# Patient Record
Sex: MALE | Race: OTHER | NOT HISPANIC OR LATINO | URBAN - METROPOLITAN AREA
[De-identification: names, ages, dates, MRNs, and addresses within clinical notes are randomized per-mention and may not be internally consistent; named-entity substitution may affect disease eponyms.]

---

## 2024-01-21 ENCOUNTER — INPATIENT (INPATIENT)
Facility: HOSPITAL | Age: 64
LOS: 1 days | Discharge: HOME | DRG: 84 | End: 2024-01-23
Attending: HOSPITALIST | Admitting: STUDENT IN AN ORGANIZED HEALTH CARE EDUCATION/TRAINING PROGRAM
Payer: COMMERCIAL

## 2024-01-21 VITALS
HEIGHT: 69 IN | OXYGEN SATURATION: 100 % | SYSTOLIC BLOOD PRESSURE: 152 MMHG | DIASTOLIC BLOOD PRESSURE: 76 MMHG | TEMPERATURE: 97 F | WEIGHT: 175.05 LBS | HEART RATE: 76 BPM | RESPIRATION RATE: 18 BRPM

## 2024-01-21 DIAGNOSIS — R93.89 ABNORMAL FINDINGS ON DIAGNOSTIC IMAGING OF OTHER SPECIFIED BODY STRUCTURES: ICD-10-CM

## 2024-01-21 DIAGNOSIS — Z29.9 ENCOUNTER FOR PROPHYLACTIC MEASURES, UNSPECIFIED: ICD-10-CM

## 2024-01-21 DIAGNOSIS — S20.219A CONTUSION OF UNSPECIFIED FRONT WALL OF THORAX, INITIAL ENCOUNTER: ICD-10-CM

## 2024-01-21 DIAGNOSIS — R55 SYNCOPE AND COLLAPSE: ICD-10-CM

## 2024-01-21 DIAGNOSIS — S70.02XA CONTUSION OF LEFT HIP, INITIAL ENCOUNTER: ICD-10-CM

## 2024-01-21 DIAGNOSIS — S09.90XA UNSPECIFIED INJURY OF HEAD, INITIAL ENCOUNTER: ICD-10-CM

## 2024-01-21 LAB
ALBUMIN SERPL ELPH-MCNC: 3.7 G/DL — SIGNIFICANT CHANGE UP (ref 3.3–5)
ALP SERPL-CCNC: 83 U/L — SIGNIFICANT CHANGE UP (ref 40–120)
ALT FLD-CCNC: 28 U/L — SIGNIFICANT CHANGE UP (ref 12–78)
ANION GAP SERPL CALC-SCNC: 4 MMOL/L — LOW (ref 5–17)
APPEARANCE UR: CLEAR — SIGNIFICANT CHANGE UP
AST SERPL-CCNC: 18 U/L — SIGNIFICANT CHANGE UP (ref 15–37)
BILIRUB SERPL-MCNC: 0.4 MG/DL — SIGNIFICANT CHANGE UP (ref 0.2–1.2)
BILIRUB UR-MCNC: NEGATIVE — SIGNIFICANT CHANGE UP
BUN SERPL-MCNC: 15 MG/DL — SIGNIFICANT CHANGE UP (ref 7–23)
CALCIUM SERPL-MCNC: 9.6 MG/DL — SIGNIFICANT CHANGE UP (ref 8.5–10.1)
CHLORIDE SERPL-SCNC: 107 MMOL/L — SIGNIFICANT CHANGE UP (ref 96–108)
CO2 SERPL-SCNC: 28 MMOL/L — SIGNIFICANT CHANGE UP (ref 22–31)
COLOR SPEC: YELLOW — SIGNIFICANT CHANGE UP
CREAT SERPL-MCNC: 1 MG/DL — SIGNIFICANT CHANGE UP (ref 0.5–1.3)
DIFF PNL FLD: NEGATIVE — SIGNIFICANT CHANGE UP
EGFR: 84 ML/MIN/1.73M2 — SIGNIFICANT CHANGE UP
GLUCOSE SERPL-MCNC: 159 MG/DL — HIGH (ref 70–99)
GLUCOSE UR QL: NEGATIVE MG/DL — SIGNIFICANT CHANGE UP
HCT VFR BLD CALC: 42.9 % — SIGNIFICANT CHANGE UP (ref 39–50)
HGB BLD-MCNC: 13.7 G/DL — SIGNIFICANT CHANGE UP (ref 13–17)
KETONES UR-MCNC: NEGATIVE MG/DL — SIGNIFICANT CHANGE UP
LEUKOCYTE ESTERASE UR-ACNC: NEGATIVE — SIGNIFICANT CHANGE UP
MCHC RBC-ENTMCNC: 26.9 PG — LOW (ref 27–34)
MCHC RBC-ENTMCNC: 31.9 GM/DL — LOW (ref 32–36)
MCV RBC AUTO: 84.1 FL — SIGNIFICANT CHANGE UP (ref 80–100)
NITRITE UR-MCNC: NEGATIVE — SIGNIFICANT CHANGE UP
NRBC # BLD: 0 /100 WBCS — SIGNIFICANT CHANGE UP (ref 0–0)
PH UR: 7.5 — SIGNIFICANT CHANGE UP (ref 5–8)
PLATELET # BLD AUTO: 254 K/UL — SIGNIFICANT CHANGE UP (ref 150–400)
POTASSIUM SERPL-MCNC: 4.6 MMOL/L — SIGNIFICANT CHANGE UP (ref 3.5–5.3)
POTASSIUM SERPL-SCNC: 4.6 MMOL/L — SIGNIFICANT CHANGE UP (ref 3.5–5.3)
PROT SERPL-MCNC: 7.7 G/DL — SIGNIFICANT CHANGE UP (ref 6–8.3)
PROT UR-MCNC: NEGATIVE MG/DL — SIGNIFICANT CHANGE UP
RBC # BLD: 5.1 M/UL — SIGNIFICANT CHANGE UP (ref 4.2–5.8)
RBC # FLD: 14.2 % — SIGNIFICANT CHANGE UP (ref 10.3–14.5)
SODIUM SERPL-SCNC: 139 MMOL/L — SIGNIFICANT CHANGE UP (ref 135–145)
SP GR SPEC: 1.04 — HIGH (ref 1–1.03)
UROBILINOGEN FLD QL: 0.2 MG/DL — SIGNIFICANT CHANGE UP (ref 0.2–1)
WBC # BLD: 10.29 K/UL — SIGNIFICANT CHANGE UP (ref 3.8–10.5)
WBC # FLD AUTO: 10.29 K/UL — SIGNIFICANT CHANGE UP (ref 3.8–10.5)

## 2024-01-21 PROCEDURE — 99223 1ST HOSP IP/OBS HIGH 75: CPT

## 2024-01-21 PROCEDURE — 70450 CT HEAD/BRAIN W/O DYE: CPT | Mod: 26,MA

## 2024-01-21 PROCEDURE — 93010 ELECTROCARDIOGRAM REPORT: CPT

## 2024-01-21 PROCEDURE — 71260 CT THORAX DX C+: CPT | Mod: 26,MA

## 2024-01-21 PROCEDURE — 99285 EMERGENCY DEPT VISIT HI MDM: CPT

## 2024-01-21 PROCEDURE — 72125 CT NECK SPINE W/O DYE: CPT | Mod: 26,MA

## 2024-01-21 PROCEDURE — 74177 CT ABD & PELVIS W/CONTRAST: CPT | Mod: 26,MA

## 2024-01-21 PROCEDURE — 73502 X-RAY EXAM HIP UNI 2-3 VIEWS: CPT | Mod: 26,LT

## 2024-01-21 RX ORDER — ACETAMINOPHEN 500 MG
1000 TABLET ORAL ONCE
Refills: 0 | Status: COMPLETED | OUTPATIENT
Start: 2024-01-21 | End: 2024-01-21

## 2024-01-21 RX ORDER — SODIUM CHLORIDE 9 MG/ML
1000 INJECTION INTRAMUSCULAR; INTRAVENOUS; SUBCUTANEOUS ONCE
Refills: 0 | Status: COMPLETED | OUTPATIENT
Start: 2024-01-21 | End: 2024-01-21

## 2024-01-21 RX ORDER — ACETAMINOPHEN 500 MG
650 TABLET ORAL EVERY 6 HOURS
Refills: 0 | Status: DISCONTINUED | OUTPATIENT
Start: 2024-01-21 | End: 2024-01-23

## 2024-01-21 RX ADMIN — Medication 1000 MILLIGRAM(S): at 20:00

## 2024-01-21 RX ADMIN — Medication 400 MILLIGRAM(S): at 19:39

## 2024-01-21 RX ADMIN — Medication 1000 MILLIGRAM(S): at 20:15

## 2024-01-21 RX ADMIN — SODIUM CHLORIDE 1000 MILLILITER(S): 9 INJECTION INTRAMUSCULAR; INTRAVENOUS; SUBCUTANEOUS at 19:38

## 2024-01-21 RX ADMIN — SODIUM CHLORIDE 1000 MILLILITER(S): 9 INJECTION INTRAMUSCULAR; INTRAVENOUS; SUBCUTANEOUS at 22:23

## 2024-01-21 NOTE — ED PROVIDER NOTE - OBJECTIVE STATEMENT
Patient is a 63-year-old healthy   male with no significant past medical history on no medications no allergies non-smoker nondrinker who fell down basement stairs at home.  Maximum number of stairs was 10.  Patient amnestic to event so what led up to him falling down the stairs i.e. events/symptoms that may have preceded the fall is not known.  Patient was found at the bottom of the stairs by his wife who was immediately present after she heard the sounds.  Patient was initially unconscious (per daughter) and then was confused post event with repetitive questioning and amnesia to event.  Patient was brought to the emergency department shortly thereafter at which time he was awake and alert with above complaints.  Of note he has never had a seizure and he has never had an event like this before.

## 2024-01-21 NOTE — H&P ADULT - PROBLEM SELECTOR PLAN 3
CT chest/CT A/P w/ IV cont: No CT evidence of acute traumatic sequela in the chest, abdomen or pelvis. 3 mm right middle lobe nodule, recommend follow-up chest CT in 6-12 months  - Patient informed about findings  - Patient to follow up outpatient for further management

## 2024-01-21 NOTE — H&P ADULT - NSHPREVIEWOFSYSTEMS_GEN_ALL_CORE
REVIEW OF SYSTEMS:  CONSTITUTIONAL: No fever/chills or appetite changes.  HENMT: No HA, lightheadedness/dizziness  RESPIRATORY: No cough, wheezing, hemoptysis; No shortness of breath.  CARDIOVASCULAR:+ Pleuritic chest pain No palpitations.  GASTROINTESTINAL: No abdominal or epigastric pain. No nausea or vomiting; No diarrhea or constipation.  GENITOURINARY: No dysuria, changes in frequency, hematuria, or incontinence  NEUROLOGICAL: Baseline strength. Sensation intact bilaterally.  SKIN: No itching, rashes  MUSCULOSKELETAL: + L.hip pain, No swelling

## 2024-01-21 NOTE — ED PROVIDER NOTE - CROS ED [facility] ALL NEG
Problem: Safety - Adult  Goal: Free from fall injury  Outcome: Adequate for Discharge  Flowsheets (Taken 11/2/2023 1110)  Free From Fall Injury: Instruct family/caregiver on patient safety  Note: Patient verbalizes understanding of fall precautions. Patient free from falls this visit. Problem: Discharge Planning  Goal: Discharge to home or other facility with appropriate resources  Outcome: Adequate for Discharge  Flowsheets (Taken 11/2/2023 1110)  Discharge to home or other facility with appropriate resources: Identify barriers to discharge with patient and caregiver  Note: Verbalized understanding of discharge instructions, follow-up appointments, and when to call the physician. Problem: Infection - Adult  Goal: Absence of infection at discharge  Outcome: Adequate for Discharge  Flowsheets (Taken 11/2/2023 1110)  Absence of infection at discharge:   Assess and monitor for signs and symptoms of infection   Monitor all insertion sites i.e., indwelling lines, tubes and drains  Note: Mediport site with no redness or warmth. Skin over port intact with no signs of breakdown noted. Patient verbalizes signs/symptoms of port infection and when to notify the physician. Chemotherapy Teaching     What is Chemotherapy   Drug action [x]   Method of Administration [x]   Handouts given []     Side Effects  Nausea/vomiting [x]   Diarrhea [x]   Fatigue [x]   Signs / Symptoms of infection [x]   Neutropenia [x]   Thrombocytopenia [x]   Alopecia [x]   neuropathy [x]   [place] diet &  the importance of fluids [x]       Micellaneous  Importance of nutrition [x]   Importance of oral hygiene [x]   When to call the MD [x]   Monitoring labs [x]   Use of supportive services []     Explanation of Drug Regimen / Frequency  5FU     Comments  Verbalized understanding to drug,action,side effects and when to call MD     Care plan reviewed with patient.   Patient verbalizes understanding of the plan of care and contribute to goal - - -

## 2024-01-21 NOTE — H&P ADULT - NSHPSOCIALHISTORY_GEN_ALL_CORE
Denies alcohol, tobacco, illicit drug use. Lives w/ wife and daughter. Able to ambulate and perform all ADL's indepdently.

## 2024-01-21 NOTE — ED PROVIDER NOTE - CARE PLAN
Principal Discharge DX:	Contusion, chest wall  Secondary Diagnosis:	Head trauma   1 Principal Discharge DX:	Syncope  Secondary Diagnosis:	Head trauma

## 2024-01-21 NOTE — H&P ADULT - NSHPPHYSICALEXAM_GEN_ALL_CORE
CONSTITUTIONAL: Well groomed, no apparent distress  EYES: No conjunctival or scleral injection, non-icteric  ENMT: Oral mucosa with moist membranes.   NECK: Supple, symmetric and without tracheal deviation   RESP: No respiratory distress, no use of accessory muscles; CTA b/l, no WRR  CV: RRR, +S1S2, no peripheral edema  GI: Soft, NT, ND  MSK: + L. hip pain TTP, Midsternum chest wall TTP, soft tissue swelling to the upper left portion of the forehead  SKIN: No rashes/ abrasions/ ecchymosis noted   NEURO: Sensation intact in upper and lower extremities b/l to light touch   PSYCH: Appropriate insight/judgment; A+O x 3, mood and affect appropriate

## 2024-01-21 NOTE — ED PROVIDER NOTE - PHYSICAL EXAMINATION
Examination reveals a well-developed well-nourished [place] male in no acute distress with the following.  HEENT normocephalic soft tissue swelling to the upper left portion of the forehead.  Pupils are equal round reactive to light and accommodation extraocular movements intact neck is supple with no midline tenderness.  Chest wall no ecchymosis but there is reproducible tenderness to palpation in the inferior lateral aspect of his chest wall without any crepitance.  Lungs are clear and symmetrical bilaterally heart regular rate and rhythm without any murmurs or gallops.  Abdomen is soft with minimal tenderness to deep palpation left upper quadrant.  Upper extremities completely normal no bruising no abrasions free range of motion of all joints.  Pelvis left hip reveals mild tenderness to palpation left proximal femur or hip area without any bruising.  Neurologically alert oriented x 4 without any focal neurologic deficits skin is warm and dry.

## 2024-01-21 NOTE — ED ADULT NURSE NOTE - NSFALLRISKINTERV_ED_ALL_ED

## 2024-01-21 NOTE — ED ADULT NURSE NOTE - OBJECTIVE STATEMENT
Pt. received alert and oriented x3 with chief complaint of trip and fall down 10 stairs landing on head w/ LOC. Pt. found on floor by daughter. Pt. presents w/ head/chest/left leg pain. Pt. placed on continuous cardiac monitor with continuous pulse ox. Pt. received alert and oriented x3 with chief complaint of trip and fall down 10 stairs landing on head w/ LOC. Pt. found on floor by daughter. Pt. presents w/ head/chest/left leg pain. Pt. placed on continuous cardiac monitor with continuous pulse ox. Hard c-collar placed on pt. upon arrival into room 18a.

## 2024-01-21 NOTE — ED PROVIDER NOTE - PROGRESS NOTE DETAILS
Radiology contacted me at approximately 7:45 PM to inform me of questionable "Speck" of blood immediately adjacent to the dura the right frontal region and recommended delayed imaging in 4 hours to see if density decreases stays the same or increases. Radiology contacted me at approximately 7:45 PM to inform me of questionable small focus of blood immediately adjacent to the dura the right frontal region and recommended delayed imaging in 4 hours to see if density decreases stays the same or increases.

## 2024-01-21 NOTE — H&P ADULT - PROBLEM SELECTOR PLAN 2
- CT Head no cont: Left frontal extracalvarial hematoma. Subtle focus of increased attenuation in a right frontal location, which may represent a small focus of subarachnoid hemorrhage,  - Will obtain repeat CT Head, f/u results   - No neurological symptoms

## 2024-01-21 NOTE — ED ADULT TRIAGE NOTE - CHIEF COMPLAINT QUOTE
patient to ED via wheelchair with c/o fall down stairs +LOC. denies use of blood thinners. unsure if fell first or syncopized first. also with left side rib pain

## 2024-01-21 NOTE — H&P ADULT - PROBLEM SELECTOR PLAN 1
Patient presents w/ fall with unclear etiology - DDX: mechanical fall vs arrythmia vs orthostatic hypotension   - CT Head no cont: Left frontal extracalvarial hematoma. Subtle focus of increased attenuation in a right frontal location, which may represent a small focus of subarachnoid hemorrhage  -  CT cervical spine no cont:  No acute fracture or traumatic subluxation. Multi-level degenerative changes, most prominent at C5-C6   -Check orthostatic vitals  - Check TSH, A1c, lipid profile, monitor electrolytes  - Telemetry monitoring to r/o arrhythmia  - F/u X ray hip pelvis   - Fall and aspiration precautions  - F/u TTE  - PT/ OT evaluation  - Tylenol 650mg PRN for mild pain   - Will hold off neuro consult at this time Patient presents w/ fall with unclear etiology - DDX: mechanical fall vs arrythmia vs orthostatic hypotension   - CT Head no cont: Left frontal extracalvarial hematoma. Subtle focus of increased attenuation in a right frontal location, which may represent a small focus of subarachnoid hemorrhage  -  CT cervical spine no cont:  No acute fracture or traumatic subluxation. Multi-level degenerative changes, most prominent at C5-C6   -Check orthostatic vitals  - Check TSH, A1c, lipid profile, monitor electrolytes  - Telemetry monitoring to r/o arrhythmia  - F/u X ray hip pelvis   - Fall and aspiration precautions  - F/u TTE  - PT/ OT evaluation  - Tylenol 650mg PRN for mild pain   - Can consider neuro consult if needed.

## 2024-01-21 NOTE — H&P ADULT - ATTENDING COMMENTS
Negative
64yo M w/ no significant PMH (on no meds) presents w/ fall. Patient states he was going downstairs when he suddenly fell (perhaps 10 stairs) and lost consciousness. Admitted for syncope, head trauma.     Syncope and collapse - Denies prodromal symptoms. CTH noted as above, will get MRI brain w/wo con to r/o acute/chronic pathology. f/u orthostatics, TTE in am. Fall precautions, neuro checks q4h. PT when able.   Traumatic fall 2/2 syncope - Trauma scan with CT head, cervical spine, chest/abdomen/pelvis negative for fractures. monitor on tele, PT when able.  3 mm lung nodule - f/u for screening as outpatient.  DVT ppx: SCDs for now

## 2024-01-21 NOTE — H&P ADULT - HISTORY OF PRESENT ILLNESS
64yo M w/ no significant PMH (on no meds) presents w/ fall. Patient states he was going downstairs when he suddenly fell (perhaps 10 stairs) and lost consciousness. Unable to contact family for history. Patient states that he felt normal and did not have any inciting event. Patient however is unsure of how he fell and states "he must have slipped". Patient states he lost consciousness for perhaps 10 minutes and his wife found him at the bottom of the stairs. As per ED note, patient was confused post event and was amnesic to event. Patient states he has never had a fall like this before and has no cardiac hx/ no seizure hx. Patient has seen a neurologist a few years ago but states it was because his legs "did not work as well" after having chikungunya infection. However patient is able to ambulate independently. Patient currently admits to achy mid sternum pain after the fall, rates it 8/10, non radiating, achy in nature, and pleuritic. Patient also has L. hip pain, rated 8/10, achy in nature, worse with movement. Patient states that he has been well hydrated and has been eating well. Drinks about 6-7 glasses of water per day. Patient currently denies fevers, chills, headache, congestion, sore throat, palpitations, sob, degroot, abdominal pain, n/v.     IN THE ED:  Temp  96.8 F , HR 76  , BP  152/76  ,RR 18 , SpO2 100% RA   S/P 1L Bolus NS IV x1, Ofirmev IV x1  EKG: Ordered   Labs significant for: WBC 10.29, H/H 13.2/42.9, Na 139, K 4.6, BUN/Cr 15/1,00,  Imaging: CT Head no cont: Left frontal extracalvarial hematoma. Subtle focus of increased attenuation in a right frontal location, which may represent a small focus of subarachnoid hemorrhage, attention on follow-up.; CT cervical spine no cont:  No acute fracture or traumatic subluxation. Multi-level degenerative changes, most prominent at C5-C6; CT chest/CT A/P w/ IV cont: No CT evidence of acute traumatic sequela in the chest, abdomen or pelvis. 3 mm right middle lobe nodule, recommend follow-up chest CT in 6-12 months.

## 2024-01-21 NOTE — ED PROVIDER NOTE - CLINICAL SUMMARY MEDICAL DECISION MAKING FREE TEXT BOX
63-year-old male fell downstairs short while ago at home sustaining injury to head and left chest as well as hip with questionable loss of consciousness and amnesia to event requiring thorough evaluation labs EKG and trauma imaging of the head neck chest abdomen and pelvis.

## 2024-01-21 NOTE — H&P ADULT - ASSESSMENT
64yo M w/ no significant PMH (on no meds) presents w/ fall. Patient states he was going downstairs when he suddenly fell (perhaps 10 stairs) and lost consciousness. Admitted for syncope, head trauma.

## 2024-01-22 LAB
A1C WITH ESTIMATED AVERAGE GLUCOSE RESULT: 5.5 % — SIGNIFICANT CHANGE UP (ref 4–5.6)
ALBUMIN SERPL ELPH-MCNC: 3.4 G/DL — SIGNIFICANT CHANGE UP (ref 3.3–5)
ALP SERPL-CCNC: 77 U/L — SIGNIFICANT CHANGE UP (ref 40–120)
ALT FLD-CCNC: 24 U/L — SIGNIFICANT CHANGE UP (ref 12–78)
ANION GAP SERPL CALC-SCNC: 1 MMOL/L — LOW (ref 5–17)
AST SERPL-CCNC: 11 U/L — LOW (ref 15–37)
BASOPHILS # BLD AUTO: 0.02 K/UL — SIGNIFICANT CHANGE UP (ref 0–0.2)
BASOPHILS NFR BLD AUTO: 0.3 % — SIGNIFICANT CHANGE UP (ref 0–2)
BILIRUB SERPL-MCNC: 0.6 MG/DL — SIGNIFICANT CHANGE UP (ref 0.2–1.2)
BUN SERPL-MCNC: 11 MG/DL — SIGNIFICANT CHANGE UP (ref 7–23)
CALCIUM SERPL-MCNC: 9.1 MG/DL — SIGNIFICANT CHANGE UP (ref 8.5–10.1)
CHLORIDE SERPL-SCNC: 108 MMOL/L — SIGNIFICANT CHANGE UP (ref 96–108)
CHOLEST SERPL-MCNC: 169 MG/DL — SIGNIFICANT CHANGE UP
CO2 SERPL-SCNC: 29 MMOL/L — SIGNIFICANT CHANGE UP (ref 22–31)
CREAT SERPL-MCNC: 0.9 MG/DL — SIGNIFICANT CHANGE UP (ref 0.5–1.3)
EGFR: 95 ML/MIN/1.73M2 — SIGNIFICANT CHANGE UP
EOSINOPHIL # BLD AUTO: 0.1 K/UL — SIGNIFICANT CHANGE UP (ref 0–0.5)
EOSINOPHIL NFR BLD AUTO: 1.5 % — SIGNIFICANT CHANGE UP (ref 0–6)
ESTIMATED AVERAGE GLUCOSE: 111 MG/DL — SIGNIFICANT CHANGE UP (ref 68–114)
GLUCOSE SERPL-MCNC: 108 MG/DL — HIGH (ref 70–99)
HCT VFR BLD CALC: 41.7 % — SIGNIFICANT CHANGE UP (ref 39–50)
HCV AB S/CO SERPL IA: 0.15 S/CO — SIGNIFICANT CHANGE UP (ref 0–0.99)
HCV AB SERPL-IMP: SIGNIFICANT CHANGE UP
HDLC SERPL-MCNC: 38 MG/DL — LOW
HGB BLD-MCNC: 13.1 G/DL — SIGNIFICANT CHANGE UP (ref 13–17)
IMM GRANULOCYTES NFR BLD AUTO: 0.2 % — SIGNIFICANT CHANGE UP (ref 0–0.9)
LIPID PNL WITH DIRECT LDL SERPL: 109 MG/DL — HIGH
LYMPHOCYTES # BLD AUTO: 2.1 K/UL — SIGNIFICANT CHANGE UP (ref 1–3.3)
LYMPHOCYTES # BLD AUTO: 32.4 % — SIGNIFICANT CHANGE UP (ref 13–44)
MCHC RBC-ENTMCNC: 26.6 PG — LOW (ref 27–34)
MCHC RBC-ENTMCNC: 31.4 GM/DL — LOW (ref 32–36)
MCV RBC AUTO: 84.6 FL — SIGNIFICANT CHANGE UP (ref 80–100)
MONOCYTES # BLD AUTO: 0.74 K/UL — SIGNIFICANT CHANGE UP (ref 0–0.9)
MONOCYTES NFR BLD AUTO: 11.4 % — SIGNIFICANT CHANGE UP (ref 2–14)
NEUTROPHILS # BLD AUTO: 3.51 K/UL — SIGNIFICANT CHANGE UP (ref 1.8–7.4)
NEUTROPHILS NFR BLD AUTO: 54.2 % — SIGNIFICANT CHANGE UP (ref 43–77)
NON HDL CHOLESTEROL: 132 MG/DL — HIGH
NRBC # BLD: 0 /100 WBCS — SIGNIFICANT CHANGE UP (ref 0–0)
PLATELET # BLD AUTO: 245 K/UL — SIGNIFICANT CHANGE UP (ref 150–400)
POTASSIUM SERPL-MCNC: 4.2 MMOL/L — SIGNIFICANT CHANGE UP (ref 3.5–5.3)
POTASSIUM SERPL-SCNC: 4.2 MMOL/L — SIGNIFICANT CHANGE UP (ref 3.5–5.3)
PROT SERPL-MCNC: 7 G/DL — SIGNIFICANT CHANGE UP (ref 6–8.3)
RAPID RVP RESULT: SIGNIFICANT CHANGE UP
RBC # BLD: 4.93 M/UL — SIGNIFICANT CHANGE UP (ref 4.2–5.8)
RBC # FLD: 14.2 % — SIGNIFICANT CHANGE UP (ref 10.3–14.5)
SARS-COV-2 RNA SPEC QL NAA+PROBE: SIGNIFICANT CHANGE UP
SODIUM SERPL-SCNC: 138 MMOL/L — SIGNIFICANT CHANGE UP (ref 135–145)
TRIGL SERPL-MCNC: 126 MG/DL — SIGNIFICANT CHANGE UP
TROPONIN I, HIGH SENSITIVITY RESULT: 9.3 NG/L — SIGNIFICANT CHANGE UP
TSH SERPL-MCNC: 0.35 UIU/ML — LOW (ref 0.36–3.74)
WBC # BLD: 6.48 K/UL — SIGNIFICANT CHANGE UP (ref 3.8–10.5)
WBC # FLD AUTO: 6.48 K/UL — SIGNIFICANT CHANGE UP (ref 3.8–10.5)

## 2024-01-22 PROCEDURE — 93306 TTE W/DOPPLER COMPLETE: CPT | Mod: 26

## 2024-01-22 PROCEDURE — 99223 1ST HOSP IP/OBS HIGH 75: CPT

## 2024-01-22 PROCEDURE — 99232 SBSQ HOSP IP/OBS MODERATE 35: CPT

## 2024-01-22 PROCEDURE — 93880 EXTRACRANIAL BILAT STUDY: CPT | Mod: 26

## 2024-01-22 PROCEDURE — 70450 CT HEAD/BRAIN W/O DYE: CPT | Mod: 26

## 2024-01-22 PROCEDURE — 70553 MRI BRAIN STEM W/O & W/DYE: CPT | Mod: 26

## 2024-01-22 RX ORDER — INFLUENZA VIRUS VACCINE 15; 15; 15; 15 UG/.5ML; UG/.5ML; UG/.5ML; UG/.5ML
0.7 SUSPENSION INTRAMUSCULAR ONCE
Refills: 0 | Status: DISCONTINUED | OUTPATIENT
Start: 2024-01-22 | End: 2024-01-23

## 2024-01-22 RX ADMIN — Medication 650 MILLIGRAM(S): at 05:14

## 2024-01-22 RX ADMIN — Medication 650 MILLIGRAM(S): at 13:53

## 2024-01-22 RX ADMIN — Medication 650 MILLIGRAM(S): at 07:00

## 2024-01-22 RX ADMIN — Medication 650 MILLIGRAM(S): at 23:49

## 2024-01-22 NOTE — CONSULT NOTE ADULT - ASSESSMENT
CHARTING IN PROGRESS  64yo M w/ no significant PMH (on no meds) presents w/ fall. Admitted for syncope work-up. CTH noted to have small right frontal subarachnoid hemorrhage.    #Syncope  - Patient is not complaining of any cardiac symptoms at this time.  - No clear evidence of acute ischemia, trops negative x 1.  - EKG sinus rhythm with 1st deg AV block  - No meaningful evidence of volume overload.  - TTE with LVEF 64%, grossly normal  - F/u orthostatic VS  - BP well controlled, monitor routine hemodynamics.  - No telemetry events to date. Continue to monitor on tele  - TSH borderline low, f/u T3 and T4  - CTH with small right frontal subarachnoid hemorrhage, repeat CTH stable. Pending MRI  - Monitor and replete lytes, keep K>4, Mg>2.  - Other cardiovascular workup will depend on clinical course.  - All other workup per primary team.  - Will continue to follow.  - Will need outpatient follow up for Zio/Holter monitor   62yo M w/ no significant PMH (on no meds) presents w/ fall. Admitted for syncope work-up. CTH noted to have small right frontal subarachnoid hemorrhage.    #Syncope  - Likely arrhythmogenic  - No clear evidence of acute ischemia, trops negative x 1.  - EKG sinus rhythm with 1st deg AV block  - No telemetry events to date. Continue to monitor on tele  - Will need outpatient follow up for 2wk Zio monitor and stress test  - No meaningful evidence of volume overload.  - TTE with LVEF 64%, grossly normal  - F/u orthostatic VS  - BP well controlled, monitor routine hemodynamics.  - TSH borderline low, f/u T3 and T4  - CTH with small right frontal subarachnoid hemorrhage, repeat CTH stable. Pending MRI  - Monitor and replete lytes, keep K>4, Mg>2.  - Other cardiovascular workup will depend on clinical course.  - All other workup per primary team.  - Will continue to follow.   62yo M w/ no significant PMH (on no meds) presents w/ fall. Admitted for syncope work-up. CTH noted to have small right frontal subarachnoid hemorrhage.    #Syncope  - Possibly arrhythmogenic  - No clear evidence of acute ischemia, trops negative x 1.  - EKG sinus rhythm with 1st deg AV block  - No telemetry events to date. Continue to monitor on tele  - Will need outpatient follow up for 2wk Zio monitor and stress test  - No meaningful evidence of volume overload.  - TTE with LVEF 64%, grossly normal  - F/u orthostatic VS  - BP well controlled, monitor routine hemodynamics.  - TSH borderline low, f/u T3 and T4  - CTH with small right frontal subarachnoid hemorrhage, repeat CTH stable. Pending MRI  - Monitor and replete lytes, keep K>4, Mg>2.  - Other cardiovascular workup will depend on clinical course.  - All other workup per primary team.  - Will continue to follow.

## 2024-01-22 NOTE — PROGRESS NOTE ADULT - PROBLEM SELECTOR PLAN 2
- CT Head no cont: Left frontal extracalvarial hematoma. Subtle focus of increased attenuation in a right frontal location, which may represent a small focus of subarachnoid hemorrhage,  - No neurological symptoms  plan  repeat ct head now  MRI brain

## 2024-01-22 NOTE — PATIENT PROFILE ADULT - FALL HARM RISK - RISK INTERVENTIONS

## 2024-01-22 NOTE — CONSULT NOTE ADULT - ATTENDING COMMENTS
62yo M w/ no significant PMH (on no meds) presents w/ fall. Admitted for syncope work-up. CTH noted to have small right frontal subarachnoid hemorrhage.    Very concerning syncopal event  No prodrome noted and he has no memory of the event  Now with small subarachnoid hemorrhage   EKG with NSR with first degree, no delta wave noted  TTE with normal LV, RV function, no significant valvular disease, no pHTN  No events on tele thus far but has been off most of the day  Would obtain orthostatics  Would obtain carotid dopplers in this scenario   MRI done, read is pending  Continue tele monitoring  Will need very close Cardiology follow up with me at least EKG stress + 2 week Steven

## 2024-01-22 NOTE — PROGRESS NOTE ADULT - PROBLEM SELECTOR PLAN 1
Patient presents w/ fall with unclear etiology - DDX: mechanical fall vs arrythmia vs orthostatic hypotension   - CT Head no cont: Left frontal extracalvarial hematoma. Subtle focus of increased attenuation in a right frontal location, which may represent a small focus of subarachnoid hemorrhage  -  CT cervical spine no cont:  No acute fracture or traumatic subluxation. Multi-level degenerative changes, most prominent at C5-C6  -tele thus far benign  -echo benign  -PT consulted  plan:  -cards consult  - Check TSH, A1c, lipid profile, monitor electrolytes  - Fall and aspiration precautions  - F/u TTE  - PT/ OT evaluation  - Tylenol 650mg PRN for mild pain   - Can consider neuro consult if needed.

## 2024-01-22 NOTE — CHART NOTE - NSCHARTNOTEFT_GEN_A_CORE
[facility]     62yo M w/ no significant PMH (on no meds) presented w/ fall. Patient stated he was going downstairs when he suddenly fell (perhaps 10 stairs) and lost consciousness. Admitted for syncope, head trauma.   - CT Head no cont: Left frontal extracalvarial hematoma. Subtle focus of increased attenuation in a right frontal location, which may represent a small focus of subarachnoid hemorrhage    Neuro intact per report    Stable repeat CTH  MRI brain pending    Neurochecks q4h  hold AC/AP   SBP goal <160    No indication for Neurosurgical or Neurocritical care intervention at this time. Initial injury>24h ago and stable exam/imaging.  Please recall if any neurologic decompensation or acute findings on imaging.      Case discussed with Neurosurgery Dr. Polanco and Hospitalist Dr. Farrell

## 2024-01-22 NOTE — PHYSICAL THERAPY INITIAL EVALUATION ADULT - TRANSFER TRAINING, PT EVAL
Patient will improve sit <-> stand with independence 3-5 sessions in order for patient to safely get in and out of chair

## 2024-01-22 NOTE — PATIENT PROFILE ADULT - NSPROMEDSBROUGHTTOHOSP_GEN_A_NUR
Nutrition Assessment   Assessment Type: Follow up    Demographic/Anthropometrics Information  Gender: male   Patient Age: 52 year old  Height:    Ht Readings from Last 1 Encounters:   03/06/20 6' 5.01\" (1.956 m)      Weight:   Wt Readings from Last 1 Encounters:   03/06/20 103 kg      BMI:   BMI Readings from Last 1 Encounters:   03/06/20 26.92 kg/m²     Weight Classification: Overweight (BMI 25-29.9)    Estimated Nutritional Needs  Needs previously estimated to be 1430-4499 kcals per day, 13-14 servings carb per day    Nutrition Diagnosis (PES)  Food and nutrition knowledge related deficit related to Lack of prior exposure to information as evidenced by Need for Education    Nutrition Plan  Recommended Nutrition Intervention: nutrition education  Monitor: Biochemical data, medical tests, procedures and Weight    New Diet recall  Breakfast- 1-2 slices bread with butter, eggs OR ramen noodles with boiled egg, unsweetened tea    Snack- 12 crackers and unsweetened tea    Dinner- 3 cups rice, greens, fried or baked meat    Dietitian Notes/Impressions/Recommendations:  Follow up for pt with type 2 DM, HTN and HL.  Pt's wife stated that she is trying to limit portion sizes for pt and encourages him to eat 3 meals per day.  Pt does not seem to be taking a leading role in his healthy eating.  Pt is not carb counting; at this point the plate method seems more appropriate for pt, which was discussed at initial visit.    Per pt, fasting glucose is running >= 200mg/dL;  He used to take lantus but stopped d/t cost.  Pt is planning on restarting lantus soon.    Reviewed plate method and discussed need for 3 meals daily.  Asked pt if he can increase walking from 30 minutes per day to 45 minutes but he said he has pain if he walks more.    New Goals:  1. Eat 3 meals daily  2. Eat a good source of protein at each meal  3. Limit serving of rice to 1 cup  4. Continue to walk 30 minutes, 4-5 days per week    Pt to call if he would like  follow up appointment.    Marycruz Sung, MS, RDN, LDN, CDE  Clinical Dietitian       no

## 2024-01-22 NOTE — PHYSICAL THERAPY INITIAL EVALUATION ADULT - PERTINENT HX OF CURRENT PROBLEM, REHAB EVAL
64yo M w/ no significant PMH (on no meds) presents w/ fall. Patient states he was going downstairs when he suddenly fell (perhaps 10 stairs) and lost consciousness. Unable to contact family for history. Patient states that he felt normal and did not have any inciting event. Patient however is unsure of how he fell and states "he must have slipped". Patient states he lost consciousness for perhaps 10 minutes and his wife found him at the bottom of the stairs. As per ED note, patient was confused post event and was amnesic to event. Patient states he has never had a fall like this before and has no cardiac hx/ no seizure hx. Patient has seen a neurologist a few years ago but states it was because his legs "did not work as well" after having chikungunya infection. However patient is able to ambulate independently. Patient currently admits to achy mid sternum pain after the fall, rates it 8/10, non radiating, achy in nature, and pleuritic. Patient also has L. hip pain, rated 8/10, achy in nature, worse with movement. Patient states that he has been well hydrated and has been eating well. Drinks about 6-7 glasses of water per day. Patient currently denies fevers, chills, headache, congestion, sore throat, palpitations, sob, degroot, abdominal pain, n/v.

## 2024-01-22 NOTE — PHYSICAL THERAPY INITIAL EVALUATION ADULT - ADDITIONAL COMMENTS
History obtained from patient and patient's dtr bedside. Patient lives with dtr in private home with 4 steps to enter and 10+14 steps inside house. Patient reports prior to admission being independent with no assistive device with all functional mobility.

## 2024-01-22 NOTE — CONSULT NOTE ADULT - SUBJECTIVE AND OBJECTIVE BOX
[facility]    Ronnie Mejia Patel, Jose, Nishant        [phone] (office)    Reason for Consult: syncope    Interval HPI: Patient seen and examined at bedside. No acute events overnight.     HPI:  62yo M w/ no significant PMH (on no meds) presents w/ fall. Patient states he was going downstairs when he suddenly fell (perhaps 10 stairs) and lost consciousness. Unable to contact family for history. Patient states that he felt normal and did not have any inciting event. Patient however is unsure of how he fell and states "he must have slipped". Patient states he lost consciousness for perhaps 10 minutes and his wife found him at the bottom of the stairs. As per ED note, patient was confused post event and was amnesic to event. Patient states he has never had a fall like this before and has no cardiac hx/ no seizure hx. Patient has seen a neurologist a few years ago but states it was because his legs "did not work as well" after having chikungunya infection. However patient is able to ambulate independently. Patient currently admits to achy mid sternum pain after the fall, rates it 8/10, non radiating, achy in nature, and pleuritic. Patient also has L. hip pain, rated 8/10, achy in nature, worse with movement. Patient states that he has been well hydrated and has been eating well. Drinks about 6-7 glasses of water per day. Patient currently denies fevers, chills, headache, congestion, sore throat, palpitations, sob, degroot, abdominal pain, n/v.     IN THE ED:  Temp  96.8 F , HR 76  , BP  152/76  ,RR 18 , SpO2 100% RA   S/P 1L Bolus NS IV x1, Ofirmev IV x1  EKG: Ordered   Labs significant for: WBC 10.29, H/H 13.2/42.9, Na 139, K 4.6, BUN/Cr 15/1,00,  Imaging: CT Head no cont: Left frontal extracalvarial hematoma. Subtle focus of increased attenuation in a right frontal location, which may represent a small focus of subarachnoid hemorrhage, attention on follow-up.; CT cervical spine no cont:  No acute fracture or traumatic subluxation. Multi-level degenerative changes, most prominent at C5-C6; CT chest/CT A/P w/ IV cont: No CT evidence of acute traumatic sequela in the chest, abdomen or pelvis. 3 mm right middle lobe nodule, recommend follow-up chest CT in 6-12 months.         (21 Jan 2024 21:42)      PAST MEDICAL & SURGICAL HISTORY:  No pertinent past medical history      No significant past surgical history          SOCIAL HISTORY: No active tobacco, alcohol or illicit drug use    FAMILY HISTORY:  No pertinent family history in first degree relatives        Home Medications:      MEDICATIONS  (STANDING):    MEDICATIONS  (PRN):  acetaminophen     Tablet .. 650 milliGRAM(s) Oral every 6 hours PRN Temp greater or equal to 38C (100.4F), Mild Pain (1 - 3)      Allergies    No Known Allergies    Intolerances        REVIEW OF SYSTEMS: Negative except as per HPI.    VITAL SIGNS:   Vital Signs Last 24 Hrs  T(C): 37.1 (22 Jan 2024 12:55), Max: 37.1 (22 Jan 2024 12:55)  T(F): 98.8 (22 Jan 2024 12:55), Max: 98.8 (22 Jan 2024 12:55)  HR: 77 (22 Jan 2024 12:55) (76 - 83)  BP: 135/81 (22 Jan 2024 12:55) (117/60 - 152/76)  BP(mean): --  RR: 18 (22 Jan 2024 12:55) (18 - 19)  SpO2: 99% (22 Jan 2024 12:55) (97% - 100%)    Parameters below as of 22 Jan 2024 12:00  Patient On (Oxygen Delivery Method): room air        I&O's Summary      PHYSICAL EXAM:  Constitutional: NAD, well-developed  HEENT NC/AT, moist mucous membranes  Pulmonary: Non-labored, breath sounds are clear bilaterally, no wheezing, rales or rhonchi  Cardiovascular: +S1, S2, RRR, no murmur  Gastrointestinal: Soft, nontender, nondistended, normoactive bowel sounds  Extremities: No peripheral edema   Neurological: Alert, strength and sensitivity are grossly intact  Skin: No obvious lesions/rashes  Psych: Mood & affect appropriate    LABS: All Labs Reviewed:                        13.1   6.48  )-----------( 245      ( 22 Jan 2024 14:28 )             41.7                         13.7   10.29 )-----------( 254      ( 21 Jan 2024 19:02 )             42.9     22 Jan 2024 14:28    138    |  108    |  11     ----------------------------<  108    4.2     |  29     |  0.90   21 Jan 2024 19:02    139    |  107    |  15     ----------------------------<  159    4.6     |  28     |  1.00     Ca    9.1        22 Jan 2024 14:28  Ca    9.6        21 Jan 2024 19:02    TPro  7.0    /  Alb  3.4    /  TBili  0.6    /  DBili  x      /  AST  11     /  ALT  24     /  AlkPhos  77     22 Jan 2024 14:28  TPro  7.7    /  Alb  3.7    /  TBili  0.4    /  DBili  x      /  AST  18     /  ALT  28     /  AlkPhos  83     21 Jan 2024 19:02          Blood Culture:     01-22 @ 14:28  TSH: 0.35      EKG: sinus rhythm with 1st deg AV block    RADIOLOGY:    CTH with Left frontal extracalvarial hematoma. Subtle focus of increased attenuation in a right frontal location, which may represent a small focus of subarachnoid hemorrhage   [facility]    Ronnie Mejia Patel, Jose, Nishant        [phone] (office)    Reason for Consult: syncope    Interval HPI: Patient seen and examined at bedside. Denies medical hx, not any meds, has never seen a cardiologist. Reports this is the first time he has lost consciousness and fallen, unsure of down time, was found by his wife and daughter at the bottom of the stairs, fell down about 10 steps. Complaining of reproducible right sided chest pain after the fall. No other acute cardiac complaints. Denies personal or familial cardiac hx or hx of sudden death in the family.    HPI:  62yo M w/ no significant PMH (on no meds) presents w/ fall. Patient states he was going downstairs when he suddenly fell (perhaps 10 stairs) and lost consciousness. Unable to contact family for history. Patient states that he felt normal and did not have any inciting event. Patient however is unsure of how he fell and states "he must have slipped". Patient states he lost consciousness for perhaps 10 minutes and his wife found him at the bottom of the stairs. As per ED note, patient was confused post event and was amnesic to event. Patient states he has never had a fall like this before and has no cardiac hx/ no seizure hx. Patient has seen a neurologist a few years ago but states it was because his legs "did not work as well" after having chikungunya infection. However patient is able to ambulate independently. Patient currently admits to achy mid sternum pain after the fall, rates it 8/10, non radiating, achy in nature, and pleuritic. Patient also has L. hip pain, rated 8/10, achy in nature, worse with movement. Patient states that he has been well hydrated and has been eating well. Drinks about 6-7 glasses of water per day. Patient currently denies fevers, chills, headache, congestion, sore throat, palpitations, sob, degroot, abdominal pain, n/v.     IN THE ED:  Temp  96.8 F , HR 76  , BP  152/76  ,RR 18 , SpO2 100% RA   S/P 1L Bolus NS IV x1, Ofirmev IV x1  EKG: Ordered   Labs significant for: WBC 10.29, H/H 13.2/42.9, Na 139, K 4.6, BUN/Cr 15/1,00,  Imaging: CT Head no cont: Left frontal extracalvarial hematoma. Subtle focus of increased attenuation in a right frontal location, which may represent a small focus of subarachnoid hemorrhage, attention on follow-up.; CT cervical spine no cont:  No acute fracture or traumatic subluxation. Multi-level degenerative changes, most prominent at C5-C6; CT chest/CT A/P w/ IV cont: No CT evidence of acute traumatic sequela in the chest, abdomen or pelvis. 3 mm right middle lobe nodule, recommend follow-up chest CT in 6-12 months.         (21 Jan 2024 21:42)      PAST MEDICAL & SURGICAL HISTORY:  No pertinent past medical history      No significant past surgical history          SOCIAL HISTORY: No active tobacco, alcohol or illicit drug use    FAMILY HISTORY:  No pertinent family history in first degree relatives        Home Medications:      MEDICATIONS  (STANDING):    MEDICATIONS  (PRN):  acetaminophen     Tablet .. 650 milliGRAM(s) Oral every 6 hours PRN Temp greater or equal to 38C (100.4F), Mild Pain (1 - 3)      Allergies    No Known Allergies    Intolerances        REVIEW OF SYSTEMS: Negative except as per HPI.    VITAL SIGNS:   Vital Signs Last 24 Hrs  T(C): 37.1 (22 Jan 2024 12:55), Max: 37.1 (22 Jan 2024 12:55)  T(F): 98.8 (22 Jan 2024 12:55), Max: 98.8 (22 Jan 2024 12:55)  HR: 77 (22 Jan 2024 12:55) (76 - 83)  BP: 135/81 (22 Jan 2024 12:55) (117/60 - 152/76)  BP(mean): --  RR: 18 (22 Jan 2024 12:55) (18 - 19)  SpO2: 99% (22 Jan 2024 12:55) (97% - 100%)    Parameters below as of 22 Jan 2024 12:00  Patient On (Oxygen Delivery Method): room air        I&O's Summary      PHYSICAL EXAM:  Constitutional: NAD, well-developed  HEENT NC/AT, moist mucous membranes  Pulmonary: Non-labored, breath sounds are clear bilaterally, no wheezing, rales or rhonchi  Cardiovascular: +S1, S2, RRR, no murmur  Gastrointestinal: Soft, nontender, nondistended, normoactive bowel sounds  Extremities: No peripheral edema   Neurological: Alert, strength and sensitivity are grossly intact  Skin: No obvious lesions/rashes  Psych: Mood & affect appropriate    LABS: All Labs Reviewed:                        13.1   6.48  )-----------( 245      ( 22 Jan 2024 14:28 )             41.7                         13.7   10.29 )-----------( 254      ( 21 Jan 2024 19:02 )             42.9     22 Jan 2024 14:28    138    |  108    |  11     ----------------------------<  108    4.2     |  29     |  0.90   21 Jan 2024 19:02    139    |  107    |  15     ----------------------------<  159    4.6     |  28     |  1.00     Ca    9.1        22 Jan 2024 14:28  Ca    9.6        21 Jan 2024 19:02    TPro  7.0    /  Alb  3.4    /  TBili  0.6    /  DBili  x      /  AST  11     /  ALT  24     /  AlkPhos  77     22 Jan 2024 14:28  TPro  7.7    /  Alb  3.7    /  TBili  0.4    /  DBili  x      /  AST  18     /  ALT  28     /  AlkPhos  83     21 Jan 2024 19:02          Blood Culture:     01-22 @ 14:28  TSH: 0.35      EKG: sinus rhythm with 1st deg AV block    RADIOLOGY:    CTH with Left frontal extracalvarial hematoma. Subtle focus of increased attenuation in a right frontal location, which may represent a small focus of subarachnoid hemorrhage

## 2024-01-22 NOTE — PHYSICAL THERAPY INITIAL EVALUATION ADULT - GAIT TRAINING, PT EVAL
Patient will ambulate 150 feet within 3-5 sessions to allow patient to ambulate household distances.

## 2024-01-22 NOTE — PROGRESS NOTE ADULT - SUBJECTIVE AND OBJECTIVE BOX
patient seen and examined  comfortable  reports headache is less  in speaking to patient  patient reports not remembering the event of fall. the last thing he remembers was the event before the fall, attempting to go to basement [place] travel [place]      VItals stable  echo stable  tele thus far no events  for MRI head  Review of Systems:  General:denies fever chills, headache, weakness  HEENT: denies blurry vision,diffculty swallowing, difficulty hearing, tinnitus  Cardiovascular: denies chest pain  ,palpitations  Pulmonary:denies shortness of breath, cough, wheezing, hemoptysis  Gastrointestinal: denies abdominal pain, constipation, diarrhea,nausea , vomiting, hematochezia  : denies hematuria, dysuria, or incontinence  Neurological: denies weakness, numbness , tingling, dizziness, tremors  MSK: denies muscle pain, difficulty ambulating, swelling, back pain  skin: denies skin rash, itching, burning, or  skin lesions  Psychiatrical: denies mood disturbances, anxierty, feeling depressed, depression , or difficulty sleeping    Objective:  Vitals  T(C): 37.1 (01-22-24 @ 12:55), Max: 37.1 (01-22-24 @ 12:55)  HR: 77 (01-22-24 @ 12:55) (76 - 83)  BP: 135/81 (01-22-24 @ 12:55) (117/60 - 152/76)  RR: 18 (01-22-24 @ 12:55) (18 - 19)  SpO2: 99% (01-22-24 @ 12:55) (97% - 100%)    Physical Exam:  General: comfortable, no acute distress  HEENT: Atraumatic, no LAD, trachea midline, PERRLA  Cardiovascular: normal s1s2, no murmurs, gallops or fricition rubs  Pulmonary: clear to ausculation Bilaterally, no wheezing , rhonchi  Gastrointestinal: soft non tender non distended, no masses felt, no organomegally  Muscloskeletal: no lower extremity edema, intact bilateral lower extremity pulses  Neurological: CN II-12 intact. No focal weakness  Psychiatrical: normal mood, cooperative  SKIN: no rash, lesions or ulcers    Labs:                          13.1   6.48  )-----------( 245      ( 22 Jan 2024 14:28 )             41.7     01-21    139  |  107  |  15  ----------------------------<  159<H>  4.6   |  28  |  1.00    Ca    9.6      21 Jan 2024 19:02    TPro  7.7  /  Alb  3.7  /  TBili  0.4  /  DBili  x   /  AST  18  /  ALT  28  /  AlkPhos  83  01-21    LIVER FUNCTIONS - ( 21 Jan 2024 19:02 )  Alb: 3.7 g/dL / Pro: 7.7 g/dL / ALK PHOS: 83 U/L / ALT: 28 U/L / AST: 18 U/L / GGT: x                 Active Medications  MEDICATIONS  (STANDING):    MEDICATIONS  (PRN):  acetaminophen     Tablet .. 650 milliGRAM(s) Oral every 6 hours PRN Temp greater or equal to 38C (100.4F), Mild Pain (1 - 3)

## 2024-01-23 ENCOUNTER — TRANSCRIPTION ENCOUNTER (OUTPATIENT)
Age: 64
End: 2024-01-23

## 2024-01-23 VITALS
DIASTOLIC BLOOD PRESSURE: 67 MMHG | RESPIRATION RATE: 19 BRPM | SYSTOLIC BLOOD PRESSURE: 128 MMHG | HEART RATE: 72 BPM | TEMPERATURE: 98 F | OXYGEN SATURATION: 98 %

## 2024-01-23 PROCEDURE — 73502 X-RAY EXAM HIP UNI 2-3 VIEWS: CPT

## 2024-01-23 PROCEDURE — 83036 HEMOGLOBIN GLYCOSYLATED A1C: CPT

## 2024-01-23 PROCEDURE — 93005 ELECTROCARDIOGRAM TRACING: CPT

## 2024-01-23 PROCEDURE — 36415 COLL VENOUS BLD VENIPUNCTURE: CPT

## 2024-01-23 PROCEDURE — 0225U NFCT DS DNA&RNA 21 SARSCOV2: CPT

## 2024-01-23 PROCEDURE — 93880 EXTRACRANIAL BILAT STUDY: CPT

## 2024-01-23 PROCEDURE — 80061 LIPID PANEL: CPT

## 2024-01-23 PROCEDURE — 99232 SBSQ HOSP IP/OBS MODERATE 35: CPT

## 2024-01-23 PROCEDURE — 84443 ASSAY THYROID STIM HORMONE: CPT

## 2024-01-23 PROCEDURE — 96365 THER/PROPH/DIAG IV INF INIT: CPT

## 2024-01-23 PROCEDURE — 99239 HOSP IP/OBS DSCHRG MGMT >30: CPT

## 2024-01-23 PROCEDURE — 74177 CT ABD & PELVIS W/CONTRAST: CPT | Mod: MA

## 2024-01-23 PROCEDURE — 99285 EMERGENCY DEPT VISIT HI MDM: CPT

## 2024-01-23 PROCEDURE — 93306 TTE W/DOPPLER COMPLETE: CPT

## 2024-01-23 PROCEDURE — 85027 COMPLETE CBC AUTOMATED: CPT

## 2024-01-23 PROCEDURE — 84484 ASSAY OF TROPONIN QUANT: CPT

## 2024-01-23 PROCEDURE — A9579: CPT

## 2024-01-23 PROCEDURE — 71260 CT THORAX DX C+: CPT | Mod: MA

## 2024-01-23 PROCEDURE — 86803 HEPATITIS C AB TEST: CPT

## 2024-01-23 PROCEDURE — 70450 CT HEAD/BRAIN W/O DYE: CPT | Mod: MA

## 2024-01-23 PROCEDURE — 80053 COMPREHEN METABOLIC PANEL: CPT

## 2024-01-23 PROCEDURE — 97162 PT EVAL MOD COMPLEX 30 MIN: CPT

## 2024-01-23 PROCEDURE — 81003 URINALYSIS AUTO W/O SCOPE: CPT

## 2024-01-23 PROCEDURE — 72125 CT NECK SPINE W/O DYE: CPT | Mod: MA

## 2024-01-23 PROCEDURE — 70553 MRI BRAIN STEM W/O & W/DYE: CPT

## 2024-01-23 PROCEDURE — 85025 COMPLETE CBC W/AUTO DIFF WBC: CPT

## 2024-01-23 RX ADMIN — Medication 650 MILLIGRAM(S): at 05:13

## 2024-01-23 NOTE — PROGRESS NOTE ADULT - NS ATTEND AMEND GEN_ALL_CORE FT
62yo M w/ no significant PMH (on no meds) presents w/ fall. Admitted for syncope work-up. CTH noted to have small right frontal subarachnoid hemorrhage.    syncope of uncertain etiology with associ sah  has not memory of the event though this may be from head trauma and retrograde amnesia  will need followup for monitoring and ischemic eval  echo normal ef  dc planning as per primary team

## 2024-01-23 NOTE — DISCHARGE NOTE PROVIDER - CARE PROVIDER_API CALL
Elliott Mejia  Interventional Cardiology  [address] [place] [address]  Phone: [phone]  Fax: [phone]  Follow Up Time: 2 weeks    Suman Fuentes  Neurosurgery  [address] [place] [address]  Phone: [phone]  Fax: [phone]  Follow Up Time: 2 weeks    Emmy Castillo [facility]  Neurology  [address] [place] [address]  Phone: [phone]  Fax: [phone]  Follow Up Time: 2 weeks

## 2024-01-23 NOTE — DISCHARGE NOTE PROVIDER - NSDCCPCAREPLAN_GEN_ALL_CORE_FT
PRINCIPAL DISCHARGE DIAGNOSIS  Diagnosis: Syncope  Assessment and Plan of Treatment: You were evaluated for the following hospital problems:  1. Neurosurgery (dr. Fuentes consulted for traumatic hemorhage) stable does not require transfer. MR head and dopplers are reassuring  Please followup with Dr. Fuentes in two week followup in office for repeat imaging  2. As for syncope event , please followup with cardiology for an outpatient stress test  3. Wife concerns on tremors. patient has mild tremors of outstreteched hands. Neurology consulted (Dr leal). Please followup outpatient        SECONDARY DISCHARGE DIAGNOSES  Diagnosis: Head trauma  Assessment and Plan of Treatment:

## 2024-01-23 NOTE — CARE COORDINATION ASSESSMENT. - OTHER PERTINENT DISCHARGE PLANNING INFORMATION:
CM met with the patient at the bedside and explained role of CM and transition planning. Patient verbalized understanding. Patient lives with his family in a private home in [facility]. Unsure of address as he just moved. 4 stairs outside/ flight of stairs to basement. No DME or prior services PTA. Does not take medication at home. CM provided direct contact/resource folder and remains available.

## 2024-01-23 NOTE — PROGRESS NOTE ADULT - SUBJECTIVE AND OBJECTIVE BOX
[facility] -- Roberto Graff, Fam Trujillo Savella, Goodger, Cohen: Office # [phone]    Follow Up:  Syncope, head trauma    Subjective/Observations: Patient awake, alert, resting in bed. Denies chest pain, palpitations and dizziness. Denies any difficulty breathing. No orthopnea and PND. Tolerating room air.     REVIEW OF SYSTEMS: All other review of systems are negative unless indicated above    PAST MEDICAL & SURGICAL HISTORY:  No pertinent past medical history    No significant past surgical history    MEDICATIONS  (STANDING):  influenza  Vaccine (HIGH DOSE) 0.7 milliLiter(s) IntraMuscular once    MEDICATIONS  (PRN):  acetaminophen     Tablet .. 650 milliGRAM(s) Oral every 6 hours PRN Temp greater or equal to 38C (100.4F), Mild Pain (1 - 3)    Allergies  No Known Allergies    Vital Signs Last 24 Hrs  T(C): 36.6 (23 Jan 2024 04:58), Max: 37.1 (22 Jan 2024 12:55)  T(F): 97.9 (23 Jan 2024 04:58), Max: 98.8 (22 Jan 2024 12:55)  HR: 71 (23 Jan 2024 04:58) (71 - 82)  BP: 125/73 (23 Jan 2024 04:58) (117/60 - 162/79)  BP(mean): --  RR: 18 (23 Jan 2024 04:58) (17 - 18)  SpO2: 96% (23 Jan 2024 04:58) (96% - 99%)    Parameters below as of 23 Jan 2024 04:58  Patient On (Oxygen Delivery Method): room air      I&O's Summary    22 Jan 2024 07:01  -  23 Jan 2024 07:00  --------------------------------------------------------  IN: 0 mL / OUT: 350 mL / NET: -350 mL          TELE:   PHYSICAL EXAM:  Constitutional: NAD, awake and alert  HEENT: Moist Mucous Membranes, Anicteric  Pulmonary: Non-labored, breath sounds are clear bilaterally, No wheezing, rales or rhonchi  Cardiovascular: Regular, S1 and S2, No murmurs, No rubs, gallops or clicks  Gastrointestinal:  soft, nontender, nondistended   Lymph: No peripheral edema. No lymphadenopathy.   Skin: No visible rashes or ulcers.  Psych:  Mood & affect appropriate      LABS: All Labs Reviewed:                        13.1   6.48  )-----------( 245      ( 22 Jan 2024 14:28 )             41.7                         13.7   10.29 )-----------( 254      ( 21 Jan 2024 19:02 )             42.9     22 Jan 2024 14:28    138    |  108    |  11     ----------------------------<  108    4.2     |  29     |  0.90   21 Jan 2024 19:02    139    |  107    |  15     ----------------------------<  159    4.6     |  28     |  1.00     Ca    9.1        22 Jan 2024 14:28  Ca    9.6        21 Jan 2024 19:02    TPro  7.0    /  Alb  3.4    /  TBili  0.6    /  DBili  x      /  AST  11     /  ALT  24     /  AlkPhos  77     22 Jan 2024 14:28  TPro  7.7    /  Alb  3.7    /  TBili  0.4    /  DBili  x      /  AST  18     /  ALT  28     /  AlkPhos  83     21 Jan 2024 19:02   LIVER FUNCTIONS - ( 22 Jan 2024 14:28 )  Alb: 3.4 g/dL / Pro: 7.0 g/dL / ALK PHOS: 77 U/L / ALT: 24 U/L / AST: 11 U/L / GGT: x           Troponin I, High Sensitivity Result: 9.3 ng/L (01-21-24 @ 23:36)  Cholesterol: 169 mg/dL (01-22-24 @ 14:28)  HDL Cholesterol: 38 mg/dL (01-22-24 @ 14:28)  Triglycerides, Serum: 126 mg/dL (01-22-24 @ 14:28)    12 Lead ECG:   Ventricular Rate 79 BPM    Atrial Rate 79 BPM    P-R Interval 224 ms    QRS Duration 88 ms    Q-T Interval 380 ms    QTC Calculation(Bazett) 435 ms    P Axis 44 degrees    R Axis 0 degrees    T Axis 11 degrees    Diagnosis Line Sinus rhythm with 1st degree AV block  No previous ECGs available  Confirmed by Erica Dillard ([id number]) on 1/22/2024 2:29:21 PM (01-21-24 @ 18:07)      TRANSTHORACIC ECHOCARDIOGRAM REPORT  ________________________________________________________________________________                                      _______       Pt. Name:       CHAU AMES Study Date:    1/22/2024  MRN:            [id number]     YOB: 1960  Accession #:    [id number]     Age:           63 years  Account#:       [id number]    Gender:        M  Heart Rate:                   Height:        68.90 in (175.00 cm)  Rhythm:                       Weight:        174.16 lb (79.00 kg)  Blood Pressure: 125/80 mmHg   BSA/BMI:       1.95 m² / 25.80 kg/m²  ________________________________________________________________________________________  Referring Physician:    [id number] Luis Miguel Escobar  Interpreting Physician: Sara Otto  Primary Sonographer:    Otis Cook    CPT:               ECHO TTE WO CON COMP W DOPP - 18389.m  Indication(s):     Syncope and collapse - R55  Procedure:         Transthoracic echocardiogram with 2-D, M-mode and complete        spectral and color flow Doppler.  Ordering Location: [facility]  Admission Status:  ED  Study Information: Image quality for this study is technically difficult.    _______________________________________________________________________________________     CONCLUSIONS:      1. Technically difficult image quality.   2. Left ventricular systolic function is normal with an ejection fraction of 64 % by Fine's method of disks.   3. There is normal LV mass and concentric remodeling.   4. Normal right ventricular cavity size and probably normal systolic function.   5. The left atrium is normal.   6. Structurally normal mitral valve with normal leaflet excursion.   7. Trace mitral regurgitation.   8. Tricuspid aortic valve with normal leaflet excursion.   9. Estimated pulmonary artery systolic pressure is 20 mmHg, consistent with normal pulmonary artery pressure.    ________________________________________________________________________________________  FINDINGS:     Left Ventricle:  Left ventricular systolic function is normal with a calculated ejection fraction of 64 % by the Fine's biplane method of disks. There is normal left ventricular diastolic function. There is normal LV mass and concentric remodeling.     Right Ventricle:  The right ventricular cavity is normal in size and probably normal systolic function. Tricuspid annular plane systolic excursion (TAPSE) is 2.1 cm (normal >=1.7 cm).     Left Atrium:  The left atrium is normal with an indexed volume of 20.87 ml/m².     Right Atrium:  The right atrium is normal in size.     Aortic Valve:  The aortic valve is tricuspid with normal leaflet excursion.     Mitral Valve:  Structurally normal mitral valve with normal leaflet excursion. There is trace mitral regurgitation.     Tricuspid Valve:  Structurally normal tricuspid valve with normal leaflet excursion. There is mild tricuspid regurgitation. Estimated pulmonary artery systolic pressure is 20 mmHg, consistent with normal pulmonary artery pressure.     Pulmonic Valve:  The pulmonic valve was not well visualized.     Aorta:  The aortic root at the sinuses of Valsalva is normal in size, measuring 3.10 cm (indexed 1.59 cm/m²). The ascending aorta diameter is normal in size, measuring 2.30 cm (indexed 1.18 cm/m²). The aortic arch diameter is normal in size, measuring 2.3 cm (indexed 1.18 cm/m²).     Systemic Veins:  The inferior vena cava is normal in size measuring 1.50 cm in diameter, (normal <2.1cm) with normal inspiratory collapse (normal >50%) consistent with normal right atrial pressure (~3, range 0-5mmHg).  ____________________________________________________________________  QUANTITATIVE DATA:  Left Ventricle Measurements: (Indexed to BSA)     IVSd (2D):   0.9 cm  LVPWd (2D):  1.1 cm  LVIDd (2D):  4.4 cm  LVIDs (2D):  2.7 cm  LV Mass:     150 g  76.9 g/m²  LV Vol d, MOD A2C: 51.6 ml 26.52 ml/m²  LV Vol d, MOD A4C: 68.8 ml 35.36 ml/m²  LV Vol d, MOD BP:  60.7 ml 31.17 ml/m²  LV Vol s, MOD A2C: 17.7 ml 9.10 ml/m²  LV Vol s, MOD A4C: 27.1 ml 13.93 ml/m²  LV Vol s, MOD BP:  21.8 ml 11.21 ml/m²  LVOT SV MOD BP:    38.8 ml  LV EF% MOD BP:     64 %     MV E Vmax:    0.74 m/s  MV A Vmax:    0.79 m/s  MV E/A:       0.94  e' lateral:   13.30 cm/s  e' medial:    10.70 cm/s  E/e' lateral: 5.56  E/e' medial:  6.91  E/e' Average: 6.16  MV DT:        186 msec    Aorta Measurements: (normal range) (Indexed to BSA)     Sinuses of Valsalva: 3.10 cm (3.1 - 3.7 cm)  Ao Asc prox:         2.30 cm  Ao Arch:             2.3 cm       Left Atrium Measurements: (Indexed to BSA)  LA Diam 2D: 3.10 cm    Right Ventricle Measurements:     TAPSE:            2.1 cm  RV Base (RVID1):  3.7 cm  RV Mid (RVID2):   3.3 cm  RV Major (RVID3): 6.0 cm       LVOT / RVOT/ Qp/Qs Data: (Indexed to BSA)  LVOT Diameter: 2.10 cm    Mitral Valve Measurements:     MV E Vmax: 0.7 m/s  MV A Vmax: 0.8 m/s  MV E/A:    0.9       Tricuspid Valve Measurements:     TR Vmax:          2.1 m/s  TR Peak Gradient: 17.1 mmHg  RA Pressure:      3 mmHg  PASP:             20 mmHg    ________________________________________________________________________________________  Electronically signed on 1/22/2024 at 11:28:23 AM by Sara Otto         *** Final ***   [facility] -- Roberto Graff, Fam Trujillo Savella, Goodger, Cohen: Office # [phone]    Follow Up:  Syncope, head trauma    Subjective/Observations: Patient awake, alert, resting in bed. Denies chest pain, palpitations and dizziness. Denies any difficulty breathing. No orthopnea and PND. Tolerating room air. C/o Headache  and left hip.     REVIEW OF SYSTEMS: All other review of systems are negative unless indicated above    PAST MEDICAL & SURGICAL HISTORY:  No pertinent past medical history    No significant past surgical history    MEDICATIONS  (STANDING):  influenza  Vaccine (HIGH DOSE) 0.7 milliLiter(s) IntraMuscular once    MEDICATIONS  (PRN):  acetaminophen     Tablet .. 650 milliGRAM(s) Oral every 6 hours PRN Temp greater or equal to 38C (100.4F), Mild Pain (1 - 3)    Allergies  No Known Allergies    Vital Signs Last 24 Hrs  T(C): 36.6 (23 Jan 2024 04:58), Max: 37.1 (22 Jan 2024 12:55)  T(F): 97.9 (23 Jan 2024 04:58), Max: 98.8 (22 Jan 2024 12:55)  HR: 71 (23 Jan 2024 04:58) (71 - 82)  BP: 125/73 (23 Jan 2024 04:58) (117/60 - 162/79)  BP(mean): --  RR: 18 (23 Jan 2024 04:58) (17 - 18)  SpO2: 96% (23 Jan 2024 04:58) (96% - 99%)    Parameters below as of 23 Jan 2024 04:58  Patient On (Oxygen Delivery Method): room air      I&O's Summary    22 Jan 2024 07:01  -  23 Jan 2024 07:00  --------------------------------------------------------  IN: 0 mL / OUT: 350 mL / NET: -350 mL          TELE: Sr 50-90s   PHYSICAL EXAM:  Constitutional: NAD, awake and alert  HEENT: Moist Mucous Membranes, Anicteric  Pulmonary: Non-labored, breath sounds are clear bilaterally, No wheezing, rales or rhonchi  Cardiovascular: Regular, S1 and S2, No murmurs, No rubs, gallops or clicks  Gastrointestinal:  soft, nontender, nondistended   Lymph: No peripheral edema. No lymphadenopathy.   Skin: No visible rashes or ulcers.  Psych:  Mood & affect appropriate      LABS: All Labs Reviewed:                        13.1   6.48  )-----------( 245      ( 22 Jan 2024 14:28 )             41.7                         13.7   10.29 )-----------( 254      ( 21 Jan 2024 19:02 )             42.9     22 Jan 2024 14:28    138    |  108    |  11     ----------------------------<  108    4.2     |  29     |  0.90   21 Jan 2024 19:02    139    |  107    |  15     ----------------------------<  159    4.6     |  28     |  1.00     Ca    9.1        22 Jan 2024 14:28  Ca    9.6        21 Jan 2024 19:02    TPro  7.0    /  Alb  3.4    /  TBili  0.6    /  DBili  x      /  AST  11     /  ALT  24     /  AlkPhos  77     22 Jan 2024 14:28  TPro  7.7    /  Alb  3.7    /  TBili  0.4    /  DBili  x      /  AST  18     /  ALT  28     /  AlkPhos  83     21 Jan 2024 19:02   LIVER FUNCTIONS - ( 22 Jan 2024 14:28 )  Alb: 3.4 g/dL / Pro: 7.0 g/dL / ALK PHOS: 77 U/L / ALT: 24 U/L / AST: 11 U/L / GGT: x           Troponin I, High Sensitivity Result: 9.3 ng/L (01-21-24 @ 23:36)  Cholesterol: 169 mg/dL (01-22-24 @ 14:28)  HDL Cholesterol: 38 mg/dL (01-22-24 @ 14:28)  Triglycerides, Serum: 126 mg/dL (01-22-24 @ 14:28)    12 Lead ECG:   Ventricular Rate 79 BPM    Atrial Rate 79 BPM    P-R Interval 224 ms    QRS Duration 88 ms    Q-T Interval 380 ms    QTC Calculation(Bazett) 435 ms    P Axis 44 degrees    R Axis 0 degrees    T Axis 11 degrees    Diagnosis Line Sinus rhythm with 1st degree AV block  No previous ECGs available  Confirmed by Erica Dillard ([id number]) on 1/22/2024 2:29:21 PM (01-21-24 @ 18:07)      TRANSTHORACIC ECHOCARDIOGRAM REPORT  ________________________________________________________________________________                                      _______       Pt. Name:       CHAU AMES Study Date:    1/22/2024  MRN:            [id number]     YOB: 1960  Accession #:    [id number]     Age:           63 years  Account#:       [id number]    Gender:        M  Heart Rate:                   Height:        68.90 in (175.00 cm)  Rhythm:                       Weight:        174.16 lb (79.00 kg)  Blood Pressure: 125/80 mmHg   BSA/BMI:       1.95 m² / 25.80 kg/m²  ________________________________________________________________________________________  Referring Physician:    [id number] Luis Miguel Escobar  Interpreting Physician: Sara Otto  Primary Sonographer:    Otis Cook    CPT:               ECHO TTE WO CON COMP W DOPP - 34422.m  Indication(s):     Syncope and collapse - R55  Procedure:         Transthoracic echocardiogram with 2-D, M-mode and complete        spectral and color flow Doppler.  Ordering Location: [facility]  Admission Status:  ED  Study Information: Image quality for this study is technically difficult.    _______________________________________________________________________________________     CONCLUSIONS:      1. Technically difficult image quality.   2. Left ventricular systolic function is normal with an ejection fraction of 64 % by Fine's method of disks.   3. There is normal LV mass and concentric remodeling.   4. Normal right ventricular cavity size and probably normal systolic function.   5. The left atrium is normal.   6. Structurally normal mitral valve with normal leaflet excursion.   7. Trace mitral regurgitation.   8. Tricuspid aortic valve with normal leaflet excursion.   9. Estimated pulmonary artery systolic pressure is 20 mmHg, consistent with normal pulmonary artery pressure.    ________________________________________________________________________________________  FINDINGS:     Left Ventricle:  Left ventricular systolic function is normal with a calculated ejection fraction of 64 % by the Fine's biplane method of disks. There is normal left ventricular diastolic function. There is normal LV mass and concentric remodeling.     Right Ventricle:  The right ventricular cavity is normal in size and probably normal systolic function. Tricuspid annular plane systolic excursion (TAPSE) is 2.1 cm (normal >=1.7 cm).     Left Atrium:  The left atrium is normal with an indexed volume of 20.87 ml/m².     Right Atrium:  The right atrium is normal in size.     Aortic Valve:  The aortic valve is tricuspid with normal leaflet excursion.     Mitral Valve:  Structurally normal mitral valve with normal leaflet excursion. There is trace mitral regurgitation.     Tricuspid Valve:  Structurally normal tricuspid valve with normal leaflet excursion. There is mild tricuspid regurgitation. Estimated pulmonary artery systolic pressure is 20 mmHg, consistent with normal pulmonary artery pressure.     Pulmonic Valve:  The pulmonic valve was not well visualized.     Aorta:  The aortic root at the sinuses of Valsalva is normal in size, measuring 3.10 cm (indexed 1.59 cm/m²). The ascending aorta diameter is normal in size, measuring 2.30 cm (indexed 1.18 cm/m²). The aortic arch diameter is normal in size, measuring 2.3 cm (indexed 1.18 cm/m²).     Systemic Veins:  The inferior vena cava is normal in size measuring 1.50 cm in diameter, (normal <2.1cm) with normal inspiratory collapse (normal >50%) consistent with normal right atrial pressure (~3, range 0-5mmHg).  ____________________________________________________________________  QUANTITATIVE DATA:  Left Ventricle Measurements: (Indexed to BSA)     IVSd (2D):   0.9 cm  LVPWd (2D):  1.1 cm  LVIDd (2D):  4.4 cm  LVIDs (2D):  2.7 cm  LV Mass:     150 g  76.9 g/m²  LV Vol d, MOD A2C: 51.6 ml 26.52 ml/m²  LV Vol d, MOD A4C: 68.8 ml 35.36 ml/m²  LV Vol d, MOD BP:  60.7 ml 31.17 ml/m²  LV Vol s, MOD A2C: 17.7 ml 9.10 ml/m²  LV Vol s, MOD A4C: 27.1 ml 13.93 ml/m²  LV Vol s, MOD BP:  21.8 ml 11.21 ml/m²  LVOT SV MOD BP:    38.8 ml  LV EF% MOD BP:     64 %     MV E Vmax:    0.74 m/s  MV A Vmax:    0.79 m/s  MV E/A:       0.94  e' lateral:   13.30 cm/s  e' medial:    10.70 cm/s  E/e' lateral: 5.56  E/e' medial:  6.91  E/e' Average: 6.16  MV DT:        186 msec    Aorta Measurements: (normal range) (Indexed to BSA)     Sinuses of Valsalva: 3.10 cm (3.1 - 3.7 cm)  Ao Asc prox:         2.30 cm  Ao Arch:             2.3 cm       Left Atrium Measurements: (Indexed to BSA)  LA Diam 2D: 3.10 cm    Right Ventricle Measurements:     TAPSE:            2.1 cm  RV Base (RVID1):  3.7 cm  RV Mid (RVID2):   3.3 cm  RV Major (RVID3): 6.0 cm       LVOT / RVOT/ Qp/Qs Data: (Indexed to BSA)  LVOT Diameter: 2.10 cm    Mitral Valve Measurements:     MV E Vmax: 0.7 m/s  MV A Vmax: 0.8 m/s  MV E/A:    0.9       Tricuspid Valve Measurements:     TR Vmax:          2.1 m/s  TR Peak Gradient: 17.1 mmHg  RA Pressure:      3 mmHg  PASP:             20 mmHg    ________________________________________________________________________________________  Electronically signed on 1/22/2024 at 11:28:23 AM by Sara Otto         *** Final ***

## 2024-01-23 NOTE — DISCHARGE NOTE NURSING/CASE MANAGEMENT/SOCIAL WORK - PATIENT PORTAL LINK FT
You can access the FollowMyHealth Patient Portal offered by [facility] by registering at the following website: [email or web address]. By joining [facility]’s FollowMyHealth portal, you will also be able to view your health information using other applications (apps) compatible with our system.

## 2024-01-23 NOTE — CAREGIVER ENGAGEMENT NOTE - CAREGIVER OUTREACH NOTES - FREE TEXT
Declined for CM to contact his spouse at this time. He stated she will transport him home at discharge.

## 2024-01-23 NOTE — DISCHARGE NOTE PROVIDER - PROVIDER TOKENS
PROVIDER:[TOKEN:[[id number]],FOLLOWUP:[2 weeks]],PROVIDER:[TOKEN:[[id number]],FOLLOWUP:[2 weeks]],PROVIDER:[TOKEN:[[id number]],FOLLOWUP:[2 weeks]]

## 2024-01-23 NOTE — CARE COORDINATION ASSESSMENT. - NSCAREPROVIDERS_GEN_ALL_CORE_FT
CARE PROVIDERS:  Accepting Physician: Luis Miguel Escobar  Administration: Gosia Pruett  Administration: Napoleon Velásquez  Administration: Hakan Maldonado  Admitting: Luis Miguel Escobar  Attending: Mino Farrell  Case Management: Breanna Barrientos  Consultant: Suman Fuentes  Consultant: Sanjuana Pickering  Consultant: Bertha Polanco  Consultant: Weil, Patricia  Consultant: Elliott Mejia  Consultant: Dyana Del Real  Consultant: Charo Garcia  Consultant: Erica Dillard  Covering Team: Traci Mcmahon  Covering Team: Sanjuana Pickering  ED Attending: Juan Ralph  ED Attending2: Zac Hall  ED Nurse: Santiago Le  Nurse: Amaury Castillo  Nurse: Santhosh Grady  Nurse: Teresita Bird  Ordered: ADM, User  Override: Abhi Beverly  PCA/Nursing Assistant: Marcos Hernandes  Primary Team: Ava Ruby  Primary Team: Cathy Choudhury  Primary Team: Sandy Gallagher  Quality Review: Soniya Power  Team: [facility], Team

## 2024-01-23 NOTE — DISCHARGE NOTE NURSING/CASE MANAGEMENT/SOCIAL WORK - NSDCFUADDAPPT_GEN_ALL_CORE_FT
Prescription for home PT has been provided to you  Prescription for home physical therapy has been provided to you. You can contact the following agencies to arrange home physical therapy.   [facility] [phone]  [facility] [phone]  [place] [phone]  [facility] [place] [phone]  [place] [phone]  [facility] [phone]  [place] [facility] [phone]

## 2024-01-23 NOTE — PROGRESS NOTE ADULT - ASSESSMENT
64yo M w/ no significant PMH (on no meds) presents w/ fall. Admitted for syncope work-up. CTH noted to have small right frontal subarachnoid hemorrhage.    Syncope  - Possibly arrhythmogenic  - No prodrome noted and he has no memory of the event  - Tele w/   -  Continue to monitor on tele  - Will need outpatient follow up for 2wk Zio monitor and stress test  - F/u orthostatic VS  - Would obtain carotid dopplers    - EKG sinus rhythm with 1st deg AV block  - No clear evidence of acute ischemia, trop negative x 1.    - No meaningful evidence of volume overload.  - TTE with LVEF 64%, grossly normal    - BP well controlled  - TSH borderline low, f/u T3 and T4  - CTH with small right frontal subarachnoid hemorrhage, repeat CTH stable. Pending MRI    - Monitor and replete lytes, keep K>4, Mg>2.  - Will continue to follow.    Bertha Polanco MS FNP, AGACNP  Nurse Practitioner- Cardiology   Please call on TEAMS     62yo M w/ no significant PMH (on no meds) presents w/ fall. Admitted for syncope work-up. CTH noted to have small right frontal subarachnoid hemorrhage.    Syncope  - Possibly arrhythmogenic  - No prodrome noted and he has no memory of the event  - Tele w/ SR 50-90s, no events, continue to monitor on tele  - Will need outpatient follow up for 2wk Zio monitor and stress test  - F/u orthostatic VS, ordered   - Would obtain carotid dopplers    - EKG sinus rhythm with 1st deg AV block  - No clear evidence of acute ischemia, trop negative x 1.    - No meaningful evidence of volume overload.  - TTE with LVEF 64%, grossly normal    - BP well controlled  - TSH borderline low, f/u T3 and T4  - CTH with small right frontal subarachnoid hemorrhage, repeat CTH stable.   - MRI w/ No evidence of a mass, abnormal enhancement, or current evidence of acute ischemia. Mild chronic white matter ischemic changes    - Monitor and replete lytes, keep K>4, Mg>2.  - Will continue to follow.    Bertha Polanco, MS FNP, AGACNP  Nurse Practitioner- Cardiology   Please call on TEAMS

## 2024-01-23 NOTE — DISCHARGE NOTE PROVIDER - CARE PROVIDERS DIRECT ADDRESSES
[email or web address]

## 2024-01-23 NOTE — DISCHARGE NOTE PROVIDER - HOSPITAL COURSE
62yo M w/ no significant PMH (on no meds) presents w/ fall. Patient states he was going downstairs when he suddenly fell (perhaps 10 stairs) and lost consciousness. Admitted for syncope, head trauma.              Problem/Plan - 1:  ·  Problem: Syncope.   ·  Plan: Patient presents w/ fall with unclear etiology - DDX: mechanical fall vs arrythmia vs orthostatic hypotension   - CT Head no cont: Left frontal extracalvarial hematoma. Subtle focus of increased attenuation in a right frontal location, which may represent a small focus of subarachnoid hemorrhage  -  CT cervical spine no cont:  No acute fracture or traumatic subluxation. Multi-level degenerative changes, most prominent at C5-C6  -tele thus far benign  -echo benign  -PT consulted  plan:  -cards consult  - Check TSH, A1c, lipid profile, monitor electrolytes  - Fall and aspiration precautions  - F/u TTE  - PT/ OT evaluation  - Tylenol 650mg PRN for mild pain   - Can consider neuro consult if needed.     Problem/Plan - 2:  ·  Problem: Head trauma.   ·  Plan: - CT Head no cont: Left frontal extracalvarial hematoma. Subtle focus of increased attenuation in a right frontal location, which may represent a small focus of subarachnoid hemorrhage,  - No neurological symptoms  plan  repeat ct head now  MRI brain.     Problem/Plan - 3:  ·  Problem: Abnormal finding on imaging.   ·  Plan: CT chest/CT A/P w/ IV cont: No CT evidence of acute traumatic sequela in the chest, abdomen or pelvis. 3 mm right middle lobe nodule, recommend follow-up chest CT in 6-12 months  - Patient informed about findings  - Patient to follow up outpatient for further management.     Problem/Plan - 4:  ·  Problem: Need for prophylactic measure.   ·  Plan: SCD's in setting of hematoma / abnormal CT head.    Critical points on discharge  Neurosurgery (dr. Fuentes consulted for traumatic hemorhage) stable does not require transfer. GCS 15 , MR head and dopplers are reassuring  Nsx advised for a two week followup in office for repeat imaging  As for syncope event ,discussed with patient following up with cardiology for an outpatient stress test  Wife concerns on tremors. patient has mild tremors of outstreteched hands. Neurology consulted (Dr leal). will set patient up with outpatient appt  Advised patient to delay his trip to [facility] until seen by the outpatient physicians

## 2024-01-23 NOTE — CARE COORDINATION ASSESSMENT. - ASSESSMENT CONCERNS TO BE ADDRESSED
Anticipate transition home today with outpatient follow up. CM discussed PT recommendations for home PT and RW. Patient is in agreement.   Rx provided for home PT and referrals have been sent to [facility] and [facility]-pending acceptance.   Referral sent to [facility] for RW-upon approval to be delivered to the patient at the bedside.

## 2024-02-21 ENCOUNTER — OUTPATIENT (OUTPATIENT)
Dept: OUTPATIENT SERVICES | Facility: HOSPITAL | Age: 64
LOS: 1 days | Discharge: HOME | End: 2024-02-21
Payer: MEDICARE

## 2024-02-21 DIAGNOSIS — R94.39 ABNORMAL RESULT OF OTHER CARDIOVASCULAR FUNCTION STUDY: ICD-10-CM

## 2024-02-21 LAB
ANION GAP SERPL CALC-SCNC: 12 MMOL/L — SIGNIFICANT CHANGE UP (ref 7–14)
BUN SERPL-MCNC: 10 MG/DL — SIGNIFICANT CHANGE UP (ref 7–23)
CALCIUM SERPL-MCNC: 9.3 MG/DL — SIGNIFICANT CHANGE UP (ref 8.4–10.5)
CHLORIDE SERPL-SCNC: 101 MMOL/L — SIGNIFICANT CHANGE UP (ref 98–107)
CO2 SERPL-SCNC: 26 MMOL/L — SIGNIFICANT CHANGE UP (ref 22–31)
CREAT SERPL-MCNC: 0.81 MG/DL — SIGNIFICANT CHANGE UP (ref 0.5–1.3)
EGFR: 97 ML/MIN/1.73M2 — SIGNIFICANT CHANGE UP
GLUCOSE SERPL-MCNC: 97 MG/DL — SIGNIFICANT CHANGE UP (ref 70–99)
HCT VFR BLD CALC: 43.5 % — SIGNIFICANT CHANGE UP (ref 39–50)
HGB BLD-MCNC: 14.1 G/DL — SIGNIFICANT CHANGE UP (ref 13–17)
MAGNESIUM SERPL-MCNC: 2.1 MG/DL — SIGNIFICANT CHANGE UP (ref 1.6–2.6)
MCHC RBC-ENTMCNC: 27 PG — SIGNIFICANT CHANGE UP (ref 27–34)
MCHC RBC-ENTMCNC: 32.4 GM/DL — SIGNIFICANT CHANGE UP (ref 32–36)
MCV RBC AUTO: 83.2 FL — SIGNIFICANT CHANGE UP (ref 80–100)
NRBC # BLD: 0 /100 WBCS — SIGNIFICANT CHANGE UP (ref 0–0)
NRBC # FLD: 0 K/UL — SIGNIFICANT CHANGE UP (ref 0–0)
PLATELET # BLD AUTO: 211 K/UL — SIGNIFICANT CHANGE UP (ref 150–400)
POTASSIUM SERPL-MCNC: 4.4 MMOL/L — SIGNIFICANT CHANGE UP (ref 3.5–5.3)
POTASSIUM SERPL-SCNC: 4.4 MMOL/L — SIGNIFICANT CHANGE UP (ref 3.5–5.3)
RBC # BLD: 5.23 M/UL — SIGNIFICANT CHANGE UP (ref 4.2–5.8)
RBC # FLD: 13.3 % — SIGNIFICANT CHANGE UP (ref 10.3–14.5)
SODIUM SERPL-SCNC: 139 MMOL/L — SIGNIFICANT CHANGE UP (ref 135–145)
WBC # BLD: 5.63 K/UL — SIGNIFICANT CHANGE UP (ref 3.8–10.5)
WBC # FLD AUTO: 5.63 K/UL — SIGNIFICANT CHANGE UP (ref 3.8–10.5)

## 2024-02-21 PROCEDURE — 99152 MOD SED SAME PHYS/QHP 5/>YRS: CPT | Mod: 59

## 2024-02-21 PROCEDURE — 93010 ELECTROCARDIOGRAM REPORT: CPT

## 2024-02-21 PROCEDURE — 93458 L HRT ARTERY/VENTRICLE ANGIO: CPT | Mod: 26

## 2024-02-21 RX ORDER — SODIUM CHLORIDE 9 MG/ML
3 INJECTION INTRAMUSCULAR; INTRAVENOUS; SUBCUTANEOUS EVERY 8 HOURS
Refills: 0 | Status: DISCONTINUED | OUTPATIENT
Start: 2024-02-21 | End: 2024-03-06

## 2024-02-21 RX ORDER — SODIUM CHLORIDE 9 MG/ML
1000 INJECTION INTRAMUSCULAR; INTRAVENOUS; SUBCUTANEOUS
Refills: 0 | Status: DISCONTINUED | OUTPATIENT
Start: 2024-02-21 | End: 2024-03-06

## 2024-02-21 RX ORDER — SODIUM CHLORIDE 9 MG/ML
250 INJECTION INTRAMUSCULAR; INTRAVENOUS; SUBCUTANEOUS ONCE
Refills: 0 | Status: COMPLETED | OUTPATIENT
Start: 2024-02-21 | End: 2024-02-21

## 2024-02-21 RX ADMIN — SODIUM CHLORIDE 100 MILLILITER(S): 9 INJECTION INTRAMUSCULAR; INTRAVENOUS; SUBCUTANEOUS at 11:50

## 2024-02-21 RX ADMIN — SODIUM CHLORIDE 500 MILLILITER(S): 9 INJECTION INTRAMUSCULAR; INTRAVENOUS; SUBCUTANEOUS at 11:52

## 2024-02-21 NOTE — H&P CARDIOLOGY - NEGATIVE NEUROLOGICAL SYMPTOMS
no transient paralysis/no generalized seizures/no focal seizures/no tremors/no vertigo/no loss of sensation/no headache/no hemiparesis/no confusion/no facial palsy

## 2024-02-21 NOTE — H&P CARDIOLOGY - HISTORY OF PRESENT ILLNESS
63 y/o M with no PMH(not on any medications) but recent admission to [facility] on 01/24 for fall with head trauma with CT head showing Left frontal extra calvarial hematoma. Subtle focus of increased attenuation in a right frontal location, which may represent a small focus of subarachnoid hemorrhage and was discharged with outpatient Neurologist and Neurosurgeon follow up presented to [facility] [place] for abnormal stress test. As per wife at bedside her  has been having multiple unwitnessed falls with head trauma and last episode was yesterday and day before. Patient bend over to pick something and then fell on the floor. Wife denied any urinary or fecal incontinence or any seizure like activity. Patient stated that he has been feeling very weak in both his legs and have been using a walker for assistance. Patient also endorsed of bilateral numbness in both his legs and when he stands up from a seated position he would feel dizziness and would have to hold onto the wall or the walker nearby. Patient has an appointment with the neurologist this Friday. Patient otherwise denied any CP, PAULINO, SOB, fevers, chills, N/V/D/C, abdominal pain, dysuria, melena, hematochezia, sick contact, cough, body aches, pleuritic or positional chest pain.     Referring: Dr. Shaik Denney

## 2024-03-08 ENCOUNTER — INPATIENT (INPATIENT)
Facility: HOSPITAL | Age: 64
LOS: 12 days | Discharge: OTHER INSTITUTION | DRG: 25 | End: 2024-03-21
Attending: NEUROLOGICAL SURGERY | Admitting: STUDENT IN AN ORGANIZED HEALTH CARE EDUCATION/TRAINING PROGRAM
Payer: COMMERCIAL

## 2024-03-08 ENCOUNTER — EMERGENCY (EMERGENCY)
Facility: HOSPITAL | Age: 64
LOS: 1 days | Discharge: ANOTHER HOSPITAL | End: 2024-03-08
Attending: EMERGENCY MEDICINE | Admitting: EMERGENCY MEDICINE
Payer: COMMERCIAL

## 2024-03-08 ENCOUNTER — TRANSCRIPTION ENCOUNTER (OUTPATIENT)
Age: 64
End: 2024-03-08

## 2024-03-08 VITALS
HEART RATE: 79 BPM | OXYGEN SATURATION: 98 % | RESPIRATION RATE: 18 BRPM | DIASTOLIC BLOOD PRESSURE: 81 MMHG | SYSTOLIC BLOOD PRESSURE: 131 MMHG | TEMPERATURE: 98 F

## 2024-03-08 VITALS
HEIGHT: 69 IN | DIASTOLIC BLOOD PRESSURE: 85 MMHG | HEART RATE: 82 BPM | SYSTOLIC BLOOD PRESSURE: 143 MMHG | OXYGEN SATURATION: 99 % | WEIGHT: 220.02 LBS | RESPIRATION RATE: 18 BRPM | TEMPERATURE: 98 F

## 2024-03-08 VITALS
OXYGEN SATURATION: 100 % | DIASTOLIC BLOOD PRESSURE: 61 MMHG | HEART RATE: 79 BPM | SYSTOLIC BLOOD PRESSURE: 127 MMHG | RESPIRATION RATE: 24 BRPM

## 2024-03-08 DIAGNOSIS — S06.5XAA TRAUMATIC SUBDURAL HEMORRHAGE WITH LOSS OF CONSCIOUSNESS STATUS UNKNOWN, INITIAL ENCOUNTER: ICD-10-CM

## 2024-03-08 LAB
ALBUMIN SERPL ELPH-MCNC: 3.6 G/DL — SIGNIFICANT CHANGE UP (ref 3.3–5)
ALBUMIN SERPL ELPH-MCNC: 4 G/DL — SIGNIFICANT CHANGE UP (ref 3.3–5.2)
ALP SERPL-CCNC: 90 U/L — SIGNIFICANT CHANGE UP (ref 40–120)
ALP SERPL-CCNC: 94 U/L — SIGNIFICANT CHANGE UP (ref 40–120)
ALT FLD-CCNC: 19 U/L — SIGNIFICANT CHANGE UP
ALT FLD-CCNC: 32 U/L — SIGNIFICANT CHANGE UP (ref 12–78)
ANION GAP SERPL CALC-SCNC: 8 MMOL/L — SIGNIFICANT CHANGE UP (ref 5–17)
ANION GAP SERPL CALC-SCNC: 9 MMOL/L — SIGNIFICANT CHANGE UP (ref 5–17)
APTT BLD: 25.1 SEC — SIGNIFICANT CHANGE UP (ref 24.5–35.6)
AST SERPL-CCNC: 16 U/L — SIGNIFICANT CHANGE UP
AST SERPL-CCNC: 43 U/L — HIGH (ref 15–37)
BASOPHILS # BLD AUTO: 0.02 K/UL — SIGNIFICANT CHANGE UP (ref 0–0.2)
BASOPHILS # BLD AUTO: 0.03 K/UL — SIGNIFICANT CHANGE UP (ref 0–0.2)
BASOPHILS NFR BLD AUTO: 0.3 % — SIGNIFICANT CHANGE UP (ref 0–2)
BASOPHILS NFR BLD AUTO: 0.4 % — SIGNIFICANT CHANGE UP (ref 0–2)
BILIRUB SERPL-MCNC: 0.4 MG/DL — SIGNIFICANT CHANGE UP (ref 0.4–2)
BILIRUB SERPL-MCNC: 0.5 MG/DL — SIGNIFICANT CHANGE UP (ref 0.2–1.2)
BLD GP AB SCN SERPL QL: SIGNIFICANT CHANGE UP
BUN SERPL-MCNC: 10.3 MG/DL — SIGNIFICANT CHANGE UP (ref 8–20)
BUN SERPL-MCNC: 9 MG/DL — SIGNIFICANT CHANGE UP (ref 7–23)
CALCIUM SERPL-MCNC: 9 MG/DL — SIGNIFICANT CHANGE UP (ref 8.5–10.1)
CALCIUM SERPL-MCNC: 9.4 MG/DL — SIGNIFICANT CHANGE UP (ref 8.4–10.5)
CHLORIDE SERPL-SCNC: 103 MMOL/L — SIGNIFICANT CHANGE UP (ref 96–108)
CHLORIDE SERPL-SCNC: 106 MMOL/L — SIGNIFICANT CHANGE UP (ref 96–108)
CO2 SERPL-SCNC: 26 MMOL/L — SIGNIFICANT CHANGE UP (ref 22–31)
CO2 SERPL-SCNC: 28 MMOL/L — SIGNIFICANT CHANGE UP (ref 22–29)
CREAT SERPL-MCNC: 0.8 MG/DL — SIGNIFICANT CHANGE UP (ref 0.5–1.3)
CREAT SERPL-MCNC: 0.84 MG/DL — SIGNIFICANT CHANGE UP (ref 0.5–1.3)
D DIMER BLD IA.RAPID-MCNC: 182 NG/ML DDU — SIGNIFICANT CHANGE UP
EGFR: 96 ML/MIN/1.73M2 — SIGNIFICANT CHANGE UP
EGFR: 98 ML/MIN/1.73M2 — SIGNIFICANT CHANGE UP
EOSINOPHIL # BLD AUTO: 0.09 K/UL — SIGNIFICANT CHANGE UP (ref 0–0.5)
EOSINOPHIL # BLD AUTO: 0.09 K/UL — SIGNIFICANT CHANGE UP (ref 0–0.5)
EOSINOPHIL NFR BLD AUTO: 1.2 % — SIGNIFICANT CHANGE UP (ref 0–6)
EOSINOPHIL NFR BLD AUTO: 1.3 % — SIGNIFICANT CHANGE UP (ref 0–6)
ETHANOL SERPL-MCNC: <10 MG/DL — SIGNIFICANT CHANGE UP (ref 0–9)
GLUCOSE SERPL-MCNC: 100 MG/DL — HIGH (ref 70–99)
GLUCOSE SERPL-MCNC: 130 MG/DL — HIGH (ref 70–99)
HCT VFR BLD CALC: 43.5 % — SIGNIFICANT CHANGE UP (ref 39–50)
HCT VFR BLD CALC: 45.7 % — SIGNIFICANT CHANGE UP (ref 39–50)
HGB BLD-MCNC: 14.4 G/DL — SIGNIFICANT CHANGE UP (ref 13–17)
HGB BLD-MCNC: 14.5 G/DL — SIGNIFICANT CHANGE UP (ref 13–17)
IMM GRANULOCYTES NFR BLD AUTO: 0.3 % — SIGNIFICANT CHANGE UP (ref 0–0.9)
IMM GRANULOCYTES NFR BLD AUTO: 0.4 % — SIGNIFICANT CHANGE UP (ref 0–0.9)
INR BLD: 1.08 RATIO — SIGNIFICANT CHANGE UP (ref 0.85–1.18)
LIDOCAIN IGE QN: 40 U/L — SIGNIFICANT CHANGE UP (ref 22–51)
LYMPHOCYTES # BLD AUTO: 2.91 K/UL — SIGNIFICANT CHANGE UP (ref 1–3.3)
LYMPHOCYTES # BLD AUTO: 2.92 K/UL — SIGNIFICANT CHANGE UP (ref 1–3.3)
LYMPHOCYTES # BLD AUTO: 40.3 % — SIGNIFICANT CHANGE UP (ref 13–44)
LYMPHOCYTES # BLD AUTO: 41.7 % — SIGNIFICANT CHANGE UP (ref 13–44)
MCHC RBC-ENTMCNC: 26.8 PG — LOW (ref 27–34)
MCHC RBC-ENTMCNC: 27.3 PG — SIGNIFICANT CHANGE UP (ref 27–34)
MCHC RBC-ENTMCNC: 31.7 GM/DL — LOW (ref 32–36)
MCHC RBC-ENTMCNC: 33.1 GM/DL — SIGNIFICANT CHANGE UP (ref 32–36)
MCV RBC AUTO: 82.4 FL — SIGNIFICANT CHANGE UP (ref 80–100)
MCV RBC AUTO: 84.3 FL — SIGNIFICANT CHANGE UP (ref 80–100)
MONOCYTES # BLD AUTO: 0.72 K/UL — SIGNIFICANT CHANGE UP (ref 0–0.9)
MONOCYTES # BLD AUTO: 0.74 K/UL — SIGNIFICANT CHANGE UP (ref 0–0.9)
MONOCYTES NFR BLD AUTO: 10.2 % — SIGNIFICANT CHANGE UP (ref 2–14)
MONOCYTES NFR BLD AUTO: 10.3 % — SIGNIFICANT CHANGE UP (ref 2–14)
NEUTROPHILS # BLD AUTO: 3.22 K/UL — SIGNIFICANT CHANGE UP (ref 1.8–7.4)
NEUTROPHILS # BLD AUTO: 3.44 K/UL — SIGNIFICANT CHANGE UP (ref 1.8–7.4)
NEUTROPHILS NFR BLD AUTO: 46.1 % — SIGNIFICANT CHANGE UP (ref 43–77)
NEUTROPHILS NFR BLD AUTO: 47.5 % — SIGNIFICANT CHANGE UP (ref 43–77)
NRBC # BLD: 0 /100 WBCS — SIGNIFICANT CHANGE UP (ref 0–0)
PLATELET # BLD AUTO: 258 K/UL — SIGNIFICANT CHANGE UP (ref 150–400)
PLATELET # BLD AUTO: 306 K/UL — SIGNIFICANT CHANGE UP (ref 150–400)
POTASSIUM SERPL-MCNC: 3.7 MMOL/L — SIGNIFICANT CHANGE UP (ref 3.5–5.3)
POTASSIUM SERPL-MCNC: 5 MMOL/L — SIGNIFICANT CHANGE UP (ref 3.5–5.3)
POTASSIUM SERPL-SCNC: 3.7 MMOL/L — SIGNIFICANT CHANGE UP (ref 3.5–5.3)
POTASSIUM SERPL-SCNC: 5 MMOL/L — SIGNIFICANT CHANGE UP (ref 3.5–5.3)
PROT SERPL-MCNC: 7.1 G/DL — SIGNIFICANT CHANGE UP (ref 6.6–8.7)
PROT SERPL-MCNC: 7.6 G/DL — SIGNIFICANT CHANGE UP (ref 6–8.3)
PROTHROM AB SERPL-ACNC: 12 SEC — SIGNIFICANT CHANGE UP (ref 9.5–13)
RBC # BLD: 5.28 M/UL — SIGNIFICANT CHANGE UP (ref 4.2–5.8)
RBC # BLD: 5.42 M/UL — SIGNIFICANT CHANGE UP (ref 4.2–5.8)
RBC # FLD: 13.3 % — SIGNIFICANT CHANGE UP (ref 10.3–14.5)
RBC # FLD: 13.6 % — SIGNIFICANT CHANGE UP (ref 10.3–14.5)
SODIUM SERPL-SCNC: 139 MMOL/L — SIGNIFICANT CHANGE UP (ref 135–145)
SODIUM SERPL-SCNC: 141 MMOL/L — SIGNIFICANT CHANGE UP (ref 135–145)
TROPONIN I, HIGH SENSITIVITY RESULT: 7.5 NG/L — SIGNIFICANT CHANGE UP
WBC # BLD: 6.98 K/UL — SIGNIFICANT CHANGE UP (ref 3.8–10.5)
WBC # BLD: 7.25 K/UL — SIGNIFICANT CHANGE UP (ref 3.8–10.5)
WBC # FLD AUTO: 6.98 K/UL — SIGNIFICANT CHANGE UP (ref 3.8–10.5)
WBC # FLD AUTO: 7.25 K/UL — SIGNIFICANT CHANGE UP (ref 3.8–10.5)

## 2024-03-08 PROCEDURE — 70450 CT HEAD/BRAIN W/O DYE: CPT | Mod: 26,MC,59

## 2024-03-08 PROCEDURE — 70496 CT ANGIOGRAPHY HEAD: CPT | Mod: 26,MC

## 2024-03-08 PROCEDURE — 71045 X-RAY EXAM CHEST 1 VIEW: CPT

## 2024-03-08 PROCEDURE — 96374 THER/PROPH/DIAG INJ IV PUSH: CPT

## 2024-03-08 PROCEDURE — 71045 X-RAY EXAM CHEST 1 VIEW: CPT | Mod: 26

## 2024-03-08 PROCEDURE — 99285 EMERGENCY DEPT VISIT HI MDM: CPT | Mod: 25

## 2024-03-08 PROCEDURE — 99285 EMERGENCY DEPT VISIT HI MDM: CPT

## 2024-03-08 PROCEDURE — 85025 COMPLETE CBC W/AUTO DIFF WBC: CPT

## 2024-03-08 PROCEDURE — 70450 CT HEAD/BRAIN W/O DYE: CPT | Mod: MC

## 2024-03-08 PROCEDURE — 93010 ELECTROCARDIOGRAM REPORT: CPT

## 2024-03-08 PROCEDURE — 70450 CT HEAD/BRAIN W/O DYE: CPT | Mod: 26,MC

## 2024-03-08 PROCEDURE — 93005 ELECTROCARDIOGRAM TRACING: CPT

## 2024-03-08 PROCEDURE — 80053 COMPREHEN METABOLIC PANEL: CPT

## 2024-03-08 PROCEDURE — 85379 FIBRIN DEGRADATION QUANT: CPT

## 2024-03-08 PROCEDURE — 72131 CT LUMBAR SPINE W/O DYE: CPT | Mod: MC

## 2024-03-08 PROCEDURE — 70498 CT ANGIOGRAPHY NECK: CPT | Mod: 26,MC

## 2024-03-08 PROCEDURE — 72125 CT NECK SPINE W/O DYE: CPT | Mod: 26

## 2024-03-08 PROCEDURE — 84484 ASSAY OF TROPONIN QUANT: CPT

## 2024-03-08 PROCEDURE — 72131 CT LUMBAR SPINE W/O DYE: CPT | Mod: 26,MC

## 2024-03-08 PROCEDURE — 36415 COLL VENOUS BLD VENIPUNCTURE: CPT

## 2024-03-08 RX ORDER — ACETAMINOPHEN 500 MG
1000 TABLET ORAL ONCE
Refills: 0 | Status: COMPLETED | OUTPATIENT
Start: 2024-03-08 | End: 2024-03-08

## 2024-03-08 RX ORDER — LEVETIRACETAM 250 MG/1
1000 TABLET, FILM COATED ORAL
Refills: 0 | Status: DISCONTINUED | OUTPATIENT
Start: 2024-03-08 | End: 2024-03-09

## 2024-03-08 RX ORDER — SODIUM CHLORIDE 9 MG/ML
250 INJECTION INTRAMUSCULAR; INTRAVENOUS; SUBCUTANEOUS ONCE
Refills: 0 | Status: DISCONTINUED | OUTPATIENT
Start: 2024-03-08 | End: 2024-03-09

## 2024-03-08 RX ADMIN — Medication 400 MILLIGRAM(S): at 21:09

## 2024-03-08 NOTE — H&P ADULT - HISTORY OF PRESENT ILLNESS
63 yo male with no PMHx transferred from [place] s/p fall in the afternoon found to have B/L SDH in the frontal-parietal area.     A: intact  B: B/L breathe sounds, clear to auscultation   C: femoral pulses palpable B/L, BP: 142/70  D: GCS: 15

## 2024-03-08 NOTE — ED PROVIDER NOTE - OBJECTIVE STATEMENT
64-year-old male presents to the ED with some at bedside with complaints of multiple frequent falls associated with "blacking out".  Son at bedside states that patient is unable to be left alone secondary to frequent blackouts and falls.  Patient was seen and admitted at [facility] 1 month ago with neuro and cardio workup.  Son at bedside states that primary care physician told him to come to the ER for further evaluation and treatment and admission for inpatient rehab secondary to unsteady gait.

## 2024-03-08 NOTE — ED ADULT TRIAGE NOTE - CADM TRG TX PRIOR TO ARRIVAL
**THIS IS A VIRTUAL VISIT VIA AUDIO- VIDEO SYNCHRONOUS DISCUSSION. PATIENT AGREED TO HAVE THEIR CARE DELIVERED OVER A StratioT/DOXY. ME VIDEO VISIT IN PLACE OF THEIR REGULARLY SCHEDULED OFFICE VISIT**   Pt  is aware that this is a billable encounter and is responsible for copays/deductibles   Patient gave a verbal consent to proceed with virtual video visit   Patient is at home and I, the provider,  am at the office care diabetes and endocrinology      Patient Information  Date:10/20/2022  Name : Celsa Garcia 26 y.o.     YOB: 1989           History of present illness    Celsa Garcia is a 26 y.o. female  is here for follow-up for  thyroid cancer      Patient has walked in requesting help for scheduling GRANADOS asap   She had seen  Dr. Rosa Brooke     I am doing this visit as I am covering for Dr. Jared Gagnon and patient accepted to proceed with my help for GRANADOS therapy    Reviewed Dr. India Grijalva notes,  and Dr. Sarai Yuen notes           Papillary thyroid carcinoma T3N1 2.1 cm, status post total thyroidectomy at [facility] by Dr. Betsy Carrizales, in October 2011. She had metastatic carcinoma in 6 of 7 lymph nodes level VI nodes with vascular and lymphatic invasion. 2 parathyroid glands were removed right inferior and right superior. Thyrogen stimulated ablation February 2012 with 167 mCi with a TG of 3.6. Neck ultrasound in October was negative, initial TG was 0.8, increased to 1.1. Thyrogen stimulated TG was 40 with a negative whole-body scan. She had neck ultrasound at [facility] in December which was negative for recurrent disease. PET scan showed uptake in one axillary node in the mediastinum.   She saw Dr. Alcides Vargas at [facility],     November 2011: TSH 1.3, TG 6.6 (postop, PreI-131)  May 31, 2012, TSH less than 0.01, TG 0.8, TG antibody less than 20  October 16, 2012: TG 1.1, TG antibody less than 20, TSH 0.042  November 30, 2012: Thyrogen stimulated TG 40, TG antibody less than 20  January 18, 2013: TG 1.5, TG antibody less than 20, TSH 0.019 (on Synthroid 125 mcg)  April 2013: TG 1.5, TSH 0.45  February 2015: TG 2.2, TSH 0.3  September 2015: TSH 0.08, TG 4.6  2016: TG 6.8, TSH 0.495    Thyrogen whole-body scan: January 31, 2012  Anterior and lateral pinhole images of the neck reveal 3 small round foci of tracer uptake within the neck. One in the right neck, adjacent to the midline and 2 in the left neck located to the cephalad and lateral to the midline    Thyrogen whole-body scan iodine-131 treatment 167 mCi February 2012  Post iodine-131 treatment whole-body scan radioiodine avid thyroid tissue confined to the neck. No imaging evidence of distant metastatic cancer. TG was 3.6  PET/CT 12/14/2012  0.7 cm right axillary lymph node exhibits increased radiotracer activity. Small lymph nodes in the axillary are normal.  Soft tissue in the anterior mediastinum is more prominent than expected for a patient of this age. There is a focus of increased tracer activity in the anterior mediastinal soft tissue on the right with a max SUV of 4.8. Soft tissue in the left aspect of the anterior mediastinum exhibits a max SUV of 3.8. No mediastinal lymphadenopathy. Impression soft tissue in the anterior mediastinum with increased metabolic activity. Right axillary lymph node with increased metabolic activity. Metastatic disease cannot be excluded. No abnormal activity in the expected location of the thyroid gland    CT chest January 2013  Anterior mediastinal opacities as described on the left rather than right. Left-sided lesion is slightly smaller than the CT images of the PET scan December 2012. The etiology of the posterior in the right anterior mediastinum is indeterminate. It could be thymic tissue. There are 2 soft tissue densities extending inferiorly along the great vessel to form a band of opacity which corresponds to the thickened pericardial surface adjacent to the great vessels.   Should be noted that neither the right-sided focus in the anterior mediastinum not apparent pericardial thickening was identified on the recent PET scan. Scattered tiny nodular opacities majority of which are peripheral distribution and probably reflect small lymph nodes. She is on 150 mcg the longest, celiac was negative in the past      Review of Systems: Per HPI    Physical Examination:  General: pleasant, no distress, good eye contact      Data Reviewed:     [x] Reviewed labs      Assessment/Plan:       Metastatic papillary thyroid cancer status post total thyroidectomy as well as central neck lymph node dissection in 2011 at [facility] [place]. GRANADOS  2012 with 167 mCi, with Tg of 3.6  She had TG positive, scan negative disease without any radiological evidence. TG November 2019 less than 5, stable  Ultrasound neck May 2020 - neg      2021: Thyroglobulin 10, ultrasound neck negative,  CT chest November 2021 - LUNGS: Multiple pulmonary nodules, measuring up to 3 mm in the right upper lobe up to 4 mm in the right middle lobe , up to 4 mm in the right lower lobe  Up to 1 to 2 mm in the lingula  Up to 3 mm in the left lower lobe. INCIDENTALLY IMAGED UPPER ABDOMEN: Nonobstructing 4 mm left renal calculus. Segments VI 6 x 3.6 cm enhancing mass (series 2, image 45). Segment VI 11 x 11  mm enhancing mass (image 51). More central right hepatic 19 mm and 10 mm  enhancing masses (series 2, image 54). BONES: No destructive bone lesion. PET/CT November 2021  -Diffuse FDG uptake in the breasts, possibly related to lactation. Clinical correlation recommended. 2.  Pulmonary nodules, indeterminate. 2.  No FDG uptake associated with liver lesions. The last CT chest was in 2013 at [facility] [place], there was some nodular opacities then. Small nodular densities in the lung, indeterminate, too small to biopsy.  Radiologist reviewed all scans,, lung lesions are new per radiology      Enhancing lesion on the liver on CT has no FDG uptake on PET, differential hemangioma, focal nodular hyperplasia, ultrasound liver      Thyrogen stimulated radioactive iodine uptake and scan ordered which can both be diagnostic as well as therapeutic if there is uptake-not completed,  patient postponed it because scans are expensive  She finally saw Dr. Isaac Gardner and is here to follow up on GRANADOS therapy and WBS       Discussed that the lung nodules are of concern, until otherwise proven - we have to consider it as malignancy given persistent thyroid disease, although thyroglobulin is not impressive. I discussed with her proceeding to  GRANADOS  after withdrawing synthroid   Discussed that she cannot get pregnant for almost 1 years after GRANADOS (  vasectomized )   Discussed that she has to be AWAY from her kids for a week following the treatment       She already discontinued taking synthroid on oct 18 2022 . Will do TSH  on nov 2nd and if elevated, will proceed with GRANADOS therapy  with 200 mci  to address lung mets .   Pregnancy test a day prior to treatment dose   Will do WBS  a week later       She resumed her synthroid dose after the WBS and will do labs in 6 and 3 months       Spent > 40 minutes on the day of the visit reviewing chart,  ordering/reviewing labs, counseling, discussing therapeutics and documentation in the medical record none

## 2024-03-08 NOTE — ED PROVIDER NOTE - CLINICAL SUMMARY MEDICAL DECISION MAKING FREE TEXT BOX
64y M presents as transfer from [place] for b/l SDH diagnosed after fall with head injury. Pt hemodynamically stable with GCS 15. Code Trauma B activated on arrival. Seen by neurosurgery team -- advising q1h neuro checks. Admitted to SICU.

## 2024-03-08 NOTE — ED ADULT NURSE REASSESSMENT NOTE - NS ED NURSE REASSESS COMMENT FT1
Assumed care for patient at 1900. patient alert and oriented. patient being transferred to [facility] secondary to bilat hematomas. Report given to transferring hospital. Transport here now.

## 2024-03-08 NOTE — ED ADULT NURSE NOTE - ED STAT RN HANDOFF DETAILS
Patient transferred to Neuro ICU for further care, A&Ox4, in no acute distress with no apparent deficits on assessment.

## 2024-03-08 NOTE — ED PROVIDER NOTE - CLINICAL SUMMARY MEDICAL DECISION MAKING FREE TEXT BOX
64-year-old male with frequent falls, unsteady gait, questionable syncope.  Cardiac workup rule out ACS, arrhythmia, PE.  Labs to rule out electrolyte abnormalities.  CT head rule out any intracranial pathology secondary to trauma.  PT, social work, admit.

## 2024-03-08 NOTE — ED ADULT TRIAGE NOTE - CHIEF COMPLAINT QUOTE
AOX4, s/p fall at home. c/o weakness in legs. no blood thinners. AOX4, s/p fall at home. c/o weakness in legs and lower back pain. no blood thinners. denies hitting head . denies LOC. pt has hx falls, dizziness, weakness in legs

## 2024-03-08 NOTE — ED PROVIDER NOTE - OBJECTIVE STATEMENT
64y M w/ no known PMH, presents as transfer from [place] for subdural hemorrhage. Pt has reportedly had freq 64y M w/ no known PMH, presents as transfer from [place] for subdural hemorrhage. Pt has reportedly had frequent falls recently and had negative workup at [place] 1 month ago. Had a fall this afternoon; was seen at [place] and found to have b/l frontoparietal SDH. Given Ofirmev prior to arrival. Denies other complaints. No blood thinners.

## 2024-03-08 NOTE — ED PROVIDER NOTE - NS ED ROS FT
Constitutional: no fever  CV: no chest pain  Resp: no cough, no shortness of breath  GI: no abdominal pain, no vomiting, no diarrhea  : no dysuria  MSK: no joint pain  Neuro: +headache

## 2024-03-08 NOTE — H&P ADULT - ASSESSMENT
63 yo male with no significant PMHx transferred from [facility] s/p fall, found to have B/L SDH in the frontalpariteal region. Patient is hemodynamically stable, GCS remains 15. No other deficits or obvious injuries noted.  Awaiting neuro surgery recommendations. Keppra given in the trauma room.     Plan:   - NPO  - pain control   - follow up neuro surgery recs  - f/u CT head, C spine  - f/u CTA head and neck.   - neuro checks   - monitor vitals    65 yo male with no significant PMHx transferred from [facility] s/p fall, found to have B/L SDH in the frontalpariteal region. Patient is hemodynamically stable, GCS remains 15. No other deficits or obvious injuries noted.  Awaiting neuro surgery recommendations. Keppra given in the trauma room.     Plan:   - NPO  - pain control   - follow up neuro surgery recs  - f/u CT head, C spine  - f/u CTA head and neck.   - neuro checks   - monitor vitals     Addendum  Admit to SICU  q1 neurochecks

## 2024-03-08 NOTE — ED ADULT NURSE NOTE - CHIEF COMPLAINT QUOTE
AOX4, s/p fall at home. c/o weakness in legs and lower back pain. no blood thinners. denies hitting head . denies LOC. pt has hx falls, dizziness, weakness in legs

## 2024-03-08 NOTE — ED ADULT NURSE NOTE - NSFALLHARMRISKINTERV_ED_ALL_ED

## 2024-03-08 NOTE — ED ADULT NURSE NOTE - OBJECTIVE STATEMENT
Received the patient in the Er. Patient is alert and oriented. S/P Fall. Denies LOC. C/O Back pain. Able to move all extremities. Patient has H/O Falls.

## 2024-03-08 NOTE — ED ADULT NURSE NOTE - OBJECTIVE STATEMENT
Patient presents to trauma bay, as trauma B transfer from [facility] Patient presents to trauma bay, as trauma B transfer from [facility].  Pt alert and responsive, normotensive on arrival without apparent neuro deficits, no uncontrolled bleeding noted.  Refer to trauma flow sheet.

## 2024-03-08 NOTE — ED PROVIDER NOTE - NSICDXNOPASTSURGICALHX_GEN_ALL_ED
Caller: Eva Borrego    Relationship: Self    Best call back number: [phone]     Medication needed:   Requested Prescriptions     Pending Prescriptions Disp Refills   • HYDROcodone-acetaminophen (Norco) 7.5-325 MG per tablet 120 tablet 0     Sig: Take 1 tablet by mouth Every 6 (Six) Hours As Needed for Severe Pain .       When do you need the refill by: 3-29-21    What additional details did the patient provide when requesting the medication: PATIENT IS OUT OF MEDICATION    Does the patient have less than a 3 day supply:  [x] Yes  [] No    What is the patient's preferred pharmacy: [facility] [address] [place] [address] [address] [place] [address] [address] [facility] [address] - [phone] [facility] [phone] FX        <-- Click to add NO significant Past Surgical History

## 2024-03-08 NOTE — ED PROVIDER NOTE - PHYSICAL EXAMINATION
Constitutional: Awake, alert, in no acute distress  Eyes: PERRL  HENT: no scalp tenderness or deformity, no facial tenderness, airway patent  Neck: no cervical spine tenderness, no palpable stepoff, no tracheal deviation  CV: no chest wall tenderness, no crepitus or subcutaneous emphysema.  RRR, no murmur, 2+ distal pulses in all extremities  Pulm: non-labored respirations, CTAB  Abdomen: soft, non-tender, non-distended, no ecchymosis  Back: +lumbar spinal tenderness, no palpable stepoff  Extremities: stable pelvis, no extremity tenderness or deformity  Skin: no rash  Neuro: AAOx3, GCS 15, +mild weakness LLE, no other focal deficits

## 2024-03-09 ENCOUNTER — RESULT REVIEW (OUTPATIENT)
Age: 64
End: 2024-03-09

## 2024-03-09 LAB
ANION GAP SERPL CALC-SCNC: 11 MMOL/L — SIGNIFICANT CHANGE UP (ref 5–17)
BASOPHILS # BLD AUTO: 0.02 K/UL — SIGNIFICANT CHANGE UP (ref 0–0.2)
BASOPHILS NFR BLD AUTO: 0.3 % — SIGNIFICANT CHANGE UP (ref 0–2)
BUN SERPL-MCNC: 11.5 MG/DL — SIGNIFICANT CHANGE UP (ref 8–20)
CALCIUM SERPL-MCNC: 8.8 MG/DL — SIGNIFICANT CHANGE UP (ref 8.4–10.5)
CHLORIDE SERPL-SCNC: 105 MMOL/L — SIGNIFICANT CHANGE UP (ref 96–108)
CO2 SERPL-SCNC: 23 MMOL/L — SIGNIFICANT CHANGE UP (ref 22–29)
CREAT SERPL-MCNC: 0.72 MG/DL — SIGNIFICANT CHANGE UP (ref 0.5–1.3)
EGFR: 101 ML/MIN/1.73M2 — SIGNIFICANT CHANGE UP
EOSINOPHIL # BLD AUTO: 0.1 K/UL — SIGNIFICANT CHANGE UP (ref 0–0.5)
EOSINOPHIL NFR BLD AUTO: 1.6 % — SIGNIFICANT CHANGE UP (ref 0–6)
GLUCOSE SERPL-MCNC: 99 MG/DL — SIGNIFICANT CHANGE UP (ref 70–99)
HCT VFR BLD CALC: 41.3 % — SIGNIFICANT CHANGE UP (ref 39–50)
HCV AB S/CO SERPL IA: 0.15 S/CO — SIGNIFICANT CHANGE UP (ref 0–0.99)
HCV AB SERPL-IMP: SIGNIFICANT CHANGE UP
HGB BLD-MCNC: 13.4 G/DL — SIGNIFICANT CHANGE UP (ref 13–17)
IMM GRANULOCYTES NFR BLD AUTO: 0.2 % — SIGNIFICANT CHANGE UP (ref 0–0.9)
LYMPHOCYTES # BLD AUTO: 2.4 K/UL — SIGNIFICANT CHANGE UP (ref 1–3.3)
LYMPHOCYTES # BLD AUTO: 39.3 % — SIGNIFICANT CHANGE UP (ref 13–44)
MAGNESIUM SERPL-MCNC: 1.9 MG/DL — SIGNIFICANT CHANGE UP (ref 1.6–2.6)
MCHC RBC-ENTMCNC: 26.7 PG — LOW (ref 27–34)
MCHC RBC-ENTMCNC: 32.4 GM/DL — SIGNIFICANT CHANGE UP (ref 32–36)
MCV RBC AUTO: 82.4 FL — SIGNIFICANT CHANGE UP (ref 80–100)
MONOCYTES # BLD AUTO: 0.67 K/UL — SIGNIFICANT CHANGE UP (ref 0–0.9)
MONOCYTES NFR BLD AUTO: 11 % — SIGNIFICANT CHANGE UP (ref 2–14)
NEUTROPHILS # BLD AUTO: 2.91 K/UL — SIGNIFICANT CHANGE UP (ref 1.8–7.4)
NEUTROPHILS NFR BLD AUTO: 47.6 % — SIGNIFICANT CHANGE UP (ref 43–77)
NT-PROBNP SERPL-SCNC: <36 PG/ML — SIGNIFICANT CHANGE UP (ref 0–300)
PHOSPHATE SERPL-MCNC: 4.1 MG/DL — SIGNIFICANT CHANGE UP (ref 2.4–4.7)
PLATELET # BLD AUTO: 248 K/UL — SIGNIFICANT CHANGE UP (ref 150–400)
POTASSIUM SERPL-MCNC: 4 MMOL/L — SIGNIFICANT CHANGE UP (ref 3.5–5.3)
POTASSIUM SERPL-SCNC: 4 MMOL/L — SIGNIFICANT CHANGE UP (ref 3.5–5.3)
RBC # BLD: 5.01 M/UL — SIGNIFICANT CHANGE UP (ref 4.2–5.8)
RBC # FLD: 13.3 % — SIGNIFICANT CHANGE UP (ref 10.3–14.5)
SODIUM SERPL-SCNC: 139 MMOL/L — SIGNIFICANT CHANGE UP (ref 135–145)
TROPONIN T, HIGH SENSITIVITY RESULT: 8 NG/L — SIGNIFICANT CHANGE UP (ref 0–51)
WBC # BLD: 6.11 K/UL — SIGNIFICANT CHANGE UP (ref 3.8–10.5)
WBC # FLD AUTO: 6.11 K/UL — SIGNIFICANT CHANGE UP (ref 3.8–10.5)

## 2024-03-09 PROCEDURE — 72158 MRI LUMBAR SPINE W/O & W/DYE: CPT | Mod: 26

## 2024-03-09 PROCEDURE — 93306 TTE W/DOPPLER COMPLETE: CPT | Mod: 26

## 2024-03-09 PROCEDURE — 72170 X-RAY EXAM OF PELVIS: CPT | Mod: 26

## 2024-03-09 PROCEDURE — 99232 SBSQ HOSP IP/OBS MODERATE 35: CPT

## 2024-03-09 PROCEDURE — 99223 1ST HOSP IP/OBS HIGH 75: CPT | Mod: 25

## 2024-03-09 PROCEDURE — 93010 ELECTROCARDIOGRAM REPORT: CPT

## 2024-03-09 PROCEDURE — 72156 MRI NECK SPINE W/O & W/DYE: CPT | Mod: 26

## 2024-03-09 PROCEDURE — 99233 SBSQ HOSP IP/OBS HIGH 50: CPT | Mod: 25

## 2024-03-09 PROCEDURE — 99497 ADVNCD CARE PLAN 30 MIN: CPT

## 2024-03-09 PROCEDURE — 99223 1ST HOSP IP/OBS HIGH 75: CPT

## 2024-03-09 PROCEDURE — 72131 CT LUMBAR SPINE W/O DYE: CPT | Mod: 26

## 2024-03-09 PROCEDURE — 70450 CT HEAD/BRAIN W/O DYE: CPT | Mod: 26

## 2024-03-09 PROCEDURE — 70552 MRI BRAIN STEM W/DYE: CPT | Mod: 26

## 2024-03-09 PROCEDURE — 72157 MRI CHEST SPINE W/O & W/DYE: CPT | Mod: 26

## 2024-03-09 RX ORDER — CHLORHEXIDINE GLUCONATE 213 G/1000ML
1 SOLUTION TOPICAL DAILY
Refills: 0 | Status: DISCONTINUED | OUTPATIENT
Start: 2024-03-09 | End: 2024-03-17

## 2024-03-09 RX ORDER — LEVETIRACETAM 250 MG/1
500 TABLET, FILM COATED ORAL
Refills: 0 | Status: DISCONTINUED | OUTPATIENT
Start: 2024-03-09 | End: 2024-03-13

## 2024-03-09 RX ORDER — ACETAMINOPHEN 500 MG
1000 TABLET ORAL EVERY 6 HOURS
Refills: 0 | Status: COMPLETED | OUTPATIENT
Start: 2024-03-09 | End: 2024-03-13

## 2024-03-09 RX ORDER — CEFAZOLIN SODIUM 1 G
2000 VIAL (EA) INJECTION ONCE
Refills: 0 | Status: COMPLETED | OUTPATIENT
Start: 2024-03-10 | End: 2024-03-10

## 2024-03-09 RX ORDER — SODIUM CHLORIDE 9 MG/ML
1000 INJECTION INTRAMUSCULAR; INTRAVENOUS; SUBCUTANEOUS
Refills: 0 | Status: DISCONTINUED | OUTPATIENT
Start: 2024-03-09 | End: 2024-03-09

## 2024-03-09 RX ORDER — CHLORHEXIDINE GLUCONATE 213 G/1000ML
1 SOLUTION TOPICAL EVERY 12 HOURS
Refills: 0 | Status: DISCONTINUED | OUTPATIENT
Start: 2024-03-09 | End: 2024-03-09

## 2024-03-09 RX ORDER — SODIUM CHLORIDE 9 MG/ML
3 INJECTION INTRAMUSCULAR; INTRAVENOUS; SUBCUTANEOUS EVERY 8 HOURS
Refills: 0 | Status: DISCONTINUED | OUTPATIENT
Start: 2024-03-09 | End: 2024-03-10

## 2024-03-09 RX ORDER — MAGNESIUM SULFATE 500 MG/ML
2 VIAL (ML) INJECTION ONCE
Refills: 0 | Status: COMPLETED | OUTPATIENT
Start: 2024-03-09 | End: 2024-03-09

## 2024-03-09 RX ADMIN — SODIUM CHLORIDE 3 MILLILITER(S): 9 INJECTION INTRAMUSCULAR; INTRAVENOUS; SUBCUTANEOUS at 19:28

## 2024-03-09 RX ADMIN — Medication 1000 MILLIGRAM(S): at 15:30

## 2024-03-09 RX ADMIN — Medication 400 MILLIGRAM(S): at 14:58

## 2024-03-09 RX ADMIN — Medication 25 GRAM(S): at 05:51

## 2024-03-09 RX ADMIN — LEVETIRACETAM 500 MILLIGRAM(S): 250 TABLET, FILM COATED ORAL at 17:14

## 2024-03-09 RX ADMIN — LEVETIRACETAM 500 MILLIGRAM(S): 250 TABLET, FILM COATED ORAL at 05:51

## 2024-03-09 NOTE — PATIENT PROFILE ADULT - FALL HARM RISK - HARM RISK INTERVENTIONS

## 2024-03-09 NOTE — PROGRESS NOTE ADULT - SUBJECTIVE AND OBJECTIVE BOX
NEUROSURGERY PROGRESS NOTE:    HPI: 63 yo male with no PMHx transferred from [place] s/p fall in the afternoon. Patient has been having multiple falls recently, and had a negative cardio and neuro w/u at [place] 1 month ago. Fall today was unwitnessed, +HS, +LOC. On CTH in ED at [place], found to have B/L SDH in the frontal-parietal area. Repeat CTH in ED here re-demonstrates the bleed. Neurosurgery consulted for SDHs. Pt evaluated in ED trauma bay, in NAD, already in C-collar.   pt seen and states is at baseline, denied any new or worsening sensorimotor changes    pt seen in ICU earlier today w Dr Polanco, pt is native [facility] speaker and communicates well in English/ makes needs known.  at baseline w recent h/o ataxia and frequent falls.  mild headache well controlled w PRN analgesics as ordered   pt denied any daily meds use at home PTA, occasional tylenol for aches   - Nausea / - Vomiting  denies weakness  denies numbness/ tingling  denies visual changes  denies C/T/LS  Spine pain  + void  + OOB w assist   - diet/ +NPO   + venodynes b/l when in bed         MEDICATIONS  (STANDING):  sodium chloride 0.9% lock flush 3 milliLiter(s) IV Push every 8 hours      MEDICATIONS  (PRN):    Allergies    Allergy Status Unknown    Intolerances      Vital Signs Last 24 Hrs  T(C): 36.6 (09 Mar 2024 12:01), Max: 37.1 (09 Mar 2024 01:10)  T(F): 97.9 (09 Mar 2024 12:01), Max: 98.7 (09 Mar 2024 01:10)  HR: 81 (09 Mar 2024 13:00) (55 - 86)  BP: 155/130 (09 Mar 2024 13:00) (101/66 - 163/70)  BP(mean): 140 (09 Mar 2024 13:00) (74 - 140)  RR: 18 (09 Mar 2024 13:00) (11 - 24)  SpO2: 100% (09 Mar 2024 13:00) (93% - 100%)    Parameters below as of 09 Mar 2024 12:01  Patient On (Oxygen Delivery Method): room air        GCS: 15  Eye response (E)4  Verbal response (V)5  Motor response (M)6        PHYSICAL EXAM:  GENERAL: NAD, well-groomed, cooperative   HEAD:  Atraumatic, normocephalic  VIOLETA COMA SCORE: E- V- M- = 15  MENTAL STATUS: AAO x3; Awake and upright in bed. Appropriately conversant without aphasia; following commands  CRANIAL NERVES: PERRL. EOMI without nystagmus. Face symmetric w/ normal eye closure and smile, tongue midline. Hearing grossly intact. Speech clear. Head turning and shoulder shrug intact. FS/ TML.  MOTOR: strength 5/5 b/l upper and lower extremities  SENSORY: intact to FT to face & UEs and LEs  SKIN: Warm, dry; no rashes or lesions appreciated         LABS:                        13.4   6.11  )-----------( 248      ( 09 Mar 2024 03:15 )             41.3     03-09    139  |  105  |  11.5  ----------------------------<  99  4.0   |  23.0  |  0.72    Ca    8.8      09 Mar 2024 03:15  Phos  4.1     03-09  Mg     1.9     03-09    TPro  7.1  /  Alb  4.0  /  TBili  0.4  /  DBili  x   /  AST  16  /  ALT  19  /  AlkPhos  90  03-08    PT/INR - ( 08 Mar 2024 22:30 )   PT: 12.0 sec;   INR: 1.08 ratio         PTT - ( 08 Mar 2024 22:30 )  PTT:25.1 sec  Urinalysis Basic - ( 09 Mar 2024 03:15 )    Color: x / Appearance: x / SG: x / pH: x  Gluc: 99 mg/dL / Ketone: x  / Bili: x / Urobili: x   Blood: x / Protein: x / Nitrite: x   Leuk Esterase: x / RBC: x / WBC x   Sq Epi: x / Non Sq Epi: x / Bacteria: x        RADIOLOGY & ADDITIONAL TESTS:  < from: CT Head No Cont (03.09.24 @ 05:44) >  ACC: [id number] EXAM:  CT BRAIN   ORDERED BY: DANA OVERTON     PROCEDURE DATE:  03/09/2024    INTERPRETATION:  CT head without IV contrast  CLINICAL INFORMATION: Follow-up subdural hematomas    TECHNIQUE: Contiguous axial 5 mm sections were obtained through the head.   Sagittal and coronal 2-D reformatted images were also obtained.   This scan was performed using automatic exposure control (radiation dose reduction software) to obtain a diagnostic image quality scan with patient dose as low as reasonably achievable.    FINDINGS:   CT dated 03/08/2024 available for review.    The brain demonstrates unchanged acute BILATERAL holohemispheric subdural hematomas measuring 2.3 cm on the LEFT and 2.2 cm on the RIGHT with mass effect on the underlying brain but no midline shift.   No acute cerebral cortical infarct is seen.    No mass effect is found in the brain.    The ventricles, sulci and basal cisterns show mild cerebellar atrophy.    The orbits are unremarkable.  The paranasal sinuses are clear.  The nasal cavity appears intact.  The nasopharynx is symmetric.  The central skull base, petrous temporal bones and calvarium remain intact.      IMPRESSION:   unchanged acute BILATERAL holohemispheric subdural hematomas measuring 2.3 cm on the LEFT and 2.2 cm on the RIGHT with mass effect on the underlying brain but no midline shift.    --- End of Report ---  BASILIA PARK MD; Attending Radiologist  This document has been electronically signed. Mar 9 2024  7:59AM  < end of copied text >      I spent a total time of 25 mins with the patient at bedside of which more than 50% of time was spent on counseling/coordination of care

## 2024-03-09 NOTE — CHART NOTE - NSCHARTNOTEFT_GEN_A_CORE
Spoke with Dr. Cobb. Will get EEG to r/o seizure. Will consult neurology. Will get MR brain, C/T/L-spine to assess for CSF leak. OR clearance pending further medical w/u. Plan for OR sometime this week pending clearance and pt status. Discussed w/ SICU team.

## 2024-03-09 NOTE — CONSULT NOTE ADULT - SUBJECTIVE AND OBJECTIVE BOX
HPI: 65 yo male with no PMHx transferred from [place] s/p fall in the afternoon. Patient has been having multiple falls recently, and had a negative cardio and neuro w/u at [place] 1 month ago. Fall today was unwitnessed, +HS, +LOC. On CTH in ED at [place], found to have B/L SDH in the frontal-parietal area. Repeat CTH in ED here re-demonstrates the bleed. Neurosurgery consulted for SDHs. Pt evaluated in ED trauma bay, in [facility], already in [facility].     Allergies  Allergy Status Unknown  Intolerances    MEDICATIONS:  Neuro:  levETIRAcetam 500 milliGRAM(s) Oral two times a day    IVF:  sodium chloride 0.9%. 1000 milliLiter(s) IV Continuous <Continuous>    PHYSICAL EXAM:  GENERAL: NAD, well-groomed  HEAD:  Atraumatic, normocephalic  VIOLETA COMA SCORE: E- V- M- = 15  MENTAL STATUS: AAO x3; Awake; Opens eyes spontaneously; Appropriately conversant without aphasia; following simple commands  CRANIAL NERVES: PERRL. EOMI without nystagmus. Face symmetric w/ normal eye closure and smile, tongue midline. Hearing grossly intact. Speech clear. Head turning and shoulder shrug intact.   MOTOR: strength 5/5 b/l upper and lower extremities  SKIN: Warm, dry; no rashes or lesions    LABS:                        14.4   7.25  )-----------( 258      ( 08 Mar 2024 22:30 )             43.5     03-08    139  |  103  |  10.3  ----------------------------<  130<H>  3.7   |  28.0  |  0.80    Ca    9.4      08 Mar 2024 22:30    TPro  7.1  /  Alb  4.0  /  TBili  0.4  /  DBili  x   /  AST  16  /  ALT  19  /  AlkPhos  90  03-08    PT/INR - ( 08 Mar 2024 22:30 )   PT: 12.0 sec;   INR: 1.08 ratio         PTT - ( 08 Mar 2024 22:30 )  PTT:25.1 sec  Urinalysis Basic - ( 08 Mar 2024 22:30 )    Color: x / Appearance: x / SG: x / pH: x  Gluc: 130 mg/dL / Ketone: x  / Bili: x / Urobili: x   Blood: x / Protein: x / Nitrite: x   Leuk Esterase: x / RBC: x / WBC x   Sq Epi: x / Non Sq Epi: x / Bacteria: x    RADIOLOGY & ADDITIONAL STUDIES:    < from: CT Angio Head w/ IV Cont (03.08.24 @ 22:58) >  IMPRESSION:    NONCONTRAST CT HEAD:  Bilateral mixed attenuation subdural collections, measuring up to 1.5 cm   in thickness on the right and up to 1.7 cm on the left. No   intraventricular extension of hemorrhage or midline shift. Findings are   consistent with acute on chronic subdural hematoma.    Calvarium intact.    CTA HEAD/NECK:  No proximal large vessel occlusion, aneurysm, dissection, or   hemodynamically flow-limiting stenosis identified in the head and neck.    CT CERVICAL SPINE:  No evidence of acute cervical spine fracture or spondylolisthesis.

## 2024-03-09 NOTE — DISCHARGE NOTE NURSING/CASE MANAGEMENT/SOCIAL WORK - NSDCPETBCESMAN_GEN_ALL_CORE
If you are a smoker, it is important for your health to stop smoking. Please be aware that second hand smoke is also harmful.
31.5

## 2024-03-09 NOTE — PROGRESS NOTE ADULT - NUTRITIONAL ASSESSMENT
Diet, NPO after Midnight:      NPO Start Date: 09-Mar-2024,   NPO Start Time: 23:59  Except Medications  With Ice Chips/Sips of Water (03-09-24 @ 14:21) [Active]  Diet, NPO (03-08-24 @ 22:36) [Active]    Prealbumin pending

## 2024-03-09 NOTE — CONSULT NOTE ADULT - ASSESSMENT
65 yo male with no PMHx transferred from [place] s/p fall in the afternoon. On CTH in ED at [place], found to have B/L SDH in the frontal-parietal area. Repeat CTH in ED here re-demonstrates the bleed. Neurosurgery consulted for SDHs.    PLAN    - Q1 neuro checks  - Trauma eval  - CTH 6 hrs (~05:00)  - Medicine consult for clearance  - Hold AC/AP  - Keppra 500 BID  - Care management per primary team  - To d/w Dr. Polanco in AM

## 2024-03-09 NOTE — PROGRESS NOTE ADULT - SUBJECTIVE AND OBJECTIVE BOX
HPI:  65 yo male with no PMHx transferred from [place] s/p unwitnessed fall in the afternoon found to have B/L SDH in the frontal-parietal area. Patient admits to prior episode of mechanical fall on 02/21/24 in which he was diagnosed with R frontal SDH. At that time, Neurosurgery (Dr. Fuentes, [facility]) was consulted and deemed no acute neurosurgical intervention and followup outpatient for repeat imaging.     Patient admitted to trauma service, Neurosurgery consulted and following.      A: intact  B: B/L breathe sounds, clear to auscultation   C: femoral pulses palpable B/L, BP: 142/70  D: GCS: 15  (08 Mar 2024 22:36)        INTERVAL HPI/OVERNIGHT EVENTS:  64y Male seen and examined at bedside. Patient neurologically intact (GCS 15) and able to provide full history. Noted to have slight headache that is 4/10 in intensity. pending repeat CT head.       Vital Signs Last 24 Hrs  T(C): 37.1 (09 Mar 2024 01:10), Max: 37.1 (09 Mar 2024 01:10)  T(F): 98.7 (09 Mar 2024 01:10), Max: 98.7 (09 Mar 2024 01:10)  HR: 63 (09 Mar 2024 03:00) (63 - 72)  BP: 101/66 (09 Mar 2024 03:00) (101/66 - 149/105)  BP(mean): 79 (09 Mar 2024 03:00) (79 - 114)  RR: 13 (09 Mar 2024 03:00) (13 - 16)  SpO2: 95% (09 Mar 2024 03:00) (95% - 100%)        PHYSICAL EXAM:  GENERAL: NAD, well-groomed  HEAD:  normocephalic  VIOLETA COMA SCORE: E- V- M- = 15  MENTAL STATUS: AAO x3; Awake; Opens eyes spontaneously; Appropriately  conversant without aphasia; following simple and complex commands  CRANIAL NERVES: PERRL. EOMI without nystagmus. Face symmetric w/ normal eye  closure and smile, tongue midline. Hearing grossly intact. Speech clear.  MOTOR: strength 5/5 b/l upper and lower extremities  SKIN: Warm, dry; no rashes or lesions       LABS:                        13.4   6.11  )-----------( 248      ( 09 Mar 2024 03:15 )             41.3     03-08    139  |  103  |  10.3  ----------------------------<  130<H>  3.7   |  28.0  |  0.80    Ca    9.4      08 Mar 2024 22:30    TPro  7.1  /  Alb  4.0  /  TBili  0.4  /  DBili  x   /  AST  16  /  ALT  19  /  AlkPhos  90  03-08    PT/INR - ( 08 Mar 2024 22:30 )   PT: 12.0 sec;   INR: 1.08 ratio         PTT - ( 08 Mar 2024 22:30 )  PTT:25.1 sec  Urinalysis Basic - ( 08 Mar 2024 22:30 )    Color: x / Appearance: x / SG: x / pH: x  Gluc: 130 mg/dL / Ketone: x  / Bili: x / Urobili: x   Blood: x / Protein: x / Nitrite: x   Leuk Esterase: x / RBC: x / WBC x   Sq Epi: x / Non Sq Epi: x / Bacteria: x        03-08 @ 07:01  -  03-09 @ 03:58  --------------------------------------------------------  IN: 200 mL / OUT: 0 mL / NET: 200 mL        RADIOLOGY & ADDITIONAL TESTS:  < from: CT Cervical Spine No Cont (03.08.24 @ 23:21) >          IMPRESSION:    NONCONTRAST CT HEAD:  Bilateral mixed attenuation subdural collections, measuring up to 1.5 cm   in thickness on the right and up to 1.7 cm on the left. No   intraventricular extension of hemorrhage or midline shift. Findings are   consistent with acute on chronic subdural hematoma.    Calvarium intact.    CTA HEAD/NECK:  No proximal large vessel occlusion, aneurysm, dissection, or   hemodynamically flow-limiting stenosis identified in the head and neck.    CT CERVICAL SPINE:  No evidence of acute cervical spine fracture or spondylolisthesis.    Critical findings above related to intracranial hemorrhage were discussed   directly by telephone by the ED radiologist on call, Lupillo Tim M.D.,   with the emergency Department ordering physician, Shine Whaley M.D., at   11:40 PM on 3/8/2024.    < end of copied text >  < from: CT Angio Neck w/ IV Cont (03.08.24 @ 22:59) >

## 2024-03-09 NOTE — DISCHARGE NOTE NURSING/CASE MANAGEMENT/SOCIAL WORK - PATIENT PORTAL LINK FT
You can access the FollowMyHealth Patient Portal offered by [facility] by registering at the following website: [email or web address]. By joining [facility]’s FollowMyHealth portal, you will also be able to view your health information using other applications (apps) compatible with our system.

## 2024-03-09 NOTE — DISCHARGE NOTE NURSING/CASE MANAGEMENT/SOCIAL WORK - NSDCPELOVENOX_GEN_ALL_CORE
Consent Type: Consent 1 (Standard) Enoxaparin/Lovenox - Compliance/Enoxaparin/Lovenox - Dietary Advice/Enoxaparin/Lovenox - Follow up monitoring/Enoxaparin/Lovenox - Potential for adverse drug reactions and interactions

## 2024-03-09 NOTE — CHART NOTE - NSCHARTNOTEFT_GEN_A_CORE
Tertiary Trauma Survey (TTS)    Date of TTS: 03-09-24 @ 14:45                             Admit Date: 03-08-24 @ 23:10      Trauma Activation: B    List Injuries Identified to Date:        List Operative and Interventional Radiological Procedures:   N/A    Tertiary [x] 3/9/2024  Geriatric Consult [x] 3/9/2024  PTSD [N/A]   GOC [x] 3/9/2024   CODE STATUS: FULL       Physical Exam:    Neuro: A and Ox3, NAD, Nonfocal. Upper and Lower Extremities Sensory/Motor intact B/L.    HEENT: Normal cephalic, atraumatic. Trachea midline.     Pulm/Chest: CTA B/L, equal rise and fall of the chest.    Cardiac: NSR, S1/S2.    GI / Abdomen: Nontender, nondistended.     Musculoskeletal / Extremities: Atraumatic. AROM of the extremities. Cervical/Thoracic/Lumbar spine NTTP with no obvious stepoffs. TTP in sacrum/coccyx.     Integumentary: Intact. No obvious ecchymosis, lacerations, abrasions.      RADIOLOGICAL FINDINGS REVIEW:  CXR  IMPRESSION:  HEART:difficult to access in this projection.  LUNGS: free of consolidation,effusion, or pneumothorax..  BONES: within normal limits    INCIDENTAL FINDINGS:    [ ] No    [ ] Yes, Findings are:        [ ] Tertiary exam complete, there are no new injuries identified    [ ] Tertiary exam done, new injuries identified are:                [ ] Imaging ordered: Tertiary Trauma Survey (TTS)    Date of TTS: 03-09-24 @ 14:45                             Admit Date: 03-08-24 @ 23:10      Trauma Activation: B    List Injuries Identified to Date:  SDH B/L      List Operative and Interventional Radiological Procedures:   N/A    Tertiary [x] 3/9/2024  Geriatric Consult [x] 3/9/2024  PTSD [N/A]   GOC [x] 3/9/2024   CODE STATUS: FULL       Physical Exam:    Neuro: A and Ox3, NAD, Nonfocal. Upper and Lower Extremities Sensory/Motor intact B/L.    HEENT: Normal cephalic, atraumatic. Trachea midline.     Pulm/Chest: CTA B/L, equal rise and fall of the chest.    Cardiac: NSR, S1/S2.    GI / Abdomen: Nontender, nondistended.     Musculoskeletal / Extremities: Atraumatic. AROM of the extremities. Cervical/Thoracic/Lumbar spine NTTP with no obvious stepoffs. TTP in sacrum/coccyx.     Integumentary: Intact. No obvious ecchymosis, lacerations, abrasions.      RADIOLOGICAL FINDINGS REVIEW:  CXR  IMPRESSION:  HEART:difficult to access in this projection.  LUNGS: free of consolidation,effusion, or pneumothorax..  BONES: within normal limits    NONCONTRAST CT HEAD:  Bilateral mixed attenuation subdural collections, measuring up to 1.5 cm in thickness on the right and up to 1.7 cm on the left. No intraventricular extension of hemorrhage or midline shift. Findings are consistent with acute on chronic subdural hematoma.    Calvarium intact.    CTA HEAD/NECK:  No proximal large vessel occlusion, aneurysm, dissection, or hemodynamically flow-limiting stenosis identified in the head and neck.    CT CERVICAL SPINE:  No evidence of acute cervical spine fracture or spondylolisthesis.    CTH:   IMPRESSION: unchanged acute BILATERAL holohemispheric subdural hematomas measuring 2.3 cm on the LEFT and 2.2 cm on the RIGHT with mass effect on the underlying brain but no midline shift.    INCIDENTAL FINDINGS:    [x] No    [ ] Yes, Findings are:    [x] Tertiary exam done, new injuries identified are: sacral/coccyx TTP              [x] Imaging ordered: X Tertiary Trauma Survey (TTS)    Date of TTS: 03-09-24 @ 14:45                             Admit Date: 03-08-24 @ 23:10      Trauma Activation: B    List Injuries Identified to Date:  SDH B/L      List Operative and Interventional Radiological Procedures:   N/A    Tertiary [x] 3/9/2024  Geriatric Consult [x] 3/9/2024  PTSD [N/A]   GOC [x] 3/9/2024   CODE STATUS: FULL       Physical Exam:    Neuro: A and Ox3, NAD, Nonfocal. Upper and Lower Extremities Sensory/Motor intact B/L.    HEENT: Normal cephalic, atraumatic. Trachea midline.     Pulm/Chest: CTA B/L, equal rise and fall of the chest.    Cardiac: NSR, S1/S2.    GI / Abdomen: Nontender, nondistended.     Musculoskeletal / Extremities: Atraumatic. AROM of the extremities. Cervical/Thoracic/Lumbar spine NTTP with no obvious stepoffs. TTP in sacrum/coccyx.     Integumentary: Intact. No obvious ecchymosis, lacerations, abrasions.      RADIOLOGICAL FINDINGS REVIEW:  CXR  IMPRESSION:  HEART:difficult to access in this projection.  LUNGS: free of consolidation,effusion, or pneumothorax..  BONES: within normal limits    NONCONTRAST CT HEAD:  Bilateral mixed attenuation subdural collections, measuring up to 1.5 cm in thickness on the right and up to 1.7 cm on the left. No intraventricular extension of hemorrhage or midline shift. Findings are consistent with acute on chronic subdural hematoma.    Calvarium intact.    CTA HEAD/NECK:  No proximal large vessel occlusion, aneurysm, dissection, or hemodynamically flow-limiting stenosis identified in the head and neck.    CT CERVICAL SPINE:  No evidence of acute cervical spine fracture or spondylolisthesis.    CTH:   IMPRESSION: unchanged acute BILATERAL holohemispheric subdural hematomas measuring 2.3 cm on the LEFT and 2.2 cm on the RIGHT with mass effect on the underlying brain but no midline shift.    INCIDENTAL FINDINGS:    [x] No    [ ] Yes, Findings are:    [x] Tertiary exam done, new injuries identified are: sacral/coccyx TTP              [x] Imaging ordered: XR Pelvis - Pending read Tertiary Trauma Survey (TTS)    Date of TTS: 03-09-24 @ 14:45                             Admit Date: 03-08-24 @ 23:10      Trauma Activation: B    List Injuries Identified to Date:  SDH B/L      List Operative and Interventional Radiological Procedures:   N/A    Tertiary [x] 3/9/2024  Geriatric Consult [x] 3/9/2024  PTSD [N/A]   GOC [x] 3/9/2024   CODE STATUS: FULL       Physical Exam:    Neuro: A and Ox3, NAD, Nonfocal. Upper and Lower Extremities Sensory/Motor intact B/L.    HEENT: Normal cephalic, atraumatic. Trachea midline.     Pulm/Chest: CTA B/L, equal rise and fall of the chest.    Cardiac: NSR, S1/S2.    GI / Abdomen: Nontender, nondistended.     Musculoskeletal / Extremities: Atraumatic. AROM of the extremities. Cervical/Thoracic/Lumbar spine NTTP with no obvious stepoffs. TTP in sacrum/coccyx.     Integumentary: Intact. No obvious ecchymosis, lacerations, abrasions.      RADIOLOGICAL FINDINGS REVIEW:  CXR  IMPRESSION:  HEART:difficult to access in this projection.  LUNGS: free of consolidation,effusion, or pneumothorax..  BONES: within normal limits    NONCONTRAST CT HEAD:  Bilateral mixed attenuation subdural collections, measuring up to 1.5 cm in thickness on the right and up to 1.7 cm on the left. No intraventricular extension of hemorrhage or midline shift. Findings are consistent with acute on chronic subdural hematoma.    Calvarium intact.    CTA HEAD/NECK:  No proximal large vessel occlusion, aneurysm, dissection, or hemodynamically flow-limiting stenosis identified in the head and neck.    CT CERVICAL SPINE:  No evidence of acute cervical spine fracture or spondylolisthesis.    CTH:   IMPRESSION: unchanged acute BILATERAL holohemispheric subdural hematomas measuring 2.3 cm on the LEFT and 2.2 cm on the RIGHT with mass effect on the underlying brain but no midline shift.    INCIDENTAL FINDINGS:    [x] No    [ ] Yes, Findings are:    [x] Tertiary exam done, new injuries identified are: sacral/coccyx TTP              [x] Imaging ordered: XR Pelvis - Pending read, will obtain lumbar sacral CT

## 2024-03-09 NOTE — CONSULT NOTE ADULT - ASSESSMENT
63 yo male with Hx of Multiple unwitnessed falls, out patient Cardiac cath done in feb shows minor irregularities, he was brought to the [place] s/p unwitnessed fall yesterday afternoon found to have B/L SDH in the frontal-parietal area, Patient admits to prior episode of mechanical fall on 02/21/24 in which he was diagnosed with R frontal SDH. At that time, Neurosurgery (Dr. Fuentes, [facility]) was consulted and deemed no acute neurosurgical intervention and followup outpatient for repeat imaging, he has been transferred here under Trauma service for further eval, medicine consulted for [facility] Trauma Eval.       Plan:     Identification of Seniors at Risk:                                                                                                                                       Before the event that brought you to the hospital, did you need someone to help you on a regular basis?     In the 24 hours before your injury, have you needed more help than usual?                                                          Have you been hospitalized for one or more nights during the past six months?                                                   In general, do you have serious problems with your vision that cannot be corrected with glasses?                   In general, do you have serious problems with your memory?                                                                                   Do you take six or more different medications every day?                                                                                             A positive screen is an aswer of yes to 2 or more - Total:                                   Recurrent Fall / B/L SDH    - Neurochecks  - neurosurgery following  - pain control, avoid oversedation  - serial imaging   - Keppra 500 BID for AED prophylaxis  - SBP:    - no other deficits/obvious injury noted  - DVT prophylaxis as per Primary team   -Fall risk, fall precaution  -PT and OT  - recent cath reviewed with minor irregularities  - would check Orthostatic vitals   -would get TSH, cortisole in am, vitamin B12 level, magnesium level   - consult for home safety         65 yo male with Hx of Multiple unwitnessed falls, out patient Cardiac cath done in feb shows minor irregularities, he was brought to the [place] [employer] s/p unwitnessed fall yesterday afternoon found to have B/L SDH in the frontal-parietal area, Patient admits to prior episode of mechanical fall on 02/21/24 in which he was diagnosed with R frontal SDH. At that time, Neurosurgery (Dr. Fuentes, [facility]) was consulted and deemed no acute neurosurgical intervention and followup outpatient for repeat imaging, he has been transferred here under Trauma service for further eval, medicine consulted for [facility] Trauma Eval.       Plan:     Identification of Seniors at Risk:                                                                                                                                       Before the event that brought you to the hospital, did you need someone to help you on a regular basis?   Yes   In the 24 hours before your injury, have you needed more help than usual?                                                no          Have you been hospitalized for one or more nights during the past six months?                                         Yes           In general, do you have serious problems with your vision that cannot be corrected with glasses?               No    In general, do you have serious problems with your memory?                                                                  No                  Do you take six or more different medications every day?                                                                        No                      A positive screen is an aswer of yes to 2 or more - Total:   Score is positive                Recurrent Fall / B/L SDH    - Neuro checks   - neurosurgery following  - pain control, avoid oversedation  - serial imaging   - Keppra 500 BID for AED prophylaxis  - SBP:    - no other deficits/obvious injury noted  - DVT prophylaxis as per Primary team   -Fall risk, fall precaution  -PT and OT  - recent cath reviewed with minor irregularities  - would check Orthostatic vitals   -would get TSH, cortisole in am, vitamin B12 level, magnesium level   - consult for home safety  -orthostatic hypotension check   -Neurology consult for seizure r/o and cVEEG  -seizure precautions  -fall precaution.   -DVT prophylaxis as per Primary team              63 yo male with Hx of Multiple unwitnessed falls, out patient Cardiac cath done in feb shows minor irregularities, he was brought to the [place] s/p unwitnessed fall yesterday afternoon found to have B/L SDH in the frontal-parietal area, Patient admits to prior episode of mechanical fall on 02/21/24 in which he was diagnosed with R frontal SDH. At that time, Neurosurgery (Dr. Fuentes, [facility]) was consulted and deemed no acute neurosurgical intervention and followup outpatient for repeat imaging, he has been transferred here under Trauma service for further eval, medicine consulted for [facility] Trauma Eval.       Plan:     Identification of Seniors at Risk:                                                                                                                                       Before the event that brought you to the hospital, did you need someone to help you on a regular basis?   Yes   In the 24 hours before your injury, have you needed more help than usual?                                                no          Have you been hospitalized for one or more nights during the past six months?                                         Yes           In general, do you have serious problems with your vision that cannot be corrected with glasses?               No    In general, do you have serious problems with your memory?                                                                  No                  Do you take six or more different medications every day?                                                                        No                      A positive screen is an aswer of yes to 2 or more - Total:   Score is positive                Recurrent Fall / B/L SDH    - Neuro checks   - neurosurgery following  - pain control, avoid oversedation  - serial imaging   - Keppra 500 BID for AED prophylaxis  - SBP:    - no other deficits/obvious injury noted  - DVT prophylaxis as per Primary team   -Fall risk, fall precaution  -PT and OT  - recent cath reviewed with minor irregularities  - would check Orthostatic vitals   -would get TSH, cortisole in am, vitamin B12 level, magnesium level, RPR   - consult for home safety  -orthostatic hypotension check   -Neurology consult for seizure r/o and cVEEG  -seizure precautions  -fall precaution.   -DVT prophylaxis as per Primary team     Plan of care discussed with SICU attending and Neurosurgery attending

## 2024-03-09 NOTE — CONSULT NOTE ADULT - NS ATTEND AMEND GEN_ALL_CORE FT
NSGY Attg:    see above    patient seen and examined    agree with above    A and O x 3  PERRL  EOMI  speech clear  no drift    imaging reviewed    I explained the risks, benefits, and alternatives of potential operative intervention to the patient's son per the request of the patient as below:    benefit: hopeful decompression, hopeful improvement in symptoms, hopeful prevention of progression of deficit   alternative: no surgical intervention; continued surveillance  risks: bleeding, infection, CSF leak, failure of procedure, need for re-operation, seizure, stroke, coma, death, DVT, PE, MI, PNA, UTI, difficulty/failure to intubate or extubate, new or worsening numbness, tingling, weakness, paralysis, sensory changes, difficulty/inability to ambulate, difficulty with expressive or receptive speech, altered personality or judgment, sexual dysfunction, incontinence    Patient and son are agree able to operative intervention    d/w medical team -- pending EEG and additional labs for pre-operative optimization    plan of care determined for bilateral subacute SDH with balance difficulty  supportive care per ICU  will follow  anticipate drainage this week  MRI brain and CTLS spine pending  EEG pending [Arthralgia] : arthralgia [Joint Pain] : joint pain [Joint Stiffness] : joint stiffness [Joint Swelling] : joint swelling NSGY Attg:    see above    patient seen and examined    agree with above    A and O x 3  PERRL  EOMI  speech clear  no drift    imaging reviewed    I explained the risks, benefits, and alternatives of potential operative intervention to the patient's son per the request of the patient as below:    benefit: hopeful decompression, hopeful improvement in symptoms, hopeful prevention of progression of deficit   alternative: no surgical intervention; continued surveillance; MMA embolization with or without drainage  risks: bleeding, infection, CSF leak, failure of procedure, need for re-operation, seizure, stroke, coma, death, DVT, PE, MI, PNA, UTI, difficulty/failure to intubate or extubate, new or worsening numbness, tingling, weakness, paralysis, sensory changes, difficulty/inability to ambulate, difficulty with expressive or receptive speech, altered personality or judgment, sexual dysfunction, incontinence    Patient and son are agree able to operative intervention.  I answered all of the son's additional questions.    d/w medical team -- pending EEG and additional labs for pre-operative optimization    plan of care determined for bilateral subacute SDH with balance difficulty  supportive care per ICU  will follow  anticipate drainage this week  MRI brain and CTLS spine pending  EEG pending [Negative] : Heme/Lymph

## 2024-03-09 NOTE — PROGRESS NOTE ADULT - ASSESSMENT
65 yo male with no PMHx transferred from [place] s/p fall in the afternoon.  On CTH in ED at [place], found to have B/L  acute on chronic SDH in the frontal-parietal area. GCS 15     Plan:   - c/w q1 neurochecks  - neurosurgery following, reccs greatly appreciated.   - pain control, avoid oversedation  - pending repeat 6 hour CT head (0500)  - Keppra 500 BID for AED prophylaxis  - C spine negative for fracture/ligamentous injury. Cleared for C-Collar removal.   - SBP:    - no other deficits/obvious injury noted  - tertiary exam to follow.   - Hold DVT prophylaxis due to acute cerebral hemorrhage

## 2024-03-09 NOTE — PROGRESS NOTE ADULT - CRITICAL CARE ATTENDING COMMENT
x Repeat CT head from 5am stable        Unwitnessed falls  Bilateral (frontal parietal) SDH    -keppra  -HD stable, TTE ordered, troponin, BNP  -Breathing comfortably on RA  -NPO for possible operative intervention w/NSGY  -IVF w/NS at 100/hr  -Cr stable, adequate urine output  -SCDs, no chemical dvt prophylaxis  -F/U Xray of pelvis Repeat CT head from 5am stable  Patient reports pain in his lower back    GEN: Awake, alert, oriented to person, place, month and situation        Unwitnessed falls  Bilateral (frontal parietal) SDH    -keppra  -HD stable, TTE ordered, troponin, BNP  -Breathing comfortably on RA  -NPO for possible operative intervention w/NSGY  -IVF w/NS at 100/hr  -Cr stable, adequate urine output  -SCDs, no chemical dvt prophylaxis  -F/U Xray of pelvis Repeat CT head from 5am stable  Patient reports pain in his lower back    GEN: Awake, alert, oriented to person, place, month and situation, moves all extremities w/o deficit  NECK:  Non tender, no stepoffs  CVS:  RRR  PUL:  Clear b/l  ABD:  Soft, non tender, non distended  EXT:  Warm, no edema, no abrasions, 2+DP pulses b/l  BACK:  No abrasions, ecchymosis, no step offs, noted to have some midline tenderness right at superior gluteal cleft at bony prominence w/o associated ecchymosis or abrasion    Unwitnessed falls  Bilateral traumatic (frontal parietal) SDH    -Repeat CT stable, pending NSGY plan - possibly surgery vs IR procedure vs conservative management, keppra, serial q1hr neuro exams  -HD stable, TTE ordered, as well as troponin & BNP, would check EKG as well -as part of pre-operative work up   -Breathing comfortably on RA  -NPO for possible operative intervention w/NSGY, OK for diet if plan is for surgery tomorrow  -IVF w/NS at 100/hr  -Cr stable, adequate urine output  -SCDs, no chemical dvt prophylaxis  -F/U Xray of pelvis and review CT of pelvis from admission to eval sacrum given tenderness on exam   -Tertiary exam  -See Goals of Care note -patient is a full code  -Geriatric evaluation Repeat CT head from 5am stable  Patient reports pain in his lower back    GEN: Awake, alert, oriented to person, place, month and situation, moves all extremities w/o deficit  NECK:  Non tender, no stepoffs  CVS:  RRR  PUL:  Clear b/l  ABD:  Soft, non tender, non distended  EXT:  Warm, no edema, no abrasions, 2+DP pulses b/l  BACK:  No abrasions, ecchymosis, no step offs, noted to have some midline tenderness right at superior gluteal cleft at bony prominence w/o associated ecchymosis or abrasion    Unwitnessed falls  Bilateral traumatic (frontal parietal) SDH    -Repeat CT stable, pending NSGY plan - possibly surgery vs IR procedure vs conservative management, keppra, serial q1hr neuro exams  -HD stable, TTE ordered, as well as troponin & BNP, would check EKG as well -as part of pre-operative work up   -Breathing comfortably on RA  -NPO for possible operative intervention w/NSGY, OK for diet if plan is for surgery tomorrow  -IVF w/NS at 100/hr  -Cr stable, adequate urine output  -SCDs, no chemical dvt prophylaxis  -F/U Xray of pelvis and review CT of pelvis from admission to eval sacrum given tenderness on exam   -Tertiary exam  -See Goals of Care note -patient is a full code  -Geriatric evaluation  -Patient without any trauma contraindications to planned operative procedure.

## 2024-03-09 NOTE — PROGRESS NOTE ADULT - ASSESSMENT
65 yo male with no PMHx transferred from [place] s/p fall in the afternoon. On CTH in ED at [place], found to have B/L SDH in the frontal-parietal area. Repeat CTH in ED here re-demonstrates the bleed. Neurosurgery consulted for SDHs.  f/u CTB reported as stable and pt remains clinically intact w mild HAs     PLAN:  Dr Polanco discussed options w pt and family regarding surgical vs nonsurgical vs IR options  pls for OR at 7:30 AM 3/10/24 fro b/l angelina hole craniotomies for Subdural fluids/ hematomas evacuation   ok for diet today   cont clinical observation and CTB if decline in neuro status/ exam noted   medical/ SICU clearance to OR pending/ cardio echo in progress   pre-op ordered:  AM labs   2u pRBC on hold to OR in AM   chlorhexidine 4% Liquid 1 Application(s) Topical every 12 hours  levETIRAcetam 500 milliGRAM(s) Oral two times a day  sodium chloride 0.9% lock flush 3 milliLiter(s) IV Push every 8 hours  sodium chloride 0.9%. 1000 milliLiter(s) (100 mL/Hr) IV Continuous <Continuous>  Hibiclens wash/ hair wash ordered x3   Q1 neuro checks  Hold AC/AP or chemical DVT PPx   cont w Keppra 500 BID  will defer further care & management per primary team 65 yo male with no PMHx transferred from [place] s/p fall in the afternoon. On CTH in ED at [place], found to have B/L SDH in the frontal-parietal area. Repeat CTH in ED here re-demonstrates the bleed. Neurosurgery consulted for SDHs.  f/u CTB reported as stable and pt remains clinically intact w mild HAs     PLAN:  Dr Polanco discussed options w pt and family regarding surgical vs nonsurgical vs IR options  pls for OR at 7:30 AM 3/10/24 fro b/l angelina hole craniotomies for Subdural fluids/ hematomas evacuation   ok for diet today   cont clinical observation and CTB if decline in neuro status/ exam noted   medical/ SICU clearance to OR pending/ cardio echo in progress   pre-op ordered:  AM labs   2u pRBC on hold to OR in AM   chlorhexidine 4% Liquid 1 Application(s) Topical every 12 hours  levETIRAcetam 500 milliGRAM(s) Oral two times a day  sodium chloride 0.9% lock flush 3 milliLiter(s) IV Push every 8 hours  sodium chloride 0.9%. 1000 milliLiter(s) (100 mL/Hr) IV Continuous <Continuous>  Hibiclens wash/ hair wash ordered x3   Q1 neuro checks  Hold AC/AP or chemical DVT PPx   cont w Keppra 500 BID  will defer further care & management per primary team  plan and care d/w SICU team

## 2024-03-09 NOTE — GOALS OF CARE CONVERSATION - ADVANCED CARE PLANNING - CONVERSATION DETAILS
Spoke w/patient regarding proxy decision maker.  He is clear that he would want his ex wife, Jarrod Hernandes ([phone]) to make decisions for himself should he be unable to do so.  Discussed/defined DNR/DNI.  He is clear that he would want to have chest compressions, shocks or medications should he require them and in addition he would want to be intubated should he require it.  Patient to remain a full code.

## 2024-03-09 NOTE — CONSULT NOTE ADULT - SUBJECTIVE AND OBJECTIVE BOX
65 yo male with Hx of Multiple unwitnessed falls, out patient Cardiac cath done in feb shows minor irregularities, he was brought to the [place] s/p unwitnessed fall yesterday afternoon found to have B/L SDH in the frontal-parietal area, Patient admits to prior episode of mechanical fall on 02/21/24 in which he was diagnosed with R frontal SDH. At that time, Neurosurgery (Dr. Fuentes, [facility]) was consulted and deemed no acute neurosurgical intervention and followup outpatient for repeat imaging, he has been transferred here under Trauma service for further eval, medicine consulted for [facility] Trauma Eval.         Vital Signs Last 24 Hrs  T(C): 36.6 (09 Mar 2024 12:01), Max: 37.1 (09 Mar 2024 01:10)  T(F): 97.9 (09 Mar 2024 12:01), Max: 98.7 (09 Mar 2024 01:10)  HR: 65 (09 Mar 2024 14:00) (55 - 86)  BP: 129/81 (09 Mar 2024 14:00) (101/66 - 163/70)  BP(mean): 94 (09 Mar 2024 14:00) (74 - 140)  RR: 12 (09 Mar 2024 14:00) (11 - 24)  SpO2: 99% (09 Mar 2024 14:00) (93% - 100%)    Parameters below as of 09 Mar 2024 12:01  Patient On (Oxygen Delivery Method): room air          LABS:                        13.4   6.11  )-----------( 248      ( 09 Mar 2024 03:15 )             41.3     03-09    139  |  105  |  11.5  ----------------------------<  99  4.0   |  23.0  |  0.72    Ca    8.8      09 Mar 2024 03:15  Phos  4.1     03-09  Mg     1.9     03-09    TPro  7.1  /  Alb  4.0  /  TBili  0.4  /  DBili  x   /  AST  16  /  ALT  19  /  AlkPhos  90  03-08    PT/INR - ( 08 Mar 2024 22:30 )   PT: 12.0 sec;   INR: 1.08 ratio         PTT - ( 08 Mar 2024 22:30 )  PTT:25.1 sec          I&O's Summary    08 Mar 2024 07:01  -  09 Mar 2024 07:00  --------------------------------------------------------  IN: 750 mL / OUT: 400 mL / NET: 350 mL    09 Mar 2024 06:01  -  09 Mar 2024 15:23  --------------------------------------------------------  IN: 800 mL / OUT: 700 mL / NET: 100 mL        MEDICATIONS  (STANDING):  chlorhexidine 4% Liquid 1 Application(s) Topical every 12 hours  levETIRAcetam 500 milliGRAM(s) Oral two times a day  sodium chloride 0.9% lock flush 3 milliLiter(s) IV Push every 8 hours  sodium chloride 0.9%. 1000 milliLiter(s) (100 mL/Hr) IV Continuous <Continuous>    MEDICATIONS  (PRN):  acetaminophen   IVPB .. 1000 milliGRAM(s) IV Intermittent every 6 hours PRN Temp greater or equal to 38C (100.4F), Mild Pain (1 - 3), Moderate Pain (4 - 6), Severe Pain (7 - 10)             63 yo male with Hx of Multiple unwitnessed falls, out patient Cardiac cath done in feb shows minor irregularities, he was brought to the [place] s/p unwitnessed fall yesterday afternoon found to have B/L SDH in the frontal-parietal area, Patient admits to prior episode of mechanical fall on 02/21/24 in which he was diagnosed with R frontal SDH. At that time, Neurosurgery (Dr. Fuentes, [facility]) was consulted and deemed no acute neurosurgical intervention and followup outpatient for repeat imaging, he has been transferred here under Trauma service for further eval, medicine consulted for [facility] Trauma Eval.   as per patient he had fall multiple times, he does not remember how he fall down, her never bite his tongue but lost urine with his knowledge, never been diagnosed with seizure, never had EEG as well, he is strict vegetarian.        Vital Signs Last 24 Hrs  T(C): 36.4 (09 Mar 2024 15:45), Max: 37.1 (09 Mar 2024 01:10)  T(F): 97.6 (09 Mar 2024 15:45), Max: 98.7 (09 Mar 2024 01:10)  HR: 78 (09 Mar 2024 17:17) (55 - 86)  BP: 126/72 (09 Mar 2024 17:17) (101/66 - 163/70)  BP(mean): 86 (09 Mar 2024 17:17) (74 - 140)  RR: 18 (09 Mar 2024 17:17) (11 - 24)  SpO2: 100% (09 Mar 2024 17:17) (93% - 100%)    Parameters below as of 09 Mar 2024 16:00  Patient On (Oxygen Delivery Method): room air        PHYSICAL EXAM:    GENERAL: Elderly male looking comfortable   HEENT: PERRL, +EOMI  NECK: soft, Supple, No JVD   CHEST/LUNG: Clear to auscultate bilaterally; No wheezing  HEART: S1S2+, Regular rate and rhythm; No murmurs  ABDOMEN: Soft, Nontender, Nondistended; Bowel sounds present  EXTREMITIES:  1+ Peripheral Pulses, No edema  SKIN: No rashes or lesions  NEURO: AAOX3  PSYCH: normal mood            LABS:                        13.4   6.11  )-----------( 248      ( 09 Mar 2024 03:15 )             41.3     03-09    139  |  105  |  11.5  ----------------------------<  99  4.0   |  23.0  |  0.72    Ca    8.8      09 Mar 2024 03:15  Phos  4.1     03-09  Mg     1.9     03-09    TPro  7.1  /  Alb  4.0  /  TBili  0.4  /  DBili  x   /  AST  16  /  ALT  19  /  AlkPhos  90  03-08    PT/INR - ( 08 Mar 2024 22:30 )   PT: 12.0 sec;   INR: 1.08 ratio         PTT - ( 08 Mar 2024 22:30 )  PTT:25.1 sec          I&O's Summary    08 Mar 2024 07:01  -  09 Mar 2024 07:00  --------------------------------------------------------  IN: 750 mL / OUT: 400 mL / NET: 350 mL    09 Mar 2024 06:01  -  09 Mar 2024 15:23  --------------------------------------------------------  IN: 800 mL / OUT: 700 mL / NET: 100 mL        MEDICATIONS  (STANDING):  chlorhexidine 4% Liquid 1 Application(s) Topical every 12 hours  levETIRAcetam 500 milliGRAM(s) Oral two times a day  sodium chloride 0.9% lock flush 3 milliLiter(s) IV Push every 8 hours  sodium chloride 0.9%. 1000 milliLiter(s) (100 mL/Hr) IV Continuous <Continuous>    MEDICATIONS  (PRN):  acetaminophen   IVPB .. 1000 milliGRAM(s) IV Intermittent every 6 hours PRN Temp greater or equal to 38C (100.4F), Mild Pain (1 - 3), Moderate Pain (4 - 6), Severe Pain (7 - 10)

## 2024-03-10 LAB
ANION GAP SERPL CALC-SCNC: 8 MMOL/L — SIGNIFICANT CHANGE UP (ref 5–17)
BASOPHILS # BLD AUTO: 0.02 K/UL — SIGNIFICANT CHANGE UP (ref 0–0.2)
BASOPHILS NFR BLD AUTO: 0.3 % — SIGNIFICANT CHANGE UP (ref 0–2)
BLD GP AB SCN SERPL QL: SIGNIFICANT CHANGE UP
BUN SERPL-MCNC: 8.6 MG/DL — SIGNIFICANT CHANGE UP (ref 8–20)
CALCIUM SERPL-MCNC: 8.8 MG/DL — SIGNIFICANT CHANGE UP (ref 8.4–10.5)
CHLORIDE SERPL-SCNC: 102 MMOL/L — SIGNIFICANT CHANGE UP (ref 96–108)
CO2 SERPL-SCNC: 28 MMOL/L — SIGNIFICANT CHANGE UP (ref 22–29)
CORTIS AM PEAK SERPL-MCNC: 12.6 UG/DL — SIGNIFICANT CHANGE UP (ref 6–18.4)
CREAT SERPL-MCNC: 0.73 MG/DL — SIGNIFICANT CHANGE UP (ref 0.5–1.3)
EGFR: 100 ML/MIN/1.73M2 — SIGNIFICANT CHANGE UP
EOSINOPHIL # BLD AUTO: 0.12 K/UL — SIGNIFICANT CHANGE UP (ref 0–0.5)
EOSINOPHIL NFR BLD AUTO: 2 % — SIGNIFICANT CHANGE UP (ref 0–6)
GLUCOSE SERPL-MCNC: 97 MG/DL — SIGNIFICANT CHANGE UP (ref 70–99)
HCT VFR BLD CALC: 42.3 % — SIGNIFICANT CHANGE UP (ref 39–50)
HGB BLD-MCNC: 13.7 G/DL — SIGNIFICANT CHANGE UP (ref 13–17)
IMM GRANULOCYTES NFR BLD AUTO: 0.2 % — SIGNIFICANT CHANGE UP (ref 0–0.9)
LYMPHOCYTES # BLD AUTO: 1.93 K/UL — SIGNIFICANT CHANGE UP (ref 1–3.3)
LYMPHOCYTES # BLD AUTO: 32.6 % — SIGNIFICANT CHANGE UP (ref 13–44)
MAGNESIUM SERPL-MCNC: 2.1 MG/DL — SIGNIFICANT CHANGE UP (ref 1.6–2.6)
MCHC RBC-ENTMCNC: 27 PG — SIGNIFICANT CHANGE UP (ref 27–34)
MCHC RBC-ENTMCNC: 32.4 GM/DL — SIGNIFICANT CHANGE UP (ref 32–36)
MCV RBC AUTO: 83.3 FL — SIGNIFICANT CHANGE UP (ref 80–100)
MONOCYTES # BLD AUTO: 0.72 K/UL — SIGNIFICANT CHANGE UP (ref 0–0.9)
MONOCYTES NFR BLD AUTO: 12.2 % — SIGNIFICANT CHANGE UP (ref 2–14)
MRSA PCR RESULT.: SIGNIFICANT CHANGE UP
NEUTROPHILS # BLD AUTO: 3.12 K/UL — SIGNIFICANT CHANGE UP (ref 1.8–7.4)
NEUTROPHILS NFR BLD AUTO: 52.7 % — SIGNIFICANT CHANGE UP (ref 43–77)
PHOSPHATE SERPL-MCNC: 3.5 MG/DL — SIGNIFICANT CHANGE UP (ref 2.4–4.7)
PLATELET # BLD AUTO: 251 K/UL — SIGNIFICANT CHANGE UP (ref 150–400)
POTASSIUM SERPL-MCNC: 4.2 MMOL/L — SIGNIFICANT CHANGE UP (ref 3.5–5.3)
POTASSIUM SERPL-SCNC: 4.2 MMOL/L — SIGNIFICANT CHANGE UP (ref 3.5–5.3)
PREALB SERPL-MCNC: 19 MG/DL — SIGNIFICANT CHANGE UP (ref 18–38)
RBC # BLD: 5.08 M/UL — SIGNIFICANT CHANGE UP (ref 4.2–5.8)
RBC # FLD: 13.2 % — SIGNIFICANT CHANGE UP (ref 10.3–14.5)
S AUREUS DNA NOSE QL NAA+PROBE: SIGNIFICANT CHANGE UP
SODIUM SERPL-SCNC: 138 MMOL/L — SIGNIFICANT CHANGE UP (ref 135–145)
TSH SERPL-MCNC: 0.12 UIU/ML — LOW (ref 0.27–4.2)
VIT B12 SERPL-MCNC: 649 PG/ML — SIGNIFICANT CHANGE UP (ref 232–1245)
WBC # BLD: 5.92 K/UL — SIGNIFICANT CHANGE UP (ref 3.8–10.5)
WBC # FLD AUTO: 5.92 K/UL — SIGNIFICANT CHANGE UP (ref 3.8–10.5)

## 2024-03-10 PROCEDURE — 99233 SBSQ HOSP IP/OBS HIGH 50: CPT

## 2024-03-10 PROCEDURE — 99232 SBSQ HOSP IP/OBS MODERATE 35: CPT

## 2024-03-10 PROCEDURE — 99291 CRITICAL CARE FIRST HOUR: CPT

## 2024-03-10 PROCEDURE — 95720 EEG PHY/QHP EA INCR W/VEEG: CPT

## 2024-03-10 RX ORDER — DEXTROSE MONOHYDRATE, SODIUM CHLORIDE, AND POTASSIUM CHLORIDE 50; .745; 4.5 G/1000ML; G/1000ML; G/1000ML
1000 INJECTION, SOLUTION INTRAVENOUS
Refills: 0 | Status: DISCONTINUED | OUTPATIENT
Start: 2024-03-10 | End: 2024-03-10

## 2024-03-10 RX ORDER — SENNA PLUS 8.6 MG/1
2 TABLET ORAL AT BEDTIME
Refills: 0 | Status: DISCONTINUED | OUTPATIENT
Start: 2024-03-10 | End: 2024-03-14

## 2024-03-10 RX ORDER — SODIUM CHLORIDE 9 MG/ML
1000 INJECTION INTRAMUSCULAR; INTRAVENOUS; SUBCUTANEOUS
Refills: 0 | Status: DISCONTINUED | OUTPATIENT
Start: 2024-03-11 | End: 2024-03-11

## 2024-03-10 RX ORDER — CYCLOBENZAPRINE HYDROCHLORIDE 10 MG/1
5 TABLET, FILM COATED ORAL ONCE
Refills: 0 | Status: COMPLETED | OUTPATIENT
Start: 2024-03-10 | End: 2024-03-10

## 2024-03-10 RX ORDER — POLYETHYLENE GLYCOL 3350 17 G/17G
17 POWDER, FOR SOLUTION ORAL EVERY 12 HOURS
Refills: 0 | Status: DISCONTINUED | OUTPATIENT
Start: 2024-03-10 | End: 2024-03-14

## 2024-03-10 RX ADMIN — Medication 400 MILLIGRAM(S): at 17:05

## 2024-03-10 RX ADMIN — SODIUM CHLORIDE 3 MILLILITER(S): 9 INJECTION INTRAMUSCULAR; INTRAVENOUS; SUBCUTANEOUS at 19:44

## 2024-03-10 RX ADMIN — SODIUM CHLORIDE 3 MILLILITER(S): 9 INJECTION INTRAMUSCULAR; INTRAVENOUS; SUBCUTANEOUS at 13:54

## 2024-03-10 RX ADMIN — LEVETIRACETAM 500 MILLIGRAM(S): 250 TABLET, FILM COATED ORAL at 05:30

## 2024-03-10 RX ADMIN — Medication 1000 MILLIGRAM(S): at 17:30

## 2024-03-10 RX ADMIN — SODIUM CHLORIDE 3 MILLILITER(S): 9 INJECTION INTRAMUSCULAR; INTRAVENOUS; SUBCUTANEOUS at 07:16

## 2024-03-10 RX ADMIN — CHLORHEXIDINE GLUCONATE 1 APPLICATION(S): 213 SOLUTION TOPICAL at 11:55

## 2024-03-10 RX ADMIN — CYCLOBENZAPRINE HYDROCHLORIDE 5 MILLIGRAM(S): 10 TABLET, FILM COATED ORAL at 20:20

## 2024-03-10 RX ADMIN — LEVETIRACETAM 500 MILLIGRAM(S): 250 TABLET, FILM COATED ORAL at 17:05

## 2024-03-10 NOTE — PROGRESS NOTE ADULT - SUBJECTIVE AND OBJECTIVE BOX
24h Events: Patient remains neurologically intact and very pleasant. CT L-spine and MRI’s all performed yesterday and pending official reads. Medicine consulted for geriatric trauma consult yesterday. Syncope w/u performed. EEG started yesterday.    ICU Vital Signs Last 24 Hrs  T(C): 36.8 (10 Mar 2024 04:07), Max: 36.8 (10 Mar 2024 04:07)  T(F): 98.3 (10 Mar 2024 04:07), Max: 98.3 (10 Mar 2024 04:07)  HR: 50 (10 Mar 2024 05:00) (50 - 82)  BP: 108/80 (10 Mar 2024 05:00) (103/81 - 155/130)  BP(mean): 79 (10 Mar 2024 05:00) (73 - 140)  ABP: --  ABP(mean): --  RR: 16 (10 Mar 2024 05:00) (12 - 20)  SpO2: 99% (10 Mar 2024 05:00) (94% - 100%)    O2 Parameters below as of 10 Mar 2024 04:00  Patient On (Oxygen Delivery Method): room air      I&O's Detail    08 Mar 2024 07:01  -  09 Mar 2024 07:00  --------------------------------------------------------  IN:    IV PiggyBack: 50 mL    sodium chloride 0.9%: 700 mL  Total IN: 750 mL    OUT:    Voided (mL): 400 mL  Total OUT: 400 mL    Total NET: 350 mL      09 Mar 2024 06:01  -  10 Mar 2024 05:32  --------------------------------------------------------  IN:    IV PiggyBack: 100 mL    Oral Fluid: 800 mL    sodium chloride 0.9%: 800 mL  Total IN: 1700 mL    OUT:    Voided (mL): 1550 mL  Total OUT: 1550 mL    Total NET: 150 mL      MEDICATIONS  (STANDING):  ceFAZolin  Injectable. 2000 milliGRAM(s) IV Push once  chlorhexidine 2% Cloths 1 Application(s) Topical daily  levETIRAcetam 500 milliGRAM(s) Oral two times a day  sodium chloride 0.9% lock flush 3 milliLiter(s) IV Push every 8 hours    MEDICATIONS  (PRN):  acetaminophen   IVPB .. 1000 milliGRAM(s) IV Intermittent every 6 hours PRN Temp greater or equal to 38C (100.4F), Mild Pain (1 - 3), Moderate Pain (4 - 6), Severe Pain (7 - 10)      Physical Exam:    GENERAL: NAD, well-groomed   HEAD:  normocephalic   VIOLETA COMA SCORE: E- V- M- = 15   MENTAL STATUS: AAO x3; Awake; Opens eyes spontaneously; Appropriately   conversant without aphasia; following simple and complex commands   CRANIAL NERVES: PERRL. EOMI without nystagmus. Face symmetric w/ normal eye   closure and smile, tongue midline. Hearing grossly intact. Speech clear.   MOTOR: strength 5/5 b/l upper and lower extremities   SKIN: Warm, dry; no rashes or lesions     LABS:  CBC Full  -  ( 10 Mar 2024 04:21 )  WBC Count : 5.92 K/uL  RBC Count : 5.08 M/uL  Hemoglobin : 13.7 g/dL  Hematocrit : 42.3 %  Platelet Count - Automated : 251 K/uL  Mean Cell Volume : 83.3 fl  Mean Cell Hemoglobin : 27.0 pg  Mean Cell Hemoglobin Concentration : 32.4 gm/dL  Auto Neutrophil # : 3.12 K/uL  Auto Lymphocyte # : 1.93 K/uL  Auto Monocyte # : 0.72 K/uL  Auto Eosinophil # : 0.12 K/uL  Auto Basophil # : 0.02 K/uL  Auto Neutrophil % : 52.7 %  Auto Lymphocyte % : 32.6 %  Auto Monocyte % : 12.2 %  Auto Eosinophil % : 2.0 %  Auto Basophil % : 0.3 %    03-10    138  |  102  |  8.6  ----------------------------<  97  4.2   |  28.0  |  0.73    Ca    8.8      10 Mar 2024 04:21  Phos  3.5     03-10  Mg     2.1     03-10    TPro  7.1  /  Alb  4.0  /  TBili  0.4  /  DBili  x   /  AST  16  /  ALT  19  /  AlkPhos  90  03-08    PT/INR - ( 08 Mar 2024 22:30 )   PT: 12.0 sec;   INR: 1.08 ratio         PTT - ( 08 Mar 2024 22:30 )  PTT:25.1 sec  Urinalysis Basic - ( 10 Mar 2024 04:21 )    Color: x / Appearance: x / SG: x / pH: x  Gluc: 97 mg/dL / Ketone: x  / Bili: x / Urobili: x   Blood: x / Protein: x / Nitrite: x   Leuk Esterase: x / RBC: x / WBC x   Sq Epi: x / Non Sq Epi: x / Bacteria: x      RECENT CULTURES:      LIVER FUNCTIONS - ( 08 Mar 2024 22:30 )  Alb: 4.0 g/dL / Pro: 7.1 g/dL / ALK PHOS: 90 U/L / ALT: 19 U/L / AST: 16 U/L / GGT: x

## 2024-03-10 NOTE — PROGRESS NOTE ADULT - ASSESSMENT
65 yo M with mixed BL SDH with mass effect.  H/o recent falls (?syncopal events), unclear etiology.  1st degree AV block.    Plan:  neurochecks  plan for SDH evac in am  Keppra for sz ppx  -160; syncope w/up negative so far  maintain Osats>92%, incentive spirotmetry  NPO MN for procedure BM regimen  monitor e-lytes, IVf MN  SCDs for VTE ppx, hold chemoppx for OR

## 2024-03-10 NOTE — PROGRESS NOTE ADULT - SUBJECTIVE AND OBJECTIVE BOX
HPI:  63 yo male with no PMHx transferred from [place] s/p fall in the afternoon. Patient has been having multiple falls recently, and had a negative cardio and neuro w/u at [place] 1 month ago. Fall today was unwitnessed, +HS, +LOC. On CTH in ED at [place], found to have B/L SDH in the frontal-parietal area. Repeat CTH in ED here re-demonstrates the bleed. Neurosurgery consulted for SDHs. Pt evaluated in ED trauma bay, in [facility], already in C-collar.     (08 Mar 2024 22:36)    O/n events: none reported.    ICU Vital Signs Last 24 Hrs  T(C): 36.8 (10 Mar 2024 16:06), Max: 36.8 (10 Mar 2024 04:07)  T(F): 98.3 (10 Mar 2024 16:06), Max: 98.3 (10 Mar 2024 04:07)  HR: 75 (10 Mar 2024 17:00) (50 - 79)  BP: 115/74 (10 Mar 2024 17:00) (95/58 - 135/81)  BP(mean): 87 (10 Mar 2024 17:00) (64 - 96)  ABP: --  ABP(mean): --  RR: 13 (10 Mar 2024 17:00) (11 - 18)  SpO2: 98% (10 Mar 2024 17:00) (94% - 100%)    O2 Parameters below as of 10 Mar 2024 16:00  Patient On (Oxygen Delivery Method): room air    Labs, imaging reviewed.      Exam:  NAD, cooperative.    MENTAL STATUS: AAO x 3, conversant, following simple commands, comprehension seems intact.  CRANIAL NERVES: PERRL, EOMI without nystagmus. Face symmetric.  MOTOR: strength 5/5 b/l upper and lower extremities, trace upward drift LUE.  SENSATION: grossly intact to light touch all extremities   S1S2 present  CTAB  Abd soft, NT, ND  No peripheral swelling.

## 2024-03-10 NOTE — CHART NOTE - NSCHARTNOTEFT_GEN_A_CORE
SICU TRANSFER NOTE  -----------------------------  ICU Admission Date: 03/08/24  Transfer Date: 03-10-24 @ 11:41    Admission Diagnosis: B/L SDH     Active Problems/injuries: B/L SDH, Mild narrowing of the RIGHT C5-6 neural foramen due to uncovertebral spurring, Tiny LEFT foraminal disc herniations at L3-4 and L4-5 narrow the LEFT neural foramina at these levels. Mild disc bulges at L4-5 and L5-S1 flatten the ventral thecal sac and narrow the BILATERAL neural foramina.    Procedures: N/A    Consultants:  [ ] Cardiology  [ ] Endocrine  [ ] Infectious Disease  [x] Medicine  [x]Neurosurgery  [ ] Ortho       [ ] Weight Bearing Status:  [ ] Palliative       [ ] Advanced Directives:    [ ] Physical Medicine and Rehab       [ ] Disposition :   [ ] Plastics  [ ] Pulmonary    Medications  acetaminophen   IVPB .. 1000 milliGRAM(s) IV Intermittent every 6 hours PRN  chlorhexidine 2% Cloths 1 Application(s) Topical daily  levETIRAcetam 500 milliGRAM(s) Oral two times a day  polyethylene glycol 3350 17 Gram(s) Oral every 12 hours  senna 2 Tablet(s) Oral at bedtime  sodium chloride 0.9% lock flush 3 milliLiter(s) IV Push every 8 hours  sodium chloride 0.9% with potassium chloride 20 mEq/L 1000 milliLiter(s) IV Continuous <Continuous>      [x] I attest I have reviewed and reconciled all medications prior to transfer    Assessment: 65 yo male with no PMHx transferred from [place] s/p fall in the afternoon.   On CTH in ED at [place], found to have B/L  acute on chronic SDH in the frontal-parietal area. GCS 15.    Plan:    - c/w q1 neurochecks   - neurosurgery following   - pain control, avoid oversedation   - 6h Rpt CTH stable, obtain rpt if change in mental status  - Keppra 500 BID for AED prophylaxis   - C spine negative for fracture/ligamentous injury. Cleared for C-Collar removal.    - SBP:     - no other deficits/obvious injury noted   - tertiary exam completed.    - Hold DVT prophylaxis due to acute cerebral hemorrhage   - EEG pending  - Lumbar MRI w/ disk bulging/herniaitions  - MR Head/C-Spine/T-spine/L-spine w/o CSF leak    Tertiary [x]  Geriatric Consult [x]  PTSD [N/A]   GOC [x]   CODE STATUS: FULL     I have discussed this case with Neuro Sx ICU upon transfer and all questions regarding ICU course were answered.  The following items are to be followed up:  1) Medicine clearance for OR  2) F/U plan for Lumbar Herniations   3) F/U plan for OR  4) Start DVT prophylaxis when clinically appropriate  5) Plan per Neuro Sx/Neuro ICU

## 2024-03-10 NOTE — PROGRESS NOTE ADULT - ASSESSMENT
ASSESSMENT:   65 yo male with no PMHx transferred from [place] s/p fall in the afternoon. Pt has had multiple falls over the last several months. CTH showed b/l mixed SDH.   -Repeat CTH stable  -MRI brain/spinal axis-negative for CSF leak    PLAN:    -Reviewed imaging  -Q1h neuro checks  -STAT CTH for any changes in neuro exam  -OR likely later this week, pending medical clearance  -Will discuss possible MMA embo w/ NeuroIR  -EEG pending  -Medicine following, recommended EEG, neuro eval, and additional labs  -Pain control prn, avoid oversedation  --160  -Keppra 500mg BID  -VTE prophylaxis: SCDs, hold chemoprophylaxis due to: acute component of b/l SDH  -Will likely be transferred from SICU to NSICU today  -Further medical management per SICU  -Discussed case with Dr. Polanco

## 2024-03-10 NOTE — PROGRESS NOTE ADULT - ASSESSMENT
Assessment: 63 yo male with no PMHx transferred from [place] s/p fall in the afternoon.   On CTH in ED at [place], found to have B/L  acute on chronic SDH in the frontal-parietal area. GCS 15      Plan:    - c/w q1 neurochecks   - neurosurgery following   - pain control, avoid oversedation   - 6h Rpt CTH stable   - Keppra 500 BID for AED prophylaxis   - C spine negative for fracture/ligamentous injury. Cleared for C-Collar removal.    - SBP:     - no other deficits/obvious injury noted   - tertiary exam to follow.    - Hold DVT prophylaxis due to acute cerebral hemorrhage   - f/u 24h EEG   - Lumbar CT performed and pending read   - MR Head/C-Spine/T-spine/L-spine performed and pending reads   - Transfer to Neuro ICU for continued management today.

## 2024-03-10 NOTE — PROGRESS NOTE ADULT - SUBJECTIVE AND OBJECTIVE BOX
SUBJECTIVE:   63 yo male with no PMHx transferred from [place] s/p fall in the afternoon. Patient has been having multiple falls recently, and had a negative cardio and neuro w/u at [place] 1 month ago. Fall today was unwitnessed, +HS, +LOC. On CTH in ED at [place], found to have B/L SDH in the frontal-parietal area. Repeat CTH in ED here re-demonstrates the bleed. Neurosurgery consulted for SDHs. Pt evaluated in ED trauma bay, in [facility], already in C-collar.     INTERVAL HX/OVERNIGHT EVENTS:  Patient seen and examined at bedside. Reports mild HA but otherwise reports he is feeling better. Had MRI of brain and spinal axis last night which was negative for CSF leak. Plan for OR likely later this week.     Vital Signs Last 24 Hrs  T(C): 36.8 (03-10-24 @ 08:01), Max: 36.8 (03-10-24 @ 04:07)  T(F): 98.2 (03-10-24 @ 08:01), Max: 98.3 (03-10-24 @ 04:07)  HR: 68 (03-10-24 @ 09:00) (50 - 82)  BP: 135/81 (03-10-24 @ 09:00) (95/58 - 155/130)  BP(mean): 83 (03-10-24 @ 09:00) (70 - 140)  RR: 14 (03-10-24 @ 09:00) (11 - 20)  SpO2: 99% (03-10-24 @ 09:00) (94% - 100%)    PHYSICAL EXAM:    GENERAL: NAD    HEAD: normocephalic   MENTAL STATUS: AAO x 3, conversant, following simple commands    CRANIAL NERVES: PERRL, EOMI without nystagmus. Face symmetric, Tongue is midline. Hearing grossly intact. Head turning and shoulder shrug intact.    REFLEXES: No pronator drift   MOTOR: strength 5/5 b/l upper and lower extremities   SENSATION: grossly intact to light touch all extremities   SKIN: Warm, dry    LABS:                          13.7   5.92  )-----------( 251      ( 10 Mar 2024 04:21 )             42.3    03-10    138  |  102  |  8.6  ----------------------------<  97  4.2   |  28.0  |  0.73    Ca    8.8      10 Mar 2024 04:21  Phos  3.5     03-10  Mg     2.1     03-10    TPro  7.1  /  Alb  4.0  /  TBili  0.4  /  DBili  x   /  AST  16  /  ALT  19  /  AlkPhos  90  03-08  PT/INR - ( 08 Mar 2024 22:30 )   PT: 12.0 sec;   INR: 1.08 ratio         PTT - ( 08 Mar 2024 22:30 )  PTT:25.1 sec    03-09 @ 06:01  -  03-10 @ 07:00  --------------------------------------------------------  IN: 1700 mL / OUT: 1550 mL / NET: 150 mL    03-10 @ 07:01  -  03-10 @ 09:47  --------------------------------------------------------  IN: 0 mL / OUT: 250 mL / NET: -250 mL        IMAGING:   MR Head w/ IV Cont (03.09.24 @ 22:05)   IMPRESSION:    MR brain: unchanged BILATERAL acute subdural hematomas, measuring 1.6 on   the RIGHT and 1.7 on the LEFT with mass effect on the brain but no   midline shift.    MR spine:    1.  Cervical spine:   Mild narrowing of the RIGHT C5-6 neural foramen due  to uncovertebral spurring.    2.  Thoracic spine:   Unremarkable MR of the thoracic spine.    3.  Lumbar spine:   Tiny LEFT foraminal disc herniations at L3-4 and L4-5   narrow the LEFT neural foramina at these levels. Mild disc bulges at L4-5   and L5-S1 flatten the ventral thecal sac and narrow the BILATERAL neural   foramina.    CT Head No Cont (03.09.24 @ 05:44)   IMPRESSION:   unchanged acute BILATERAL holohemispheric subdural   hematomas measuring 2.3 cm on the LEFT and 2.2 cm on the RIGHT with mass   effect on the underlying brain but no midline shift.      CT Lumbar Spine No Cont (03.09.24 @ 20:13)   IMPRESSION:  Tiny LEFT foraminal disc herniations at L3-4 and L4-5 narrow   the LEFT neural foramina at these levels. Mild disc bulges at L4-5 and   L5-S1 flatten the ventral thecal sac and narrow the BILATERAL neural   foramina.     No vertebral fracture is recognized.    MR Head w/ IV Cont (03.09.24 @ 22:05)   IMPRESSION:    MR brain: unchanged BILATERAL acute subdural hematomas, measuring 1.6 on   the RIGHT and 1.7 on the LEFT with mass effect on the brain but no   midline shift.    MR spine:    1.  Cervical spine:   Mild narrowing of the RIGHT C5-6 neural foramen due  to uncovertebral spurring.    2.  Thoracic spine:   Unremarkable MR of the thoracic spine.    3.  Lumbar spine:   Tiny LEFT foraminal disc herniations at L3-4 and L4-5   narrow the LEFT neural foramina at these levels. Mild disc bulges at L4-5   and L5-S1 flatten the ventral thecal sac and narrow the BILATERAL neural   foramina.    CT Head No Cont (03.08.24 @ 22:53)   IMPRESSION:    NONCONTRAST CT HEAD:  Bilateral mixed attenuation subdural collections, measuring up to 1.5 cm   in thickness on the right and up to 1.7 cm on the left. No   intraventricular extension of hemorrhage or midline shift. Findings are   consistent with acute on chronic subdural hematoma.    Calvarium intact.    CTA HEAD/NECK:  No proximal large vessel occlusion, aneurysm, dissection, or   hemodynamically flow-limiting stenosis identified in the head and neck.    CT CERVICAL SPINE:  No evidence of acute cervical spine fracture or spondylolisthesis.

## 2024-03-10 NOTE — PROGRESS NOTE ADULT - SUBJECTIVE AND OBJECTIVE BOX
CHAU HUI    [id number]    64y      Male    Patient is a 64y old  Male who presents with a chief complaint of b/l SDH (10 Mar 2024 09:46)      INTERVAL HPI/OVERNIGHT EVENTS:    Patient is doing ok, he has no complains, denies fever, chills, chest pain       Vital Signs Last 24 Hrs  T(C): 36.8 (10 Mar 2024 08:01), Max: 36.8 (10 Mar 2024 04:07)  T(F): 98.2 (10 Mar 2024 08:01), Max: 98.3 (10 Mar 2024 04:07)  HR: 74 (10 Mar 2024 11:00) (50 - 82)  BP: 128/71 (10 Mar 2024 11:00) (95/58 - 155/130)  BP(mean): 86 (10 Mar 2024 11:00) (70 - 140)  RR: 13 (10 Mar 2024 11:00) (11 - 20)  SpO2: 98% (10 Mar 2024 11:00) (94% - 100%)    Parameters below as of 10 Mar 2024 08:00  Patient On (Oxygen Delivery Method): room air        PHYSICAL EXAM:  GENERAL: Elderly male looking comfortable   HEENT: PERRL, +EOMI  NECK: soft, Supple, No JVD   CHEST/LUNG: Clear to auscultate bilaterally; No wheezing  HEART: S1S2+, Regular rate and rhythm; No murmurs  ABDOMEN: Soft, Nontender, Nondistended; Bowel sounds present  EXTREMITIES:  1+ Peripheral Pulses, No edema  SKIN: No rashes or lesions  NEURO: AAOX3  PSYCH: normal mood      LABS:                        13.7   5.92  )-----------( 251      ( 10 Mar 2024 04:21 )             42.3     03-10    138  |  102  |  8.6  ----------------------------<  97  4.2   |  28.0  |  0.73    Ca    8.8      10 Mar 2024 04:21  Phos  3.5     03-10  Mg     2.1     03-10    TPro  7.1  /  Alb  4.0  /  TBili  0.4  /  DBili  x   /  AST  16  /  ALT  19  /  AlkPhos  90  03-08    PT/INR - ( 08 Mar 2024 22:30 )   PT: 12.0 sec;   INR: 1.08 ratio         PTT - ( 08 Mar 2024 22:30 )  PTT:25.1 sec          I&O's Summary    09 Mar 2024 06:01  -  10 Mar 2024 07:00  --------------------------------------------------------  IN: 1700 mL / OUT: 1550 mL / NET: 150 mL    10 Mar 2024 07:01  -  10 Mar 2024 12:25  --------------------------------------------------------  IN: 0 mL / OUT: 400 mL / NET: -400 mL        MEDICATIONS  (STANDING):  chlorhexidine 2% Cloths 1 Application(s) Topical daily  levETIRAcetam 500 milliGRAM(s) Oral two times a day  polyethylene glycol 3350 17 Gram(s) Oral every 12 hours  senna 2 Tablet(s) Oral at bedtime  sodium chloride 0.9% lock flush 3 milliLiter(s) IV Push every 8 hours  sodium chloride 0.9% with potassium chloride 20 mEq/L 1000 milliLiter(s) (75 mL/Hr) IV Continuous <Continuous>    MEDICATIONS  (PRN):  acetaminophen   IVPB .. 1000 milliGRAM(s) IV Intermittent every 6 hours PRN Temp greater or equal to 38C (100.4F), Mild Pain (1 - 3), Moderate Pain (4 - 6), Severe Pain (7 - 10)

## 2024-03-10 NOTE — PROGRESS NOTE ADULT - ASSESSMENT
63 yo male with Hx of Multiple unwitnessed falls, out patient Cardiac cath done in feb shows minor irregularities, he was brought to the [place] s/p unwitnessed fall yesterday afternoon found to have B/L SDH in the frontal-parietal area, Patient admits to prior episode of mechanical fall on 02/21/24 in which he was diagnosed with R frontal SDH. At that time, Neurosurgery (Dr. Fuentes, [facility]) was consulted and deemed no acute neurosurgical intervention and followup outpatient for repeat imaging, he has been transferred here under Trauma service for further eval, medicine consulted for [facility] Trauma Eval.       Plan:     Identification of Seniors at Risk:                                                                                                                                       Before the event that brought you to the hospital, did you need someone to help you on a regular basis?   Yes   In the 24 hours before your injury, have you needed more help than usual?                                                no          Have you been hospitalized for one or more nights during the past six months?                                         Yes           In general, do you have serious problems with your vision that cannot be corrected with glasses?               No    In general, do you have serious problems with your memory?                                                                  No                  Do you take six or more different medications every day?                                                                        No                      A positive screen is an aswer of yes to 2 or more - Total:   Score is positive                Recurrent Fall / B/L SDH    - Neuro checks   - neurosurgery following  - pain control, avoid oversedation  - serial imaging   - Keppra 500 BID for AED prophylaxis  - SBP:    - no other deficits/obvious injury noted  - DVT prophylaxis as per Primary team   -Fall risk, fall precaution  -PT and OT  - recent cath reviewed with minor irregularities  - would check Orthostatic vitals   -TSH, vitamin B12 level, magnesium and cortisole level is ok   -RPR pending   - consult for home safety  -orthostatic hypotension check   -Neurology consult for seizure r/o and EEG  -seizure precautions  -fall precaution.   -DVT prophylaxis as per Primary team   -Getting MRI of the brain and spike  -likely require MCOT and tilt table test as out patient.     Patient denies Hx of exertional dyspnea or chest pain, no personal or family hx of easy bleeding, Patient's METS Score is limited, his RCRI is ~1, patient labs, EKG, Echo and recent cath reviewed, patient is at intermediate risk for perioperative cardiovascular complication and is optimized from the medicine point of view for planned procedure.     Medicine team is signing off, please call as needed              65 yo male with Hx of Multiple unwitnessed falls, out patient Cardiac cath done in feb shows minor irregularities, he was brought to the [place] s/p unwitnessed fall yesterday afternoon found to have B/L SDH in the frontal-parietal area, Patient admits to prior episode of mechanical fall on 02/21/24 in which he was diagnosed with R frontal SDH. At that time, Neurosurgery (Dr. Fuentes, [facility]) was consulted and deemed no acute neurosurgical intervention and followup outpatient for repeat imaging, he has been transferred here under Trauma service for further eval, medicine consulted for [facility] Trauma Eval.       Plan:     Identification of Seniors at Risk:                                                                                                                                       Before the event that brought you to the hospital, did you need someone to help you on a regular basis?   Yes   In the 24 hours before your injury, have you needed more help than usual?                                                no          Have you been hospitalized for one or more nights during the past six months?                                         Yes           In general, do you have serious problems with your vision that cannot be corrected with glasses?               No    In general, do you have serious problems with your memory?                                                                  No                  Do you take six or more different medications every day?                                                                        No                      A positive screen is an aswer of yes to 2 or more - Total:   Score is positive                Recurrent Fall / B/L SDH    - Neuro checks   - neurosurgery following  - pain control, avoid oversedation  - serial imaging   - Keppra 500 BID for AED prophylaxis  - SBP:    - no other deficits/obvious injury noted  - DVT prophylaxis as per Primary team   -Fall risk, fall precaution  -PT and OT  - recent cath reviewed with minor irregularities  - would check Orthostatic vitals   -TSH, vitamin B12 level, magnesium and cortisole level is ok   -RPR pending   - consult for home safety  -orthostatic hypotension check   -Neurology consult for seizure r/o and EEG  -seizure precautions  -fall precaution.   -DVT prophylaxis as per Primary team   -Getting MRI of the brain and spike  -likely require MCOT and tilt table test as out patient.     TSH is slightly lower then normal, might need to repeat in few weeks as out patient     Patient denies Hx of exertional dyspnea or chest pain, no personal or family hx of easy bleeding, Patient's METS Score is limited, his RCRI is ~1, patient labs, EKG, Echo and recent cath reviewed, patient is at intermediate risk for perioperative cardiovascular complication and is optimized from the medicine point of view for planned procedure.     Medicine team is signing off, please call as needed

## 2024-03-10 NOTE — PROGRESS NOTE ADULT - CRITICAL CARE ATTENDING COMMENT
x MRIs performed overnight to eval for CSF leak.    GEN: Awake, alert, oriented and comfortable, conversant and appropriate, moves all extremities  ABD:  Soft, non tender, non distended  EXT:  Warm, no edema, no abrasions, 2+DP pulses b/l    Unwitnessed falls  Bilateral traumatic (frontal parietal) SDH (acute on chronic)    -Mild headache this am, prn tylenol, continue w/q1hr neuro checks and plan for repeat scan w/any significant pain or exam findings.  Plan for likely neurosurgical intervention in day or so  -MRIs completed overnight did not suggest CSF leak but did note L3/4 and L4/5 disk bulging and herniation.  Have discussed w/NSGY who will review imaging (they are on for spine consults).  No evidence of sacral fracture on CT pelvis.  -Appreciate medicine input and ongoing work up for syncope including neurology evaluation  -HD stable  -Breathing comfortably on RA  -H/H stable, scds, NSGY to determine time of initiation of chemical prophylaxis  -Afebrile, wbc normal, no antibiotics  -Voiding, Cr stable, making adequate urine  -PIV    No acute critical care issues at this time.  Will transfer from SICU to NSGY-ICU.  Both teams are in agreement.

## 2024-03-11 ENCOUNTER — TRANSCRIPTION ENCOUNTER (OUTPATIENT)
Age: 64
End: 2024-03-11

## 2024-03-11 LAB
A1C WITH ESTIMATED AVERAGE GLUCOSE RESULT: 5.6 % — SIGNIFICANT CHANGE UP (ref 4–5.6)
ALBUMIN SERPL ELPH-MCNC: 3.7 G/DL — SIGNIFICANT CHANGE UP (ref 3.3–5.2)
ALP SERPL-CCNC: 91 U/L — SIGNIFICANT CHANGE UP (ref 40–120)
ALT FLD-CCNC: 15 U/L — SIGNIFICANT CHANGE UP
ANION GAP SERPL CALC-SCNC: 11 MMOL/L — SIGNIFICANT CHANGE UP (ref 5–17)
AST SERPL-CCNC: 14 U/L — SIGNIFICANT CHANGE UP
BASOPHILS # BLD AUTO: 0.03 K/UL — SIGNIFICANT CHANGE UP (ref 0–0.2)
BASOPHILS NFR BLD AUTO: 0.4 % — SIGNIFICANT CHANGE UP (ref 0–2)
BILIRUB SERPL-MCNC: 0.4 MG/DL — SIGNIFICANT CHANGE UP (ref 0.4–2)
BUN SERPL-MCNC: 12.7 MG/DL — SIGNIFICANT CHANGE UP (ref 8–20)
CALCIUM SERPL-MCNC: 8.9 MG/DL — SIGNIFICANT CHANGE UP (ref 8.4–10.5)
CHLORIDE SERPL-SCNC: 105 MMOL/L — SIGNIFICANT CHANGE UP (ref 96–108)
CHOLEST SERPL-MCNC: 140 MG/DL — SIGNIFICANT CHANGE UP
CHOLEST SERPL-MCNC: 142 MG/DL — SIGNIFICANT CHANGE UP
CO2 SERPL-SCNC: 24 MMOL/L — SIGNIFICANT CHANGE UP (ref 22–29)
CREAT SERPL-MCNC: 0.68 MG/DL — SIGNIFICANT CHANGE UP (ref 0.5–1.3)
EGFR: 103 ML/MIN/1.73M2 — SIGNIFICANT CHANGE UP
EOSINOPHIL # BLD AUTO: 0.15 K/UL — SIGNIFICANT CHANGE UP (ref 0–0.5)
EOSINOPHIL NFR BLD AUTO: 2.2 % — SIGNIFICANT CHANGE UP (ref 0–6)
ESTIMATED AVERAGE GLUCOSE: 114 MG/DL — SIGNIFICANT CHANGE UP (ref 68–114)
GLUCOSE SERPL-MCNC: 93 MG/DL — SIGNIFICANT CHANGE UP (ref 70–99)
HCT VFR BLD CALC: 43.1 % — SIGNIFICANT CHANGE UP (ref 39–50)
HDLC SERPL-MCNC: 37 MG/DL — LOW
HDLC SERPL-MCNC: 38 MG/DL — LOW
HGB BLD-MCNC: 14.1 G/DL — SIGNIFICANT CHANGE UP (ref 13–17)
IMM GRANULOCYTES NFR BLD AUTO: 0.1 % — SIGNIFICANT CHANGE UP (ref 0–0.9)
INR BLD: 1.06 RATIO — SIGNIFICANT CHANGE UP (ref 0.85–1.18)
LIPID PNL WITH DIRECT LDL SERPL: 83 MG/DL — SIGNIFICANT CHANGE UP
LIPID PNL WITH DIRECT LDL SERPL: 84 MG/DL — SIGNIFICANT CHANGE UP
LYMPHOCYTES # BLD AUTO: 2.45 K/UL — SIGNIFICANT CHANGE UP (ref 1–3.3)
LYMPHOCYTES # BLD AUTO: 35.7 % — SIGNIFICANT CHANGE UP (ref 13–44)
MAGNESIUM SERPL-MCNC: 1.8 MG/DL — SIGNIFICANT CHANGE UP (ref 1.6–2.6)
MCHC RBC-ENTMCNC: 26.9 PG — LOW (ref 27–34)
MCHC RBC-ENTMCNC: 32.7 GM/DL — SIGNIFICANT CHANGE UP (ref 32–36)
MCV RBC AUTO: 82.3 FL — SIGNIFICANT CHANGE UP (ref 80–100)
MONOCYTES # BLD AUTO: 0.67 K/UL — SIGNIFICANT CHANGE UP (ref 0–0.9)
MONOCYTES NFR BLD AUTO: 9.8 % — SIGNIFICANT CHANGE UP (ref 2–14)
NEUTROPHILS # BLD AUTO: 3.56 K/UL — SIGNIFICANT CHANGE UP (ref 1.8–7.4)
NEUTROPHILS NFR BLD AUTO: 51.8 % — SIGNIFICANT CHANGE UP (ref 43–77)
NON HDL CHOLESTEROL: 103 MG/DL — SIGNIFICANT CHANGE UP
NON HDL CHOLESTEROL: 104 MG/DL — SIGNIFICANT CHANGE UP
PHOSPHATE SERPL-MCNC: 3.5 MG/DL — SIGNIFICANT CHANGE UP (ref 2.4–4.7)
PLATELET # BLD AUTO: 247 K/UL — SIGNIFICANT CHANGE UP (ref 150–400)
POTASSIUM SERPL-MCNC: 4.1 MMOL/L — SIGNIFICANT CHANGE UP (ref 3.5–5.3)
POTASSIUM SERPL-SCNC: 4.1 MMOL/L — SIGNIFICANT CHANGE UP (ref 3.5–5.3)
PROT SERPL-MCNC: 6.5 G/DL — LOW (ref 6.6–8.7)
PROTHROM AB SERPL-ACNC: 11.7 SEC — SIGNIFICANT CHANGE UP (ref 9.5–13)
RBC # BLD: 5.24 M/UL — SIGNIFICANT CHANGE UP (ref 4.2–5.8)
RBC # FLD: 13.4 % — SIGNIFICANT CHANGE UP (ref 10.3–14.5)
SODIUM SERPL-SCNC: 140 MMOL/L — SIGNIFICANT CHANGE UP (ref 135–145)
T PALLIDUM AB TITR SER: NEGATIVE — SIGNIFICANT CHANGE UP
T4 FREE SERPL-MCNC: 1.2 NG/DL — SIGNIFICANT CHANGE UP (ref 0.9–1.8)
TRIGL SERPL-MCNC: 101 MG/DL — SIGNIFICANT CHANGE UP
TRIGL SERPL-MCNC: 101 MG/DL — SIGNIFICANT CHANGE UP
TSH SERPL-MCNC: 0.18 UIU/ML — LOW (ref 0.27–4.2)
WBC # BLD: 6.87 K/UL — SIGNIFICANT CHANGE UP (ref 3.8–10.5)
WBC # FLD AUTO: 6.87 K/UL — SIGNIFICANT CHANGE UP (ref 3.8–10.5)

## 2024-03-11 PROCEDURE — 99232 SBSQ HOSP IP/OBS MODERATE 35: CPT

## 2024-03-11 PROCEDURE — 99291 CRITICAL CARE FIRST HOUR: CPT

## 2024-03-11 RX ORDER — CEFAZOLIN SODIUM 1 G
2000 VIAL (EA) INJECTION ONCE
Refills: 0 | Status: DISCONTINUED | OUTPATIENT
Start: 2024-03-12 | End: 2024-03-13

## 2024-03-11 RX ORDER — SODIUM CHLORIDE 9 MG/ML
1000 INJECTION INTRAMUSCULAR; INTRAVENOUS; SUBCUTANEOUS
Refills: 0 | Status: DISCONTINUED | OUTPATIENT
Start: 2024-03-12 | End: 2024-03-12

## 2024-03-11 RX ORDER — CHLORHEXIDINE GLUCONATE 213 G/1000ML
1 SOLUTION TOPICAL DAILY
Refills: 0 | Status: COMPLETED | OUTPATIENT
Start: 2024-03-11 | End: 2024-03-13

## 2024-03-11 RX ORDER — ENOXAPARIN SODIUM 100 MG/ML
40 INJECTION SUBCUTANEOUS ONCE
Refills: 0 | Status: COMPLETED | OUTPATIENT
Start: 2024-03-11 | End: 2024-03-11

## 2024-03-11 RX ORDER — CYCLOBENZAPRINE HYDROCHLORIDE 10 MG/1
5 TABLET, FILM COATED ORAL THREE TIMES A DAY
Refills: 0 | Status: DISCONTINUED | OUTPATIENT
Start: 2024-03-11 | End: 2024-03-20

## 2024-03-11 RX ORDER — MAGNESIUM SULFATE 500 MG/ML
2 VIAL (ML) INJECTION ONCE
Refills: 0 | Status: COMPLETED | OUTPATIENT
Start: 2024-03-11 | End: 2024-03-11

## 2024-03-11 RX ADMIN — Medication 25 GRAM(S): at 07:42

## 2024-03-11 RX ADMIN — LEVETIRACETAM 500 MILLIGRAM(S): 250 TABLET, FILM COATED ORAL at 05:46

## 2024-03-11 RX ADMIN — CHLORHEXIDINE GLUCONATE 1 APPLICATION(S): 213 SOLUTION TOPICAL at 12:06

## 2024-03-11 RX ADMIN — CYCLOBENZAPRINE HYDROCHLORIDE 5 MILLIGRAM(S): 10 TABLET, FILM COATED ORAL at 00:04

## 2024-03-11 RX ADMIN — LEVETIRACETAM 500 MILLIGRAM(S): 250 TABLET, FILM COATED ORAL at 17:17

## 2024-03-11 RX ADMIN — SODIUM CHLORIDE 75 MILLILITER(S): 9 INJECTION INTRAMUSCULAR; INTRAVENOUS; SUBCUTANEOUS at 00:01

## 2024-03-11 RX ADMIN — ENOXAPARIN SODIUM 40 MILLIGRAM(S): 100 INJECTION SUBCUTANEOUS at 08:52

## 2024-03-11 NOTE — EEG REPORT - NS EEG TEXT BOX
CHAU AMES [facility]-[id number]     Study Date: 03-10-24 13:29 - 08:00 03-11-24  Duration in hours:  18.5h    --------------------------------------------------------------------------------------------------  History:  CC/ HPI Patient is a 64y old  Male who presents with a chief complaint of SDH (10 Mar 2024 18:28)    MEDICATIONS  (STANDING):  levETIRAcetam 500 milliGRAM(s) Oral two times a day  magnesium sulfate  IVPB 2 Gram(s) IV Intermittent once  polyethylene glycol 3350 17 Gram(s) Oral every 12 hours  senna 2 Tablet(s) Oral at bedtime    --------------------------------------------------------------------------------------------------  Study Interpretation:    [Abbreviation Key:  PDR=alpha rhythm/posterior dominant rhythm. A-P=anterior posterior.  Amplitude: ‘very low’:<20; ‘low’:20-49; ‘medium’:; ‘high’:>150uV.  Persistence for periodic/rhythmic patterns (% of epoch) ‘rare’:<1%; ‘occasional’:1-10%; ‘frequent’:10-50%; ‘abundant’:50-90%; ‘continuous’:>90%.  Persistence for sporadic discharges: ‘rare’:<1/hr; ‘occasional’:1/min-1/hr; ‘frequent’:>1/min; ‘abundant’:>1/10 sec.  RPP=rhythmic and periodic patterns; GRDA=generalized rhythmic delta activity; FIRDA=frontal intermittent GRDA; LRDA=lateralized rhythmic delta activity; TIRDA=temporal intermittent rhythmic delta activity;  LPD=PLED=lateralized periodic discharges; GPD=generalized periodic discharges; BIPDs =bilateral independent periodic discharges; Mf=multifocal; SIRPDs=stimulus induced rhythmic, periodic, or ictal appearing discharges; BIRDs=brief potentially ictal rhythmic discharges >4 Hz, lasting .5-10s; PFA (paroxysmal bursts >13 Hz or =8 Hz <10s).  Modifiers: +F=with fast component; +S=with spike component; +R=with rhythmic component.  S-B=burst suppression pattern.  Max=maximal. N1-drowsy; N2-stage II sleep; N3-slow wave sleep. SSS/BETS=small sharp spikes/benign epileptiform transients of sleep. HV=hyperventilation; PS=photic stimulation]    FINDINGS:      Background:  Continuity: continuous  Symmetry: symmetric  PDR: 7.5-8 Hz activity, with amplitude to 40 uV, that attenuated to eye opening.    Reactivity: present  Voltage: normal (between 20-150uV)  Anterior Posterior Gradient: present  Other background findings: none  Breach: absent    Background Slowing:  Generalized slowing: intermittent frontally predominant irregular delta and theta activity.  Focal slowing: none was present.    State Changes:   -Present with N2 sleep transients with symmetric spindles and K-complexes.    Sporadic Epileptiform Discharges:    None    Rhythmic and Periodic Patterns (RPPs):  None     Electrographic and Electroclinical seizures:  None    Other Clinical Events:  None    Activation Procedures:   -Hyperventilation was performed and did not elicit any abnormalities.    -Photic stimulation was performed and did not elicit any abnormalities.       Artifacts:  Intermittent myogenic and movement artifacts were noted.    ECG:  The heart rate on single channel ECG was predominantly between 60-75 BPM.    EEG Classification / Summary:  Abnormal EEG study  Mild to moderate generalized background slowing    -----------------------------------------------------------------------------------------------------    Clinical Impression: ***THIS IS A PRELIMINARY FELLOW REPORT PENDING REVIEW WITH ATTENDING EPILEPTOLOGIST***  Mild to moderate diffuse/multi-focal cerebral dysfunction, not specific as to etiology.  There were no epileptiform abnormalities recorded.    Mohinder Romero MD  PGY-6, Pediatric Epilepsy Fellow   -------------------------------------------------------------------------------------------------------  [facility] EEG Reading Room Ph#: [phone]  Epilepsy Answering Service after 5PM and before 8:30AM: Ph#: [phone]   CHAU AMES [facility]-[id number]     Study Date: 03-10-24 13:29 - 09:00 03-11-24  Duration in hours:  19.5h    --------------------------------------------------------------------------------------------------  History:  CC/ HPI Patient is a 64y old  Male who presents with a chief complaint of SDH (10 Mar 2024 18:28)    MEDICATIONS  (STANDING):  levETIRAcetam 500 milliGRAM(s) Oral two times a day  magnesium sulfate  IVPB 2 Gram(s) IV Intermittent once  polyethylene glycol 3350 17 Gram(s) Oral every 12 hours  senna 2 Tablet(s) Oral at bedtime    --------------------------------------------------------------------------------------------------  Study Interpretation:    [Abbreviation Key:  PDR=alpha rhythm/posterior dominant rhythm. A-P=anterior posterior.  Amplitude: ‘very low’:<20; ‘low’:20-49; ‘medium’:; ‘high’:>150uV.  Persistence for periodic/rhythmic patterns (% of epoch) ‘rare’:<1%; ‘occasional’:1-10%; ‘frequent’:10-50%; ‘abundant’:50-90%; ‘continuous’:>90%.  Persistence for sporadic discharges: ‘rare’:<1/hr; ‘occasional’:1/min-1/hr; ‘frequent’:>1/min; ‘abundant’:>1/10 sec.  RPP=rhythmic and periodic patterns; GRDA=generalized rhythmic delta activity; FIRDA=frontal intermittent GRDA; LRDA=lateralized rhythmic delta activity; TIRDA=temporal intermittent rhythmic delta activity;  LPD=PLED=lateralized periodic discharges; GPD=generalized periodic discharges; BIPDs =bilateral independent periodic discharges; Mf=multifocal; SIRPDs=stimulus induced rhythmic, periodic, or ictal appearing discharges; BIRDs=brief potentially ictal rhythmic discharges >4 Hz, lasting .5-10s; PFA (paroxysmal bursts >13 Hz or =8 Hz <10s).  Modifiers: +F=with fast component; +S=with spike component; +R=with rhythmic component.  S-B=burst suppression pattern.  Max=maximal. N1-drowsy; N2-stage II sleep; N3-slow wave sleep. SSS/BETS=small sharp spikes/benign epileptiform transients of sleep. HV=hyperventilation; PS=photic stimulation]    FINDINGS:      Background:  Continuity: continuous  Symmetry: symmetric  PDR: 7.5-8 Hz activity, with amplitude to 40 uV, that attenuated to eye opening.    Reactivity: present  Voltage: normal (between 20-150uV)  Anterior Posterior Gradient: present  Other background findings: none  Breach: absent    Background Slowing:  Generalized slowing: intermittent frontally predominant irregular delta and theta activity.  Focal slowing: none was present.    State Changes:   -Present with N2 sleep transients with symmetric spindles and K-complexes.    Sporadic Epileptiform Discharges:    None    Rhythmic and Periodic Patterns (RPPs):  Infrequent GRDA near 1hz    Electrographic and Electroclinical seizures:  None    Other Clinical Events:  None    Activation Procedures:   -Hyperventilation was performed and did not elicit any abnormalities.    -Photic stimulation was performed and did not elicit any abnormalities.       Artifacts:  Intermittent myogenic and movement artifacts were noted.    ECG:  The heart rate on single channel ECG was predominantly between 60-75 BPM.    EEG Classification / Summary:  Abnormal EEG study  Mild to moderate generalized background slowing. Infrequent GRDA near 1hz    -----------------------------------------------------------------------------------------------------    Clinical Impression:    Mild  diffuse/multi-focal cerebral dysfunction, not specific as to etiology.   There were no epileptiform abnormalities recorded.      Mohinder Romero MD  PGY-6, Pediatric Epilepsy Fellow   -------------------------------------------------------------------------------------------------------  [facility] EEG Reading Room Ph#: [phone]  Epilepsy Answering Service after 5PM and before 8:30AM: Ph#: [phone]

## 2024-03-11 NOTE — PROGRESS NOTE ADULT - ASSESSMENT
65 yo M with mixed BL SDH with mass effect.  H/o recent falls (?syncopal events), unclear etiology.  1st degree AV block.    Plan:  neurochecks  plan for SDH evac in am 3/11 or 3/12  Keppra for sz ppx, cont  cEEG negative, no suspicious clinical events - d/c  -160; syncope w/up negative so far  maintain Osats>92%, incentive spirometry  diet as tolerated; NPO MN for procedure; BM regimen  monitor e-lytes, IVf MN  SCDs for VTE ppx, SQL this am; hold further chemoppx for OR

## 2024-03-11 NOTE — PROGRESS NOTE ADULT - ASSESSMENT
ASSESSMENT/PLAN:    NEURO:  Neuro/Vital checks q 1  Pre-op for OR this am  EEG pending    Keppra 500 BID for AED prophylaxsis  Pain control- flexeril for chronic back pain, Tylenol prn    PULM: RA  Incentive Spirometry as tolerated    CV:  SBP goal:     RENAL:  Fluids:  NS @ 75cc/hr     GI:  Diet: NPO @ midnight  GI prophylaxis [X] not indicated   Bowel regimen  [X] senna [] miralax    ENDO:   -no active issues      HEME/ONC:  VTE prophylaxis: [X] SCDs     ID: Afebrile  -no active issues      CODE STATUS:  [X] Full Code    DISPOSITION:  [X] NSICU

## 2024-03-11 NOTE — PROGRESS NOTE ADULT - SUBJECTIVE AND OBJECTIVE BOX
HPI:  63 yo male with no PMHx transferred from [place] s/p fall in the afternoon. Patient has been having multiple falls recently, and had a negative cardio and neuro w/u at [place] 1 month ago. Fall today was unwitnessed, +HS, +LOC. On CTH in ED at [place], found to have B/L SDH in the frontal-parietal area. Repeat CTH in ED here re-demonstrates the bleed. Neurosurgery consulted for SDHs. Pt evaluated in ED trauma bay, in [place], already in C-collar.   (08 Mar 2024 22:36)    O/n events: none reported.      ICU Vital Signs Last 24 Hrs  T(C): 36.6 (11 Mar 2024 12:01), Max: 36.9 (10 Mar 2024 20:00)  T(F): 97.9 (11 Mar 2024 12:01), Max: 98.5 (10 Mar 2024 20:00)  HR: 78 (11 Mar 2024 13:00) (50 - 80)  BP: 129/77 (11 Mar 2024 12:00) (91/80 - 137/69)  BP(mean): 90 (11 Mar 2024 12:00) (72 - 96)  ABP: --  ABP(mean): --  RR: 17 (11 Mar 2024 13:00) (10 - 23)  SpO2: 94% (11 Mar 2024 13:00) (94% - 100%)    O2 Parameters below as of 11 Mar 2024 12:00  Patient On (Oxygen Delivery Method): room air          Labs, imaging reviewed.  Telemetry with no events.    Exam: no changes.  NAD, cooperative.    MENTAL STATUS: AAO x 3, conversant, following simple commands, comprehension seems intact.  CRANIAL NERVES: PERRL, EOMI without nystagmus. Face symmetric.  MOTOR: strength 5/5 b/l upper and lower extremities, trace upward drift LUE.  SENSATION: grossly intact to light touch all extremities   S1S2 present  CTAB  Abd soft, NT, ND  No peripheral swelling.

## 2024-03-11 NOTE — PROGRESS NOTE ADULT - SUBJECTIVE AND OBJECTIVE BOX
HPI:  65 yo male with no PMHx transferred from [place] s/p fall in the afternoon (3/8) found to have B/L SDH in the frontal-parietal area.     A: intact  B: B/L breathe sounds, clear to auscultation   C: femoral pulses palpable B/L, BP: 142/70  D: GCS: 15  (08 Mar 2024 22:36)      INTERVAL HPI/OVERNIGHT EVENTS:  flexeril 5mg q8 added for chronic back pain    Vital Signs Last 24 Hrs  T(C): 36.9 (10 Mar 2024 23:39), Max: 36.9 (10 Mar 2024 20:00)  T(F): 98.4 (10 Mar 2024 23:39), Max: 98.5 (10 Mar 2024 20:00)  HR: 71 (11 Mar 2024 01:00) (50 - 80)  BP: 137/69 (11 Mar 2024 01:00) (95/58 - 137/69)  BP(mean): 79 (11 Mar 2024 01:00) (64 - 96)  RR: 15 (11 Mar 2024 01:00) (11 - 23)  SpO2: 100% (11 Mar 2024 01:00) (95% - 100%)    Parameters below as of 11 Mar 2024 00:00  Patient On (Oxygen Delivery Method): room air        PHYSICAL EXAM:  GENERAL: no acute distress, well-groomed  HEAD:  Normocephalic  NEURO:  VIOLETA COMA SCORE: E- V- M- = 15   MENTAL STATUS: AAO x3; Awake; Opens eyes spontaneously; Appropriately   conversant without aphasia; following simple and complex commands   CRANIAL NERVES: PERRL. EOMI without nystagmus. Face symmetric w/ normal eye   closure and smile, tongue midline. Hearing grossly intact. Speech clear.   MOTOR: strength 5/5 b/l upper and lower extremities   SKIN: Warm, dry; no rashes or lesions       CHEST/LUNG: Clear to auscultation bilaterally; no rales, rhonchi, wheezing, or rubs  ABDOMEN: Soft, nontender, nondistended;   EXTREMITIES:  2+ peripheral pulses, no clubbing, cyanosis, or edema  SKIN: Warm, dry; no rashes or lesions      LABS:                        13.7   5.92  )-----------( 251      ( 10 Mar 2024 04:21 )             42.3     03-10    138  |  102  |  8.6  ----------------------------<  97  4.2   |  28.0  |  0.73    Ca    8.8      10 Mar 2024 04:21  Phos  3.5     03-10  Mg     2.1     03-10        Urinalysis Basic - ( 10 Mar 2024 04:21 )    Color: x / Appearance: x / SG: x / pH: x  Gluc: 97 mg/dL / Ketone: x  / Bili: x / Urobili: x   Blood: x / Protein: x / Nitrite: x   Leuk Esterase: x / RBC: x / WBC x   Sq Epi: x / Non Sq Epi: x / Bacteria: x        03-09 @ 06:01  - 03-10 @ 07:00  --------------------------------------------------------  IN: 1700 mL / OUT: 1550 mL / NET: 150 mL    03-10 @ 07:01  -  03-11 @ 01:37  --------------------------------------------------------  IN: 705 mL / OUT: 900 mL / NET: -195 mL          RADIOLOGY & ADDITIONAL TESTS:    ACC: [id number] EXAM:MR SPINE LUMBAR WAW IC       PROCEDURE DATE:  03/09/2024        INTERPRETATION:  MR brain with and without gadolinium  CLINICAL INFORMATION:   CSF leak, BILATERAL subdural hematomas    IMPRESSION:    MR brain: unchanged BILATERAL acute subdural hematomas, measuring 1.6 on   the RIGHT and 1.7 on the LEFT with mass effect on the brain but no   midline shift.    MR spine:  1.  Cervical spine:   Mild narrowing of the RIGHT C5-6 neural foramen due  to uncovertebral spurring.  2.  Thoracic spine:   Unremarkable MR of the thoracic spine.  3.  Lumbar spine:   Tiny LEFT foraminal disc herniations at L3-4 and L4-5   narrow the LEFT neural foramina at these levels. Mild disc bulges at L4-5   and L5-S1 flatten the ventral thecal sac and narrow the BILATERAL neural   foramina.    --- End of Report ---

## 2024-03-12 ENCOUNTER — APPOINTMENT (OUTPATIENT)
Dept: NEUROSURGERY | Facility: HOSPITAL | Age: 64
End: 2024-03-12

## 2024-03-12 LAB
ANION GAP SERPL CALC-SCNC: 12 MMOL/L — SIGNIFICANT CHANGE UP (ref 5–17)
APTT BLD: 29.6 SEC — SIGNIFICANT CHANGE UP (ref 24.5–35.6)
BLD GP AB SCN SERPL QL: SIGNIFICANT CHANGE UP
BUN SERPL-MCNC: 11 MG/DL — SIGNIFICANT CHANGE UP (ref 8–20)
CALCIUM SERPL-MCNC: 9 MG/DL — SIGNIFICANT CHANGE UP (ref 8.4–10.5)
CHLORIDE SERPL-SCNC: 105 MMOL/L — SIGNIFICANT CHANGE UP (ref 96–108)
CO2 SERPL-SCNC: 24 MMOL/L — SIGNIFICANT CHANGE UP (ref 22–29)
CREAT SERPL-MCNC: 0.74 MG/DL — SIGNIFICANT CHANGE UP (ref 0.5–1.3)
EGFR: 100 ML/MIN/1.73M2 — SIGNIFICANT CHANGE UP
GLUCOSE BLDC GLUCOMTR-MCNC: 145 MG/DL — HIGH (ref 70–99)
GLUCOSE SERPL-MCNC: 105 MG/DL — HIGH (ref 70–99)
HCT VFR BLD CALC: 44.2 % — SIGNIFICANT CHANGE UP (ref 39–50)
HGB BLD-MCNC: 14.6 G/DL — SIGNIFICANT CHANGE UP (ref 13–17)
INR BLD: 1.01 RATIO — SIGNIFICANT CHANGE UP (ref 0.85–1.18)
MAGNESIUM SERPL-MCNC: 1.9 MG/DL — SIGNIFICANT CHANGE UP (ref 1.6–2.6)
MCHC RBC-ENTMCNC: 27.1 PG — SIGNIFICANT CHANGE UP (ref 27–34)
MCHC RBC-ENTMCNC: 33 GM/DL — SIGNIFICANT CHANGE UP (ref 32–36)
MCV RBC AUTO: 82.2 FL — SIGNIFICANT CHANGE UP (ref 80–100)
PHOSPHATE SERPL-MCNC: 3.6 MG/DL — SIGNIFICANT CHANGE UP (ref 2.4–4.7)
PLATELET # BLD AUTO: 263 K/UL — SIGNIFICANT CHANGE UP (ref 150–400)
POTASSIUM SERPL-MCNC: 4.3 MMOL/L — SIGNIFICANT CHANGE UP (ref 3.5–5.3)
POTASSIUM SERPL-SCNC: 4.3 MMOL/L — SIGNIFICANT CHANGE UP (ref 3.5–5.3)
PROTHROM AB SERPL-ACNC: 11.2 SEC — SIGNIFICANT CHANGE UP (ref 9.5–13)
RBC # BLD: 5.38 M/UL — SIGNIFICANT CHANGE UP (ref 4.2–5.8)
RBC # FLD: 13.4 % — SIGNIFICANT CHANGE UP (ref 10.3–14.5)
SODIUM SERPL-SCNC: 141 MMOL/L — SIGNIFICANT CHANGE UP (ref 135–145)
WBC # BLD: 6.76 K/UL — SIGNIFICANT CHANGE UP (ref 3.8–10.5)
WBC # FLD AUTO: 6.76 K/UL — SIGNIFICANT CHANGE UP (ref 3.8–10.5)

## 2024-03-12 PROCEDURE — 99291 CRITICAL CARE FIRST HOUR: CPT

## 2024-03-12 PROCEDURE — 99232 SBSQ HOSP IP/OBS MODERATE 35: CPT

## 2024-03-12 PROCEDURE — 61312 CRNEC/CRNOT STTL XDRL/SDRL: CPT | Mod: AS

## 2024-03-12 PROCEDURE — 61312 CRNEC/CRNOT STTL XDRL/SDRL: CPT | Mod: 59

## 2024-03-12 DEVICE — VENTRICULAR CATH STANDARD 23CM: Type: IMPLANTABLE DEVICE | Site: BILATERAL | Status: FUNCTIONAL

## 2024-03-12 DEVICE — SURGIFOAM PAD 8CM X 12.5CM X 10MM (100): Type: IMPLANTABLE DEVICE | Site: BILATERAL | Status: FUNCTIONAL

## 2024-03-12 DEVICE — SURGCEL 4 X 8": Type: IMPLANTABLE DEVICE | Site: BILATERAL | Status: FUNCTIONAL

## 2024-03-12 DEVICE — PLATE COVER BURRHOLE UN3 W/TAB 10MM: Type: IMPLANTABLE DEVICE | Site: BILATERAL | Status: FUNCTIONAL

## 2024-03-12 DEVICE — SURGIFLO MATRIX WITH THROMBIN KIT: Type: IMPLANTABLE DEVICE | Site: BILATERAL | Status: FUNCTIONAL

## 2024-03-12 DEVICE — GRAFT DURAL MATRX 1 X 3IN DURGN: Type: IMPLANTABLE DEVICE | Site: BILATERAL | Status: FUNCTIONAL

## 2024-03-12 DEVICE — GRAFT DURAGEN PLUS 3X3IN: Type: IMPLANTABLE DEVICE | Site: BILATERAL | Status: FUNCTIONAL

## 2024-03-12 DEVICE — COLLAGEN DURAMATRIX ONLAY PLUS 3X3IN: Type: IMPLANTABLE DEVICE | Site: BILATERAL | Status: FUNCTIONAL

## 2024-03-12 DEVICE — FLOSEAL FAST PREP 10ML: Type: IMPLANTABLE DEVICE | Site: BILATERAL | Status: FUNCTIONAL

## 2024-03-12 DEVICE — PLATE COVER BURRHOLE UN3 W/TAB 14MM: Type: IMPLANTABLE DEVICE | Site: BILATERAL | Status: FUNCTIONAL

## 2024-03-12 DEVICE — KIT A-LINE 1LUM 20G X 12CM SAFE KIT: Type: IMPLANTABLE DEVICE | Site: BILATERAL | Status: FUNCTIONAL

## 2024-03-12 DEVICE — SURGICEL FIBRILLAR 4 X 4": Type: IMPLANTABLE DEVICE | Site: BILATERAL | Status: FUNCTIONAL

## 2024-03-12 DEVICE — MAYFIELD SKULL PIN ADULT STEEL: Type: IMPLANTABLE DEVICE | Site: BILATERAL | Status: FUNCTIONAL

## 2024-03-12 DEVICE — PLATE UN3 RECTANGLE: Type: IMPLANTABLE DEVICE | Site: BILATERAL | Status: FUNCTIONAL

## 2024-03-12 DEVICE — SCREW UN3 ST 1.5X4MM: Type: IMPLANTABLE DEVICE | Site: BILATERAL | Status: FUNCTIONAL

## 2024-03-12 RX ORDER — MAGNESIUM SULFATE 500 MG/ML
1 VIAL (ML) INJECTION ONCE
Refills: 0 | Status: COMPLETED | OUTPATIENT
Start: 2024-03-12 | End: 2024-03-12

## 2024-03-12 RX ORDER — SODIUM CHLORIDE 9 MG/ML
1000 INJECTION INTRAMUSCULAR; INTRAVENOUS; SUBCUTANEOUS
Refills: 0 | Status: DISCONTINUED | OUTPATIENT
Start: 2024-03-12 | End: 2024-03-13

## 2024-03-12 RX ORDER — CEFAZOLIN SODIUM 1 G
1000 VIAL (EA) INJECTION EVERY 8 HOURS
Refills: 0 | Status: DISCONTINUED | OUTPATIENT
Start: 2024-03-13 | End: 2024-03-15

## 2024-03-12 RX ADMIN — LEVETIRACETAM 500 MILLIGRAM(S): 250 TABLET, FILM COATED ORAL at 17:15

## 2024-03-12 RX ADMIN — CHLORHEXIDINE GLUCONATE 1 APPLICATION(S): 213 SOLUTION TOPICAL at 08:17

## 2024-03-12 RX ADMIN — SODIUM CHLORIDE 75 MILLILITER(S): 9 INJECTION INTRAMUSCULAR; INTRAVENOUS; SUBCUTANEOUS at 02:13

## 2024-03-12 RX ADMIN — SODIUM CHLORIDE 75 MILLILITER(S): 9 INJECTION INTRAMUSCULAR; INTRAVENOUS; SUBCUTANEOUS at 21:41

## 2024-03-12 RX ADMIN — SODIUM CHLORIDE 75 MILLILITER(S): 9 INJECTION INTRAMUSCULAR; INTRAVENOUS; SUBCUTANEOUS at 17:17

## 2024-03-12 RX ADMIN — Medication 400 MILLIGRAM(S): at 21:40

## 2024-03-12 RX ADMIN — LEVETIRACETAM 500 MILLIGRAM(S): 250 TABLET, FILM COATED ORAL at 05:47

## 2024-03-12 RX ADMIN — Medication 100 GRAM(S): at 05:47

## 2024-03-12 RX ADMIN — Medication 1000 MILLIGRAM(S): at 22:00

## 2024-03-12 RX ADMIN — CHLORHEXIDINE GLUCONATE 1 APPLICATION(S): 213 SOLUTION TOPICAL at 12:16

## 2024-03-12 NOTE — PROGRESS NOTE ADULT - SUBJECTIVE AND OBJECTIVE BOX
HPI:  63 yo male with no PMHx transferred from [place] s/p fall in the afternoon (3/8) found to have B/L SDH in the frontal-parietal area.     INTERVAL HPI/OVERNIGHT EVENTS:  No overnight events    ICU Vital Signs Last 24 Hrs  T(C): 36.6 (11 Mar 2024 16:55), Max: 36.8 (11 Mar 2024 04:33)  T(F): 97.9 (11 Mar 2024 16:55), Max: 98.3 (11 Mar 2024 04:33)  HR: 83 (11 Mar 2024 19:00) (50 - 86)  BP: 124/78 (11 Mar 2024 19:00) (91/80 - 137/85)  BP(mean): 92 (11 Mar 2024 19:00) (72 - 99)  ABP: --  ABP(mean): --  RR: 17 (11 Mar 2024 19:00) (10 - 18)  SpO2: 99% (11 Mar 2024 19:00) (94% - 100%)    O2 Parameters below as of 11 Mar 2024 16:00  Patient On (Oxygen Delivery Method): room air            PHYSICAL EXAM:  GENERAL: no acute distress, well-groomed  HEAD:  Normocephalic  NEURO:  VIOLETA COMA SCORE: E- V- M- = 15   MENTAL STATUS: AAO x3; Awake; Opens eyes spontaneously; Appropriately   conversant without aphasia; following simple and complex commands   CRANIAL NERVES: PERRL. EOMI without nystagmus. Face symmetric w/ normal eye   closure and smile, tongue midline. Hearing grossly intact. Speech clear.   MOTOR: strength 5/5 b/l upper and lower extremities   SKIN: Warm, dry; no rashes or lesions       CHEST/LUNG: Clear to auscultation bilaterally; no rales, rhonchi, wheezing, or rubs  ABDOMEN: Soft, nontender, nondistended;   EXTREMITIES:  2+ peripheral pulses, no clubbing, cyanosis, or edema  SKIN: Warm, dry; no rashes or lesions      LABS:                                   14.1   6.87  )-----------( 247      ( 11 Mar 2024 05:00 )             43.1   03-11    140  |  105  |  12.7  ----------------------------<  93  4.1   |  24.0  |  0.68    Ca    8.9      11 Mar 2024 05:00  Phos  3.5     03-11  Mg     1.8     03-11    TPro  6.5<L>  /  Alb  3.7  /  TBili  0.4  /  DBili  x   /  AST  14  /  ALT  15  /  AlkPhos  91  03-11    Urinalysis Basic - ( 10 Mar 2024 04:21 )    Color: x / Appearance: x / SG: x / pH: x  Gluc: 97 mg/dL / Ketone: x  / Bili: x / Urobili: x   Blood: x / Protein: x / Nitrite: x   Leuk Esterase: x / RBC: x / WBC x   Sq Epi: x / Non Sq Epi: x / Bacteria: x      I&O's Summary    10 Mar 2024 07:01  -  11 Mar 2024 07:00  --------------------------------------------------------  IN: 1080 mL / OUT: 1050 mL / NET: 30 mL    11 Mar 2024 07:01  -  12 Mar 2024 00:24  --------------------------------------------------------  IN: 240 mL / OUT: 400 mL / NET: -160 mL        RADIOLOGY & ADDITIONAL TESTS:    ACC: [id number] EXAM:MR SPINE LUMBAR WAW IC       PROCEDURE DATE:  03/09/2024        INTERPRETATION:  MR brain with and without gadolinium  CLINICAL INFORMATION:   CSF leak, BILATERAL subdural hematomas    IMPRESSION:    MR brain: unchanged BILATERAL acute subdural hematomas, measuring 1.6 on   the RIGHT and 1.7 on the LEFT with mass effect on the brain but no   midline shift.    MR spine:  1.  Cervical spine:   Mild narrowing of the RIGHT C5-6 neural foramen due  to uncovertebral spurring.  2.  Thoracic spine:   Unremarkable MR of the thoracic spine.  3.  Lumbar spine:   Tiny LEFT foraminal disc herniations at L3-4 and L4-5   narrow the LEFT neural foramina at these levels. Mild disc bulges at L4-5   and L5-S1 flatten the ventral thecal sac and narrow the BILATERAL neural   foramina.    --- End of Report ---

## 2024-03-12 NOTE — DIETITIAN INITIAL EVALUATION ADULT - OTHER INFO
Pt is a 64 year old male with no PMHx transferred from [place] s/p fall in the afternoon. Patient has been having multiple falls recently, and had a negative cardio and neuro w/u at [place] 1 month ago. Fall today was unwitnessed, +HS, +LOC. On CTH in ED at [place], found to have B/L SDH in the frontal-parietal area. Repeat CTH in ED here re-demonstrates the bleed. Neurosurgery consulted for SDHs. Pt evaluated in ED trauma bay, in [facility], already in C-collar.

## 2024-03-12 NOTE — DIETITIAN INITIAL EVALUATION ADULT - NS FNS DIET ORDER
Diet, NPO after Midnight:      NPO Start Date: 12-Mar-2024,   NPO Start Time: 23:59  Except Medications (03-12-24 @ 07:35)  Diet, Regular:   Vegan {Accepts Vegetable Products Only}  No Beef (03-11-24 @ 10:43)

## 2024-03-12 NOTE — PROGRESS NOTE ADULT - CRITICAL CARE ATTENDING COMMENT
I have personally provided the above noted minutes of critical care time including review of laboratory values, imaging, interdisciplinary care coordination, and frequent monitoring for decompensation post-operatively.    Based on my personal evaluation, this patient has a high probability of imminent or life-threatening deterioration due to the presence of: SDH s/p angelina hole, cerebral compression, subdural drainage device in situ, 1st degree AV block -  which required my direct attention, intervention, and personal management. Other billable procedures, if performed, are documented separately.

## 2024-03-12 NOTE — PROGRESS NOTE ADULT - SUBJECTIVE AND OBJECTIVE BOX
HPI:  63 yo male with no PMHx transferred from [place] s/p fall in the afternoon. Patient has been having multiple falls recently, and had a negative cardio and neuro w/u at [place] 1 month ago. Fall today was unwitnessed, +HS, +LOC. On CTH in ED at [place], found to have B/L SDH in the frontal-parietal area. Repeat CTH in ED here re-demonstrates the bleed. Neurosurgery consulted for SDHs. Pt evaluated in ED trauma bay, in [facility], already in C-collar.   (08 Mar 2024 22:36)    O/n events: none reported.    ICU Vital Signs Last 24 Hrs  T(C): 36.7 (12 Mar 2024 12:03), Max: 36.7 (12 Mar 2024 12:03)  T(F): 98.1 (12 Mar 2024 12:03), Max: 98.1 (12 Mar 2024 12:03)  HR: 62 (12 Mar 2024 13:00) (62 - 90)  BP: 127/72 (12 Mar 2024 13:00) (95/76 - 138/78)  BP(mean): 85 (12 Mar 2024 13:00) (70 - 105)  ABP: --  ABP(mean): --  RR: 14 (12 Mar 2024 13:00) (12 - 20)  SpO2: 100% (12 Mar 2024 13:00) (95% - 100%)    O2 Parameters below as of 12 Mar 2024 12:00  Patient On (Oxygen Delivery Method): room air        Labs, imaging reviewed.  Telemetry with no events.    Exam: stable.  NAD, cooperative. Sitting in a chair.  MENTAL STATUS: AAO x 3, conversant, following simple commands, comprehension seems intact.  CRANIAL NERVES: PERRL, EOMI without nystagmus. Face symmetric.  MOTOR: strength 5/5 b/l upper and lower extremities, trace upward drift LUE.  SENSATION: grossly intact to light touch all extremities   S1S2 present  CTAB  Abd soft, NT, ND  No peripheral swelling.

## 2024-03-12 NOTE — PROGRESS NOTE ADULT - ASSESSMENT
Assessment: 64M presented 3/8 after a fall with b/l SDH, initial concern for CSF leak -> improved, no leak on imaging.   Now s/p OR for b/l crani for SDH evacuation 3/12.    Plan:  neurochecks q1h  CTH 6h post-op (3AM)  maintain drains to gravity  keppra 500mg BID  Mobility: PT/OT in AM  SBP  post-op  Incentive spirometer as tolerated, SpO2>92%  Swallow eval -> advance diet as tolerated, bowel regimen  glucose goal 120-180  donte-op ancef  SCDs, holding chemoppx post-op    See day attending note for add'l details

## 2024-03-12 NOTE — PROGRESS NOTE ADULT - ASSESSMENT
ASSESSMENT/PLAN:    NEURO:  Neuro/Vital checks q 2  Pre-op for OR 3/12 am  EEG negative 3/10    Keppra 500 BID for AED prophylaxsis  Pain control- flexeril for chronic back pain, Tylenol prn    PULM: RA  Incentive Spirometry as tolerated    CV:  SBP goal:     RENAL:  Fluids:  NS @ 75cc/hr     GI:  Diet: NPO @ midnight  GI prophylaxis [X] not indicated   Bowel regimen  [X] senna [] miralax    ENDO:   -no active issues      HEME/ONC:  VTE prophylaxis: [X] SCDs     ID: Afebrile  -no active issues      CODE STATUS:  [X] Full Code    DISPOSITION:  [X] NSICU

## 2024-03-12 NOTE — PROGRESS NOTE ADULT - ASSESSMENT
65 yo M with mixed BL SDH with mass effect.  H/o recent falls (?syncopal events), unclear etiology, w/up negative so  far.  1st degree AV block.    Plan:  neurochecks  plan for SDH evac later today, medically optimized for intervention   Keppra for sz ppx, cont  -160; PRN meds  maintain Osats>92%, incentive spirometry  NPO for procedure, IV fluids for now  monitor e-lytes, UOP  SCDs for VTE ppx, chemoppx held for procedure

## 2024-03-12 NOTE — DIETITIAN INITIAL EVALUATION ADULT - PERTINENT MEDS FT
MEDICATIONS  (STANDING):  ceFAZolin  Injectable. 2000 milliGRAM(s) IV Push once  chlorhexidine 2% Cloths 1 Application(s) Topical daily  levETIRAcetam 500 milliGRAM(s) Oral two times a day  polyethylene glycol 3350 17 Gram(s) Oral every 12 hours  senna 2 Tablet(s) Oral at bedtime  sodium chloride 0.9%. 1000 milliLiter(s) (75 mL/Hr) IV Continuous <Continuous>    MEDICATIONS  (PRN):  acetaminophen   IVPB .. 1000 milliGRAM(s) IV Intermittent every 6 hours PRN Temp greater or equal to 38C (100.4F), Mild Pain (1 - 3), Moderate Pain (4 - 6), Severe Pain (7 - 10)  cyclobenzaprine 5 milliGRAM(s) Oral three times a day PRN Muscle Spasm

## 2024-03-12 NOTE — DIETITIAN INITIAL EVALUATION ADULT - ORAL INTAKE PTA/DIET HISTORY
PT reports eating well PTA; denies any recent weight changes. Pt currently denies difficulty chewing or swallowing. Pt reports following a Vegetarian diet (confirmed in [facility]). Pt is currently NPO for procedure.

## 2024-03-12 NOTE — DIETITIAN INITIAL EVALUATION ADULT - PERTINENT LABORATORY DATA
03-12    141  |  105  |  11.0  ----------------------------<  105<H>  4.3   |  24.0  |  0.74    Ca    9.0      12 Mar 2024 02:20  Phos  3.6     03-12  Mg     1.9     03-12    TPro  6.5<L>  /  Alb  3.7  /  TBili  0.4  /  DBili  x   /  AST  14  /  ALT  15  /  AlkPhos  91  03-11  A1C with Estimated Average Glucose Result: 5.6 % (03-11-24 @ 05:00)

## 2024-03-12 NOTE — PROGRESS NOTE ADULT - SUBJECTIVE AND OBJECTIVE BOX
NSICU Night Intensivist Note    Historical Review:   64M no PMHx presented 3/8 after a fall, transferred from [place], found to have B/L SDH in the frontal-parietal area.     12hr EVENTS:  s/p b/l crani for subdural evacuation with ERICKSON drains in situ  I: 1L, O: 600cc, EBL:100    ----------------------------------------------------------------------------------------------------  PHYSICAL EXAM:  General:   HEENT: MMM  Neuro:  -Mental status- Ox_, [] EO , [] FC  -CN- PERRL 3mm, EOMI, tongue midline, face symmetric  -SensoriMotor- RUE __, RLE __, LUE __, LLE__    CV: regular rate  Pulm: no accessory muscle use  Abd: soft, nontender, nondistended, []TF  Skin: warm, dry, [] incontinence associated skin breakdown           NSICU Night Intensivist Note    Historical Review:   64M no PMHx presented 3/8 after a fall, transferred from [place], found to have B/L SDH in the frontal-parietal area. Initial concern for CSF leak not found on imaging.    12hr EVENTS:  s/p b/l crani for subdural evacuation with ERICKSON drains in situ  I: 1L, O: 600cc, EBL:100    ROS: unable to fully participate 2/2 mental status    ----------------------------------------------------------------------------------------------------  PHYSICAL EXAM:  General: lying comfortably in bed  HEENT: MMM  Neuro: head wrap c/d/i, subdural drains in place connect to EVD apparatus  -Mental status- opens eyes to voice, oriented to self and hospital, not year  -CN- PERRL 3mm, EOMI, tongue midline, face symmetric  -SensoriMotor- lifting all extremities spontaneously antigravity, not cooperative with full confrontation exam    CV: regular rate  Pulm: no accessory muscle use  Abd: soft, nontender, nondistended  Skin: warm, dry    -----------------------------------------------------------------------------------------------------  ICU Vital Signs Last 24 Hrs  T(C): 36.6 (12 Mar 2024 20:45), Max: 36.7 (12 Mar 2024 12:03)  T(F): 97.8 (12 Mar 2024 20:45), Max: 98.1 (12 Mar 2024 12:03)  HR: 83 (12 Mar 2024 23:00) (62 - 83)  BP: 135/71 (12 Mar 2024 23:00) (95/76 - 147/91)  BP(mean): 87 (12 Mar 2024 23:00) (70 - 111)  ABP: 129/50 (12 Mar 2024 23:00) (119/49 - 140/64)  ABP(mean): 77 (12 Mar 2024 23:00) (74 - 93)  RR: 18 (12 Mar 2024 23:00) (12 - 21)  SpO2: 100% (12 Mar 2024 23:00) (96% - 100%)    O2 Parameters below as of 12 Mar 2024 22:00  Patient On (Oxygen Delivery Method): room air            I&O's Summary    11 Mar 2024 07:01  -  12 Mar 2024 07:00  --------------------------------------------------------  IN: 1040 mL / OUT: 400 mL / NET: 640 mL    12 Mar 2024 07:01  -  12 Mar 2024 23:35  --------------------------------------------------------  IN: 925 mL / OUT: 919 mL / NET: 6 mL        MEDICATIONS  (STANDING):  ceFAZolin  Injectable. 2000 milliGRAM(s) IV Push once  chlorhexidine 2% Cloths 1 Application(s) Topical daily  chlorhexidine 4% Liquid 1 Application(s) Topical daily  levETIRAcetam 500 milliGRAM(s) Oral two times a day  polyethylene glycol 3350 17 Gram(s) Oral every 12 hours  senna 2 Tablet(s) Oral at bedtime  sodium chloride 0.9%. 1000 milliLiter(s) (75 mL/Hr) IV Continuous <Continuous>    IMAGING:   Recent imaging studies were reviewed.    LAB RESULTS:                          14.6   6.76  )-----------( 263      ( 12 Mar 2024 02:20 )             44.2       PT/INR - ( 12 Mar 2024 02:20 )   PT: 11.2 sec;   INR: 1.01 ratio         PTT - ( 12 Mar 2024 02:20 )  PTT:29.6 sec    03-12    141  |  105  |  11.0  ----------------------------<  105<H>  4.3   |  24.0  |  0.74    Ca    9.0      12 Mar 2024 02:20  Phos  3.6     03-12  Mg     1.9     03-12    TPro  6.5<L>  /  Alb  3.7  /  TBili  0.4  /  DBili  x   /  AST  14  /  ALT  15  /  AlkPhos  91  03-11        -----------------------------------------------------------------------------------------------------------------------------------------------------------------------------------

## 2024-03-12 NOTE — BRIEF OPERATIVE NOTE - NSICDXBRIEFPROCEDURE_GEN_ALL_CORE_FT
PROCEDURES:  Evacuation of bilateral subdural hematomas by craniotomy 12-Mar-2024 20:22:55  Patel Polanco

## 2024-03-13 LAB
ANION GAP SERPL CALC-SCNC: 13 MMOL/L — SIGNIFICANT CHANGE UP (ref 5–17)
BUN SERPL-MCNC: 9.6 MG/DL — SIGNIFICANT CHANGE UP (ref 8–20)
CALCIUM SERPL-MCNC: 8.7 MG/DL — SIGNIFICANT CHANGE UP (ref 8.4–10.5)
CHLORIDE SERPL-SCNC: 103 MMOL/L — SIGNIFICANT CHANGE UP (ref 96–108)
CO2 SERPL-SCNC: 20 MMOL/L — LOW (ref 22–29)
CREAT SERPL-MCNC: 0.67 MG/DL — SIGNIFICANT CHANGE UP (ref 0.5–1.3)
EGFR: 103 ML/MIN/1.73M2 — SIGNIFICANT CHANGE UP
GLUCOSE SERPL-MCNC: 170 MG/DL — HIGH (ref 70–99)
HCT VFR BLD CALC: 41.9 % — SIGNIFICANT CHANGE UP (ref 39–50)
HGB BLD-MCNC: 13.8 G/DL — SIGNIFICANT CHANGE UP (ref 13–17)
MAGNESIUM SERPL-MCNC: 1.8 MG/DL — SIGNIFICANT CHANGE UP (ref 1.6–2.6)
MCHC RBC-ENTMCNC: 26.8 PG — LOW (ref 27–34)
MCHC RBC-ENTMCNC: 32.9 GM/DL — SIGNIFICANT CHANGE UP (ref 32–36)
MCV RBC AUTO: 81.4 FL — SIGNIFICANT CHANGE UP (ref 80–100)
PHOSPHATE SERPL-MCNC: 2.4 MG/DL — SIGNIFICANT CHANGE UP (ref 2.4–4.7)
PLATELET # BLD AUTO: 246 K/UL — SIGNIFICANT CHANGE UP (ref 150–400)
POTASSIUM SERPL-MCNC: 3.7 MMOL/L — SIGNIFICANT CHANGE UP (ref 3.5–5.3)
POTASSIUM SERPL-SCNC: 3.7 MMOL/L — SIGNIFICANT CHANGE UP (ref 3.5–5.3)
RBC # BLD: 5.15 M/UL — SIGNIFICANT CHANGE UP (ref 4.2–5.8)
RBC # FLD: 13.1 % — SIGNIFICANT CHANGE UP (ref 10.3–14.5)
SODIUM SERPL-SCNC: 136 MMOL/L — SIGNIFICANT CHANGE UP (ref 135–145)
WBC # BLD: 8.62 K/UL — SIGNIFICANT CHANGE UP (ref 3.8–10.5)
WBC # FLD AUTO: 8.62 K/UL — SIGNIFICANT CHANGE UP (ref 3.8–10.5)

## 2024-03-13 PROCEDURE — 70450 CT HEAD/BRAIN W/O DYE: CPT | Mod: 26

## 2024-03-13 PROCEDURE — 70450 CT HEAD/BRAIN W/O DYE: CPT | Mod: 26,77

## 2024-03-13 PROCEDURE — 99291 CRITICAL CARE FIRST HOUR: CPT

## 2024-03-13 RX ORDER — SODIUM CHLORIDE 9 MG/ML
500 INJECTION INTRAMUSCULAR; INTRAVENOUS; SUBCUTANEOUS ONCE
Refills: 0 | Status: COMPLETED | OUTPATIENT
Start: 2024-03-13 | End: 2024-03-13

## 2024-03-13 RX ORDER — ENOXAPARIN SODIUM 100 MG/ML
40 INJECTION SUBCUTANEOUS EVERY 24 HOURS
Refills: 0 | Status: DISCONTINUED | OUTPATIENT
Start: 2024-03-13 | End: 2024-03-14

## 2024-03-13 RX ORDER — POTASSIUM PHOSPHATE, MONOBASIC POTASSIUM PHOSPHATE, DIBASIC 236; 224 MG/ML; MG/ML
30 INJECTION, SOLUTION INTRAVENOUS ONCE
Refills: 0 | Status: COMPLETED | OUTPATIENT
Start: 2024-03-13 | End: 2024-03-13

## 2024-03-13 RX ORDER — ACETAMINOPHEN 500 MG
1000 TABLET ORAL ONCE
Refills: 0 | Status: COMPLETED | OUTPATIENT
Start: 2024-03-13 | End: 2024-03-13

## 2024-03-13 RX ORDER — MAGNESIUM SULFATE 500 MG/ML
2 VIAL (ML) INJECTION ONCE
Refills: 0 | Status: COMPLETED | OUTPATIENT
Start: 2024-03-13 | End: 2024-03-13

## 2024-03-13 RX ORDER — LEVETIRACETAM 250 MG/1
500 TABLET, FILM COATED ORAL EVERY 12 HOURS
Refills: 0 | Status: DISCONTINUED | OUTPATIENT
Start: 2024-03-13 | End: 2024-03-15

## 2024-03-13 RX ADMIN — SODIUM CHLORIDE 500 MILLILITER(S): 9 INJECTION INTRAMUSCULAR; INTRAVENOUS; SUBCUTANEOUS at 06:28

## 2024-03-13 RX ADMIN — CHLORHEXIDINE GLUCONATE 1 APPLICATION(S): 213 SOLUTION TOPICAL at 13:34

## 2024-03-13 RX ADMIN — LEVETIRACETAM 500 MILLIGRAM(S): 250 TABLET, FILM COATED ORAL at 17:33

## 2024-03-13 RX ADMIN — Medication 25 GRAM(S): at 04:37

## 2024-03-13 RX ADMIN — Medication 1000 MILLIGRAM(S): at 02:54

## 2024-03-13 RX ADMIN — Medication 1000 MILLIGRAM(S): at 10:21

## 2024-03-13 RX ADMIN — Medication 10 MILLIGRAM(S): at 12:18

## 2024-03-13 RX ADMIN — Medication 1000 MILLIGRAM(S): at 14:00

## 2024-03-13 RX ADMIN — SODIUM CHLORIDE 500 MILLILITER(S): 9 INJECTION INTRAMUSCULAR; INTRAVENOUS; SUBCUTANEOUS at 12:18

## 2024-03-13 RX ADMIN — SODIUM CHLORIDE 1000 MILLILITER(S): 9 INJECTION INTRAMUSCULAR; INTRAVENOUS; SUBCUTANEOUS at 03:43

## 2024-03-13 RX ADMIN — POTASSIUM PHOSPHATE, MONOBASIC POTASSIUM PHOSPHATE, DIBASIC 83.33 MILLIMOLE(S): 236; 224 INJECTION, SOLUTION INTRAVENOUS at 03:44

## 2024-03-13 RX ADMIN — SENNA PLUS 2 TABLET(S): 8.6 TABLET ORAL at 22:02

## 2024-03-13 RX ADMIN — ENOXAPARIN SODIUM 40 MILLIGRAM(S): 100 INJECTION SUBCUTANEOUS at 22:02

## 2024-03-13 RX ADMIN — Medication 1000 MILLIGRAM(S): at 06:45

## 2024-03-13 RX ADMIN — Medication 1000 MILLIGRAM(S): at 17:33

## 2024-03-13 RX ADMIN — Medication 400 MILLIGRAM(S): at 06:28

## 2024-03-13 RX ADMIN — LEVETIRACETAM 500 MILLIGRAM(S): 250 TABLET, FILM COATED ORAL at 06:28

## 2024-03-13 RX ADMIN — Medication 400 MILLIGRAM(S): at 13:33

## 2024-03-13 RX ADMIN — POLYETHYLENE GLYCOL 3350 17 GRAM(S): 17 POWDER, FOR SOLUTION ORAL at 17:47

## 2024-03-13 NOTE — PROGRESS NOTE ADULT - ASSESSMENT
Impression 64M s/p bilateral frontal angelina hole e/o SDH POD#0    Plan CTH 3am, 1hr neuro checks, ancef, keppra, maintain subdural drains to gravity - clamp in transit

## 2024-03-13 NOTE — PROGRESS NOTE ADULT - ASSESSMENT
65 yo M with mixed BL SDH with mass effect. S/p BL angelina hole for SDH evac 3/12, SD drains in place; pneumacephalus.  H/o recent falls (?syncopal events), unclear etiology, w/up negative so  far.  1st degree AV block.    Plan:  neurochecks  keeping flat for 24 hrs, suppl O2  plan for CTh in am  Keppra for sz ppx, cont  -160; PRN meds  maintain Osats>92%, incentive spirometry  NPO while flat, IV fluids for now; dysphagia screen in am  monitor e-lytes, UOP  SCDs for VTE ppx, chemoppx held as fresh postop

## 2024-03-13 NOTE — PROGRESS NOTE ADULT - SUBJECTIVE AND OBJECTIVE BOX
HPI:  63 yo male with no PMHx transferred from [place] s/p fall in the afternoon found to have B/L SDH in the frontal-parietal area.     A: intact  B: B/L breathe sounds, clear to auscultation   C: femoral pulses palpable B/L, BP: 142/70  D: GCS: 15  (08 Mar 2024 22:36)    as above - 64M s/p bilateral frontal angelina hole e/o SDH POD#0, in Neuro ICU doing well.  awake - no complaints      MEDICATIONS  (STANDING):  ceFAZolin  Injectable. 1000 milliGRAM(s) IV Push every 8 hours  ceFAZolin  Injectable. 2000 milliGRAM(s) IV Push once  chlorhexidine 2% Cloths 1 Application(s) Topical daily  chlorhexidine 4% Liquid 1 Application(s) Topical daily  levETIRAcetam 500 milliGRAM(s) Oral two times a day  polyethylene glycol 3350 17 Gram(s) Oral every 12 hours  senna 2 Tablet(s) Oral at bedtime  sodium chloride 0.9%. 1000 milliLiter(s) (75 mL/Hr) IV Continuous <Continuous>    MEDICATIONS  (PRN):  acetaminophen   IVPB .. 1000 milliGRAM(s) IV Intermittent every 6 hours PRN Temp greater or equal to 38C (100.4F), Mild Pain (1 - 3), Moderate Pain (4 - 6), Severe Pain (7 - 10)  cyclobenzaprine 5 milliGRAM(s) Oral three times a day PRN Muscle Spasm                              14.6   6.76  )-----------( 263      ( 12 Mar 2024 02:20 )             44.2     03-12    141  |  105  |  11.0  ----------------------------<  105<H>  4.3   |  24.0  |  0.74    Ca    9.0      12 Mar 2024 02:20  Phos  3.6     03-12  Mg     1.9     03-12    TPro  6.5<L>  /  Alb  3.7  /  TBili  0.4  /  DBili  x   /  AST  14  /  ALT  15  /  AlkPhos  91  03-11    PT/INR - ( 12 Mar 2024 02:20 )   PT: 11.2 sec;   INR: 1.01 ratio         PTT - ( 12 Mar 2024 02:20 )  PTT:29.6 sec      IMAGING    Neurosurgery Imaging:        Vital Signs Last 24 Hrs  T(C): 36.6 (12 Mar 2024 20:45), Max: 36.7 (12 Mar 2024 12:03)  T(F): 97.8 (12 Mar 2024 20:45), Max: 98.1 (12 Mar 2024 12:03)  HR: 83 (12 Mar 2024 23:00) (62 - 83)  BP: 135/71 (12 Mar 2024 23:00) (106/84 - 147/91)  BP(mean): 87 (12 Mar 2024 23:00) (70 - 111)  RR: 18 (12 Mar 2024 23:00) (12 - 21)  SpO2: 100% (12 Mar 2024 23:00) (96% - 100%)    Parameters below as of 12 Mar 2024 22:00  Patient On (Oxygen Delivery Method): room air      I&O's Detail    11 Mar 2024 07:01  -  12 Mar 2024 07:00  --------------------------------------------------------  IN:    IV PiggyBack: 50 mL    Oral Fluid: 390 mL    sodium chloride 0.9%: 600 mL  Total IN: 1040 mL    OUT:    Voided (mL): 400 mL  Total OUT: 400 mL    Total NET: 640 mL      12 Mar 2024 07:01  -  13 Mar 2024 01:13  --------------------------------------------------------  IN:    IV PiggyBack: 100 mL    sodium chloride 0.9%: 525 mL    sodium chloride 0.9%: 300 mL  Total IN: 925 mL    OUT:    Drain (mL): 6 mL    Drain (mL): 53 mL    Indwelling Catheter - Urethral (mL): 460 mL    Voided (mL): 400 mL  Total OUT: 919 mL    Total NET: 6 mL          PHYSICAL EXAM:  Alert, eyes open spontaneously, follows commands  left drain 50cc   Rt drain 6 cc  Head wrap in place c/d/i

## 2024-03-13 NOTE — PROGRESS NOTE ADULT - SUBJECTIVE AND OBJECTIVE BOX
HPI:  65 yo male with no PMHx transferred from [place] s/p fall in the afternoon. Patient has been having multiple falls recently, and had a negative cardio and neuro w/u at [facility] 1 month ago. Fall today was unwitnessed, +HS, +LOC. On CTH in ED at [facility], found to have B/L SDH in the frontal-parietal area. Repeat CTH in ED here re-demonstrates the bleed. Neurosurgery consulted for SDHs. Pt evaluated in ED trauma bay, in [facility], already in C-collar.   (08 Mar 2024 22:36)    O/n events: none reported.    ICU Vital Signs Last 24 Hrs  T(C): 36.7 (13 Mar 2024 16:22), Max: 36.7 (13 Mar 2024 16:22)  T(F): 98.1 (13 Mar 2024 16:22), Max: 98.1 (13 Mar 2024 16:22)  HR: 81 (13 Mar 2024 15:00) (69 - 105)  BP: 147/94 (13 Mar 2024 15:00) (98/86 - 152/91)  BP(mean): 108 (13 Mar 2024 15:00) (72 - 111)  ABP: 94/76 (13 Mar 2024 02:00) (94/76 - 140/64)  ABP(mean): 84 (13 Mar 2024 02:00) (74 - 94)  RR: 12 (13 Mar 2024 15:00) (12 - 27)  SpO2: 100% (13 Mar 2024 15:00) (96% - 100%)    O2 Parameters below as of 13 Mar 2024 12:00  Patient On (Oxygen Delivery Method): mask, nonrebreather  O2 Flow (L/min): 15      Labs, imaging reviewed.    Exam: stable.  NAD, cooperative.  MENTAL STATUS: AAO x 3, conversant, following simple commands, comprehension seems intact.  CRANIAL NERVES: PERRL, EOMI without nystagmus. Face symmetric.  MOTOR: strength 5/5 b/l upper and lower extremities, trace upward drift LUE.  SENSATION: grossly intact to light touch all extremities   S1S2 present  CTAB  Abd soft, NT, ND  No peripheral swelling.

## 2024-03-13 NOTE — PROVIDER CONTACT NOTE (CHANGE IN STATUS NOTIFICATION) - ASSESSMENT
pt A&Ox2, confused to situation & time but notably more alert & cooperative since laying bed flat & administering NRB mask. Pt with no drifts on BL UE & LLE but unable to maintain RLE antigravity for a full 5 seconds.

## 2024-03-14 LAB
ANION GAP SERPL CALC-SCNC: 10 MMOL/L — SIGNIFICANT CHANGE UP (ref 5–17)
BUN SERPL-MCNC: 10.4 MG/DL — SIGNIFICANT CHANGE UP (ref 8–20)
CALCIUM SERPL-MCNC: 9.1 MG/DL — SIGNIFICANT CHANGE UP (ref 8.4–10.5)
CHLORIDE SERPL-SCNC: 103 MMOL/L — SIGNIFICANT CHANGE UP (ref 96–108)
CO2 SERPL-SCNC: 23 MMOL/L — SIGNIFICANT CHANGE UP (ref 22–29)
CREAT SERPL-MCNC: 0.72 MG/DL — SIGNIFICANT CHANGE UP (ref 0.5–1.3)
EGFR: 101 ML/MIN/1.73M2 — SIGNIFICANT CHANGE UP
GLUCOSE SERPL-MCNC: 122 MG/DL — HIGH (ref 70–99)
HCT VFR BLD CALC: 44.4 % — SIGNIFICANT CHANGE UP (ref 39–50)
HGB BLD-MCNC: 14.8 G/DL — SIGNIFICANT CHANGE UP (ref 13–17)
MAGNESIUM SERPL-MCNC: 2.1 MG/DL — SIGNIFICANT CHANGE UP (ref 1.6–2.6)
MCHC RBC-ENTMCNC: 27.1 PG — SIGNIFICANT CHANGE UP (ref 27–34)
MCHC RBC-ENTMCNC: 33.3 GM/DL — SIGNIFICANT CHANGE UP (ref 32–36)
MCV RBC AUTO: 81.3 FL — SIGNIFICANT CHANGE UP (ref 80–100)
PHOSPHATE SERPL-MCNC: 2.7 MG/DL — SIGNIFICANT CHANGE UP (ref 2.4–4.7)
PLATELET # BLD AUTO: 258 K/UL — SIGNIFICANT CHANGE UP (ref 150–400)
POTASSIUM SERPL-MCNC: 4.1 MMOL/L — SIGNIFICANT CHANGE UP (ref 3.5–5.3)
POTASSIUM SERPL-SCNC: 4.1 MMOL/L — SIGNIFICANT CHANGE UP (ref 3.5–5.3)
RBC # BLD: 5.46 M/UL — SIGNIFICANT CHANGE UP (ref 4.2–5.8)
RBC # FLD: 13.5 % — SIGNIFICANT CHANGE UP (ref 10.3–14.5)
SODIUM SERPL-SCNC: 136 MMOL/L — SIGNIFICANT CHANGE UP (ref 135–145)
WBC # BLD: 11.75 K/UL — HIGH (ref 3.8–10.5)
WBC # FLD AUTO: 11.75 K/UL — HIGH (ref 3.8–10.5)

## 2024-03-14 PROCEDURE — 70450 CT HEAD/BRAIN W/O DYE: CPT | Mod: 26,59

## 2024-03-14 PROCEDURE — 99291 CRITICAL CARE FIRST HOUR: CPT

## 2024-03-14 RX ORDER — SODIUM CHLORIDE 9 MG/ML
1000 INJECTION INTRAMUSCULAR; INTRAVENOUS; SUBCUTANEOUS
Refills: 0 | Status: DISCONTINUED | OUTPATIENT
Start: 2024-03-14 | End: 2024-03-14

## 2024-03-14 RX ADMIN — CHLORHEXIDINE GLUCONATE 1 APPLICATION(S): 213 SOLUTION TOPICAL at 11:25

## 2024-03-14 RX ADMIN — Medication 1000 MILLIGRAM(S): at 01:59

## 2024-03-14 RX ADMIN — Medication 1000 MILLIGRAM(S): at 18:16

## 2024-03-14 RX ADMIN — Medication 63.75 MILLIMOLE(S): at 06:47

## 2024-03-14 RX ADMIN — LEVETIRACETAM 500 MILLIGRAM(S): 250 TABLET, FILM COATED ORAL at 18:15

## 2024-03-14 RX ADMIN — LEVETIRACETAM 500 MILLIGRAM(S): 250 TABLET, FILM COATED ORAL at 05:08

## 2024-03-14 RX ADMIN — POLYETHYLENE GLYCOL 3350 17 GRAM(S): 17 POWDER, FOR SOLUTION ORAL at 05:08

## 2024-03-14 RX ADMIN — Medication 1000 MILLIGRAM(S): at 10:31

## 2024-03-14 RX ADMIN — SODIUM CHLORIDE 75 MILLILITER(S): 9 INJECTION INTRAMUSCULAR; INTRAVENOUS; SUBCUTANEOUS at 11:25

## 2024-03-14 NOTE — PROGRESS NOTE ADULT - CRITICAL CARE ATTENDING COMMENT
I spent 55 minutes of critical care time examining patient, reviewing vitals, labs, medications, imaging and discussing with the team goals of care to prevent life-threatening in this patient who is at high risk for deterioration or death due to:  SDH

## 2024-03-14 NOTE — PROGRESS NOTE ADULT - ASSESSMENT
ASSESSMENT/PLAN: 63 yo M with mixed BL SDH with mass effect. S/p BL angelina hole for SDH evac 3/12, SD drains in place; pneumacephalus.  H/o recent falls (?syncopal events), unclear etiology, w/up negative so  far.  1st degree AV block.      NEURO:   neurochecks  keeping flat for 24 hrs, suppl O2  plan for CTh in am  Keppra for sz ppx, cont  pain mgt: tylenol and oxy 5 prn  Activity: [x] mobilize as tolerated [] Bedrest [x] PT [x] OT [] PMNR    PULM: room air   SpO2>92%  incentive spirometry   OOB    CV:  -160; PRN meds    RENAL: monitor I/O  replete lytes prn  voiding  Fluids: IVF while NPO    GI:  NPO while flat, IV fluids for now; dysphagia screen in am  GI prophylaxis [x] not indicated   zofran prn for nausea  Bowel regimen [x] senna [] other: miralax    ENDO:   Goal euglycemia (-180)    HEME/ONC:  VTE prophylaxis: [] SCDs [x] chemoprophylaxis held 24hrs post-op    ID: afebrile   no leukocytosis    MISC:    I affirm that this patient is critically ill and at high risk for sudden, fatal deterioration due to one or more of the above stated active issues.     This time does not include bedside procedures that are documented separately.               ASSESSMENT/PLAN: 63 yo M with mixed BL SDH with mass effect. S/p BL angelina hole for SDH evac 3/12, SD drains in place; pneumacephalus.  H/o recent falls (?syncopal events), unclear etiology, w/up negative so  far.  1st degree AV block.      NEURO: POD 2  neurochecks q2h  keeping flat for 24 hrs, suppl O2  plan for CTh in am  Keppra for sz ppx, cont  drains per neurosurgery  pain mgt: tylenol and oxy 5 prn  Activity: [x] mobilize as tolerated [] Bedrest [x] PT [x] OT [] PMNR    PULM: NRB for pneumocephalus  SpO2>92%  incentive spirometry   OOB    CV:  -160; PRN meds    RENAL: monitor I/O  replete lytes prn  voiding  Fluids: IVF while NPO    GI:  IV fluids for now; dysphagia screen in am  GI prophylaxis [x] not indicated   zofran prn for nausea  Bowel regimen [x] senna [] other: miralax  LBM 3/14    ENDO:   Goal euglycemia (-180)    HEME/ONC:  VTE prophylaxis: [] SCDs [x] chemoprophylaxis held 24hrs post-op    ID: afebrile   no leukocytosis  abx while drains in place    MISC:    I affirm that this patient is critically ill and at high risk for sudden, fatal deterioration due to one or more of the above stated active issues.     This time does not include bedside procedures that are documented separately.

## 2024-03-14 NOTE — PROGRESS NOTE ADULT - SUBJECTIVE AND OBJECTIVE BOX
Chief complaint:   Patient is a 64y old  Male who presents with a chief complaint of b/l SDH (14 Mar 2024 05:32)    HPI:  63 yo male with no PMHx transferred from [place] s/p fall in the afternoon found to have B/L SDH in the frontal-parietal area.     A: intact  B: B/L breathe sounds, clear to auscultation   C: femoral pulses palpable B/L, BP: 142/70  D: GCS: 15  (08 Mar 2024 22:36)        24hr EVENTS:      ROS: [ ]  Unable to assess due to mental status   All other systems negative    -----------------------------------------------------------------------------------------------------------------------------------------------------------------------------------  ICU Vital Signs Last 24 Hrs  T(C): 36.9 (14 Mar 2024 07:54), Max: 36.9 (14 Mar 2024 07:54)  T(F): 98.4 (14 Mar 2024 07:54), Max: 98.4 (14 Mar 2024 07:54)  HR: 64 (14 Mar 2024 08:00) (64 - 101)  BP: 130/70 (14 Mar 2024 08:00) (128/72 - 152/91)  BP(mean): 89 (14 Mar 2024 08:00) (86 - 110)  ABP: --  ABP(mean): --  RR: 12 (14 Mar 2024 08:00) (12 - 27)  SpO2: 100% (14 Mar 2024 08:00) (100% - 100%)    O2 Parameters below as of 14 Mar 2024 08:00  Patient On (Oxygen Delivery Method): mask, nonrebreather            I&O's Summary    13 Mar 2024 07:01  -  14 Mar 2024 07:00  --------------------------------------------------------  IN: 1373.3 mL / OUT: 3724 mL / NET: -2350.7 mL    14 Mar 2024 07:01  -  14 Mar 2024 09:11  --------------------------------------------------------  IN: 125 mL / OUT: 103 mL / NET: 22 mL        MEDICATIONS  (STANDING):  ceFAZolin  Injectable. 1000 milliGRAM(s) IV Push every 8 hours  chlorhexidine 2% Cloths 1 Application(s) Topical daily  enoxaparin Injectable 40 milliGRAM(s) SubCutaneous every 24 hours  levETIRAcetam   Injectable 500 milliGRAM(s) IV Push every 12 hours  polyethylene glycol 3350 17 Gram(s) Oral every 12 hours  senna 2 Tablet(s) Oral at bedtime      RESPIRATORY:        IMAGING:   Recent imaging studies were reviewed.    LAB RESULTS:                          14.8   11.75 )-----------( 258      ( 14 Mar 2024 03:30 )             44.4           03-14    136  |  103  |  10.4  ----------------------------<  122<H>  4.1   |  23.0  |  0.72    Ca    9.1      14 Mar 2024 03:30  Phos  2.7     03-14  Mg     2.1     03-14                  -----------------------------------------------------------------------------------------------------------------------------------------------------------------------------------    PHYSICAL EXAM:  General: Calm  HEENT: MMM  Neuro:  -Mental status- No acute distress  -CN- PERRL 3mm, EOMI, tongue midline, face symmetric    CV: RRR  Pulm: clear to auscultation  Abd: Soft, nontender, nondistended  Ext: no noted edema in lower ext  Skin: warm, dry       Chief complaint:   Patient is a 64y old  Male who presents with a chief complaint of b/l SDH (14 Mar 2024 05:32)    HPI:  63 yo male with no PMHx transferred from [place] s/p fall in the afternoon found to have B/L SDH in the frontal-parietal area.     A: intact  B: B/L breathe sounds, clear to auscultation   C: femoral pulses palpable B/L, BP: 142/70  D: GCS: 15  (08 Mar 2024 22:36)    24hr EVENTS: no acute issues  POD 2    ROS: no acute issues   All other systems negative    -----------------------------------------------------------------------------------------------------------------------------------------------------------------------------------  ICU Vital Signs Last 24 Hrs  T(C): 36.9 (14 Mar 2024 07:54), Max: 36.9 (14 Mar 2024 07:54)  T(F): 98.4 (14 Mar 2024 07:54), Max: 98.4 (14 Mar 2024 07:54)  HR: 64 (14 Mar 2024 08:00) (64 - 101)  BP: 130/70 (14 Mar 2024 08:00) (128/72 - 152/91)  BP(mean): 89 (14 Mar 2024 08:00) (86 - 110)  ABP: --  ABP(mean): --  RR: 12 (14 Mar 2024 08:00) (12 - 27)  SpO2: 100% (14 Mar 2024 08:00) (100% - 100%)    O2 Parameters below as of 14 Mar 2024 08:00  Patient On (Oxygen Delivery Method): mask, nonrebreather      I&O's Summary    13 Mar 2024 07:01  -  14 Mar 2024 07:00  --------------------------------------------------------  IN: 1373.3 mL / OUT: 3724 mL / NET: -2350.7 mL    14 Mar 2024 07:01  -  14 Mar 2024 09:11  --------------------------------------------------------  IN: 125 mL / OUT: 103 mL / NET: 22 mL        MEDICATIONS  (STANDING):  ceFAZolin  Injectable. 1000 milliGRAM(s) IV Push every 8 hours  chlorhexidine 2% Cloths 1 Application(s) Topical daily  enoxaparin Injectable 40 milliGRAM(s) SubCutaneous every 24 hours  levETIRAcetam   Injectable 500 milliGRAM(s) IV Push every 12 hours  polyethylene glycol 3350 17 Gram(s) Oral every 12 hours  senna 2 Tablet(s) Oral at bedtime      IMAGING:   Recent imaging studies were reviewed.    LAB RESULTS:                          14.8   11.75 )-----------( 258      ( 14 Mar 2024 03:30 )             44.4       03-14    136  |  103  |  10.4  ----------------------------<  122<H>  4.1   |  23.0  |  0.72    Ca    9.1      14 Mar 2024 03:30  Phos  2.7     03-14  Mg     2.1     03-14      -----------------------------------------------------------------------------------------------------------------------------------------------------------------------------------    PHYSICAL EXAM:  General: Calm  HEENT: MMM  Neuro:  -Mental status- No acute distress, AOx1-2  -CN- PERRL 3mm, EOMI, tongue midline, face symmetric  RUE drift  LUE 5/5  RLE 5/5  LLE 5/5    CV: RRR  Pulm: clear to auscultation  Abd: Soft, nontender, nondistended  Ext: no noted edema in lower ext  Skin: warm, dry

## 2024-03-14 NOTE — PROGRESS NOTE ADULT - SUBJECTIVE AND OBJECTIVE BOX
HPI:  65 yo male with no PMHx transferred from [place] s/p [facility] in the afternoon found to have B/L SDH in the frontal-parietal area.     A: intact  B: B/L breathe sounds, clear to auscultation   C: femoral pulses palpable B/L, BP: 142/70  D: GCS: 15  (08 Mar 2024 22:36)      INTERVAL HPI/OVERNIGHT EVENTS:  QUE     Vital Signs Last 24 Hrs  T(C): 36.5 (14 Mar 2024 04:00), Max: 36.8 (13 Mar 2024 23:59)  T(F): 97.7 (14 Mar 2024 04:00), Max: 98.3 (13 Mar 2024 23:59)  HR: 74 (14 Mar 2024 05:00) (65 - 105)  BP: 143/83 (14 Mar 2024 05:00) (98/86 - 152/91)  BP(mean): 100 (14 Mar 2024 05:00) (72 - 110)  RR: 14 (14 Mar 2024 05:00) (12 - 27)  SpO2: 100% (14 Mar 2024 05:00) (100% - 100%)    Parameters below as of 14 Mar 2024 04:00  Patient On (Oxygen Delivery Method): mask, nonrebreather  O2 Flow (L/min): 15      PHYSICAL EXAM:  Exam: NAD, cooperative.  MENTAL STATUS: AAO x 3, lethargic tonight, conversant, following simple commands, comprehension seems intact.  CRANIAL NERVES: PERRL, EOMI without nystagmus. Face symmetric.  MOTOR: strength 5/5 b/l upper and lower extremities; effort dependent due to fatigue   SENSATION: grossly intact to light touch all extremities   CV: S1S2 present  Respiratory: chest rise and fall equally and bilaterally   Abd soft, NT, ND  No peripheral swelling.      LABS:                        14.8   11.75 )-----------( 258      ( 14 Mar 2024 03:30 )             44.4     03-14    136  |  103  |  10.4  ----------------------------<  122<H>  4.1   |  23.0  |  0.72    Ca    9.1      14 Mar 2024 03:30  Phos  2.7     03-14  Mg     2.1     03-14        Urinalysis Basic - ( 14 Mar 2024 03:30 )    Color: x / Appearance: x / SG: x / pH: x  Gluc: 122 mg/dL / Ketone: x  / Bili: x / Urobili: x   Blood: x / Protein: x / Nitrite: x   Leuk Esterase: x / RBC: x / WBC x   Sq Epi: x / Non Sq Epi: x / Bacteria: x        03-12 @ 07:01 - 03-13 @ 07:00  --------------------------------------------------------  IN: 3016.5 mL / OUT: 2538 mL / NET: 478.5 mL    03-13 @ 07:01 - 03-14 @ 05:33  --------------------------------------------------------  IN: 1248.3 mL / OUT: 3629 mL / NET: -2380.7 mL        RADIOLOGY & ADDITIONAL TESTS:  3/14 CTH pending    ASSESSMENT AND PLAN:   ASSESSMENT: 65 yo M with mixed BL SDH with mass effect. S/p BL angelina hole for SDH evac 3/12, SD drains in place; pneumacephalus.  H/o recent falls (?syncopal events), unclear etiology, w/up negative so  far.  1st degree AV block.    NEURO:  Neuro/Vital checks q 2  CTH pending   Right SD HMV putting out; Left SD hemovac no drainage   EEG negative 3/10  Keppra 500 BID for AED prophylaxsis  Pain control- flexeril for chronic back pain, Tylenol prn    PULM: RA  NRB 10L for pneumocephalus     CV:  SBP goal:     RENAL:  No active issues  [place]     GI:  Diet: NPO  GI prophylaxis [X] not indicated   Bowel regimen  [X] senna [] miralax    ENDO:   -no active issues      HEME/ONC:  VTE prophylaxis: [X] SCDs     ID: Afebrile  -no active issues      CODE STATUS:  [X] Full Code    DISPOSITION:  [X] NSICU

## 2024-03-15 LAB
ANION GAP SERPL CALC-SCNC: 13 MMOL/L — SIGNIFICANT CHANGE UP (ref 5–17)
BUN SERPL-MCNC: 14.2 MG/DL — SIGNIFICANT CHANGE UP (ref 8–20)
CALCIUM SERPL-MCNC: 9.1 MG/DL — SIGNIFICANT CHANGE UP (ref 8.4–10.5)
CHLORIDE SERPL-SCNC: 104 MMOL/L — SIGNIFICANT CHANGE UP (ref 96–108)
CO2 SERPL-SCNC: 21 MMOL/L — LOW (ref 22–29)
CREAT SERPL-MCNC: 0.65 MG/DL — SIGNIFICANT CHANGE UP (ref 0.5–1.3)
EGFR: 104 ML/MIN/1.73M2 — SIGNIFICANT CHANGE UP
GLUCOSE SERPL-MCNC: 113 MG/DL — HIGH (ref 70–99)
HCT VFR BLD CALC: 42.1 % — SIGNIFICANT CHANGE UP (ref 39–50)
HGB BLD-MCNC: 13.8 G/DL — SIGNIFICANT CHANGE UP (ref 13–17)
MAGNESIUM SERPL-MCNC: 2 MG/DL — SIGNIFICANT CHANGE UP (ref 1.6–2.6)
MCHC RBC-ENTMCNC: 26.7 PG — LOW (ref 27–34)
MCHC RBC-ENTMCNC: 32.8 GM/DL — SIGNIFICANT CHANGE UP (ref 32–36)
MCV RBC AUTO: 81.4 FL — SIGNIFICANT CHANGE UP (ref 80–100)
PHOSPHATE SERPL-MCNC: 2.3 MG/DL — LOW (ref 2.4–4.7)
PLATELET # BLD AUTO: 250 K/UL — SIGNIFICANT CHANGE UP (ref 150–400)
POTASSIUM SERPL-MCNC: 4.1 MMOL/L — SIGNIFICANT CHANGE UP (ref 3.5–5.3)
POTASSIUM SERPL-SCNC: 4.1 MMOL/L — SIGNIFICANT CHANGE UP (ref 3.5–5.3)
RBC # BLD: 5.17 M/UL — SIGNIFICANT CHANGE UP (ref 4.2–5.8)
RBC # FLD: 13.6 % — SIGNIFICANT CHANGE UP (ref 10.3–14.5)
SODIUM SERPL-SCNC: 137 MMOL/L — SIGNIFICANT CHANGE UP (ref 135–145)
WBC # BLD: 10.96 K/UL — HIGH (ref 3.8–10.5)
WBC # FLD AUTO: 10.96 K/UL — HIGH (ref 3.8–10.5)

## 2024-03-15 PROCEDURE — 70450 CT HEAD/BRAIN W/O DYE: CPT | Mod: 26

## 2024-03-15 PROCEDURE — 99223 1ST HOSP IP/OBS HIGH 75: CPT

## 2024-03-15 PROCEDURE — 99233 SBSQ HOSP IP/OBS HIGH 50: CPT

## 2024-03-15 PROCEDURE — 70450 CT HEAD/BRAIN W/O DYE: CPT | Mod: 26,77,59

## 2024-03-15 RX ORDER — ACETAMINOPHEN 500 MG
650 TABLET ORAL EVERY 6 HOURS
Refills: 0 | Status: DISCONTINUED | OUTPATIENT
Start: 2024-03-15 | End: 2024-03-21

## 2024-03-15 RX ORDER — SODIUM,POTASSIUM PHOSPHATES 278-250MG
1 POWDER IN PACKET (EA) ORAL ONCE
Refills: 0 | Status: COMPLETED | OUTPATIENT
Start: 2024-03-15 | End: 2024-03-15

## 2024-03-15 RX ORDER — ACETAMINOPHEN 500 MG
1000 TABLET ORAL ONCE
Refills: 0 | Status: COMPLETED | OUTPATIENT
Start: 2024-03-15 | End: 2024-03-15

## 2024-03-15 RX ORDER — LEVETIRACETAM 250 MG/1
500 TABLET, FILM COATED ORAL EVERY 12 HOURS
Refills: 0 | Status: DISCONTINUED | OUTPATIENT
Start: 2024-03-15 | End: 2024-03-21

## 2024-03-15 RX ORDER — ENOXAPARIN SODIUM 100 MG/ML
40 INJECTION SUBCUTANEOUS EVERY 24 HOURS
Refills: 0 | Status: DISCONTINUED | OUTPATIENT
Start: 2024-03-15 | End: 2024-03-18

## 2024-03-15 RX ADMIN — LEVETIRACETAM 500 MILLIGRAM(S): 250 TABLET, FILM COATED ORAL at 17:18

## 2024-03-15 RX ADMIN — Medication 400 MILLIGRAM(S): at 05:45

## 2024-03-15 RX ADMIN — LEVETIRACETAM 500 MILLIGRAM(S): 250 TABLET, FILM COATED ORAL at 05:46

## 2024-03-15 RX ADMIN — Medication 650 MILLIGRAM(S): at 16:23

## 2024-03-15 RX ADMIN — CHLORHEXIDINE GLUCONATE 1 APPLICATION(S): 213 SOLUTION TOPICAL at 11:06

## 2024-03-15 RX ADMIN — Medication 1 PACKET(S): at 05:45

## 2024-03-15 RX ADMIN — Medication 1000 MILLIGRAM(S): at 01:57

## 2024-03-15 RX ADMIN — Medication 1000 MILLIGRAM(S): at 06:34

## 2024-03-15 RX ADMIN — ENOXAPARIN SODIUM 40 MILLIGRAM(S): 100 INJECTION SUBCUTANEOUS at 22:30

## 2024-03-15 RX ADMIN — Medication 650 MILLIGRAM(S): at 17:00

## 2024-03-15 NOTE — PROGRESS NOTE ADULT - ASSESSMENT
ASSESSMENT/PLAN: 65 yo M with mixed BL SDH with mass effect. S/p BL angelina hole for SDH evac 3/12, SD drains in place; pneumacephalus.  H/o recent falls (?syncopal events), unclear etiology, w/up negative so  far.  1st degree AV block.      NEURO: POD 3  neurochecks q4h  Keppra for sz ppx, cont  dc drains per neurosurgery  pain mgt: tylenol and oxy 5 prn  Activity: [x] mobilize as tolerated [] Bedrest [x] PT [x] OT [] PMNR    PULM: room air  SpO2>92%  incentive spirometry   OOB    CV:  -160; PRN meds    RENAL: monitor I/O  replete lytes prn  voiding  Fluids: IVF while NPO    GI:  IV fluids for now; dysphagia screen in am  GI prophylaxis [x] not indicated   zofran prn for nausea  Bowel regimen [x] senna [] other: miralax  LBM 3/14    ENDO:   Goal euglycemia (-180)    HEME/ONC:  VTE prophylaxis: [] SCDs [x] chemoprophylaxis     ID: afebrile   no leukocytosis  abx while drains in place    MISC:             ASSESSMENT/PLAN: 63 yo M with mixed BL SDH with mass effect. S/p BL angelina hole for SDH evac 3/12, SD drains in place; pneumacephalus.  H/o recent falls (?syncopal events), unclear etiology, w/up negative so  far.  1st degree AV block.      NEURO: POD 3  neurochecks q4h  Keppra for sz ppx, cont  dc drains per neurosurgery  CT after drain removal  pain mgt: tylenol and oxy 5 prn  possible coccyx injury  Activity: [x] mobilize as tolerated [] Bedrest [x] PT [x] OT [] PMNR    PULM: room air  SpO2>92%  incentive spirometry   OOB    CV:  -160; PRN meds    RENAL: monitor I/O  replete lytes prn  trial of void  Fluids: IVF while NPO    GI:  adv diet as tolerated  GI prophylaxis [x] not indicated   zofran prn for nausea  Bowel regimen [x] senna [] other: miralax  LBM 3/14    ENDO:   Goal euglycemia (-180)    HEME/ONC:  VTE prophylaxis: [] SCDs [x] chemoprophylaxis     ID: afebrile   no leukocytosis               ASSESSMENT/PLAN: 65 yo M with mixed BL SDH with mass effect. S/p BL angelina hole for SDH evac 3/12, SD drains in place; pneumacephalus.  H/o recent falls (?syncopal events), unclear etiology, w/up negative so  far.  1st degree AV block.      NEURO: POD 3  neurochecks q4h  Keppra for sz ppx, cont  dc drains per neurosurgery  plan for MMA embo  CT after drain removal  pain mgt: tylenol and oxy 5 prn  possible coccyx injury  Activity: [x] mobilize as tolerated [] Bedrest [x] PT [x] OT [] PMNR    PULM: room air  SpO2>92%  incentive spirometry   OOB    CV:  -160; PRN meds    RENAL: monitor I/O  replete lytes prn  trial of void  Fluids: IVF while NPO    GI:  adv diet as tolerated  GI prophylaxis [x] not indicated   zofran prn for nausea  Bowel regimen [x] senna [] other: miralax  LBM 3/14    ENDO:   Goal euglycemia (-180)    HEME/ONC:  VTE prophylaxis: [] SCDs [x] chemoprophylaxis     ID: afebrile   no leukocytosis

## 2024-03-15 NOTE — PHYSICAL THERAPY INITIAL EVALUATION ADULT - IMPAIRED TRANSFERS: BED/CHAIR, REHAB EVAL
poor motor planning with RLE, inattention to right side noted, verbal cues needed for safety/impaired balance/cognition/impaired postural control/decreased strength
impaired balance/impaired coordination/impaired postural control/decreased strength
Opt out

## 2024-03-15 NOTE — PROGRESS NOTE ADULT - SUBJECTIVE AND OBJECTIVE BOX
Chief complaint:   Patient is a 64y old  Male who presents with a chief complaint of B/l acute on chronic SDH (15 Mar 2024 01:05)    HPI:  65 yo male with no PMHx transferred from [place] s/p fall in the afternoon found to have B/L SDH in the frontal-parietal area.     A: intact  B: B/L breathe sounds, clear to auscultation   C: femoral pulses palpable B/L, BP: 142/70  D: GCS: 15  (08 Mar 2024 22:36)        24hr EVENTS:      ROS: [ ]  Unable to assess due to mental status   All other systems negative    -----------------------------------------------------------------------------------------------------------------------------------------------------------------------------------  ICU Vital Signs Last 24 Hrs  T(C): 37 (15 Mar 2024 08:07), Max: 37 (15 Mar 2024 08:07)  T(F): 98.6 (15 Mar 2024 08:07), Max: 98.6 (15 Mar 2024 08:07)  HR: 69 (15 Mar 2024 08:00) (57 - 96)  BP: 127/68 (15 Mar 2024 08:00) (99/53 - 136/74)  BP(mean): 87 (15 Mar 2024 08:00) (60 - 108)  ABP: --  ABP(mean): --  RR: 16 (15 Mar 2024 08:00) (12 - 21)  SpO2: 97% (15 Mar 2024 08:00) (93% - 100%)    O2 Parameters below as of 15 Mar 2024 08:00  Patient On (Oxygen Delivery Method): room air            I&O's Summary    14 Mar 2024 07:01  -  15 Mar 2024 07:00  --------------------------------------------------------  IN: 1746 mL / OUT: 1164 mL / NET: 582 mL    15 Mar 2024 07:01  -  15 Mar 2024 09:17  --------------------------------------------------------  IN: 0 mL / OUT: 7 mL / NET: -7 mL        MEDICATIONS  (STANDING):  chlorhexidine 2% Cloths 1 Application(s) Topical daily  levETIRAcetam   Injectable 500 milliGRAM(s) IV Push every 12 hours      RESPIRATORY:        IMAGING:   Recent imaging studies were reviewed.    LAB RESULTS:                          13.8   10.96 )-----------( 250      ( 15 Mar 2024 04:00 )             42.1           03-15    137  |  104  |  14.2  ----------------------------<  113<H>  4.1   |  21.0<L>  |  0.65    Ca    9.1      15 Mar 2024 04:00  Phos  2.3     03-15  Mg     2.0     03-15                  -----------------------------------------------------------------------------------------------------------------------------------------------------------------------------------    PHYSICAL EXAM:  General: Calm, laying in bed  HEENT: MMM  Neuro:  -Mental status- No acute distress, AOx3, conversational, following commands  -CN- PERRL 3mm, EOMI, tongue midline, face symmetric  -Motor- full strength in all ext  -Sensation- intact to LT   -Coordination- no dysmetria noted    CV:   Pulm: Clear to auscultation  Abd: Soft, nontender, nondistended  Ext: No edema  Skin: warm, dry       Chief complaint:   Patient is a 64y old  Male who presents with a chief complaint of B/l acute on chronic SDH (15 Mar 2024 01:05)    HPI:  65 yo male with no PMHx transferred from [place] s/p fall in the afternoon found to have B/L SDH in the frontal-parietal area.     A: intact  B: B/L breathe sounds, clear to auscultation   C: femoral pulses palpable B/L, BP: 142/70  D: GCS: 15  (08 Mar 2024 22:36)    24hr EVENTS:  no acute issues    ROS: no complaints  All other systems negative    -----------------------------------------------------------------------------------------------------------------------------------------------------------------------------------  ICU Vital Signs Last 24 Hrs  T(C): 37 (15 Mar 2024 08:07), Max: 37 (15 Mar 2024 08:07)  T(F): 98.6 (15 Mar 2024 08:07), Max: 98.6 (15 Mar 2024 08:07)  HR: 69 (15 Mar 2024 08:00) (57 - 96)  BP: 127/68 (15 Mar 2024 08:00) (99/53 - 136/74)  BP(mean): 87 (15 Mar 2024 08:00) (60 - 108)  ABP: --  ABP(mean): --  RR: 16 (15 Mar 2024 08:00) (12 - 21)  SpO2: 97% (15 Mar 2024 08:00) (93% - 100%)    O2 Parameters below as of 15 Mar 2024 08:00  Patient On (Oxygen Delivery Method): room air            I&O's Summary    14 Mar 2024 07:01  -  15 Mar 2024 07:00  --------------------------------------------------------  IN: 1746 mL / OUT: 1164 mL / NET: 582 mL    15 Mar 2024 07:01  -  15 Mar 2024 09:17  --------------------------------------------------------  IN: 0 mL / OUT: 7 mL / NET: -7 mL        MEDICATIONS  (STANDING):  chlorhexidine 2% Cloths 1 Application(s) Topical daily  levETIRAcetam   Injectable 500 milliGRAM(s) IV Push every 12 hours      IMAGING:   Recent imaging studies were reviewed.    LAB RESULTS:                          13.8   10.96 )-----------( 250      ( 15 Mar 2024 04:00 )             42.1       03-15    137  |  104  |  14.2  ----------------------------<  113<H>  4.1   |  21.0<L>  |  0.65    Ca    9.1      15 Mar 2024 04:00  Phos  2.3     03-15  Mg     2.0     03-15    -----------------------------------------------------------------------------------------------------------------------------------------------------------------------------------    PHYSICAL EXAM:  General: Calm, laying in bed  HEENT: MMM  Neuro:  -Mental status- No acute distress, AOx2, conversational, following commands  -CN- PERRL 3mm, EOMI, tongue midline, face symmetric  -Motor- full strength in all ext  -Sensation- intact to LT   -Coordination- no dysmetria noted    CV: RRR  Pulm: Clear to auscultation  Abd: Soft, nontender, nondistended  Ext: No edema  Skin: warm, dry

## 2024-03-15 NOTE — CHART NOTE - NSCHARTNOTEFT_GEN_A_CORE
INTERVAL HPI/HOSPITAL COURSE/OVERNIGHT EVENTS:  64y Male no PMH other than frequent falls recently, worked up at [place] without significant findings, s/p unwitnessed fall with headstrike/loss of consciousness, found with bilateral mixed density subdural hematomas, transferred to [facility] for tertiary care  3/8: Transferred to [facility] from [place], admitted to SICU. CTA head negative. CT C spine without fracture  3/9: Tenderness to sacrum on examination by SICU. CT L spine/MR lumbar spine obtained, found with disc bulges, coccygeal edema. MR cervical and thoracic spine also obtained to rule out cerebrospinal fluid leak, no evidence of leak, only mild narrowing of R C5-6 neural foramen due to uncovertebral spurring. Medicine consulted for medical optimization for surgical intervention.  3/10: Transferred to NSICU  3/11: EEG without seizures  3/12: OR for bilateral craniotomies for subdural evacuation. Estimated blood loss 100 mL. Postoperative CTH with pneumocephalus. Positioned flat  3/13-3/14:: Head of bed elevated  3/14:     Vital Signs Last 24 Hrs  T(C): 37 (15 Mar 2024 08:07), Max: 37 (15 Mar 2024 08:07)  T(F): 98.6 (15 Mar 2024 08:07), Max: 98.6 (15 Mar 2024 08:07)  HR: 79 (15 Mar 2024 11:00) (57 - 96)  BP: 126/71 (15 Mar 2024 11:00) (99/53 - 134/69)  BP(mean): 84 (15 Mar 2024 11:00) (60 - 108)  RR: 12 (15 Mar 2024 11:00) (12 - 21)  SpO2: 99% (15 Mar 2024 11:00) (93% - 100%)    Parameters below as of 15 Mar 2024 08:00  Patient On (Oxygen Delivery Method): room air        PHYSICAL EXAM:  GENERAL: NAD, well-groomed  HEAD:  Atraumatic, normocephalic  DRAINS:   WOUND: Dressing clean dry intact  VIOLETA COMA SCORE: E- V- M- =       E: 4= opens eyes spontaneously 3= to voice 2= to noxious 1= no opening       V: 5= oriented 4= confused 3= inappropriate words 2= incomprehensible sounds 1= nonverbal 1T= intubated       M: 6= follows commands 5= localizes 4= withdraws 3= flexor posturing 2= extensor posturing 1= no movement  MENTAL STATUS: AAO x3; Awake/Comatose; Opens eyes spontaneously/to voice/to light touch/to noxious stimuli; Appropriately conversant without aphasia/Nonverbal; following simple commands/mimicking/not following commands  CRANIAL NERVES: Visual acuity normal for age, visual fields full to confrontation, PERRL. EOMI without nystagmus. Facial sensation intact V1-3 distribution b/l. Face symmetric w/ normal eye closure and smile, tongue midline. Hearing grossly intact. Speech clear. Head turning and shoulder shrug intact.   REFLEXES: PERRL. Corneals intact b/l. Gag intact. Cough intact. Oculocephalic reflex intact (Doll's eye). Negative Charlton's b/l. Negative clonus b/l  MOTOR: strength 5/5 b/l upper and lower extremities  Uppers     Delt (C5/6)     Bicep (C5/6)     Wrist Extend (C6)     Tricep (C7)     HG (C8/T1)  R                     5/5                 5/5                         5/5                           5/5                   5/5  L                      5/5                 5/5                         5/5                           5/5                   5/5  Lowers      HF(L1/L2)     KE (L3)     DF (L4)     EHL (L5)     PF (S1)      R                     5/5              5/5           5/5           5/5            5/5  L                     5/5               5/5          5/5            5/5            5/5  SENSATION: grossly intact to light touch all extremities  COORDINATION: Gait intact; rapid alternating movements intact; heel to shin intact; no upper extremity dysmetria  CHEST/LUNG: Clear to auscultation bilaterally; no rales, rhonchi, wheezing, or rubs  HEART: +S1/+S2; Regular rate and rhythm; no murmurs, rubs, or gallops  ABDOMEN: Soft, nontender, nondistended; bowel sounds present all four quadrants  EXTREMITIES:  2+ peripheral pulses, no clubbing, cyanosis, or edema  SKIN: Warm, dry; no rashes or lesions    LABS:                        13.8   10.96 )-----------( 250      ( 15 Mar 2024 04:00 )             42.1     03-15    137  |  104  |  14.2  ----------------------------<  113<H>  4.1   |  21.0<L>  |  0.65    Ca    9.1      15 Mar 2024 04:00  Phos  2.3     03-15  Mg     2.0     03-15        Urinalysis Basic - ( 15 Mar 2024 04:00 )    Color: x / Appearance: x / SG: x / pH: x  Gluc: 113 mg/dL / Ketone: x  / Bili: x / Urobili: x   Blood: x / Protein: x / Nitrite: x   Leuk Esterase: x / RBC: x / WBC x   Sq Epi: x / Non Sq Epi: x / Bacteria: x        03-14 @ 07:01  -  03-15 @ 07:00  --------------------------------------------------------  IN: 1746 mL / OUT: 1164 mL / NET: 582 mL    03-15 @ 07:01  -  03-15 @ 11:28  --------------------------------------------------------  IN: 240 mL / OUT: 17 mL / NET: 223 mL        RADIOLOGY & ADDITIONAL TESTS: INTERVAL HPI/HOSPITAL COURSE/OVERNIGHT EVENTS:  64y Male no PMH other than frequent falls recently, worked up at [place] without significant findings, s/p unwitnessed fall with headstrike/loss of consciousness, found with bilateral mixed density subdural hematomas, transferred to [facility] for tertiary care  3/8: Transferred to [facility] from [place], admitted to SICU. CTA head negative. CT C spine without fracture  3/9: Tenderness to sacrum on examination by SICU. CT L spine/MR lumbar spine obtained, found with disc bulges, coccygeal edema. MR cervical and thoracic spine also obtained to rule out cerebrospinal fluid leak, no evidence of leak, only mild narrowing of R C5-6 neural foramen due to uncovertebral spurring. Medicine consulted for medical optimization for surgical intervention.  3/10: Transferred to NSICU  3/11: EEG without seizures  3/12: OR for bilateral craniotomies for subdural evacuation. Estimated blood loss 100 mL. Postoperative CTH with pneumocephalus. Positioned flat  3/13-3/14: Head of bed elevated. Stable  3/15: Subdural drains removed. Post drain removal CT head stable. Stable for downgrade to telemetry    Vital Signs Last 24 Hrs  T(C): 37 (15 Mar 2024 08:07), Max: 37 (15 Mar 2024 08:07)  T(F): 98.6 (15 Mar 2024 08:07), Max: 98.6 (15 Mar 2024 08:07)  HR: 79 (15 Mar 2024 11:00) (57 - 96)  BP: 126/71 (15 Mar 2024 11:00) (99/53 - 134/69)  BP(mean): 84 (15 Mar 2024 11:00) (60 - 108)  RR: 12 (15 Mar 2024 11:00) (12 - 21)  SpO2: 99% (15 Mar 2024 11:00) (93% - 100%)    Parameters below as of 15 Mar 2024 08:00  Patient On (Oxygen Delivery Method): room air    PHYSICAL EXAM:  GENERAL: NAD, well-groomed  HEAD: s/p bilateral craniotomies. Incisions c/d/i. Drain sites closed with sutures in place from OR after removal without drainage  VIOLETA COMA SCORE: E- 4 V- 5 M- 6=15  MENTAL STATUS: AAO x3; Appropriately conversant without aphasia; following commands  CRANIAL NERVES: PERRL. EOMI without nystagmus. Facial sensation intact V1-3 distribution b/l. Face symmetric w/ normal eye closure and smile, tongue midline. Hearing grossly intact. Speech clear  MOTOR: strength 5/5 b/l upper and lower extremities; no drift  SENSATION: grossly intact to light touch all extremities  CHEST/LUNG: Nonlabored breathing  HEART: Regular rate  ABDOMEN: Soft, nontender, nondistended  EXTREMITIES: nontender; nonedematous    LABS:                        13.8   10.96 )-----------( 250      ( 15 Mar 2024 04:00 )             42.1     03-15    137  |  104  |  14.2  ----------------------------<  113<H>  4.1   |  21.0<L>  |  0.65    Ca    9.1      15 Mar 2024 04:00  Phos  2.3     03-15  Mg     2.0     03-15    Urinalysis Basic - ( 15 Mar 2024 04:00 )    Color: x / Appearance: x / SG: x / pH: x  Gluc: 113 mg/dL / Ketone: x  / Bili: x / Urobili: x   Blood: x / Protein: x / Nitrite: x   Leuk Esterase: x / RBC: x / WBC x   Sq Epi: x / Non Sq Epi: x / Bacteria: x    03-14 @ 07:01  -  03-15 @ 07:00  --------------------------------------------------------  IN: 1746 mL / OUT: 1164 mL / NET: 582 mL    03-15 @ 07:01  -  03-15 @ 11:28  --------------------------------------------------------  IN: 240 mL / OUT: 17 mL / NET: 223 mL    RADIOLOGY & ADDITIONAL TESTS:  CT Head No Cont (03.15.24 @ 10:14)  IMPRESSION:   Persistent unchanged BILATERAL subdural collections   following drain removal, LEFT measuring 1.5 cm and RIGHT measuring 6 mm.   Persistent pneumocephalus.   There is mild mass effect on the LEFT   hemisphere with subfalcine herniation to the LEFTunchanged. Mild   effacement of the posterior LEFT lateral ventricle.    CT Head No Cont (03.15.24 @ 01:46)  IMPRESSION: Is slightly decreased postoperative air after drainage of   bilateral frontal subdural hematomas. Bilateral subdural catheters   extending into low density extra-axial collections left greater than   right with mild midline shift to the right. Small acute parafalcine   subdural hematoma unchanged.    ASSESSMENT:  64y Male no PMH other than frequent falls recently, worked up at [employer] without significant findings, s/p unwitnessed fall with headstrike/loss of consciousness, found with bilateral mixed density subdural hematomas, transferred to [facility] for tertiary care, now s/p bilateral craniotomies for SDH evacuation 3/12 POD#3  - bilateral subdural drains d/lizz, post pull CTH stable  - exam stable- no deficits on current exam (intermittent RUE drift at times)    PLAN:  - D/w Dr. Peña-Magnus  - D/w Dr. Polanco    NEURO  - Q4 neuro checks, HOB 30 degrees  - Keppra 500 Q12 x 7 days for seizure ppx  - Pain control PRN with flexeril (reported chronic back pain)-- no back pain on exam today  - Repeat CTH Monday 3/18  - Tentatively planned for MMA embolization with neuroIR on Tuesday 3/19    CV:  - Normotensive BP goals   - At goal without need for PRN    RESPIRATORY  - Satting well on RA  - Incentive spirometer    GI  - Tolerating diet  - last BM 3/14 (multiple)- bowel regimen d/lizz      - TOV- nowak d/lizz this AM, awaiting void    HEMATOLOGY:  - Lovenox 40 for DVT ppx INTERVAL HPI/HOSPITAL COURSE/OVERNIGHT EVENTS:  64y Male no PMH other than frequent falls recently, worked up at [employer] without significant findings, presented to [facility] 3/8 s/p unwitnessed fall with headstrike/loss of consciousness, found with bilateral mixed density subdural hematomas, transferred to [facility] for tertiary care  3/8: Transferred to [facility] from [place], admitted to SICU. CTA head negative. CT C spine without fracture  3/9: Tenderness to sacrum on examination by SICU. CT L spine/MR lumbar spine obtained, found with disc bulges, coccygeal edema. MR cervical and thoracic spine also obtained to rule out cerebrospinal fluid leak, no evidence of leak, only mild narrowing of R C5-6 neural foramen due to uncovertebral spurring. Medicine consulted for medical optimization for surgical intervention.  3/10: Transferred to NSICU  3/11: EEG without seizures  3/12: OR for bilateral craniotomies for subdural evacuation. Estimated blood loss 100 mL. Postoperative CTH with pneumocephalus. Positioned flat  3/13-3/14: Head of bed elevated. Stable  3/15: Subdural drains removed. Post drain removal CT head stable. Stable for downgrade to telemetry    Vital Signs Last 24 Hrs  T(C): 37 (15 Mar 2024 08:07), Max: 37 (15 Mar 2024 08:07)  T(F): 98.6 (15 Mar 2024 08:07), Max: 98.6 (15 Mar 2024 08:07)  HR: 79 (15 Mar 2024 11:00) (57 - 96)  BP: 126/71 (15 Mar 2024 11:00) (99/53 - 134/69)  BP(mean): 84 (15 Mar 2024 11:00) (60 - 108)  RR: 12 (15 Mar 2024 11:00) (12 - 21)  SpO2: 99% (15 Mar 2024 11:00) (93% - 100%)    Parameters below as of 15 Mar 2024 08:00  Patient On (Oxygen Delivery Method): room air    PHYSICAL EXAM:  GENERAL: NAD, well-groomed  HEAD: s/p bilateral craniotomies. Incisions c/d/i. Drain sites closed with sutures in place from OR after removal without drainage  VIOLETA COMA SCORE: E- 4 V- 5 M- 6=15  MENTAL STATUS: AAO x3; Appropriately conversant without aphasia; following commands  CRANIAL NERVES: PERRL. EOMI without nystagmus. Facial sensation intact V1-3 distribution b/l. Face symmetric w/ normal eye closure and smile, tongue midline. Hearing grossly intact. Speech clear  MOTOR: strength 5/5 b/l upper and lower extremities; no drift  SENSATION: grossly intact to light touch all extremities  CHEST/LUNG: Nonlabored breathing  HEART: Regular rate  ABDOMEN: Soft, nontender, nondistended  EXTREMITIES: nontender; nonedematous    LABS:                        13.8   10.96 )-----------( 250      ( 15 Mar 2024 04:00 )             42.1     03-15    137  |  104  |  14.2  ----------------------------<  113<H>  4.1   |  21.0<L>  |  0.65    Ca    9.1      15 Mar 2024 04:00  Phos  2.3     03-15  Mg     2.0     03-15    Urinalysis Basic - ( 15 Mar 2024 04:00 )    Color: x / Appearance: x / SG: x / pH: x  Gluc: 113 mg/dL / Ketone: x  / Bili: x / Urobili: x   Blood: x / Protein: x / Nitrite: x   Leuk Esterase: x / RBC: x / WBC x   Sq Epi: x / Non Sq Epi: x / Bacteria: x    03-14 @ 07:01  -  03-15 @ 07:00  --------------------------------------------------------  IN: 1746 mL / OUT: 1164 mL / NET: 582 mL    03-15 @ 07:01  -  03-15 @ 11:28  --------------------------------------------------------  IN: 240 mL / OUT: 17 mL / NET: 223 mL    RADIOLOGY & ADDITIONAL TESTS:  CT Head No Cont (03.15.24 @ 10:14)  IMPRESSION:   Persistent unchanged BILATERAL subdural collections   following drain removal, LEFT measuring 1.5 cm and RIGHT measuring 6 mm.   Persistent pneumocephalus.   There is mild mass effect on the LEFT   hemisphere with subfalcine herniation to the LEFTunchanged. Mild   effacement of the posterior LEFT lateral ventricle.    CT Head No Cont (03.15.24 @ 01:46)  IMPRESSION: Is slightly decreased postoperative air after drainage of   bilateral frontal subdural hematomas. Bilateral subdural catheters   extending into low density extra-axial collections left greater than   right with mild midline shift to the right. Small acute parafalcine   subdural hematoma unchanged.    ASSESSMENT:  64y Male no PMH other than frequent falls recently, worked up at [employer] without significant findings, s/p unwitnessed fall with headstrike/loss of consciousness, found with bilateral mixed density subdural hematomas, transferred to [facility] for tertiary care, now s/p bilateral craniotomies for SDH evacuation 3/12 POD#3  - bilateral subdural drains d/lizz, post pull CTH stable  - exam stable- no deficits on current exam (intermittent RUE drift at times)    PLAN:  - D/w Dr. Peña-Magnus  - D/w Dr. Polanco    NEURO  - Q4 neuro checks, HOB 30 degrees  - Keppra 500 Q12 x 7 days for seizure ppx  - Pain control PRN with flexeril (reported chronic back pain)-- no back pain on exam today  - Repeat CTH Monday 3/18  - Tentatively planned for MMA embolization with neuroIR on Tuesday 3/19    CV:  - Normotensive BP goals   - At goal without need for PRN    RESPIRATORY  - Satting well on RA  - Incentive spirometer    GI  - Tolerating diet  - last BM 3/14 (multiple)- bowel regimen d/lizz      - TOV- nowak d/lizz this AM, awaiting void    HEMATOLOGY:  - Lovenox 40 for DVT ppx

## 2024-03-15 NOTE — CONSULT NOTE ADULT - SUBJECTIVE AND OBJECTIVE BOX
64yM was admitted on 03-08 from [place] after found to have bilateral SDH after a fall.  He is s/p bilateral craniotomies for subdural evacuation on 3/12.     Imaging Reviewed Today:  CTA HEAD/NECK - No proximal large vessel occlusion, aneurysm, dissection, or hemodynamically flow-limiting stenosis identified in the head and neck.    CT CERVICAL SPINE - No evidence of acute cervical spine fracture or spondylolisthesis.    CT L SPINE - Tiny LEFT foraminal disc herniations at L3-4 and L4-5 narrow the LEFT neural foramina at these levels. Mild disc bulges at L4-5 and L5-S1 flatten the ventral thecal sac and narrow the BILATERAL neural foramina.   No vertebral fracture is recognized.    MRI BRAIN - unchanged BILATERAL acute subdural hematomas, measuring 1.6 on the RIGHT and 1.7 on the LEFT with mass effect on the brain but no midline shift.    MR C, T, L SPINE -  narrowing of the RIGHT C5-6 neural foramen due to uncovertebral spurring. 2.  Thoracic spine:   Unremarkable MR of the thoracic spine.  Lumbar spine:   Tiny LEFT foraminal disc herniations at L3-4 and L4-5   narrow the LEFT neural foramina at these levels. Mild disc bulges at L4-5 and L5-S1 flatten the ventral thecal sac and narrow the BILATERAL neural foramina.  Upon review, edema is noted within the coccyx which may indicate fracture or bone contusion.     ----------------------------  Patient reports some back pain.  Limited ability to explain events leading up to his injury and findings.  Reports dizziness several times.       VITALS  T(C): 36.8 (03-15-24 @ 11:57), Max: 37 (03-15-24 @ 08:07)  HR: 76 (03-15-24 @ 15:00) (65 - 97)  BP: 147/74 (03-15-24 @ 15:00) (99/53 - 147/74)  RR: 18 (03-15-24 @ 15:00) (12 - 21)  SpO2: 98% (03-15-24 @ 15:00) (93% - 100%)  Wt(kg): --    PAST MEDICAL & SURGICAL HISTORY  No pertinent past medical history         RECENT LABS REVIEWED    CBC Full  -  ( 15 Mar 2024 04:00 )  WBC Count : 10.96 K/uL  RBC Count : 5.17 M/uL  Hemoglobin : 13.8 g/dL  Hematocrit : 42.1 %  Platelet Count - Automated : 250 K/uL  Mean Cell Volume : 81.4 fl  Mean Cell Hemoglobin : 26.7 pg  Mean Cell Hemoglobin Concentration : 32.8 gm/dL  Auto Neutrophil # : x  Auto Lymphocyte # : x  Auto Monocyte # : x  Auto Eosinophil # : x  Auto Basophil # : x  Auto Neutrophil % : x  Auto Lymphocyte % : x  Auto Monocyte % : x  Auto Eosinophil % : x  Auto Basophil % : x    03-15    137  |  104  |  14.2  ----------------------------<  113<H>  4.1   |  21.0<L>  |  0.65    Ca    9.1      15 Mar 2024 04:00  Phos  2.3     03-15  Mg     2.0     03-15      Urinalysis Basic - ( 15 Mar 2024 04:00 )    Color: x / Appearance: x / SG: x / pH: x  Gluc: 113 mg/dL / Ketone: x  / Bili: x / Urobili: x   Blood: x / Protein: x / Nitrite: x   Leuk Esterase: x / RBC: x / WBC x   Sq Epi: x / Non Sq Epi: x / Bacteria: x        ALLERGIES  Allergy Status Unknown      MEDICATIONS   chlorhexidine 2% Cloths 1 Application(s) Topical daily  cyclobenzaprine 5 milliGRAM(s) Oral three times a day PRN  enoxaparin Injectable 40 milliGRAM(s) SubCutaneous every 24 hours  levETIRAcetam 500 milliGRAM(s) Oral every 12 hours      ----------------------------------------------------------------------------------------  FUNCTIONAL HISTORY  Lives with family, [address] [place]  Independent    FUNCTIONAL STATUS/PROGRESS  3/15 PT  Bed Mobility: Rolling/Turning:     · Level of [place]	moderate assist (50% patients effort)  · Physical Assist/Nonphysical Assist	1 person assist    Bed Mobility: Supine to Sit:     · Level of [place]	moderate assist (50% patients effort)  · Physical Assist/Nonphysical Assist	1 person assist    Bed Mobility Analysis:     · Bed Mobility Limitations	decreased ability to use legs for bridging/pushing; decreased ability to use arms for pushing/pulling  · Impairments Contributing to Impaired Bed Mobility	impaired balance; impaired postural control; decreased strength    Transfer: Bed to Chair:     Transfer Skill: Bed to Chair   · Level of [place]	minimum assist (75% patients effort)  · Physical Assist/Nonphysical Assist	2 person assist  · Weight-Bearing Restrictions	weight-bearing as tolerated  · Assistive Device	bilateral hand held assist    Bed/Chair Transfer Safety Analysis:     · Impairments Contributing to Impaired Transfers	impaired balance; impaired postural control; decreased strength; impaired coordination  · Transfer Safety Concerns Noted	decreased weight-shifting ability    Transfer: Sit to Stand:     · Level of [place]	minimum assist (75% patients effort)  · Physical Assist/Nonphysical Assist	2 person assist  · Weight-Bearing Restrictions	weight-bearing as tolerated  · Assistive Device	bilateral hand held assist    Transfer: Stand to Sit:     · Level of [place]	minimum assist (75% patients effort)  · Physical Assist/Nonphysical Assist	2 person assist  · Weight-Bearing Restrictions	weight-bearing as tolerated  · Assistive Device	bilateral hand held assist    Sit/Stand Transfer Safety Analysis:     · Transfer Safety Concerns Noted	decreased weight-shifting ability  · Impairments Contributing to Impaired Transfers	impaired balance; impaired postural control; decreased strength    Gait Skills:     · Level of [place]	bed to chair only, see above    Stair Negotiation:     · Level of [place]	unable to perform      ----------------------------------------------------------------------------------------  PHYSICAL EXAM  Constitutional - NAD, Comfortable  HEENT - Cranial incisions - +[place], CDI, EOMI  Neck - Supple, No limited ROM  Chest - Breathing comfortably, No wheezing  Cardiovascular - S1S2   Abdomen - Soft   Extremities - No C/C/E, No calf tenderness   Neurologic Exam -                    Cognitive - AAO to self, PART situation     Communication - Expressive deficits, +dysarthria     Cranial Nerves - CN 2-12 intact     FUNCTIONAL MOTOR EXAM - Subtle right sided weakness                    LEFT    UE - ShAB 5/5, EF 5/5, EE 5/5, WE 5/5,  5/5                    RIGHT UE - ShAB 5/5, EF 5/5, EE 5/5, WE 5/5,  5/5                    LEFT    LE - HF 5/5, KE 5/5, DF 5/5, PF 5/5                    RIGHT LE - HF 5/5, KE 5/5, DF 5/5, PF 5/5        Sensory - Intact to LT  Psychiatric - Mood stable, Affect WNL  ----------------------------------------------------------------------------------------  ASSESSMENT/PLAN  64yMale with functional deficits after a fall sustaining bilateral SDH  Traumatic bilateral SDHs s/p craniotomies - Keppra  Pain - Tylenol, Flexeril  DVT PPX - SCDs, Lovenox  Rehab/Impaired mobility and function - Patient continues to require hospitalization for the above diagnoses and ongoing active management of comorbid complications (ICU level care, Head CT and OT eval pending) that are substantially impairing functional ability and impairing quality of life.     RECOMMEND - OOB daily    When medically optimized, based on the patient's diagnosis, current functional status and potential for progress, recommend ACUTE inpatient rehabilitation for the functional deficits consisting of 3 hours of therapy/day & 24 hour RN/daily PMR physician for comorbid medical management. Patient will be able to tolerate 3 hours a day.     Will continue to follow. Rehab recommendations are dependent on how functional progress changes as well as how patient continues to participate and tolerate therapeutic interventions, which may change. Recommend ongoing mobilization by staff to maintain cardiopulmonary function and prevention of secondary complications related to debility. Discussed the specific management and recommendations above with rehab clinical care team/rehab liaison.      Total Time Spent on Encounter (reviewing clinical notes, labs, radiology, medications, patient history/exam, assessment and plan) - 75 minutes

## 2024-03-15 NOTE — PROGRESS NOTE ADULT - SUBJECTIVE AND OBJECTIVE BOX
HPI:  63 yo male with no PMHx transferred from [place] s/p fall in the afternoon found to have B/L SDH in the frontal-parietal area.     A: intact  B: B/L breathe sounds, clear to auscultation   C: femoral pulses palpable B/L, BP: 142/70  D: GCS: 15  (08 Mar 2024 22:36)      INTERVAL HPI/OVERNIGHT EVENTS:  3/13: New RLE drift with slight weakness in HF/KE 4+/5     Vital Signs Last 24 Hrs  T(C): 36.6 (14 Mar 2024 23:25), Max: 36.9 (14 Mar 2024 07:54)  T(F): 97.9 (14 Mar 2024 23:25), Max: 98.4 (14 Mar 2024 07:54)  HR: 65 (15 Mar 2024 00:00) (57 - 93)  BP: 113/68 (15 Mar 2024 00:00) (100/81 - 147/83)  BP(mean): 60 (15 Mar 2024 00:00) (60 - 108)  RR: 21 (15 Mar 2024 00:00) (12 - 22)  SpO2: 96% (15 Mar 2024 00:00) (96% - 100%)    Parameters below as of 14 Mar 2024 16:00  Patient On (Oxygen Delivery Method): mask, nonrebreather        PHYSICAL EXAM:  GENERAL: NAD, well-groomed  HEAD:  Atraumatic, normocephalic  DRAINS: SG hemovac to self suction   WOUND: Dressing clean dry intact  VIOLETA COMA SCORE: E- V- M- = 15       E: 4= opens eyes spontaneously 3= to voice 2= to noxious 1= no opening       V: 5= oriented 4= confused 3= inappropriate words 2= incomprehensible sounds 1= nonverbal 1T= intubated       M: 6= follows commands 5= localizes 4= withdraws 3= flexor posturing 2= extensor posturing 1= no movement  MENTAL STATUS: AAO x2 (not to year); Awake; Opens eyes spontaneously; Appropriately conversant without aphasia; following simple commands  CRANIAL NERVES: PERRL. Facial sensation intact V1-3 distribution b/l. Face symmetric w/ normal eye closure and smile, tongue midline. Hearing grossly intact. Speech clear. Head turning and shoulder shrug intact.   REFLEXES: PERRL.  MOTOR: RUE drift, LUE 5/5, RLE drift HF/KE 4+/5, PF/DF 5/5, LLE 5/5   SENSATION: grossly intact to light touch all extremities  CHEST/LUNG: Chest rise and fall equally and bilaterally   HEART: +S1/+S2   ABDOMEN: Soft, nontender, nondistended   EXTREMITIES:  2+ peripheral pulses, no clubbing, cyanosis, or edema  SKIN: Warm, dry; no rashes or lesions  LABS:                        14.8   11.75 )-----------( 258      ( 14 Mar 2024 03:30 )             44.4     03-14    136  |  103  |  10.4  ----------------------------<  122<H>  4.1   |  23.0  |  0.72    Ca    9.1      14 Mar 2024 03:30  Phos  2.7     03-14  Mg     2.1     03-14        Urinalysis Basic - ( 14 Mar 2024 03:30 )    Color: x / Appearance: x / SG: x / pH: x  Gluc: 122 mg/dL / Ketone: x  / Bili: x / Urobili: x   Blood: x / Protein: x / Nitrite: x   Leuk Esterase: x / RBC: x / WBC x   Sq Epi: x / Non Sq Epi: x / Bacteria: x        03-13 @ 07:01 - 03-14 @ 07:00  --------------------------------------------------------  IN: 1373.3 mL / OUT: 3724 mL / NET: -2350.7 mL    03-14 @ 07:01  -  03-15 @ 01:06  --------------------------------------------------------  IN: 1505 mL / OUT: 870 mL / NET: 635 mL        RADIOLOGY & ADDITIONAL TESTS:    ASSESSMENT & PLAN:   ASSESSMENT: 63 yo M with mixed BL SDH with mass effect. S/p BL angelina hole for SDH evac 3/12, SD drains in place; pneumacephalus.  H/o recent falls (?syncopal events), unclear etiology, w/up negative so  far.  1st degree AV block.    NEURO:  Neuro/Vital checks q 2  STAT CTH pending   Right SD HMV putting out; Left SD hemovac no drainage   EEG negative 3/10  Keppra 500 BID for AED prophylaxsis  Pull drains today; holding lovenox tonight then get CTH post pull   Pain control- flexeril for chronic back pain, Tylenol prn    PULM: RA  Room air satting well     CV:  SBP goal:     RENAL:  No active issues  TOV in AM     GI:  Vegan diet   GI prophylaxis [X] not indicated   Bowel regimen  [X] senna [] miralax    ENDO:   -no active issues      HEME/ONC:  VTE prophylaxis: [X] SCDs   Holding SQL for drain pull     ID: Afebrile  -no active issues      CODE STATUS:  [X] Full Code    DISPOSITION:  [X] NSICU

## 2024-03-15 NOTE — CHART NOTE - NSCHARTNOTEFT_GEN_A_CORE
Patient was positioned flat. Drains were clamped. Sterile dressing removed with dried blood noted. Incision clean, dry, intact without drainage or dehiscence noted. Subdural ERICKSON drains removed slowly with minimal drainage from site. 1 suture each side already in place from OR were tied to close drain site. Patient tolerated procedure well. RN aware.

## 2024-03-16 LAB
ANION GAP SERPL CALC-SCNC: 11 MMOL/L — SIGNIFICANT CHANGE UP (ref 5–17)
BUN SERPL-MCNC: 14.7 MG/DL — SIGNIFICANT CHANGE UP (ref 8–20)
CALCIUM SERPL-MCNC: 8.7 MG/DL — SIGNIFICANT CHANGE UP (ref 8.4–10.5)
CHLORIDE SERPL-SCNC: 101 MMOL/L — SIGNIFICANT CHANGE UP (ref 96–108)
CO2 SERPL-SCNC: 23 MMOL/L — SIGNIFICANT CHANGE UP (ref 22–29)
CREAT SERPL-MCNC: 0.64 MG/DL — SIGNIFICANT CHANGE UP (ref 0.5–1.3)
EGFR: 104 ML/MIN/1.73M2 — SIGNIFICANT CHANGE UP
GLUCOSE SERPL-MCNC: 104 MG/DL — HIGH (ref 70–99)
HCT VFR BLD CALC: 40 % — SIGNIFICANT CHANGE UP (ref 39–50)
HGB BLD-MCNC: 13.3 G/DL — SIGNIFICANT CHANGE UP (ref 13–17)
MAGNESIUM SERPL-MCNC: 1.9 MG/DL — SIGNIFICANT CHANGE UP (ref 1.6–2.6)
MCHC RBC-ENTMCNC: 27.1 PG — SIGNIFICANT CHANGE UP (ref 27–34)
MCHC RBC-ENTMCNC: 33.3 GM/DL — SIGNIFICANT CHANGE UP (ref 32–36)
MCV RBC AUTO: 81.5 FL — SIGNIFICANT CHANGE UP (ref 80–100)
PHOSPHATE SERPL-MCNC: 2.7 MG/DL — SIGNIFICANT CHANGE UP (ref 2.4–4.7)
PLATELET # BLD AUTO: 246 K/UL — SIGNIFICANT CHANGE UP (ref 150–400)
POTASSIUM SERPL-MCNC: 3.9 MMOL/L — SIGNIFICANT CHANGE UP (ref 3.5–5.3)
POTASSIUM SERPL-SCNC: 3.9 MMOL/L — SIGNIFICANT CHANGE UP (ref 3.5–5.3)
RBC # BLD: 4.91 M/UL — SIGNIFICANT CHANGE UP (ref 4.2–5.8)
RBC # FLD: 13.4 % — SIGNIFICANT CHANGE UP (ref 10.3–14.5)
SODIUM SERPL-SCNC: 135 MMOL/L — SIGNIFICANT CHANGE UP (ref 135–145)
WBC # BLD: 8.52 K/UL — SIGNIFICANT CHANGE UP (ref 3.8–10.5)
WBC # FLD AUTO: 8.52 K/UL — SIGNIFICANT CHANGE UP (ref 3.8–10.5)

## 2024-03-16 RX ORDER — SODIUM,POTASSIUM PHOSPHATES 278-250MG
1 POWDER IN PACKET (EA) ORAL ONCE
Refills: 0 | Status: DISCONTINUED | OUTPATIENT
Start: 2024-03-16 | End: 2024-03-16

## 2024-03-16 RX ADMIN — Medication 650 MILLIGRAM(S): at 06:14

## 2024-03-16 RX ADMIN — LEVETIRACETAM 500 MILLIGRAM(S): 250 TABLET, FILM COATED ORAL at 06:13

## 2024-03-16 RX ADMIN — LEVETIRACETAM 500 MILLIGRAM(S): 250 TABLET, FILM COATED ORAL at 18:07

## 2024-03-16 RX ADMIN — CHLORHEXIDINE GLUCONATE 1 APPLICATION(S): 213 SOLUTION TOPICAL at 17:52

## 2024-03-16 RX ADMIN — ENOXAPARIN SODIUM 40 MILLIGRAM(S): 100 INJECTION SUBCUTANEOUS at 21:32

## 2024-03-16 RX ADMIN — Medication 650 MILLIGRAM(S): at 21:32

## 2024-03-16 NOTE — PROGRESS NOTE ADULT - SUBJECTIVE AND OBJECTIVE BOX
NSx PA Note    HPI  63 yo male with no PMHx transferred from [place] s/p fall in the afternoon found to have B/L SDH in the frontal-parietal area.     Interval/Overnight Events   Patient downgraded from the NSICU yesterday and drain removed. Seen by brain injury svc and PT/OT, all recommending AR. Patient pending CT head 3/18 am and possible MMA embo pending CT head Tuesday. Patient's phosphorus was repleted yesterday.     MEDICATIONS  (STANDING):  chlorhexidine 2% Cloths 1 Application(s) Topical daily  enoxaparin Injectable 40 milliGRAM(s) SubCutaneous every 24 hours  levETIRAcetam 500 milliGRAM(s) Oral every 12 hours    MEDICATIONS  (PRN):  acetaminophen     Tablet .. 650 milliGRAM(s) Oral every 6 hours PRN Mild Pain (1 - 3), Moderate Pain (4 - 6)  cyclobenzaprine 5 milliGRAM(s) Oral three times a day PRN Muscle Spasm    Vital Signs Last 24 Hrs  T(C): 36.3 (16 Mar 2024 08:18), Max: 36.8 (15 Mar 2024 11:57)  T(F): 97.4 (16 Mar 2024 08:18), Max: 98.2 (15 Mar 2024 11:57)  HR: 94 (16 Mar 2024 08:18) (76 - 99)  BP: 104/64 (16 Mar 2024 08:18) (104/64 - 158/95)  BP(mean): 116 (15 Mar 2024 17:00) (69 - 116)  RR: 17 (16 Mar 2024 08:18) (12 - 20)  SpO2: 97% (16 Mar 2024 08:18) (94% - 99%)    Parameters below as of 16 Mar 2024 08:18  Patient On (Oxygen Delivery Method): room air    Neuro-                             13.3   8.52  )-----------( 246      ( 16 Mar 2024 06:34 )             40.0     03-16    135  |  101  |  14.7  ----------------------------<  104<H>  3.9   |  23.0  |  0.64    Ca    8.7      16 Mar 2024 06:34  Phos  2.7     03-16  Mg     1.9     03-16    Plan:        NSx PA Note    HPI  63 yo male with no PMHx transferred from [place] s/p fall in the afternoon found to have B/L SDH in the frontal-parietal area.     Interval/Overnight Events   Patient downgraded from the NSICU yesterday and drain removed. Seen by brain injury svc and PT/OT, all recommending AR. Patient pending CT head 3/18 am and possible MMA embo pending CT head Tuesday. Patient's phosphorus was repleted yesterday.     MEDICATIONS  (STANDING):  chlorhexidine 2% Cloths 1 Application(s) Topical daily  enoxaparin Injectable 40 milliGRAM(s) SubCutaneous every 24 hours  levETIRAcetam 500 milliGRAM(s) Oral every 12 hours    MEDICATIONS  (PRN):  acetaminophen     Tablet .. 650 milliGRAM(s) Oral every 6 hours PRN Mild Pain (1 - 3), Moderate Pain (4 - 6)  cyclobenzaprine 5 milliGRAM(s) Oral three times a day PRN Muscle Spasm    Vital Signs Last 24 Hrs  T(C): 36.3 (16 Mar 2024 08:18), Max: 36.8 (15 Mar 2024 11:57)  T(F): 97.4 (16 Mar 2024 08:18), Max: 98.2 (15 Mar 2024 11:57)  HR: 94 (16 Mar 2024 08:18) (76 - 99)  BP: 104/64 (16 Mar 2024 08:18) (104/64 - 158/95)  BP(mean): 116 (15 Mar 2024 17:00) (69 - 116)  RR: 17 (16 Mar 2024 08:18) (12 - 20)  SpO2: 97% (16 Mar 2024 08:18) (94% - 99%)    Parameters below as of 16 Mar 2024 08:18  Patient On (Oxygen Delivery Method): room air    Neuro- Awake, alert, orientedx3  speech clear and appropriate  pupils equal and reactive bilaterally, EOMI  face symmetric, tongue ML  follows commands  [place] well in bed, no drift  5/5 in all extremities   Incision- staples and sutures CDI                        13.3   8.52  )-----------( 246      ( 16 Mar 2024 06:34 )             40.0     03-16    135  |  101  |  14.7  ----------------------------<  104<H>  3.9   |  23.0  |  0.64    Ca    8.7      16 Mar 2024 06:34  Phos  2.7     03-16  Mg     1.9     03-16    Plan:   NS stable  CW neuro and vitals q2  CW Keppra AED  Plan for CTH 3/18 AM  Possible MMA embo 3/19 pending CTH, pre-op labs ordered 3/18   Pain control as needed, avoid over sedation  Repleted electrolytes look good, continue to monitor   SCDs in bed, Lovenox restarted post ERICKSON removal  Incentive isabela  Bowel Regimen - LBM yesterday per patient   Seen by Dr. Polanco

## 2024-03-17 LAB
ANION GAP SERPL CALC-SCNC: 11 MMOL/L — SIGNIFICANT CHANGE UP (ref 5–17)
BUN SERPL-MCNC: 17.4 MG/DL — SIGNIFICANT CHANGE UP (ref 8–20)
CALCIUM SERPL-MCNC: 9.3 MG/DL — SIGNIFICANT CHANGE UP (ref 8.4–10.5)
CHLORIDE SERPL-SCNC: 102 MMOL/L — SIGNIFICANT CHANGE UP (ref 96–108)
CO2 SERPL-SCNC: 24 MMOL/L — SIGNIFICANT CHANGE UP (ref 22–29)
CREAT SERPL-MCNC: 0.81 MG/DL — SIGNIFICANT CHANGE UP (ref 0.5–1.3)
EGFR: 97 ML/MIN/1.73M2 — SIGNIFICANT CHANGE UP
GLUCOSE SERPL-MCNC: 97 MG/DL — SIGNIFICANT CHANGE UP (ref 70–99)
HCT VFR BLD CALC: 41.3 % — SIGNIFICANT CHANGE UP (ref 39–50)
HGB BLD-MCNC: 13.3 G/DL — SIGNIFICANT CHANGE UP (ref 13–17)
MAGNESIUM SERPL-MCNC: 1.9 MG/DL — SIGNIFICANT CHANGE UP (ref 1.6–2.6)
MCHC RBC-ENTMCNC: 26.3 PG — LOW (ref 27–34)
MCHC RBC-ENTMCNC: 32.2 GM/DL — SIGNIFICANT CHANGE UP (ref 32–36)
MCV RBC AUTO: 81.6 FL — SIGNIFICANT CHANGE UP (ref 80–100)
PHOSPHATE SERPL-MCNC: 3.2 MG/DL — SIGNIFICANT CHANGE UP (ref 2.4–4.7)
PLATELET # BLD AUTO: 256 K/UL — SIGNIFICANT CHANGE UP (ref 150–400)
POTASSIUM SERPL-MCNC: 3.8 MMOL/L — SIGNIFICANT CHANGE UP (ref 3.5–5.3)
POTASSIUM SERPL-SCNC: 3.8 MMOL/L — SIGNIFICANT CHANGE UP (ref 3.5–5.3)
RBC # BLD: 5.06 M/UL — SIGNIFICANT CHANGE UP (ref 4.2–5.8)
RBC # FLD: 13.5 % — SIGNIFICANT CHANGE UP (ref 10.3–14.5)
SODIUM SERPL-SCNC: 137 MMOL/L — SIGNIFICANT CHANGE UP (ref 135–145)
WBC # BLD: 6.81 K/UL — SIGNIFICANT CHANGE UP (ref 3.8–10.5)
WBC # FLD AUTO: 6.81 K/UL — SIGNIFICANT CHANGE UP (ref 3.8–10.5)

## 2024-03-17 RX ORDER — SODIUM CHLORIDE 9 MG/ML
1000 INJECTION, SOLUTION INTRAVENOUS
Refills: 0 | Status: DISCONTINUED | OUTPATIENT
Start: 2024-03-17 | End: 2024-03-20

## 2024-03-17 RX ADMIN — SODIUM CHLORIDE 75 MILLILITER(S): 9 INJECTION, SOLUTION INTRAVENOUS at 13:46

## 2024-03-17 RX ADMIN — LEVETIRACETAM 500 MILLIGRAM(S): 250 TABLET, FILM COATED ORAL at 17:33

## 2024-03-17 RX ADMIN — LEVETIRACETAM 500 MILLIGRAM(S): 250 TABLET, FILM COATED ORAL at 05:52

## 2024-03-17 RX ADMIN — ENOXAPARIN SODIUM 40 MILLIGRAM(S): 100 INJECTION SUBCUTANEOUS at 21:18

## 2024-03-17 NOTE — PROGRESS NOTE ADULT - SUBJECTIVE AND OBJECTIVE BOX
HPI:  63 yo male with no PMHx transferred from [place] s/p fall in the afternoon found to have B/L SDH in the frontal-parietal area.   A: intact  B: B/L breathe sounds, clear to auscultation   C: femoral pulses palpable B/L, BP: 142/70  D: GCS: 15  (08 Mar 2024 22:36)    INTERVAL HPI:  3/8 Transferred from [facility] after a fall and was found to have Bilateral Mixed density SDH  3/12 Bilateral Craniotomies     OVERNIGHT EVENTS:  None neurosurgical     63 yo male seen lying comfortably in bed. Tolerating diet. Passing gas, Last BM 3/17 AM. Voiding. Denies headache, weakness, numbness, n/v/d, fevers, chills, chest pain, SOB.     Vital Signs Last 24 Hrs  T(C): 36.9 (17 Mar 2024 08:00), Max: 37.2 (16 Mar 2024 16:17)  T(F): 98.4 (17 Mar 2024 08:00), Max: 99 (16 Mar 2024 16:17)  HR: 78 (17 Mar 2024 08:00) (72 - 94)  BP: 114/71 (17 Mar 2024 08:00) (100/59 - 154/89)  BP(mean): 99 (16 Mar 2024 11:47) (99 - 99)  RR: 18 (17 Mar 2024 08:00) (16 - 18)  SpO2: 94% (17 Mar 2024 08:00) (94% - 99%)    Parameters below as of 17 Mar 2024 08:00  Patient On (Oxygen Delivery Method): room air    PHYSICAL EXAM:  GENERAL: NAD  HEAD: Incision clean, dry and intact in staples  MENTAL STATUS: AAO x3; Awake, Opens eyes spontaneously, Appropriately conversant without aphasia, following simple commands  CRANIAL NERVES: PERRL; EOM, No facial asymmetry; facial sensation grossly intact to light touch b/l  MOTOR: strength 5/5 B/L upper and lower extremities; sensation grossly intact all extremities  SKIN: Warm, dry; no rashes or lesions    LABS:                        13.3   6.81  )-----------( 256      ( 17 Mar 2024 06:05 )             41.3     03-17    137  |  102  |  17.4  ----------------------------<  97  3.8   |  24.0  |  0.81    Ca    9.3      17 Mar 2024 06:05  Phos  3.2     03-17  Mg     1.9     03-17    Urinalysis Basic - ( 17 Mar 2024 06:05 )    Color: x / Appearance: x / SG: x / pH: x  Gluc: 97 mg/dL / Ketone: x  / Bili: x / Urobili: x   Blood: x / Protein: x / Nitrite: x   Leuk Esterase: x / RBC: x / WBC x   Sq Epi: x / Non Sq Epi: x / Bacteria: x    RADIOLOGY & ADDITIONAL TESTS:    < from: CT Head No Cont (03.15.24 @ 10:14) >  IMPRESSION:   Persistent unchanged BILATERAL subdural collections   following drain removal, LEFT measuring 1.5 cm and RIGHT measuring 6 mm.   Persistent pneumocephalus.   There is mild mass effect on the LEFT   hemisphere with subfalcine herniation to the LEFTunchanged. Mild   effacement of the posterior LEFT lateral ventricle.  --- End of Report ---    BASILIA PARK MD; Attending Radiologist  This document has been electronically signed. Mar 15 2024 10:50AM    < end of copied text >  < from: CT Head No Cont (03.15.24 @ 01:46) >  IMPRESSION: Is slightly decreased postoperative air after drainage of   bilateral frontal subdural hematomas. Bilateral subdural catheters   extending into low density extra-axial collections left greater than   right with mild midline shift to the right. Small acute parafalcine   subdural hematoma unchanged..    --- End of Report ---    ISMAEL RODRIGUEZ MD; Attending Radiologist  This document has been electronically signed. Mar 15 2024  7:55AM    < end of copied text >  < from: CT Head No Cont (03.14.24 @ 06:03) >  IMPRESSION:   Slight decrease in the size of the BILATERAL subdural   collections now measuring 1.2 cm on the LEFT and 1.7 mm on the RIGHT.   Pneumocephalus is noted with subdural drains. Mild mass effect on the   hemispheres but no midline shift.    --- End of Report ---    BASILIA PARK MD; Attending Radiologist  This document has been electronically signed. Mar 14 2024  7:29AM    < end of copied text >  < from: CT Head No Cont (03.13.24 @ 05:40) >  IMPRESSION: Stable follow-up CT exam.    --- End of Report ---  RAMONA TOSCANO MD; Attending Radiologist  This document has been electronically signed. Mar 13 2024  7:57AM    < end of copied text >  < from: CT Head No Cont (03.13.24 @ 03:09) >  IMPRESSION:    Status post bifrontal craniotomies and placement of subdural drainage   catheters.    Bilateral subdural collections are decreased in density compared with   prior. The right collection is decreased in size, the left is stable to   minimally decreased in size. Postoperative pneumocephalus along left   greater than right frontal convexities, with slight mass effect on the   left and rightward midline shift of up to 6 mm. Recommend attention on   follow-up.    --- End of Report ---  EVARISTO MITCHELL MD; Attending Radiologist  This document has been electronically signed. Mar 13 2024  4:28AM    < end of copied text >

## 2024-03-17 NOTE — PROGRESS NOTE ADULT - ASSESSMENT
63 yo male s/p bilateral craniotomies on 3/12    Plan:  -Patient seen and examined on rounds with Dr. Polanco  -Continue neurochecks and vitals q4  -DVT ppx: Lovenox and SCDs  -Seizure ppx: Continue with keppra 500mg BID  -Pain Control PRN: Tylenol and Flexeril   -Encourage incentive spirometer 10x/hr while awake  -AM Labs  -CT Head in AM  -Possible MMA embo on Tuesday   - Continue PT/OT

## 2024-03-18 LAB
ANION GAP SERPL CALC-SCNC: 15 MMOL/L — SIGNIFICANT CHANGE UP (ref 5–17)
APTT BLD: 27.5 SEC — SIGNIFICANT CHANGE UP (ref 24.5–35.6)
BLD GP AB SCN SERPL QL: SIGNIFICANT CHANGE UP
BUN SERPL-MCNC: 13.7 MG/DL — SIGNIFICANT CHANGE UP (ref 8–20)
CALCIUM SERPL-MCNC: 9 MG/DL — SIGNIFICANT CHANGE UP (ref 8.4–10.5)
CHLORIDE SERPL-SCNC: 100 MMOL/L — SIGNIFICANT CHANGE UP (ref 96–108)
CO2 SERPL-SCNC: 22 MMOL/L — SIGNIFICANT CHANGE UP (ref 22–29)
CREAT SERPL-MCNC: 0.69 MG/DL — SIGNIFICANT CHANGE UP (ref 0.5–1.3)
EGFR: 102 ML/MIN/1.73M2 — SIGNIFICANT CHANGE UP
GLUCOSE SERPL-MCNC: 96 MG/DL — SIGNIFICANT CHANGE UP (ref 70–99)
HCT VFR BLD CALC: 38.4 % — LOW (ref 39–50)
HGB BLD-MCNC: 12.7 G/DL — LOW (ref 13–17)
INR BLD: 1.1 RATIO — SIGNIFICANT CHANGE UP (ref 0.85–1.18)
MAGNESIUM SERPL-MCNC: 1.9 MG/DL — SIGNIFICANT CHANGE UP (ref 1.6–2.6)
MCHC RBC-ENTMCNC: 26.8 PG — LOW (ref 27–34)
MCHC RBC-ENTMCNC: 33.1 GM/DL — SIGNIFICANT CHANGE UP (ref 32–36)
MCV RBC AUTO: 81 FL — SIGNIFICANT CHANGE UP (ref 80–100)
PHOSPHATE SERPL-MCNC: 2.9 MG/DL — SIGNIFICANT CHANGE UP (ref 2.4–4.7)
PLATELET # BLD AUTO: 255 K/UL — SIGNIFICANT CHANGE UP (ref 150–400)
POTASSIUM SERPL-MCNC: 3.8 MMOL/L — SIGNIFICANT CHANGE UP (ref 3.5–5.3)
POTASSIUM SERPL-SCNC: 3.8 MMOL/L — SIGNIFICANT CHANGE UP (ref 3.5–5.3)
PROTHROM AB SERPL-ACNC: 12.2 SEC — SIGNIFICANT CHANGE UP (ref 9.5–13)
RBC # BLD: 4.74 M/UL — SIGNIFICANT CHANGE UP (ref 4.2–5.8)
RBC # FLD: 13.2 % — SIGNIFICANT CHANGE UP (ref 10.3–14.5)
SODIUM SERPL-SCNC: 136 MMOL/L — SIGNIFICANT CHANGE UP (ref 135–145)
WBC # BLD: 6.99 K/UL — SIGNIFICANT CHANGE UP (ref 3.8–10.5)
WBC # FLD AUTO: 6.99 K/UL — SIGNIFICANT CHANGE UP (ref 3.8–10.5)

## 2024-03-18 PROCEDURE — 70450 CT HEAD/BRAIN W/O DYE: CPT | Mod: 26

## 2024-03-18 PROCEDURE — 99233 SBSQ HOSP IP/OBS HIGH 50: CPT | Mod: 25

## 2024-03-18 RX ORDER — ENOXAPARIN SODIUM 100 MG/ML
40 INJECTION SUBCUTANEOUS
Refills: 0 | Status: DISCONTINUED | OUTPATIENT
Start: 2024-03-18 | End: 2024-03-21

## 2024-03-18 RX ORDER — CEFAZOLIN SODIUM 1 G
1000 VIAL (EA) INJECTION ONCE
Refills: 0 | Status: DISCONTINUED | OUTPATIENT
Start: 2024-03-19 | End: 2024-03-19

## 2024-03-18 RX ORDER — ASCORBIC ACID 60 MG
500 TABLET,CHEWABLE ORAL DAILY
Refills: 0 | Status: DISCONTINUED | OUTPATIENT
Start: 2024-03-18 | End: 2024-03-21

## 2024-03-18 RX ADMIN — LEVETIRACETAM 500 MILLIGRAM(S): 250 TABLET, FILM COATED ORAL at 05:41

## 2024-03-18 RX ADMIN — LEVETIRACETAM 500 MILLIGRAM(S): 250 TABLET, FILM COATED ORAL at 18:18

## 2024-03-18 RX ADMIN — SODIUM CHLORIDE 75 MILLILITER(S): 9 INJECTION, SOLUTION INTRAVENOUS at 21:16

## 2024-03-18 RX ADMIN — ENOXAPARIN SODIUM 40 MILLIGRAM(S): 100 INJECTION SUBCUTANEOUS at 21:18

## 2024-03-18 NOTE — CHART NOTE - NSCHARTNOTEFT_GEN_A_CORE
Source: Patient, chart     Current Diet: Diet, NPO after Midnight:      NPO Start Date: 18-Mar-2024,   NPO Start Time: 23:59 (03-18-24 @ 10:31)  Diet, Regular:   No Liquids (NOLIQUIDS)  Vegan {Accepts Vegetable Products Only}  No Beef (03-17-24 @ 10:53)    PO intake: %     Source for PO intake: Patient, chart     Current Weight:   3/9 180 lbs  3/10 183 lbs  3/13 174 lbs  3/14 174 lbs    Question accuracy of bed scale weights    Pertinent Medications: MEDICATIONS  (STANDING):  enoxaparin Injectable 40 milliGRAM(s) SubCutaneous <User Schedule>  levETIRAcetam 500 milliGRAM(s) Oral every 12 hours  multiple electrolytes Injection Type 1 1000 milliLiter(s) (75 mL/Hr) IV Continuous <Continuous>    Pertinent Labs: 03-18 Na136 mmol/L Glu 96 mg/dL K+ 3.8 mmol/L Cr  0.69 mg/dL BUN 13.7 mg/dL Phos 2.9 mg/dL     Hospital course: 65 yo male s/p bilateral craniotomies on 3/12. Afebrile. VSS. WBC 6.99. H&H 12.7/38.4. Pending CTH today.     Estimated Needs:   [x] no change since previous assessment  [ ] recalculated:     Current Nutrition Diagnosis: Increased Nutrient Needs (Protein-energy) related to increased physiologic demand for nutrient as evidenced by mixed B/L SDH, pending SDH evacuation.     Patient continuing to tolerate diet well with good appetite/PO intake. Pt reports eating 100% of breakfast this AM. Per SLP evaluation, recommended pt may have thin liquids - message left with hospitalist via Teams of the same. Pt with no current c/o N/V/C/D. Will continue to monitor and follow up as needed. RD remains available.     Recommendations:   1) Continue diet as tolerated - allow thin liquids per SLP evaluation this AM.  2) Encourage po intake, monitor diet tolerance, and provide assistance at meals as needed.   3) Obtain daily weights to monitor trends.     Monitoring and Evaluation:   [x] PO intake [x] Tolerance to diet prescription [X] Weights  [X] Follow up per protocol [X] Labs: chem 8, mg, phos, H/H

## 2024-03-18 NOTE — PROGRESS NOTE ADULT - ASSESSMENT
65 yo male s/p bilateral craniotomies on 3/12. Afebrile. VSS. WBC 6.99. H&H 12.7/38.4. Pending CTH today.     Plan:  -Continue neurochecks and vitals q4  -DVT ppx: Lovenox and SCDs  -Seizure ppx: Continue with keppra 500mg BID  -Pain Control PRN: Tylenol and Flexeril   -Encourage incentive spirometer 10x/hr while awake  -CT Head today  -Possible MMA embo on Tuesday   -NPO midnight   -Continue PT/OT: PT recommends acute rehab upon discharge   -Continue present care

## 2024-03-18 NOTE — PROGRESS NOTE ADULT - SUBJECTIVE AND OBJECTIVE BOX
HPI:  63 yo male with no PMHx transferred from [place] s/p fall in the afternoon found to have B/L SDH in the frontal-parietal area.     3/8 Transferred from [facility] after a fall and was found to have Bilateral Mixed density SDH  3/12 Bilateral Craniotomies    INTERVAL HPI/OVERNIGHT EVENTS:  64y Male s/p b/l craniotomies 3/12 seen lying comfortably in bed. Tolerating diet. Denies headache, weakness, numbness, n/v/d, fevers, chills, chest pain, SOB.     Vital Signs Last 24 Hrs  T(C): 37.1 (18 Mar 2024 08:01), Max: 37.1 (18 Mar 2024 04:54)  T(F): 98.7 (18 Mar 2024 08:01), Max: 98.8 (18 Mar 2024 04:54)  HR: 78 (18 Mar 2024 08:01) (67 - 82)  BP: 135/77 (18 Mar 2024 08:01) (102/61 - 135/77)  BP(mean): --  RR: 18 (18 Mar 2024 08:01) (16 - 18)  SpO2: 98% (18 Mar 2024 08:01) (97% - 99%)    Parameters below as of 18 Mar 2024 08:01  Patient On (Oxygen Delivery Method): room air        PHYSICAL EXAM:  GENERAL: NAD  HEAD:  staples intact  WOUND: Dressing clean dry intact  MENTAL STATUS: AAO x3; Awake. Appropriately conversant without aphasia; following commands.  CRANIAL NERVES:  PERRL. EOMI. Face symmetric w/ normal eye closure and smile, tongue midline. Head turning and shoulder shrug intact.   MOTOR: strength 5/5 b/l upper and lower extremities  SENSATION: grossly intact to light touch all extremities  CHEST/LUNG: Non-labored breathing   SKIN: Warm, dry; no rashes or lesions    LABS:                        12.7   6.99  )-----------( 255      ( 18 Mar 2024 04:50 )             38.4     03-18    136  |  100  |  13.7  ----------------------------<  96  3.8   |  22.0  |  0.69    Ca    9.0      18 Mar 2024 04:50  Phos  2.9     03-18  Mg     1.9     03-18      PT/INR - ( 18 Mar 2024 04:50 )   PT: 12.2 sec;   INR: 1.10 ratio         PTT - ( 18 Mar 2024 04:50 )  PTT:27.5 sec  Urinalysis Basic - ( 18 Mar 2024 04:50 )    Color: x / Appearance: x / SG: x / pH: x  Gluc: 96 mg/dL / Ketone: x  / Bili: x / Urobili: x   Blood: x / Protein: x / Nitrite: x   Leuk Esterase: x / RBC: x / WBC x   Sq Epi: x / Non Sq Epi: x / Bacteria: x        03-17 @ 07:01  -  03-18 @ 07:00  --------------------------------------------------------  IN: 900 mL / OUT: 150 mL / NET: 750 mL        RADIOLOGY & ADDITIONAL TESTS:  < from: CT Head No Cont (03.15.24 @ 10:14) >  IMPRESSION:   Persistent unchanged BILATERAL subdural collections   following drain removal, LEFT measuring 1.5 cm and RIGHT measuring 6 mm.   Persistent pneumocephalus.   There is mild mass effect on the LEFT   hemisphere with subfalcine herniation to the LEFTunchanged. Mild   effacement of the posterior LEFT lateral ventricle.    < end of copied text >

## 2024-03-18 NOTE — PROGRESS NOTE ADULT - SUBJECTIVE AND OBJECTIVE BOX
Patient reports he feels well.  Reports no pain.  reports he has been sitting in chair this weekend.  He reports he has not walked much.     FUNCTIONAL PROGRESS  3/15 PT  Transfer Skill: Bed to Chair   · Level of [place]	minimum assist (75% patients effort)  · Physical Assist/Nonphysical Assist	2 person assist  · Weight-Bearing Restrictions	weight-bearing as tolerated  · Assistive Device	bilateral hand held assist    Bed/Chair Transfer Safety Analysis:     · Impairments Contributing to Impaired Transfers	impaired balance; impaired postural control; decreased strength; impaired coordination  · Transfer Safety Concerns Noted	decreased weight-shifting ability    Transfer: Sit to Stand:     · Level of [place]	minimum assist (75% patients effort)  · Physical Assist/Nonphysical Assist	2 person assist  · Weight-Bearing Restrictions	weight-bearing as tolerated  · Assistive Device	bilateral hand held assist    Transfer: Stand to Sit:     · Level of [place]	minimum assist (75% patients effort)  · Physical Assist/Nonphysical Assist	2 person assist  · Weight-Bearing Restrictions	weight-bearing as tolerated  · Assistive Device	bilateral hand held assist    Sit/Stand Transfer Safety Analysis:     · Transfer Safety Concerns Noted	decreased weight-shifting ability  · Impairments Contributing to Impaired Transfers	impaired balance; impaired postural control; decreased strength    Gait Skills:     · Level of [place]	bed to chair only, see above    3/15 OT  Bathing Training:     · Level of [place]	moderate assist (50% patients effort)    Upper Body Dressing Training:     · Level of [place]	minimum assist (75% patients effort)    Lower Body Dressing Training:     · Level of [place]	maximum assist (25% patients effort)    Toilet Hygiene Training:     · Level of [place]	TBA    Grooming Training:     · Level of [place]	minimum assist (75% patients effort)      VITALS  T(C): 37.1 (03-18-24 @ 04:54), Max: 37.1 (03-18-24 @ 04:54)  HR: 67 (03-18-24 @ 04:54) (67 - 82)  BP: 112/64 (03-18-24 @ 04:54) (102/61 - 121/75)  RR: 18 (03-18-24 @ 04:54) (16 - 18)  SpO2: 98% (03-18-24 @ 04:54) (97% - 99%)  Wt(kg): --    MEDICATIONS   acetaminophen     Tablet .. 650 milliGRAM(s) every 6 hours PRN  cyclobenzaprine 5 milliGRAM(s) three times a day PRN  levETIRAcetam 500 milliGRAM(s) every 12 hours  multiple electrolytes Injection Type 1 1000 milliLiter(s) <Continuous>      RECENT LABS/IMAGING  - Reviewed Today                        12.7   6.99  )-----------( 255      ( 18 Mar 2024 04:50 )             38.4     03-18    136  |  100  |  13.7  ----------------------------<  96  3.8   |  22.0  |  0.69    Ca    9.0      18 Mar 2024 04:50  Phos  2.9     03-18  Mg     1.9     03-18      PT/INR - ( 18 Mar 2024 04:50 )   PT: 12.2 sec;   INR: 1.10 ratio         PTT - ( 18 Mar 2024 04:50 )  PTT:27.5 sec  Urinalysis Basic - ( 18 Mar 2024 04:50 )    Color: x / Appearance: x / SG: x / pH: x  Gluc: 96 mg/dL / Ketone: x  / Bili: x / Urobili: x   Blood: x / Protein: x / Nitrite: x   Leuk Esterase: x / RBC: x / WBC x   Sq Epi: x / Non Sq Epi: x / Bacteria: x            CTA HEAD/NECK - No proximal large vessel occlusion, aneurysm, dissection, or hemodynamically flow-limiting stenosis identified in the head and neck.    CT CERVICAL SPINE - No evidence of acute cervical spine fracture or spondylolisthesis.    CT L SPINE - Tiny LEFT foraminal disc herniations at L3-4 and L4-5 narrow the LEFT neural foramina at these levels. Mild disc bulges at L4-5 and L5-S1 flatten the ventral thecal sac and narrow the BILATERAL neural foramina.   No vertebral fracture is recognized.    MRI BRAIN - unchanged BILATERAL acute subdural hematomas, measuring 1.6 on the RIGHT and 1.7 on the LEFT with mass effect on the brain but no midline shift.    MR C, T, L SPINE -  narrowing of the RIGHT C5-6 neural foramen due to uncovertebral spurring. 2.  Thoracic spine:   Unremarkable MR of the thoracic spine.  Lumbar spine:   Tiny LEFT foraminal disc herniations at L3-4 and L4-5 narrow the LEFT neural foramina at these levels. Mild disc bulges at L4-5 and L5-S1 flatten the ventral thecal sac and narrow the BILATERAL neural foramina.  Upon review, edema is noted within the coccyx which may indicate fracture or bone contusion.    HEAD CT 3/15 - Persistent unchanged BILATERAL subdural collections following drain removal, LEFT measuring 1.5 cm and RIGHT measuring 6 mm. Persistent pneumocephalus.   There is mild mass effect on the LEFT hemisphere with subfalcine herniation to the LEFT unchanged. Mild effacement of the posterior LEFT lateral ventricle.  ----------------------------------------------------------------------------------------  PHYSICAL EXAM  Constitutional - NAD, Comfortable   Extremities - No C/C/E, No calf tenderness   Neurologic Exam -                    Cognitive - AAO to self, PART situation     Communication - Expressive deficits, +dysarthria     Cranial Nerves - CN 2-12 intact     FUNCTIONAL MOTOR EXAM - Slight right UE sided weakness     Sensory - Intact to LT  Psychiatric - Mood stable, Fatigued  ----------------------------------------------------------------------------------------  ASSESSMENT/PLAN  64yMale with functional deficits after a fall sustaining bilateral SDH  Traumatic bilateral SDHs s/p craniotomies - Keppra  Pain - Tylenol, RECOMMEND DC Flexeril  Sleep - RECOMMEND Melatonin 3mg HS  Oropharyngeal Dysphagia - Regular/NO LIQUIDS (coughing), IVF  DVT PPX - SCDs  Rehab/Impaired mobility and function - Patient continues to require hospitalization for the above diagnoses and ongoing active management of comorbid complications (Head CT, Possible MMA embolizations, No Liquids - Pending SLP eval) that are substantially impairing functional ability and impairing quality of life.     RECOMMEND - OOB daily    When medically optimized, based on the patient's diagnosis, current functional status and potential for progress, recommend ACUTE inpatient rehabilitation for the functional deficits consisting of 3 hours of therapy/day & 24 hour RN/daily PMR physician for comorbid medical management. Patient will be able to tolerate 3 hours a day.     Will continue to follow. Rehab recommendations are dependent on how functional progress changes as well as how patient continues to participate and tolerate therapeutic interventions, which may change. Recommend ongoing mobilization by staff to maintain cardiopulmonary function and prevention of secondary complications related to debility. Discussed the specific management and recommendations above with rehab clinical care team/rehab liaison.

## 2024-03-18 NOTE — SWALLOW BEDSIDE ASSESSMENT ADULT - SLP PERTINENT HISTORY OF CURRENT PROBLEM
63 yo M with mixed BL SDH with mass effect. S/p BL angelina hole for SDH evac 3/12, SD drains in place; pneumacephalus.

## 2024-03-19 ENCOUNTER — APPOINTMENT (OUTPATIENT)
Dept: NEUROSURGERY | Facility: HOSPITAL | Age: 64
End: 2024-03-19

## 2024-03-19 LAB
ALBUMIN SERPL ELPH-MCNC: 3.3 G/DL — SIGNIFICANT CHANGE UP (ref 3.3–5.2)
ALP SERPL-CCNC: 97 U/L — SIGNIFICANT CHANGE UP (ref 40–120)
ALT FLD-CCNC: 26 U/L — SIGNIFICANT CHANGE UP
ANION GAP SERPL CALC-SCNC: 11 MMOL/L — SIGNIFICANT CHANGE UP (ref 5–17)
AST SERPL-CCNC: 14 U/L — SIGNIFICANT CHANGE UP
BILIRUB SERPL-MCNC: 0.4 MG/DL — SIGNIFICANT CHANGE UP (ref 0.4–2)
BUN SERPL-MCNC: 10 MG/DL — SIGNIFICANT CHANGE UP (ref 8–20)
CALCIUM SERPL-MCNC: 8.5 MG/DL — SIGNIFICANT CHANGE UP (ref 8.4–10.5)
CHLORIDE SERPL-SCNC: 103 MMOL/L — SIGNIFICANT CHANGE UP (ref 96–108)
CO2 SERPL-SCNC: 23 MMOL/L — SIGNIFICANT CHANGE UP (ref 22–29)
CREAT SERPL-MCNC: 0.68 MG/DL — SIGNIFICANT CHANGE UP (ref 0.5–1.3)
EGFR: 103 ML/MIN/1.73M2 — SIGNIFICANT CHANGE UP
GLUCOSE SERPL-MCNC: 101 MG/DL — HIGH (ref 70–99)
HCT VFR BLD CALC: 37.2 % — LOW (ref 39–50)
HGB BLD-MCNC: 12.4 G/DL — LOW (ref 13–17)
MAGNESIUM SERPL-MCNC: 2 MG/DL — SIGNIFICANT CHANGE UP (ref 1.6–2.6)
MCHC RBC-ENTMCNC: 26.7 PG — LOW (ref 27–34)
MCHC RBC-ENTMCNC: 33.3 GM/DL — SIGNIFICANT CHANGE UP (ref 32–36)
MCV RBC AUTO: 80.2 FL — SIGNIFICANT CHANGE UP (ref 80–100)
PHOSPHATE SERPL-MCNC: 3.3 MG/DL — SIGNIFICANT CHANGE UP (ref 2.4–4.7)
PLATELET # BLD AUTO: 269 K/UL — SIGNIFICANT CHANGE UP (ref 150–400)
POTASSIUM SERPL-MCNC: 4 MMOL/L — SIGNIFICANT CHANGE UP (ref 3.5–5.3)
POTASSIUM SERPL-SCNC: 4 MMOL/L — SIGNIFICANT CHANGE UP (ref 3.5–5.3)
PROT SERPL-MCNC: 6.1 G/DL — LOW (ref 6.6–8.7)
RBC # BLD: 4.64 M/UL — SIGNIFICANT CHANGE UP (ref 4.2–5.8)
RBC # FLD: 13.2 % — SIGNIFICANT CHANGE UP (ref 10.3–14.5)
SODIUM SERPL-SCNC: 137 MMOL/L — SIGNIFICANT CHANGE UP (ref 135–145)
WBC # BLD: 6.6 K/UL — SIGNIFICANT CHANGE UP (ref 3.8–10.5)
WBC # FLD AUTO: 6.6 K/UL — SIGNIFICANT CHANGE UP (ref 3.8–10.5)

## 2024-03-19 PROCEDURE — 76377 3D RENDER W/INTRP POSTPROCES: CPT | Mod: 26,59

## 2024-03-19 PROCEDURE — 99233 SBSQ HOSP IP/OBS HIGH 50: CPT

## 2024-03-19 PROCEDURE — 75898 FOLLOW-UP ANGIOGRAPHY: CPT | Mod: 26,59

## 2024-03-19 PROCEDURE — 75894 X-RAYS TRANSCATH THERAPY: CPT | Mod: 26

## 2024-03-19 PROCEDURE — 36227 PLACE CATH XTRNL CAROTID: CPT | Mod: 50,58

## 2024-03-19 PROCEDURE — 36224 PLACE CATH CAROTD ART: CPT | Mod: 50,58

## 2024-03-19 PROCEDURE — 61624 TCAT PERM OCCLS/EMBOLJ CNS: CPT | Mod: 58

## 2024-03-19 RX ORDER — POLYETHYLENE GLYCOL 3350 17 G/17G
17 POWDER, FOR SOLUTION ORAL DAILY
Refills: 0 | Status: DISCONTINUED | OUTPATIENT
Start: 2024-03-19 | End: 2024-03-21

## 2024-03-19 RX ADMIN — ENOXAPARIN SODIUM 40 MILLIGRAM(S): 100 INJECTION SUBCUTANEOUS at 21:24

## 2024-03-19 RX ADMIN — LEVETIRACETAM 500 MILLIGRAM(S): 250 TABLET, FILM COATED ORAL at 05:37

## 2024-03-19 RX ADMIN — Medication 650 MILLIGRAM(S): at 17:49

## 2024-03-19 RX ADMIN — SODIUM CHLORIDE 75 MILLILITER(S): 9 INJECTION, SOLUTION INTRAVENOUS at 21:25

## 2024-03-19 RX ADMIN — SODIUM CHLORIDE 75 MILLILITER(S): 9 INJECTION, SOLUTION INTRAVENOUS at 05:34

## 2024-03-19 RX ADMIN — Medication 650 MILLIGRAM(S): at 17:48

## 2024-03-19 NOTE — PROGRESS NOTE ADULT - SUBJECTIVE AND OBJECTIVE BOX
Patient is a 64y old  Male who presents with a chief complaint of B/l acute on chronic SDH (15 Mar 2024 01:05)    HPI:  65 yo male with no PMHx transferred from [place] s/p fall in the afternoon found to have B/L SDH in the frontal-parietal area.     A: intact  B: B/L breathe sounds, clear to auscultation   C: femoral pulses palpable B/L, BP: 142/70  D: GCS: 15  (08 Mar 2024 22:36)      Interval history:  Patient seen and examined by neurosurgery team. Patient reports he feels well, denies HA, weakness, numbness, tingling. Patient to go for MMA embolization today with Dr. Weber.       Vital Signs Last 24 Hrs  T(C): 36.7 (19 Mar 2024 08:37), Max: 37.1 (18 Mar 2024 13:00)  T(F): 98 (19 Mar 2024 08:37), Max: 98.8 (18 Mar 2024 13:00)  HR: 69 (19 Mar 2024 08:37) (69 - 83)  BP: 139/79 (19 Mar 2024 08:37) (109/69 - 143/77)  BP(mean): --  RR: 17 (19 Mar 2024 08:37) (16 - 18)  SpO2: 100% (19 Mar 2024 08:37) (97% - 100%)    Parameters below as of 19 Mar 2024 08:37  Patient On (Oxygen Delivery Method): room air      Physical Exam:  Constitutional: NAD, lying in bed  Neuro  * Mental Status:  GCS 15: Awake, alert, oriented to conversation. No aphasia or difficulty speaking. No dysarthria.   * Cranial Nerves: Cnii-Cnxii grossly intact. PERRL, EOMI, tongue midline, no gaze deviation  * Motor: RUE 5/5, LUE 5/5, RLE 5/5, LLE 5/5, no drift   * Sensory: Sensation intact to light touch  * Reflexes: not assessed   Cardiac: regular rate and rhythm  Respiratory: non labored breathing  Head: incision C/D/I, no drainage, no erythema      LABS:                        12.4   6.60  )-----------( 269      ( 19 Mar 2024 04:00 )             37.2     03-19    137  |  103  |  10.0  ----------------------------<  101<H>  4.0   |  23.0  |  0.68    Ca    8.5      19 Mar 2024 04:00  Phos  3.3     03-19  Mg     2.0     03-19    TPro  6.1<L>  /  Alb  3.3  /  TBili  0.4  /  DBili  x   /  AST  14  /  ALT  26  /  AlkPhos  97  03-19    PT/INR - ( 18 Mar 2024 04:50 )   PT: 12.2 sec;   INR: 1.10 ratio    PTT - ( 18 Mar 2024 04:50 )  PTT:27.5 sec      Medications:  MEDICATIONS  (STANDING):  ascorbic acid 500 milliGRAM(s) Oral daily  ceFAZolin  Injectable. 1000 milliGRAM(s) IV Push once  enoxaparin Injectable 40 milliGRAM(s) SubCutaneous <User Schedule>  levETIRAcetam 500 milliGRAM(s) Oral every 12 hours  multiple electrolytes Injection Type 1 1000 milliLiter(s) (75 mL/Hr) IV Continuous <Continuous>  multivitamin 1 Tablet(s) Oral daily    MEDICATIONS  (PRN):  acetaminophen     Tablet .. 650 milliGRAM(s) Oral every 6 hours PRN Mild Pain (1 - 3), Moderate Pain (4 - 6)  cyclobenzaprine 5 milliGRAM(s) Oral three times a day PRN Muscle Spasm      RADIOLOGY & ADDITIONAL STUDIES:  < from: CT Head No Cont (03.18.24 @ 12:21) >  FINDINGS:    Bilateral frontal craniotomies are noted, with residual subdural fluid   collections measuring 8 mm on the right and 9 mm on the left. There is a   small amount of layering nondependent subdural gas anteriorly. These have   improved compared to the prior exams. Midline shift has resolved. There   is no interval rebleeding.    Cerebral volume is within normal limits. Mild nonspecific low attenuation   in the periventricular and subcortical white matter.    No acute intracranial hemorrhage. No midline shift or herniation. No CT   evidence of acute territorial infarction, although MRI with DWI would be   more sensitive.    Limited views of the sinuses and mastoids show mild mucosal thickening   without air-fluid levels, likely chronic.    Left lens implant. Limited views of the orbits and visualized soft   tissues of the neck, face, scalp, skull base, and calvarium are otherwise   unremarkable.    IMPRESSION:  1.  Improving subdural fluid collections, without interval rebleeding.    Patient Name: CHAU AMES  MRN: [id number], Accession: [id number]  DOS: 03/18/24 12:21 PM    --- End of Report ---  EVANGELISTA SMITH MD; Attending Radiologist  This document has been electronically signed. Mar 18 2024  1:59PM    < end of copied text >

## 2024-03-19 NOTE — PROGRESS NOTE ADULT - ASSESSMENT
Assessment:  63 yo male with no PMHx transferred from [place] s/p fall in the afternoon found to have B/L SDH in the frontal-parietal area s/p b/l craniotomies on 3/12/24.   - POD 7  - Exam intact  - Incision C/D/I      Plan:  - Discussed with Dr. Polanco  - b/l [facility] embo today with Dr. Weber  - [facility] in am s/p [facility] embo  - Q4 hour neuro checks  - SBP goal normotensive   - AED: keppra 500 BID  - DVT prophylaxis: SCDs, lovenox 40   - Pain control PRN: tylenol, cyclobenzaprine   - Vegan diet  - Last BM 3/18, on miralax   - MVI, vit C per nutrition  - PT/OT, PM&R recommending acute rehab, likely ok for d/c tomorrow 3/20 pending course

## 2024-03-19 NOTE — PROGRESS NOTE ADULT - SUBJECTIVE AND OBJECTIVE BOX
Patient fatigued.  Reports no headaches.  Reports no pain.     FUNCTIONAL PROGRESS  3/18 PT  Gait Training  Gait Training: minimum assist (75% patient effort);  1 person assist;  verbal cues;  full weight-bearing   rolling walker;  25 feet  Gait Analysis: 3-point gait   decreased stride length;  shuffling;  cognitive, decreased safety awareness;  25 feet;  rolling walker    3/18 SLP  Speech Language Pathology Recommendations: 1. Regular solids/thin liquids as tolerated 2. Aspiration precautions 3. Crush meds PRN 4. Will follow to check diet tolerance     3/18 OT  Bed Mobility  Bed Mobility Training Sit-to-Supine: stand-by assist  Bed Mobility Training Supine-to-Sit: stand-by assist    Bed-Chair Transfer Training  Transfer Training Bed-to-Chair Transfer: minimum assist (75% patient effort);  rolling walker  Transfer Training Chair-to-Bed Transfer: minimum assist (75% patient effort);  rolling walker    Sit-Stand Transfer Training  Transfer Training Sit-to-Stand Transfer: minimum assist (75% patient effort);  rolling walker  Transfer Training Stand-to-Sit Transfer: minimum assist (75% patient effort);  rolling walker    Therapeutic Exercise  Therapeutic Exercise Detail: copying UE ROM movement with bilat UE in open chain feedback; mild dysmetria noted with left UE.     VITALS  T(C): 36.8 (03-19-24 @ 05:31), Max: 37.1 (03-18-24 @ 13:00)  HR: 79 (03-19-24 @ 05:31) (78 - 83)  BP: 128/65 (03-19-24 @ 05:31) (109/69 - 143/77)  RR: 18 (03-19-24 @ 05:31) (16 - 18)  SpO2: 97% (03-19-24 @ 05:31) (97% - 98%)  Wt(kg): --    MEDICATIONS   acetaminophen     Tablet .. 650 milliGRAM(s) every 6 hours PRN  ascorbic acid 500 milliGRAM(s) daily  ceFAZolin  Injectable. 1000 milliGRAM(s) once  cyclobenzaprine 5 milliGRAM(s) three times a day PRN  enoxaparin Injectable 40 milliGRAM(s) <User Schedule>  levETIRAcetam 500 milliGRAM(s) every 12 hours  multiple electrolytes Injection Type 1 1000 milliLiter(s) <Continuous>  multivitamin 1 Tablet(s) daily      RECENT LABS/IMAGING  - Reviewed Today                        12.4   6.60  )-----------( 269      ( 19 Mar 2024 04:00 )             37.2     03-19    137  |  103  |  10.0  ----------------------------<  101<H>  4.0   |  23.0  |  0.68    Ca    8.5      19 Mar 2024 04:00  Phos  3.3     03-19  Mg     2.0     03-19    TPro  6.1<L>  /  Alb  3.3  /  TBili  0.4  /  DBili  x   /  AST  14  /  ALT  26  /  AlkPhos  97  03-19    PT/INR - ( 18 Mar 2024 04:50 )   PT: 12.2 sec;   INR: 1.10 ratio         PTT - ( 18 Mar 2024 04:50 )  PTT:27.5 sec  Urinalysis Basic - ( 19 Mar 2024 04:00 )    Color: x / Appearance: x / SG: x / pH: x  Gluc: 101 mg/dL / Ketone: x  / Bili: x / Urobili: x   Blood: x / Protein: x / Nitrite: x   Leuk Esterase: x / RBC: x / WBC x   Sq Epi: x / Non Sq Epi: x / Bacteria: x              CTA HEAD/NECK - No proximal large vessel occlusion, aneurysm, dissection, or hemodynamically flow-limiting stenosis identified in the head and neck.    CT CERVICAL SPINE - No evidence of acute cervical spine fracture or spondylolisthesis.    CT L SPINE - Tiny LEFT foraminal disc herniations at L3-4 and L4-5 narrow the LEFT neural foramina at these levels. Mild disc bulges at L4-5 and L5-S1 flatten the ventral thecal sac and narrow the BILATERAL neural foramina.   No vertebral fracture is recognized.    MRI BRAIN - unchanged BILATERAL acute subdural hematomas, measuring 1.6 on the RIGHT and 1.7 on the LEFT with mass effect on the brain but no midline shift.    MR C, T, L SPINE -  narrowing of the RIGHT C5-6 neural foramen due to uncovertebral spurring. 2.  Thoracic spine:   Unremarkable MR of the thoracic spine.  Lumbar spine:   Tiny LEFT foraminal disc herniations at L3-4 and L4-5 narrow the LEFT neural foramina at these levels. Mild disc bulges at L4-5 and L5-S1 flatten the ventral thecal sac and narrow the BILATERAL neural foramina.  Upon review, edema is noted within the coccyx which may indicate fracture or bone contusion.    HEAD CT 3/15 - Persistent unchanged BILATERAL subdural collections following drain removal, LEFT measuring 1.5 cm and RIGHT measuring 6 mm. Persistent pneumocephalus.   There is mild mass effect on the LEFT hemisphere with subfalcine herniation to the LEFT unchanged. Mild effacement of the posterior LEFT lateral ventricle.    HEAD CT 3/18 - 1.  Improving subdural fluid collections, without interval rebleeding.  ----------------------------------------------------------------------------------------  PHYSICAL EXAM  Constitutional - NAD, Comfortable   Extremities - No C/C/E, No calf tenderness   Neurologic Exam -                    Cognitive - AAO to self, PART situation     Communication - Expressive deficits, +dysarthria     Cranial Nerves - CN 2-12 intact     FUNCTIONAL MOTOR EXAM - Slight right UE sided weakness     Sensory - Intact to LT  Psychiatric - Mood stable, Fatigued  ----------------------------------------------------------------------------------------  ASSESSMENT/PLAN  64yMale with functional deficits after a fall sustaining bilateral SDH  Traumatic bilateral SDHs s/p craniotomies - Keppra  Pain - Tylenol, RECOMMEND DC Flexeril  DVT PPX - SCDs  Rehab/Impaired mobility and function - Patient continues to require hospitalization for the above diagnoses and ongoing active management of comorbid complications (pending MMA embolizations) that are substantially impairing functional ability and impairing quality of life.     RECOMMEND - OOB daily    When medically optimized, based on the patient's diagnosis, current functional status and potential for progress, recommend ACUTE inpatient rehabilitation for the functional deficits consisting of 3 hours of therapy/day & 24 hour RN/daily PMR physician for comorbid medical management. Patient will be able to tolerate 3 hours a day.     Will continue to follow. Rehab recommendations are dependent on how functional progress changes as well as how patient continues to participate and tolerate therapeutic interventions, which may change. Recommend ongoing mobilization by staff to maintain cardiopulmonary function and prevention of secondary complications related to debility. Discussed the specific management and recommendations above with rehab clinical care team/rehab liaison.

## 2024-03-19 NOTE — CHART NOTE - NSCHARTNOTEFT_GEN_A_CORE
Neurointerventional Surgery Post Procedure Note    Procedure: Selective Cerebral Angiography     Pre- Procedure Diagnosis: B/L SDH s/p craniotomies   Post- Procedure Diagnosis: B/L MMA embolization with coils and particles, L ICA aneurysm measuring 4 mm     : Dr. Tia MD  Nurse Practitioner: Mikala Michele NP   Nurse: RA GOMEZ  Anesthesiologist: Dr. Calderon and KEYSHA Mckeon Addis                                            Radiology Tech: Krishna   Fellow: Alek Corona DO     Sheath:  6  BMX [place] Sheath    I/Os: estimated blood loss less than 10cc,  IV fluids 500 cc, Urine output 0 cc, Contrast: Omnipaque 240 100  cc,    Vitals:  /80  HR  54 Spo2 100  %    Preliminary Report:  Under a 6 BMX [place]  sheath via the right groin under general anesthesia via left internal carotid artery, left external carotid artery, right internal carotid artery, right external carotid artery, a selective cerebral angiography  was performed and reveals B/L MMA embolization with coils and particles, L ICA aneurysm measuring 4 mm . ( Official note to follow).    Patient tolerated procedure well.  Patient remains hemodynamically stable, no change in neurological status compared to baseline.  Results were discussed with patient, patient's family and Neurosurgery.  Right groin sheath  was discontinued at 1332 with vascade closure device. Hemostasis was obtained with approximately 8 minutes of manual compression.     No active bleeding, no hematoma, no ecchymosis.   Quick clot and safeguard balloon dressing applied at 1340  Patient transferred to recovery room in stable condition.

## 2024-03-19 NOTE — CHART NOTE - NSCHARTNOTEFT_GEN_A_CORE
Neurointerventional Surgery  Pre-Procedure Note     This is a 64y ____ hand dominant Male    HPI: 63 yo male with no PMHx transferred from [place] s/p fall in the afternoon found to have B/L SDH in the frontal-parietal area. [place]8 [place] [facility] [place] was found to have Bilateral Mixed density SDH. 3/12 Bilateral Craniotomies. Patient now presenting to Neuro IR for b/l MMA embolization with Dr. Weber.     Allergies: Allergy Status Unknown    PMH/PSH:  PAST MEDICAL & SURGICAL HISTORY:  No pertinent past medical history    Current Medications:   acetaminophen     Tablet .. 650 milliGRAM(s) Oral every 6 hours PRN  ascorbic acid 500 milliGRAM(s) Oral daily  ceFAZolin  Injectable. 1000 milliGRAM(s) IV Push once  cyclobenzaprine 5 milliGRAM(s) Oral three times a day PRN  enoxaparin Injectable 40 milliGRAM(s) SubCutaneous <User Schedule>  levETIRAcetam 500 milliGRAM(s) Oral every 12 hours  multiple electrolytes Injection Type 1 1000 milliLiter(s) IV Continuous <Continuous>  multivitamin 1 Tablet(s) Oral daily      Physical Exam:  Constitutional: NAD, lying in bed  Neuro  * Mental Status:  GCS 15: Awake, alert, oriented to conversation. No aphasia or difficulty speaking. No dysarthria. Able to name objects and their function.  * Cranial Nerves: Cnii-Cnxii grossly intact. PERRL, EOMI, tongue midline, no gaze deviation  * Motor: RUE 5/5, LUE 5/5, RLE 5/5, LLE 5/5, no drift or dysmetria  * Sensory: Sensation intact to light touch  * Reflexes: not assessed   Cardiovascular: Regular rate and rhythm.  Eyes: See neurologic examination with detailed examination of eyes.  ENT: No JVD, Trachea Midline  Respiratory: non labored breathing   Gastrointestinal: Soft, nontender, nondistended.  Genitourinary: [ ] Delgadillo, [ x ] No Delgadillo.   Musculoskeletal: No muscle wasting noted, (See neurologic assessment for full muscle strength assessment) No pretibial edema appreciated, no appreciable calf tenderness.  Skin:  no wounds, no redness, no abrasions noted  Hematologic / Lymph / Immunologic: No bleeding from IV sites or wounds, No lymphadenopathy, No Hives or allergic type skin lesions    NIH SS:  DATE:  TIME:  1A: Level of consciousness (0-3): 0  1B: Questions (0-2): 0    1C: Commands (0-2): 0  2: Gaze (0-2): 0  3: Visual fields (0-3): 0  4: Facial palsy (0-3): 0  MOTOR:  5A: Left arm motor drift (0-4): 0  5B: Right arm motor drift (0-4): 0  6A: Left leg motor drift (0-4): 0  6B: Right leg motor drift (0-4): 0  7: Limb ataxia (0-2): 0  SENSORY:  8: Sensation (0-2): 0  SPEECH:  9: Language (0-3): 0  10: Dysarthria (0-2): 0  EXTINCTION:  11: Extinction/inattention (0-2): 0    TOTAL SCORE:     Labs:                         12.4   6.60  )-----------( 269      ( 19 Mar 2024 04:00 )             37.2       03-19    137  |  103  |  10.0  ----------------------------<  101<H>  4.0   |  23.0  |  0.68    Ca    8.5      19 Mar 2024 04:00  Phos  3.3     03-19  Mg     2.0     03-19    TPro  6.1<L>  /  Alb  3.3  /  TBili  0.4  /  DBili  x   /  AST  14  /  ALT  26  /  AlkPhos  97  03-19    PT/INR - ( 18 Mar 2024 04:50 )   PT: 12.2 sec;   INR: 1.10 ratio     PTT - ( 18 Mar 2024 04:50 )  PTT:27.5 sec    Assessment/Plan:   This is a 64y  year old Male  presents with b/l SDH. Patient presents to neuro-IR for selective cerebral angiography with b/l MMA embolization.     Procedure, goals, risks, benefits and alternatives  were discussed with patient and (patient's family).  All questions were answered.  Risks include but are not limited to stroke, vessel injury, hemorrhage, and or groin hematoma.  Patient demonstrates understanding  of all risks involved with this procedure and wishes to continue.   Appropriate  consent was obtained from patient and consent is in the patient's chart. Neurointerventional Surgery  Pre-Procedure Note      HPI: 63 yo male with no PMHx transferred from [place] s/p fall in the afternoon found to have B/L SDH in the frontal-parietal area. 3/8 [place] [facility] [place] and was found to have Bilateral Mixed density SDH. 3/12 Bilateral Craniotomies. Patient now presenting to Neuro IR for b/l MMA embolization with Dr. Weber.     Allergies: Allergy Status Unknown    PMH/PSH:  PAST MEDICAL & SURGICAL HISTORY:  No pertinent past medical history    Current Medications:   acetaminophen     Tablet .. 650 milliGRAM(s) Oral every 6 hours PRN  ascorbic acid 500 milliGRAM(s) Oral daily  ceFAZolin  Injectable. 1000 milliGRAM(s) IV Push once  cyclobenzaprine 5 milliGRAM(s) Oral three times a day PRN  enoxaparin Injectable 40 milliGRAM(s) SubCutaneous <User Schedule>  levETIRAcetam 500 milliGRAM(s) Oral every 12 hours  multiple electrolytes Injection Type 1 1000 milliLiter(s) IV Continuous <Continuous>  multivitamin 1 Tablet(s) Oral daily      Physical Exam:  Constitutional: NAD, lying in bed  Neuro  * Mental Status:  GCS 15: Awake, alert, oriented to conversation. No aphasia or difficulty speaking. No dysarthria. Able to name objects and their function.  * Cranial Nerves: Cnii-Cnxii grossly intact. PERRL, EOMI, tongue midline, no gaze deviation  * Motor: RUE 5/5, LUE 5/5, RLE 5/5, LLE 5/5, no drift or dysmetria  * Sensory: Sensation intact to light touch  * Reflexes: not assessed   Cardiovascular: Regular rate and rhythm  Eyes: See neurologic examination with detailed examination of eyes.  ENT: No JVD, Trachea Midline  Respiratory: non labored breathing   Gastrointestinal: Soft, nontender, nondistended.  Genitourinary: [ ] Delgadillo, [ x ] No Delgadillo.   Musculoskeletal: No muscle wasting noted, (See neurologic assessment for full muscle strength assessment)   Skin:  no wounds, no redness, no abrasions noted  Hematologic / Lymph / Immunologic: No bleeding from IV sites or wounds    NIH SS:  DATE: 3/19/24  TIME: 11:00   1A: Level of consciousness (0-3): 0  1B: Questions (0-2): 0    1C: Commands (0-2): 0  2: Gaze (0-2): 0  3: Visual fields (0-3): 0  4: Facial palsy (0-3): 0  MOTOR:  5A: Left arm motor drift (0-4): 0  5B: Right arm motor drift (0-4): 0  6A: Left leg motor drift (0-4): 0  6B: Right leg motor drift (0-4): 0  7: Limb ataxia (0-2): 0  SENSORY:  8: Sensation (0-2): 0  SPEECH:  9: Language (0-3): 0  10: Dysarthria (0-2): 0  EXTINCTION:  11: Extinction/inattention (0-2): 0    TOTAL SCORE: 0    Labs:                         12.4   6.60  )-----------( 269      ( 19 Mar 2024 04:00 )             37.2       03-19    137  |  103  |  10.0  ----------------------------<  101<H>  4.0   |  23.0  |  0.68    Ca    8.5      19 Mar 2024 04:00  Phos  3.3     03-19  Mg     2.0     03-19    TPro  6.1<L>  /  Alb  3.3  /  TBili  0.4  /  DBili  x   /  AST  14  /  ALT  26  /  AlkPhos  97  03-19    PT/INR - ( 18 Mar 2024 04:50 )   PT: 12.2 sec;   INR: 1.10 ratio     PTT - ( 18 Mar 2024 04:50 )  PTT:27.5 sec    Assessment/Plan:   This is a 64y  year old Male  presents with b/l SDH. Patient presents to neuro-IR for selective cerebral angiography with b/l MMA embolization.     Procedure, goals, risks, benefits and alternatives  were discussed with patient.  All questions were answered.  Risks include but are not limited to stroke, vessel injury, hemorrhage, and or groin hematoma.  Patient demonstrates understanding  of all risks involved with this procedure and wishes to continue. Appropriate  consent was obtained from patient and consent is in the patient's chart.

## 2024-03-20 PROCEDURE — 99233 SBSQ HOSP IP/OBS HIGH 50: CPT | Mod: 25

## 2024-03-20 PROCEDURE — 70450 CT HEAD/BRAIN W/O DYE: CPT | Mod: 26

## 2024-03-20 PROCEDURE — 96116 NUBHVL XM PHYS/QHP 1ST HR: CPT

## 2024-03-20 RX ORDER — ASCORBIC ACID 60 MG
1 TABLET,CHEWABLE ORAL
Qty: 0 | Refills: 0 | DISCHARGE
Start: 2024-03-20

## 2024-03-20 RX ORDER — LEVETIRACETAM 250 MG/1
1 TABLET, FILM COATED ORAL
Qty: 0 | Refills: 0 | DISCHARGE
Start: 2024-03-20

## 2024-03-20 RX ORDER — CYCLOBENZAPRINE HYDROCHLORIDE 10 MG/1
1 TABLET, FILM COATED ORAL
Qty: 0 | Refills: 0 | DISCHARGE
Start: 2024-03-20

## 2024-03-20 RX ORDER — POLYETHYLENE GLYCOL 3350 17 G/17G
17 POWDER, FOR SOLUTION ORAL
Qty: 0 | Refills: 0 | DISCHARGE
Start: 2024-03-20

## 2024-03-20 RX ORDER — ENOXAPARIN SODIUM 100 MG/ML
40 INJECTION SUBCUTANEOUS
Qty: 0 | Refills: 0 | DISCHARGE
Start: 2024-03-20

## 2024-03-20 RX ADMIN — POLYETHYLENE GLYCOL 3350 17 GRAM(S): 17 POWDER, FOR SOLUTION ORAL at 09:42

## 2024-03-20 RX ADMIN — Medication 500 MILLIGRAM(S): at 09:41

## 2024-03-20 RX ADMIN — Medication 650 MILLIGRAM(S): at 21:22

## 2024-03-20 RX ADMIN — ENOXAPARIN SODIUM 40 MILLIGRAM(S): 100 INJECTION SUBCUTANEOUS at 21:22

## 2024-03-20 RX ADMIN — Medication 650 MILLIGRAM(S): at 22:22

## 2024-03-20 RX ADMIN — LEVETIRACETAM 500 MILLIGRAM(S): 250 TABLET, FILM COATED ORAL at 05:45

## 2024-03-20 RX ADMIN — Medication 1 TABLET(S): at 09:41

## 2024-03-20 RX ADMIN — LEVETIRACETAM 500 MILLIGRAM(S): 250 TABLET, FILM COATED ORAL at 17:37

## 2024-03-20 NOTE — PROGRESS NOTE ADULT - SUBJECTIVE AND OBJECTIVE BOX
Patient reports he feels well.  Denies pain.  Patient is s/p B/L MMA embolization with coils and particles, L ICA aneurysm measuring 4 mm.   FUNCTIONAL PROGRESS  3/18 PT  Gait Training  Gait Training: minimum assist (75% patient effort);  1 person assist;  verbal cues;  full weight-bearing   rolling walker;  25 feet  Gait Analysis: 3-point gait   decreased stride length;  shuffling;  cognitive, decreased safety awareness;  25 feet;  rolling walker    3/18 SLP  Speech Language Pathology Recommendations: 1. Regular solids/thin liquids as tolerated 2. Aspiration precautions 3. Crush meds PRN 4. Will follow to check diet tolerance     3/18 OT  Bed Mobility  Bed Mobility Training Sit-to-Supine: stand-by assist  Bed Mobility Training Supine-to-Sit: stand-by assist    Bed-Chair Transfer Training  Transfer Training Bed-to-Chair Transfer: minimum assist (75% patient effort);  rolling walker  Transfer Training Chair-to-Bed Transfer: minimum assist (75% patient effort);  rolling walker    Sit-Stand Transfer Training  Transfer Training Sit-to-Stand Transfer: minimum assist (75% patient effort);  rolling walker  Transfer Training Stand-to-Sit Transfer: minimum assist (75% patient effort);  rolling walker    Therapeutic Exercise  Therapeutic Exercise Detail: copying UE ROM movement with bilat UE in open chain feedback; mild dysmetria noted with left UE.       VITALS  T(C): 36.7 (03-20-24 @ 05:45), Max: 36.8 (03-19-24 @ 20:00)  HR: 69 (03-20-24 @ 05:45) (52 - 84)  BP: 117/75 (03-20-24 @ 05:45) (106/59 - 152/69)  RR: 18 (03-20-24 @ 05:45) (17 - 19)  SpO2: 97% (03-20-24 @ 05:45) (97% - 100%)  Wt(kg): --    MEDICATIONS   acetaminophen     Tablet .. 650 milliGRAM(s) every 6 hours PRN  ascorbic acid 500 milliGRAM(s) daily  cyclobenzaprine 5 milliGRAM(s) three times a day PRN  enoxaparin Injectable 40 milliGRAM(s) <User Schedule>  levETIRAcetam 500 milliGRAM(s) every 12 hours  multiple electrolytes Injection Type 1 1000 milliLiter(s) <Continuous>  multivitamin 1 Tablet(s) daily  polyethylene glycol 3350 17 Gram(s) daily      RECENT LABS/IMAGING  - Reviewed Today                        12.4   6.60  )-----------( 269      ( 19 Mar 2024 04:00 )             37.2     03-19    137  |  103  |  10.0  ----------------------------<  101<H>  4.0   |  23.0  |  0.68    Ca    8.5      19 Mar 2024 04:00  Phos  3.3     03-19  Mg     2.0     03-19    TPro  6.1<L>  /  Alb  3.3  /  TBili  0.4  /  DBili  x   /  AST  14  /  ALT  26  /  AlkPhos  97  03-19      Urinalysis Basic - ( 19 Mar 2024 04:00 )    Color: x / Appearance: x / SG: x / pH: x  Gluc: 101 mg/dL / Ketone: x  / Bili: x / Urobili: x   Blood: x / Protein: x / Nitrite: x   Leuk Esterase: x / RBC: x / WBC x   Sq Epi: x / Non Sq Epi: x / Bacteria: x              CTA HEAD/NECK - No proximal large vessel occlusion, aneurysm, dissection, or hemodynamically flow-limiting stenosis identified in the head and neck.    CT CERVICAL SPINE - No evidence of acute cervical spine fracture or spondylolisthesis.    CT L SPINE - Tiny LEFT foraminal disc herniations at L3-4 and L4-5 narrow the LEFT neural foramina at these levels. Mild disc bulges at L4-5 and L5-S1 flatten the ventral thecal sac and narrow the BILATERAL neural foramina.   No vertebral fracture is recognized.    MRI BRAIN - unchanged BILATERAL acute subdural hematomas, measuring 1.6 on the RIGHT and 1.7 on the LEFT with mass effect on the brain but no midline shift.    MR C, T, L SPINE -  narrowing of the RIGHT C5-6 neural foramen due to uncovertebral spurring. 2.  Thoracic spine:   Unremarkable MR of the thoracic spine.  Lumbar spine:   Tiny LEFT foraminal disc herniations at L3-4 and L4-5 narrow the LEFT neural foramina at these levels. Mild disc bulges at L4-5 and L5-S1 flatten the ventral thecal sac and narrow the BILATERAL neural foramina.  Upon review, edema is noted within the coccyx which may indicate fracture or bone contusion.    HEAD CT 3/15 - Persistent unchanged BILATERAL subdural collections following drain removal, LEFT measuring 1.5 cm and RIGHT measuring 6 mm. Persistent pneumocephalus.   There is mild mass effect on the LEFT hemisphere with subfalcine herniation to the LEFT unchanged. Mild effacement of the posterior LEFT lateral ventricle.    HEAD CT 3/18 - 1.  Improving subdural fluid collections, without interval rebleeding.    HEAD CT 3/20 - 1.  Status post middle meningeal artery embolization and anterior parietal craniotomies bilaterally.  A small amount of postoperative pneumocephalus is decreased from 03/18/2024.2.  Residual subdural collections overlying  both frontoparietal convexities, trace subdural hemorrhage along the cerebral falx and a low-attenuation subdural collection overlying the right cerebellar hemisphere are stable in size from 03/18/2024.3.  Regional mass effect in both cerebral convexities with 3 mm of midline shift to the right is stable. 4.  No new intracranial findings.  ----------------------------------------------------------------------------------------  PHYSICAL EXAM  Constitutional - NAD, Comfortable   Extremities - No C/C/E, No calf tenderness   Neurologic Exam -                    Cognitive - AAO to self, PART situation     Communication - Expressive deficits, +dysarthria     FUNCTIONAL MOTOR EXAM - Slight right UE sided weakness     Sensory - Intact to LT  Psychiatric - Mood stable, Fatigued  ----------------------------------------------------------------------------------------  ASSESSMENT/PLAN  64yMale with functional deficits after a fall sustaining bilateral SDH  Traumatic bilateral SDHs s/p craniotomies & B/L MMA embolization - Keppra  Pain - Tylenol, RECOMMEND DC Flexeril  DVT PPX - SCDs, Lovenox  Rehab/Impaired mobility and function - Patient continues to require hospitalization for the above diagnoses and ongoing active management of comorbid complications (functional and cognitive deficits) that are substantially impairing functional ability and impairing quality of life.     RECOMMEND - OOB daily    When medically optimized, based on the patient's diagnosis, current functional status and potential for progress, recommend ACUTE inpatient rehabilitation for the functional deficits consisting of 3 hours of therapy/day & 24 hour RN/daily PMR physician for comorbid medical management. Patient will be able to tolerate 3 hours a day.     Will continue to follow. Rehab recommendations are dependent on how functional progress changes as well as how patient continues to participate and tolerate therapeutic interventions, which may change. Recommend ongoing mobilization by staff to maintain cardiopulmonary function and prevention of secondary complications related to debility. Discussed the specific management and recommendations above with rehab clinical care team/rehab liaison.

## 2024-03-20 NOTE — CONSULT NOTE ADULT - SUBJECTIVE AND OBJECTIVE BOX
Name: CHAU AMES  Age: 64y  Gender: Male  Admitting Diagnosis: Traumatic subdural hemorrhage with loss of consciousness status unknown, initial encounter [S06.5XAA]  TRAUM SUBDR HEM WITH LOC STATUS UNKNOWN, INITIAL ENCOUNTER    Current Diagnoses:   No pertinent past medical history  Traumatic subdural hematoma  Evacuation of bilateral subdural hematomas by craniotomy    HPI Per Record: 64yM was admitted on 03-08 from [place] after found to have bilateral SDH after a fall.  He is s/p bilateral craniotomies for subdural evacuation on 3/12.     Reason for Referral: To assess the nature and extent of any cognitive weaknesses and to inform treatment planning.    Current Medications: acetaminophen     Tablet .. 650 milliGRAM(s) Oral every 6 hours PRN Mild Pain (1 - 3), Moderate Pain (4 - 6)  ascorbic acid 500 milliGRAM(s) Oral daily  enoxaparin Injectable 40 milliGRAM(s) SubCutaneous <User Schedule>  levETIRAcetam 500 milliGRAM(s) Oral every 12 hours  multivitamin 1 Tablet(s) Oral daily  polyethylene glycol 3350 17 Gram(s) Oral daily    Neuroimaging:   CT Head No Cont: 03/14/2024    The brain demonstrates slight decrease in the size of the BILATERAL   subdural collections now measuring 1.2 cm on the LEFT and 1.7 mm on the   RIGHT. Pneumocephalus is noted with subdural drains. Mild mass effect on   the hemispheres but no midline shift.   No acute cerebral cortical   infarct is seen.  The ventricles are normal in size.    IMPRESSION:   Slight decrease in the size of the BILATERAL subdural   collections now measuring 1.2 cm on the LEFT and 1.7 mm on the RIGHT.   Pneumocephalus is noted with subdural drains. Mild mass effect on the   hemispheres but no midline shift.    CT Head No Cont: 03/15/2024    Bilateral frontal angelina holes are identified through which asubdural   catheters extend into the subdural space. There are bilateral low density   extra-axial collections left greater than right with mild mass effect and   mild midline shift to the right. Postoperative air is identified in the   frontal subdural space. There is slightly less postoperative air compared   to the previous exam. A small anterior parafalcine subdural hematoma is   unchanged. There is no evidence of herniation. The ventricles are not   enlarged.    IMPRESSION: Is slightly decreased postoperative air after drainage of   bilateral frontal subdural hematomas. Bilateral subdural catheters   extending into low density extra-axial collections left greater than   right with mild midline shift to the right. Small acute parafalcine   subdural hematoma unchanged..    CT Head No Cont: 03/15/2024    The brain demonstrates persistent unchanged BILATERAL subdural   collections following drain removal, LEFT measuring 1.5 cm and RIGHT   measuring 6 mm. Persistent pneumocephalus.   No acute cerebral cortical   infarct is seen. There is mild mass effect on the LEFT hemisphere with   subfalcine herniation to the LEFT unchanged. Mild effacement of the   posterior LEFT lateral ventricle.    IMPRESSION:   Persistent unchanged BILATERAL subdural collections   following drain removal, LEFT measuring 1.5 cm and RIGHT measuring 6 mm.   Persistent pneumocephalus.   There is mild mass effect on the LEFT   hemisphere with subfalcine herniation to the LEFTunchanged. Mild   effacement of the posterior LEFT lateral ventricle.    CT Head No Cont: 03/18/2024    Bilateral frontal craniotomies are noted, with residual subdural fluid   collections measuring 8 mm on the right and 9 mm on the left. There is a   small amount of layering nondependent subdural gas anteriorly. These have   improved compared to the prior exams. Midline shift has resolved. There   is no interval rebleeding.    Cerebral volume is within normal limits. Mild nonspecific low attenuation   in the periventricular and subcortical white matter.    No acute intracranial hemorrhage. No midline shift or herniation. No CT   evidence of acute territorial infarction, although MRI with DWI would be   more sensitive.    IMPRESSION:  1.  Improving subdural fluid collections, without interval rebleeding.    CT Head No Cont: 03/20/2024    IMPRESSION:  1.  Status post middle meningeal artery embolization and anterior   parietal craniotomies bilaterally.  A small amount of postoperative   pneumocephalus is decreased from 03/18/2024.  2.  Residual subdural collections overlying  both frontoparietal   convexities, trace subdural hemorrhage along the cerebral falx and a   low-attenuation subdural collection overlying the right cerebellar   hemisphere are stable in size from 03/18/2024.  3.  Regional mass effect in both cerebral convexities with 3 mm of   midline shift to the right is stable.   4.  No new intracranial findings.    Clinical Interview: Patient expressed comfort in English, and for this reason, the current session was conducted in English. Pt stated that     Social History: Pt stated that he previously worked as a  in [place].     MSE:  Appearance-  Behavior-  Motor-  Affect-  Mood-  Speech -  Thought Process-  Thought Content-   Effort -     Measures Administered:    Results Summary:           Name: CHAU AMES  Age: 64y  Gender: Male  Admitting Diagnosis: Traumatic subdural hemorrhage with loss of consciousness status unknown, initial encounter [S06.5XAA]  TRAUM SUBDR HEM WITH LOC STATUS UNKNOWN, INITIAL ENCOUNTER    Current Diagnoses:   No pertinent past medical history  Traumatic subdural hematoma  Evacuation of bilateral subdural hematomas by craniotomy    HPI Per Record: 64yM was admitted on  from [place] after found to have bilateral SDH after a fall.  He is s/p bilateral craniotomies for subdural evacuation on 3/12.     Reason for Referral: To assess the nature and extent of any cognitive weaknesses and to inform treatment planning.    Current Medications: acetaminophen     Tablet .. 650 milliGRAM(s) Oral every 6 hours PRN Mild Pain (1 - 3), Moderate Pain (4 - 6)  ascorbic acid 500 milliGRAM(s) Oral daily  enoxaparin Injectable 40 milliGRAM(s) SubCutaneous <User Schedule>  levETIRAcetam 500 milliGRAM(s) Oral every 12 hours  multivitamin 1 Tablet(s) Oral daily  polyethylene glycol 3350 17 Gram(s) Oral daily    Neuroimaging:   CT head No Cont (2024)  IMPRESSION:  Bilateral mixed attenuation subdural collections, measuring up to 1.5 cm   in thickness on the right and up to 1.7 cm on the left. No   intraventricular extension of hemorrhage or midline shift. Findings are   consistent with acute on chronic subdural hematoma.    MRI of brain (2024)  IMPRESSION: Unchanged BILATERAL acute subdural hematomas, measuring 1.6 on   the RIGHT and 1.7 on the LEFT with mass effect on the brain but no   midline shift.    CT Head No Cont: 2024    IMPRESSION:   Slight decrease in the size of the BILATERAL subdural   collections now measuring 1.2 cm on the LEFT and 1.7 mm on the RIGHT.   Pneumocephalus is noted with subdural drains. Mild mass effect on the   hemispheres but no midline shift.    CT Head No Cont: 03/15/2024    IMPRESSION: Is slightly decreased postoperative air after drainage of   bilateral frontal subdural hematomas. Bilateral subdural catheters   extending into low density extra-axial collections left greater than   right with mild midline shift to the right. Small acute parafalcine   subdural hematoma unchanged..    CT Head No Cont: 03/15/2024    IMPRESSION:   Persistent unchanged BILATERAL subdural collections   following drain removal, LEFT measuring 1.5 cm and RIGHT measuring 6 mm.   Persistent pneumocephalus.   There is mild mass effect on the LEFT   hemisphere with subfalcine herniation to the LEFTunchanged. Mild   effacement of the posterior LEFT lateral ventricle.    CT Head No Cont: 2024    IMPRESSION:  1.  Improving subdural fluid collections, without interval rebleeding.    CT Head No Cont: 2024    IMPRESSION:  1.  Status post middle meningeal artery embolization and anterior   parietal craniotomies bilaterally.  A small amount of postoperative   pneumocephalus is decreased from 2024.  2.  Residual subdural collections overlying  both frontoparietal   convexities, trace subdural hemorrhage along the cerebral falx and a   low-attenuation subdural collection overlying the right cerebellar   hemisphere are stable in size from 2024.  3.  Regional mass effect in both cerebral convexities with 3 mm of   midline shift to the right is stable.   4.  No new intracranial findings.    Clinical Interview: Patient expressed comfort in English, and for this reason, the current session was conducted in English. Pt stated that he was hospitalized for a fall. With cueing, he identified that he experienced a bleed. He denied any cognitive difficulties since his hospitalization. He stated that he has been experiencing sadness since his hospitalization, and cries more easily than usual. He also endorsed worry. Denied any prior psychiatric history. Pt denied active suicidal ideation, plan, or intent.     Social History: Pt stated that he previously worked as a  in [place]. He reportedly resides with his ex-wife, his brother-in-law, and son.     MSE:  Appearance- Appropriately dressed and groomed.  Behavior- Polite and cooperative.  Affect- Appropriately tearful.   Mood- Dysphoric.   Speech - WNL.   Thought Process- Linear, goal-oriented.  Thought Content- WNL.    Measures Administered: GOAT, clock drawing test, DRS-2 [place] copy. Given that the current session was not held in the patient's primary language, results were interpreted cautiously.     Results Summary: Pt correctly stated his name, , home address, the month, and year. His responses were perseverative on a measure of orientation (e.g., stating "8" and "24" across different items). He incorrectly stated his age, the time, his date of hospital admission, and was unable to recall the events that occurred immediately before his fall. He recalled being taken to the hospital by his wife and daughter. He incorrectly stated that he was located in [facility], and stated that the hospital location was "[facility]." Patient's drawing of a clock was highly perseverative. He was unable to draw the hands of the clock. His copy of a set of ramparts was completed inattentively. Comprehension of simple commands was intact. Comprehension of multi-step commands was weak.

## 2024-03-20 NOTE — DISCHARGE NOTE PROVIDER - NSDCCPCAREPLAN_GEN_ALL_CORE_FT
PRINCIPAL DISCHARGE DIAGNOSIS  Diagnosis: Traumatic subdural hematoma  Assessment and Plan of Treatment:

## 2024-03-20 NOTE — DISCHARGE NOTE PROVIDER - NSDCMRMEDTOKEN_GEN_ALL_CORE_FT
ascorbic acid 500 mg oral tablet: 1 tab(s) orally once a day  cyclobenzaprine 5 mg oral tablet: 1 tab(s) orally 3 times a day As needed Muscle Spasm  enoxaparin: 40mg  subcut once per day  levETIRAcetam 500 mg oral tablet: 1 tab(s) orally every 12 hours  Multiple Vitamins oral tablet: 1 tab(s) orally once a day  polyethylene glycol 3350 oral powder for reconstitution: 17 gram(s) orally once a day   ascorbic acid 500 mg oral tablet: 1 tab(s) orally once a day  cyclobenzaprine 5 mg oral tablet: 1 tab(s) orally 3 times a day As needed Muscle Spasm  enoxaparin: 40 milligram(s) subcutaneous once a day  Multiple Vitamins oral tablet: 1 tab(s) orally once a day  polyethylene glycol 3350 oral powder for reconstitution: 17 gram(s) orally once a day

## 2024-03-20 NOTE — DISCHARGE NOTE PROVIDER - NSDCACTIVITY_GEN_ALL_CORE
Walking - Indoors allowed/Walking - Outdoors allowed/Follow Instructions Provided by your Surgical Team Do not drive or operate machinery/Walking - Indoors allowed/No heavy lifting/straining/Walking - Outdoors allowed/Follow Instructions Provided by your Surgical Team

## 2024-03-20 NOTE — CONSULT NOTE ADULT - TIME BILLING
Brief clinical interview and brief cognitive screen Brief clinical interview and brief cognitive screen, interpretation of findings, and preparation of consult note.

## 2024-03-20 NOTE — DISCHARGE NOTE PROVIDER - NSDCCPTREATMENT_GEN_ALL_CORE_FT
PRINCIPAL PROCEDURE  Procedure: Evacuation of bilateral subdural hematomas by craniotomy  Findings and Treatment:       SECONDARY PROCEDURE  Procedure: Embolization, artery  Findings and Treatment: middle meningeal bilat     PRINCIPAL PROCEDURE  Procedure: Evacuation of bilateral subdural hematomas by craniotomy  Findings and Treatment:       SECONDARY PROCEDURE  Procedure: Embolization, artery  Findings and Treatment: middle meningeal bilateral

## 2024-03-20 NOTE — PROGRESS NOTE ADULT - REASON FOR ADMISSION
SDH
b/l SDH
bilat subdural hematoma
subdural hematoma
B/l acute on chronic SDH
SDH
b/l SDH
SDH
SDH

## 2024-03-20 NOTE — DISCHARGE NOTE PROVIDER - CARE PROVIDER_API CALL
Patel Polanco  Neurosurgery  [address] [place] [id number]  Phone: [phone]  Fax: [phone]  Follow Up Time: 1 week

## 2024-03-20 NOTE — PROGRESS NOTE ADULT - SUBJECTIVE AND OBJECTIVE BOX
INTERVAL HPI/OVERNIGHT EVENTS:  Post op day # 8 s/p cranio bilat for subdural evacuation, B/l MMA embo post op Day #1  pt was transferred from [place] with hx of fall that moring with neg LOC  yet, pt  had hx multi falls.   Pt sitting in chair he denies headache at this time. Following commands throughout.    MEDICATIONS  (STANDING):  ascorbic acid 500 milliGRAM(s) Oral daily  enoxaparin Injectable 40 milliGRAM(s) SubCutaneous <User Schedule>  levETIRAcetam 500 milliGRAM(s) Oral every 12 hours  multivitamin 1 Tablet(s) Oral daily  polyethylene glycol 3350 17 Gram(s) Oral daily    MEDICATIONS  (PRN):  acetaminophen     Tablet .. 650 milliGRAM(s) Oral every 6 hours PRN Mild Pain (1 - 3), Moderate Pain (4 - 6)  cyclobenzaprine 5 milliGRAM(s) Oral three times a day PRN Muscle Spasm      Allergies  Allergy Status Unknown  Intolerances      Vital Signs Last 24 Hrs  T(C): 37.3 (20 Mar 2024 11:42), Max: 37.3 (20 Mar 2024 11:42)  T(F): 99.2 (20 Mar 2024 11:42), Max: 99.2 (20 Mar 2024 11:42)  HR: 73 (20 Mar 2024 11:42) (52 - 84)  BP: 114/69 (20 Mar 2024 11:42) (106/59 - 152/69)  BP(mean): --  RR: 18 (20 Mar 2024 11:42) (17 - 20)  SpO2: 100% (20 Mar 2024 11:42) (97% - 100%)    Parameters below as of 20 Mar 2024 11:42  Patient On (Oxygen Delivery Method): room air  O2 Flow (L/min): 24   BMI (kg/m2): 28.5 (03-09-24 @ 10:26)      PHYSICAL EXAM  GENERAL: NAD,  HEAD:  Atraumatic, Normocephalic  EYES: EOMI, PERRLA, conjunctiva and sclera clear  ENMT: No tonsillar erythema, exudates, or enlargement; Moist mucous membranes,  NECK: Supple,   NERVOUS SYSTEM:  Alert & Oriented X3, Motor Strength 5/5 B/L upper and lower extremities; DTRs 2+ intact and symmetric  CHEST/LUNG: Clear bilat  HEART: Regular rate and rhythm;   ABDOMEN: Soft, Nontender, Nondistended; Bowel sounds present  EXTREMITIES:  2+ Peripheral Pulses, No edema  BM x1 on 3/19    LABS:                          12.4   6.60  )-----------( 269      ( 19 Mar 2024 04:00 )             37.2     03-19    137  |  103  |  10.0  ----------------------------<  101<H>  4.0   |  23.0  |  0.68    Ca    8.5      19 Mar 2024 04:00  Phos  3.3     03-19  Mg     2.0     03-19    TPro  6.1<L>  /  Alb  3.3  /  TBili  0.4  /  DBili  x   /  AST  14  /  ALT  26  /  AlkPhos  97  03-19      Urinalysis Basic - ( 19 Mar 2024 04:00 )    Color: x / Appearance: x / SG: x / pH: x  Gluc: 101 mg/dL / Ketone: x  / Bili: x / Urobili: x   Blood: x / Protein: x / Nitrite: x   Leuk Esterase: x / RBC: x / WBC x   Sq Epi: x / Non Sq Epi: x / Bacteria: x      I&O's Detail    19 Mar 2024 07:01  -  20 Mar 2024 07:00  --------------------------------------------------------  IN:    multiple electrolytes Injection Type 1.: 600 mL    Oral Fluid: 240 mL  Total IN: 840 mL    OUT:    Voided (mL): 800 mL  Total OUT: 800 mL    Total NET: 40 mL      20 Mar 2024 07:01  -  20 Mar 2024 14:24  --------------------------------------------------------  IN:    Oral Fluid: 180 mL  Total IN: 180 mL    OUT:    Voided (mL): 250 mL  Total OUT: 250 mL    Total NET: -70 mL    RADIOLOGY & ADDITIONAL TESTS:  < from: CT Head No Cont (03.20.24 @ 06:03) >  IMPRESSION:  1.  Status post middle meningeal artery embolization and anterior   parietal craniotomies bilaterally.  A small amount of postoperative   pneumocephalus is decreased from 03/18/2024.  2.  Residual subdural collections overlying  both frontoparietal   convexities, trace subdural hemorrhage along the cerebral falx and a   low-attenuation subdural collection overlying the right cerebellar   hemisphere are stable in size from 03/18/2024.  3.  Regional mass effect in both cerebral convexities with 3 mm of   midline shift to the right is stable.   4.  No new intracranial findings.  --- End of Report ---

## 2024-03-20 NOTE — PROGRESS NOTE ADULT - ASSESSMENT
This is 64ym s/p bilat cranio for evacuation of subdural hematoma. s/p MMA embo  Ct of the brain demonstrated residual subdural collection    Plan  -vitals and neuro q 4  -keppra 500mg q 12  -lovenox for dvt ppx  -plan for discharge to acute rehab Drain -bed pending

## 2024-03-20 NOTE — CONSULT NOTE ADULT - ASSESSMENT
While the current session was completed in the patient's non-primary language, several conclusions can be made. There was evidence of perseverative responding, reduced comprehension for multi-step commands, and disorientation. As such, the patient's presentation suggests possible delirium superimposed on cognitive weaknesses secondary to his recent bilateral subdural hematoma. New-onset sadness and tearfulness may reflect symptoms of poststroke changes in mood. Patient will benefit from continued monitoring from medical team for assessment of cognitive and/or behavioral changes.     Recommendations:   1. Pt will benefit from use of all delirium precautions, including keeping his room well-lit, quiet, and comfortable. He will also benefit from frequent re-orientation cues from staff.   2. All instructions should be provided to the patient using clear, concise language.   3. Should pt continue to exhibit low mood throughout his recovery, he may benefit from mental health treatment, which may include individual psychotherapy and/or pharmacologic treatment of his mood.

## 2024-03-20 NOTE — DISCHARGE NOTE PROVIDER - HOSPITAL COURSE
65 yo male with no PMHx transferred from [place] s/p fall in the afternoon. Patient has been having multiple falls recently, and had a negative cardio and neuro w/u at [place] 1 month ago. Fall today was unwitnessed, +HS, +LOC. On CTH in ED at [place], found to have B/L SDH in the frontal-parietal area. Repeat CTH in ED here re-demonstrates the bleed. Neurosurgery consulted for SDHs. Pt evaluated in ED trauma bay, in [facility], already in [facility]. Pt admitted on 3/8 on  with ct of the brain with subdural hematoma. Pt underwent of b/l cranio for evacuations of subdural hematoma on 3/12/2024. Pt also had a MMA embo bilat on 3/19. Pt post op course was uneventful. Pt did have a ct of the brain on 3/20 which demonstrated bilat residual subdural hematoma. Pt has been on keppra 500mg q 12 for potential prophylaxis. PT lovenox for dvt ppx. Pt was evaluated by the physical therapy team and was recommended for acute rehab.      65 yo male with no PMHx transferred from [place] s/p fall in the afternoon. Patient has been having multiple falls recently, and had a negative cardio and neuro w/u at [place] 1 month ago. Fall today was unwitnessed, +HS, +LOC. On CTH in ED at [place], found to have B/L SDH in the frontal-parietal area. Repeat CTH in ED here re-demonstrates the bleed. Neurosurgery consulted for SDHs. Pt evaluated in ED trauma bay, in [facility], already in [facility]. Pt admitted on 3/8 on  with ct of the brain with subdural hematoma. Pt underwent of b/l cranio for evacuations of subdural hematoma on 3/12/2024. Pt also had a MMA embo bilat on 3/19. Pt post op course was uneventful. Pt did have a ct of the brain on 3/20 which demonstrated bilat residual subdural hematoma. Pt has been on keppra 500mg q 12 for potential prophylaxis. PT lovenox for dvt ppx. Pt was evaluated by the physical therapy team and was recommended for acute rehab.   Pt will need the craniotomy staples to be discontinued on 3/26/24 on 3/29 the drain site sutures will need to be discontinued. Pt will need to follow up with Dr Polanco in 1 to 2 weeks..     64y Male with no known past medical history other than frequent falls recently, worked up at [facility] without significant findings, presented to [facility] 3/8 after unwitnessed fall with headstrike/loss of consciousness, found with bilateral mixed density subdural hematomas, transferred to [facility] for tertiary care  3/8: Transferred to [facility] from [place], admitted to SICU. CT angiogram head negative. CT cervical spine without fracture  3/9: Tenderness to sacrum on examination by trauma team. CT lumbar spine/MR lumbar spine obtained, found with disc bulges, coccygeal edema. MR cervical and thoracic spine also obtained to rule out cerebrospinal fluid leak, no evidence of leak, only mild narrowing of right C5-6 neural foramen due to uncovertebral spurring. Medicine consulted for medical optimization for surgical intervention.  3/10: Transferred to neurosurgical intensive care unit  3/11: EEG without seizures  3/12: OR for bilateral craniotomies for subdural evacuation. Estimated blood loss 100 mL. Postoperative CTH with pneumocephalus. Positioned flat  3/13-3/14: Head of bed elevated. Stable  3/15: Subdural drains removed. Post drain removal CT head stable. Downgraded to telemetry  3/16-3/17: Stable  3/18: Repeat CT head with improving subdural fluid collections  3/19: Underwent cerebral angiogram for bilateral middle meningeal embolization with coils and particles, incidental left internal carotid artery aneurysm  3/20: Neuropsychology consulted- recommending use of all delirium precautions including keeping room well-lit, quiet, comfortable, benefit from frequent re-orientation cues from staff. If continues to exhibit low mood throughout recovery, may benefit from mental health treatment, which may include individual psychotherapy and/or pharmacologic treatment of mood. Repeat CT head obtained, stable residual subdural collections.  3/21: Stable    Physical therapy, occupational therapy, and physiatry have all evaluated the patient deeming patient a candidate for acute rehabilitation. Patient is stable for discharge to acute rehabilitation.    On 3/21, the patient's son, Yen Yusuf was provided with a brief summary and overview of the hospital course via telephone at [phone], with information including admission date, diagnosis, surgery / procedure, complications if applicable, discharge instructions including but not limited to: incision care, signs/symptoms to look out for after discharge, home medications, new medications, patient / family education/training and instructions for follow up. Any concerns were addressed, and all questions were answered.

## 2024-03-20 NOTE — DISCHARGE NOTE PROVIDER - NSDCFUADDINST_GEN_ALL_CORE_FT
Please make an appointment for follow up with neurosurgeon Dr. Patel Polanco' office in 1-2 weeks for wound check. Call [phone] to schedule an appointment. Sutures/staples will be removed at follow up appointment. Keep incision site clean and dry. No creams, lotions, or ointments to incision area. Ok to shower but do not allow water to hit incision site directly. Gently pat dry after shower.    NO heavy lifting, strenuous activity, twisting, bending, driving, or working until cleared by your physician.  Return to ER immediately for any of the following: fever, bleeding, new onset numbness/tingling/weakness, nausea and/or vomiting, chest pain, shortness of breath, confusion, seizure, altered mental status, urinary and/or fecal incontinence or retention.   Diet: You should continue a regular diet. Ensure a well balanced and proper diet to help with proper wound healing. It is imperative for adequate water intake, dietary fiber intake, and ambulation as tolerated to avoid constipation. Please hold aspirin, NSAIDs, goody's powder, anticoagulation, vitamin E, turmeric/teran lattes, cinnamon pills, fish oil, ginseng, and all other supplements until cleared by your neurosurgeon on an outpatient follow up appointment    Pain: It is common to have a headache or incisional pain after surgery. You may take the pain medication we prescribe, or over the counter Tylenol. Pain medication, especially narcotics, can cause constipation. Use stool softeners or mild laxative as needed.     Activity: Avoid straining, heavy lifting (objects greater than 10 pounds), strenuous activity, twisting, bending, and vigorous exercise. You should not drive or operate machinery until cleared by your neurosurgeon.     Wound: Monitor your incisions for drainage, redness, swelling. Call our office if you have any concerns. You may shower your incision. While washing, do not rub, scratch, or scrub your incision. You may wash your incision with mild shampoo/soap. Keep the incision open to air. However, if you want to cover the incision, you may, with a clean scarf or hat.     Appointment(s):Follow up with Dr. Polanco in our office outpatient in 1-2 weeks. Call [phone] to schedule an appointment. Sutures/staples will be removed at follow up appointment. It is also important to see your primary physician to keep them up to date regarding your recent admission and for a formal outpatient comprehensive medical review. Call their offices for an appointment as soon as you leave the hospital. If you do not have a primary physician, you may contact the [facility] [phone] located on [address] [place] [address], [place] [address].    Should you develop any new or worsening neurologic symptoms or have concern about your incision, please call our office immediately or visit the emergency department.    Return to ER immediately for any of the following: fever, bleeding, new onset numbness/tingling/weakness, nausea and/or vomiting, chest pain, shortness of breath, confusion, seizure, altered mental status, urinary and/or fecal incontinence or retention.

## 2024-03-21 ENCOUNTER — INPATIENT (INPATIENT)
Facility: HOSPITAL | Age: 64
LOS: 3 days | Discharge: ANOTHER HOSPITAL | DRG: 949 | End: 2024-03-25
Attending: STUDENT IN AN ORGANIZED HEALTH CARE EDUCATION/TRAINING PROGRAM | Admitting: STUDENT IN AN ORGANIZED HEALTH CARE EDUCATION/TRAINING PROGRAM
Payer: COMMERCIAL

## 2024-03-21 ENCOUNTER — TRANSCRIPTION ENCOUNTER (OUTPATIENT)
Age: 64
End: 2024-03-21

## 2024-03-21 VITALS
TEMPERATURE: 98 F | DIASTOLIC BLOOD PRESSURE: 77 MMHG | RESPIRATION RATE: 16 BRPM | OXYGEN SATURATION: 100 % | HEART RATE: 75 BPM | SYSTOLIC BLOOD PRESSURE: 136 MMHG

## 2024-03-21 VITALS
SYSTOLIC BLOOD PRESSURE: 118 MMHG | TEMPERATURE: 98 F | HEIGHT: 67 IN | OXYGEN SATURATION: 95 % | HEART RATE: 82 BPM | DIASTOLIC BLOOD PRESSURE: 70 MMHG | RESPIRATION RATE: 15 BRPM | WEIGHT: 166.23 LBS

## 2024-03-21 DIAGNOSIS — S06.5X9A TRAUMATIC SUBDURAL HEMORRHAGE WITH LOSS OF CONSCIOUSNESS OF UNSPECIFIED DURATION, INITIAL ENCOUNTER: ICD-10-CM

## 2024-03-21 LAB
FLUAV AG NPH QL: SIGNIFICANT CHANGE UP
FLUBV AG NPH QL: SIGNIFICANT CHANGE UP
RSV RNA NPH QL NAA+NON-PROBE: SIGNIFICANT CHANGE UP
SARS-COV-2 RNA SPEC QL NAA+PROBE: SIGNIFICANT CHANGE UP

## 2024-03-21 PROCEDURE — 97530 THERAPEUTIC ACTIVITIES: CPT

## 2024-03-21 PROCEDURE — 82962 GLUCOSE BLOOD TEST: CPT

## 2024-03-21 PROCEDURE — 93005 ELECTROCARDIOGRAM TRACING: CPT

## 2024-03-21 PROCEDURE — 72125 CT NECK SPINE W/O DYE: CPT | Mod: MC

## 2024-03-21 PROCEDURE — 95700 EEG CONT REC W/VID EEG TECH: CPT

## 2024-03-21 PROCEDURE — 99233 SBSQ HOSP IP/OBS HIGH 50: CPT

## 2024-03-21 PROCEDURE — C1769: CPT

## 2024-03-21 PROCEDURE — C9399: CPT

## 2024-03-21 PROCEDURE — 84100 ASSAY OF PHOSPHORUS: CPT

## 2024-03-21 PROCEDURE — C1713: CPT

## 2024-03-21 PROCEDURE — 97535 SELF CARE MNGMENT TRAINING: CPT

## 2024-03-21 PROCEDURE — 80048 BASIC METABOLIC PNL TOTAL CA: CPT

## 2024-03-21 PROCEDURE — 86900 BLOOD TYPING SEROLOGIC ABO: CPT

## 2024-03-21 PROCEDURE — 72156 MRI NECK SPINE W/O & W/DYE: CPT | Mod: MC

## 2024-03-21 PROCEDURE — 86803 HEPATITIS C AB TEST: CPT

## 2024-03-21 PROCEDURE — 87641 MR-STAPH DNA AMP PROBE: CPT

## 2024-03-21 PROCEDURE — 83735 ASSAY OF MAGNESIUM: CPT

## 2024-03-21 PROCEDURE — 97163 PT EVAL HIGH COMPLEX 45 MIN: CPT

## 2024-03-21 PROCEDURE — 95714 VEEG EA 12-26 HR UNMNTR: CPT

## 2024-03-21 PROCEDURE — 82533 TOTAL CORTISOL: CPT

## 2024-03-21 PROCEDURE — 84443 ASSAY THYROID STIM HORMONE: CPT

## 2024-03-21 PROCEDURE — 80061 LIPID PANEL: CPT

## 2024-03-21 PROCEDURE — 85730 THROMBOPLASTIN TIME PARTIAL: CPT

## 2024-03-21 PROCEDURE — 84439 ASSAY OF FREE THYROXINE: CPT

## 2024-03-21 PROCEDURE — 97116 GAIT TRAINING THERAPY: CPT

## 2024-03-21 PROCEDURE — 87640 STAPH A DNA AMP PROBE: CPT

## 2024-03-21 PROCEDURE — 84484 ASSAY OF TROPONIN QUANT: CPT

## 2024-03-21 PROCEDURE — 70498 CT ANGIOGRAPHY NECK: CPT | Mod: MC

## 2024-03-21 PROCEDURE — 83690 ASSAY OF LIPASE: CPT

## 2024-03-21 PROCEDURE — C1889: CPT

## 2024-03-21 PROCEDURE — 61624 TCAT PERM OCCLS/EMBOLJ CNS: CPT

## 2024-03-21 PROCEDURE — 86901 BLOOD TYPING SEROLOGIC RH(D): CPT

## 2024-03-21 PROCEDURE — 93306 TTE W/DOPPLER COMPLETE: CPT

## 2024-03-21 PROCEDURE — 85025 COMPLETE CBC W/AUTO DIFF WBC: CPT

## 2024-03-21 PROCEDURE — 72157 MRI CHEST SPINE W/O & W/DYE: CPT | Mod: MC

## 2024-03-21 PROCEDURE — 84134 ASSAY OF PREALBUMIN: CPT

## 2024-03-21 PROCEDURE — 99285 EMERGENCY DEPT VISIT HI MDM: CPT

## 2024-03-21 PROCEDURE — 72158 MRI LUMBAR SPINE W/O & W/DYE: CPT | Mod: MC

## 2024-03-21 PROCEDURE — 83605 ASSAY OF LACTIC ACID: CPT

## 2024-03-21 PROCEDURE — 83880 ASSAY OF NATRIURETIC PEPTIDE: CPT

## 2024-03-21 PROCEDURE — 36224 PLACE CATH CAROTD ART: CPT

## 2024-03-21 PROCEDURE — 36415 COLL VENOUS BLD VENIPUNCTURE: CPT

## 2024-03-21 PROCEDURE — C1894: CPT

## 2024-03-21 PROCEDURE — 83036 HEMOGLOBIN GLYCOSYLATED A1C: CPT

## 2024-03-21 PROCEDURE — 70450 CT HEAD/BRAIN W/O DYE: CPT | Mod: MC

## 2024-03-21 PROCEDURE — C1760: CPT

## 2024-03-21 PROCEDURE — 80053 COMPREHEN METABOLIC PANEL: CPT

## 2024-03-21 PROCEDURE — 72170 X-RAY EXAM OF PELVIS: CPT

## 2024-03-21 PROCEDURE — 80307 DRUG TEST PRSMV CHEM ANLYZR: CPT

## 2024-03-21 PROCEDURE — 86850 RBC ANTIBODY SCREEN: CPT

## 2024-03-21 PROCEDURE — 70496 CT ANGIOGRAPHY HEAD: CPT | Mod: MC

## 2024-03-21 PROCEDURE — 97167 OT EVAL HIGH COMPLEX 60 MIN: CPT

## 2024-03-21 PROCEDURE — 75894 X-RAYS TRANSCATH THERAPY: CPT

## 2024-03-21 PROCEDURE — 86780 TREPONEMA PALLIDUM: CPT

## 2024-03-21 PROCEDURE — 36227 PLACE CATH XTRNL CAROTID: CPT

## 2024-03-21 PROCEDURE — 71045 X-RAY EXAM CHEST 1 VIEW: CPT

## 2024-03-21 PROCEDURE — 85610 PROTHROMBIN TIME: CPT

## 2024-03-21 PROCEDURE — 95813 EEG EXTND MNTR 61-119 MIN: CPT

## 2024-03-21 PROCEDURE — 70552 MRI BRAIN STEM W/DYE: CPT | Mod: MC

## 2024-03-21 PROCEDURE — 82607 VITAMIN B-12: CPT

## 2024-03-21 PROCEDURE — 85027 COMPLETE CBC AUTOMATED: CPT

## 2024-03-21 PROCEDURE — 72131 CT LUMBAR SPINE W/O DYE: CPT | Mod: MC

## 2024-03-21 PROCEDURE — C1887: CPT

## 2024-03-21 PROCEDURE — 75898 FOLLOW-UP ANGIOGRAPHY: CPT

## 2024-03-21 RX ORDER — ASCORBIC ACID 60 MG
500 TABLET,CHEWABLE ORAL DAILY
Refills: 0 | Status: DISCONTINUED | OUTPATIENT
Start: 2024-03-22 | End: 2024-03-25

## 2024-03-21 RX ORDER — ACETAMINOPHEN 500 MG
650 TABLET ORAL EVERY 6 HOURS
Refills: 0 | Status: DISCONTINUED | OUTPATIENT
Start: 2024-03-21 | End: 2024-03-25

## 2024-03-21 RX ORDER — LANOLIN ALCOHOL/MO/W.PET/CERES
3 CREAM (GRAM) TOPICAL AT BEDTIME
Refills: 0 | Status: DISCONTINUED | OUTPATIENT
Start: 2024-03-21 | End: 2024-03-25

## 2024-03-21 RX ORDER — ENOXAPARIN SODIUM 100 MG/ML
40 INJECTION SUBCUTANEOUS
Refills: 0 | Status: DISCONTINUED | OUTPATIENT
Start: 2024-03-21 | End: 2024-03-25

## 2024-03-21 RX ORDER — POLYETHYLENE GLYCOL 3350 17 G/17G
17 POWDER, FOR SOLUTION ORAL DAILY
Refills: 0 | Status: DISCONTINUED | OUTPATIENT
Start: 2024-03-22 | End: 2024-03-25

## 2024-03-21 RX ORDER — PANTOPRAZOLE SODIUM 20 MG/1
40 TABLET, DELAYED RELEASE ORAL
Refills: 0 | Status: DISCONTINUED | OUTPATIENT
Start: 2024-03-21 | End: 2024-03-25

## 2024-03-21 RX ADMIN — POLYETHYLENE GLYCOL 3350 17 GRAM(S): 17 POWDER, FOR SOLUTION ORAL at 16:28

## 2024-03-21 RX ADMIN — ENOXAPARIN SODIUM 40 MILLIGRAM(S): 100 INJECTION SUBCUTANEOUS at 22:47

## 2024-03-21 RX ADMIN — LEVETIRACETAM 500 MILLIGRAM(S): 250 TABLET, FILM COATED ORAL at 05:48

## 2024-03-21 RX ADMIN — Medication 1 TABLET(S): at 16:27

## 2024-03-21 RX ADMIN — Medication 500 MILLIGRAM(S): at 16:28

## 2024-03-21 NOTE — PROGRESS NOTE ADULT - SUBJECTIVE AND OBJECTIVE BOX
Patient reports no pain and that he is sleeping well.  Recalls having walked.   Continues to state he wants to go to rehab.     FUNCTIONAL PROGRESS  3/18 PT  Gait Training  Gait Training: minimum assist (75% patient effort);  1 person assist;  verbal cues;  full weight-bearing   rolling walker;  25 feet  Gait Analysis: 3-point gait   decreased stride length;  shuffling;  cognitive, decreased safety awareness;  25 feet;  rolling walker    3/18 SLP  Speech Language Pathology Recommendations: 1. Regular solids/thin liquids as tolerated 2. Aspiration precautions 3. Crush meds PRN 4. Will follow to check diet tolerance     3/18 OT  Bed Mobility  Bed Mobility Training Sit-to-Supine: stand-by assist  Bed Mobility Training Supine-to-Sit: stand-by assist    Bed-Chair Transfer Training  Transfer Training Bed-to-Chair Transfer: minimum assist (75% patient effort);  rolling walker  Transfer Training Chair-to-Bed Transfer: minimum assist (75% patient effort);  rolling walker    Sit-Stand Transfer Training  Transfer Training Sit-to-Stand Transfer: minimum assist (75% patient effort);  rolling walker  Transfer Training Stand-to-Sit Transfer: minimum assist (75% patient effort);  rolling walker    Therapeutic Exercise  Therapeutic Exercise Detail: copying UE ROM movement with bilat UE in open chain feedback; mild dysmetria noted with left UE.         VITALS  T(C): 36.7 (03-21-24 @ 07:42), Max: 37.3 (03-20-24 @ 11:42)  HR: 57 (03-21-24 @ 07:42) (57 - 85)  BP: 122/68 (03-21-24 @ 07:42) (109/71 - 132/76)  RR: 20 (03-21-24 @ 07:42) (18 - 20)  SpO2: 100% (03-21-24 @ 07:42) (95% - 100%)  Wt(kg): --    MEDICATIONS   acetaminophen     Tablet .. 650 milliGRAM(s) every 6 hours PRN  ascorbic acid 500 milliGRAM(s) daily  enoxaparin Injectable 40 milliGRAM(s) <User Schedule>  levETIRAcetam 500 milliGRAM(s) every 12 hours  multivitamin 1 Tablet(s) daily  polyethylene glycol 3350 17 Gram(s) daily      RECENT LABS/IMAGING  - Reviewed Today                        CTA HEAD/NECK - No proximal large vessel occlusion, aneurysm, dissection, or hemodynamically flow-limiting stenosis identified in the head and neck.    CT CERVICAL SPINE - No evidence of acute cervical spine fracture or spondylolisthesis.    CT L SPINE - Tiny LEFT foraminal disc herniations at L3-4 and L4-5 narrow the LEFT neural foramina at these levels. Mild disc bulges at L4-5 and L5-S1 flatten the ventral thecal sac and narrow the BILATERAL neural foramina.   No vertebral fracture is recognized.    MRI BRAIN - unchanged BILATERAL acute subdural hematomas, measuring 1.6 on the RIGHT and 1.7 on the LEFT with mass effect on the brain but no midline shift.    MR C, T, L SPINE -  narrowing of the RIGHT C5-6 neural foramen due to uncovertebral spurring. 2.  Thoracic spine:   Unremarkable MR of the thoracic spine.  Lumbar spine:   Tiny LEFT foraminal disc herniations at L3-4 and L4-5 narrow the LEFT neural foramina at these levels. Mild disc bulges at L4-5 and L5-S1 flatten the ventral thecal sac and narrow the BILATERAL neural foramina.  Upon review, edema is noted within the coccyx which may indicate fracture or bone contusion.    HEAD CT 3/15 - Persistent unchanged BILATERAL subdural collections following drain removal, LEFT measuring 1.5 cm and RIGHT measuring 6 mm. Persistent pneumocephalus.   There is mild mass effect on the LEFT hemisphere with subfalcine herniation to the LEFT unchanged. Mild effacement of the posterior LEFT lateral ventricle.    HEAD CT 3/18 - 1.  Improving subdural fluid collections, without interval rebleeding.    HEAD CT 3/20 - 1.  Status post middle meningeal artery embolization and anterior parietal craniotomies bilaterally.  A small amount of postoperative pneumocephalus is decreased from 03/18/2024.2.  Residual subdural collections overlying  both frontoparietal convexities, trace subdural hemorrhage along the cerebral falx and a low-attenuation subdural collection overlying the right cerebellar hemisphere are stable in size from 03/18/2024.3.  Regional mass effect in both cerebral convexities with 3 mm of midline shift to the right is stable. 4.  No new intracranial findings.  ----------------------------------------------------------------------------------------  PHYSICAL EXAM  Constitutional - NAD, Comfortable   Extremities - No C/C/E, No calf tenderness   Neurologic Exam -                    Cognitive - AAO to self, PART situation     Communication - Expressive deficits, +dysarthria     FUNCTIONAL MOTOR EXAM - Slight right UE sided weakness     Sensory - Intact to LT  Psychiatric - Mood stable, Fatigued  ----------------------------------------------------------------------------------------  ASSESSMENT/PLAN  64yMale with functional deficits after a fall sustaining bilateral SDH  Traumatic bilateral SDHs s/p craniotomies & B/L MMA embolization - Keppra  Pain - Tylenol   DVT PPX - SCDs, Lovenox  Rehab/Impaired mobility and function - Patient continues to require hospitalization for the above diagnoses and ongoing active management of comorbid complications (functional and cognitive deficits - appreciate Dr. Adkins's assessment) that are substantially impairing functional ability and impairing quality of life.     RECOMMEND - OOB daily    When medically optimized, based on the patient's diagnosis, current functional status and potential for progress, recommend ACUTE inpatient rehabilitation for the functional deficits consisting of 3 hours of therapy/day & 24 hour RN/daily PMR physician for comorbid medical management. Patient will be able to tolerate 3 hours a day.     Will continue to follow. Rehab recommendations are dependent on how functional progress changes as well as how patient continues to participate and tolerate therapeutic interventions, which may change. Recommend ongoing mobilization by staff to maintain cardiopulmonary function and prevention of secondary complications related to debility. Discussed the specific management and recommendations above with rehab clinical care team/rehab liaison.

## 2024-03-21 NOTE — PATIENT PROFILE ADULT - NS PRO AD NO ADVANCE DIRECTIVE
Pt would like Son only to be contact and decision maker of any health care decisions if patient is incapacitated./No No

## 2024-03-21 NOTE — H&P ADULT - NS ATTEND AMEND GEN_ALL_CORE FT
64y with no PMH other than frequent falls recently, worked up at [place] without significant findings, presented to [facility] 3/8 s/p unwitnessed fall with headstrike/loss of consciousness, found with bilateral mixed density subdural hematomas, transferred to [facility] for tertiary care, s/p b/l craniotomies for subdural evacuation 3/12/24, b/l MMA embolization 3/19/24.    Vitals reviewed. Stable.  Patient seen while sitting in the wheelchair. Denies headaches. Slept overnight.   Physical Exam  Constitutional - NAD, Comfortable  HEENT - NCAT, EOMI; frontal craniotomy staples and bilateral drain sites with sutures c/d/i   Neck - Supple, No limited ROM  Chest - good chest expansion, good respiratory effort, CTAB   Cardio - warm and well perfused, RRR, no murmur  Abdomen -  Soft, NTND  Extremities - No peripheral edema, No calf tenderness   Neurologic Exam:                    Cognitive -             Orientation: Awake, Alert, AAO to self, place, date, year, situation            Attention:  Days of week, recall 2 objects without cuing and remaining object with categorical cueing; Difficulty with serial 7s     Speech - Fluent, Comprehensible, mild dysarthria, No aphasia; Some word finding difficulties     Cranial Nerves - No facial asymmetry, Tongue midline, EOMI, Shoulder shrug intact     Motor -                      LEFT    UE - ShAB 5/5, EF 5/5, EE 5/5, WE 5/5,  WNL                    RIGHT UE - ShAB 5/5, EF 5/5, EE 5/5, WE 5/5,  WNL                    LEFT    LE - HF 5/5, KE 5/5, DF 5/5, PF 5/5                    RIGHT LE - HF 5/5, KE 5/5, DF 5/5, PF 5/5        Sensory - Intact to LT bilateral     Coordination - FTN impaired     OculoVestibular -  No nystagmus  Psychiatric - Mood stable, Affect WNL  Skin on admission: limited due to patient preference    Admission labs reviewed- CBC/CMP- HgB- 13.4  Bun/Cr- 8/0.81  SARS/Influenza/RVP negative  Reviewed goals of acute rehab, length of stay    Total time 80 mins-face to face time encounter and counseling the patient, meeting with hospitalist, nursing staff, therapists and social work to discuss medical updates and management, care coordination, reviewing chart and data-including but not limited to labs and imaging

## 2024-03-21 NOTE — PHYSICAL THERAPY INITIAL EVALUATION ADULT - PATIENT/FAMILY AGREES WITH PLAN
Hyperthyroidism   // Dr King \\ 10-21-21 Successful administration of iodine-131 //   FT3 was 3.3 and the FT4 WAS 1.0 ON 11-22-21  (MARKED IMPROVEMENT)  tsh is okay    8-23-22      Right thyroid nodule   For endo eval  Referral written  8-23-22      ifg   Sugar mild over :  glycohemoglobin Was okay  5.7 on 12/20/20  // 8-23-22 9-23-19 had sclero therapy left lower extremity dr escoto /  Seen dr escoto on 2-26-20  Okay  8-23-22      Over on exam  : diet and exercise can assist with lowering weight and also improving cardiovascular fitness .   8-23-22      Low back pain. Dr escoto seen on 2-26-20  concern was for low back pain and dr escoto //\\wrote for mri of the lumbar spine  3-5-20    Mri  1. Multilevel disc and facet degenerative changes.  Mild anterolisthesis of L4 over L5.  Moderate to severe central stenosis at L4/L5.  Mild central stenosis at L2/L3 and L3/L4.  Multilevel  foraminal stenosis, as described above.  // not interested in surgery at this time  8-23-22      Gout   //  Uric acid  //   no gout attack recent and she has stopped the allopurinol on her own   8-23-22          Right eye cat aug 1 2022.  Cataract.  Cleared again  .  ekg stable lbbb stable .5-23-22         left eye surgery on 3-2-22  At [facility] by dr rascon  //  Needs a clearance   //  Can go up a flight stairs.  No chest pain or sob.  Should be okay for surgery   Low risk pateint and low risk procedure   .5-23-22     Quit her job at [employer]  dec 2019 :  Too much time on her feet and no off days  /  Feet and leg pain and some swelling       Left breast some medial soreness and some healing abscess under the breast  Mammogram diagnostic and breast specialist   //  Mammogram was okay 6-29-21 and no more soreness       Has finished vac covid by 9-13-21      See in three with meds   
yes

## 2024-03-21 NOTE — OCCUPATIONAL THERAPY INITIAL EVALUATION ADULT - RANGE OF MOTION EXAMINATION, UPPER EXTREMITY
bilateral UE Active ROM was WFL  (within functional limits)
bilateral UE Active ROM was WNL (within normal limits)
bilateral UE Active ROM was WFL  (within functional limits)

## 2024-03-21 NOTE — OCCUPATIONAL THERAPY INITIAL EVALUATION ADULT - REFERRAL TO ANOTHER SERVICE NEEDED, OT EVAL
physical therapy
physical therapy/speech language pathology
physical therapy/speech language pathology

## 2024-03-21 NOTE — PHYSICAL THERAPY INITIAL EVALUATION ADULT - DIAGNOSIS, PT EVAL
functional debility 2*2 impaired balance, right sided inattention and decreased safety awareness.
Impaired Functional Mobility
functional debility 2*2 impaired balance and decreased strength s/p neuro event

## 2024-03-21 NOTE — OCCUPATIONAL THERAPY INITIAL EVALUATION ADULT - LIVES WITH, PROFILE
Pt lives in a 1 level home with 5 SHELBIE./children
in a private house with 5 steps to enter/spouse
Pt lives in a 2 level home with son, 3 SHELBIE, all needs can be met on main level.

## 2024-03-21 NOTE — PROGRESS NOTE ADULT - ASSESSMENT
64y Male no PMH other than frequent falls recently, worked up at [employer] without significant findings, presented to [facility] 3/8 s/p unwitnessed fall with headstrike/loss of consciousness, found with bilateral mixed density subdural hematomas, transferred to [facility] for tertiary care, s/p b/l craniotomies for subdural evacuation 3/12/24, b/l MMA embolization 3/19/24.    PLAN:  - D/w Dr. Polanco  - Q4 neuro checks  - Tylenol PRN for headache  - Tolerating diet  - Bowel regimen- last BM today 3/21 per patient  - Lovenox 40 for DVT ppx  - PMR following, appreciated  - Dispo pending to acute rehab- d/w social work/case management, pending bed availability 64y Male no PMH other than frequent falls recently, worked up at [employer] without significant findings, presented to [facility] 3/8 s/p unwitnessed fall with headstrike/loss of consciousness, found with bilateral mixed density subdural hematomas, transferred to [facility] for tertiary care, s/p b/l craniotomies for subdural evacuation 3/12/24, b/l MMA embolization 3/19/24.    PLAN:  - D/w Dr. Polanco  - Q4 neuro checks  - Tylenol PRN for headache  - D/c keppra today- POD9  - Tolerating diet  - Bowel regimen- last BM today 3/21 per patient  - Lovenox 40 for DVT ppx  - PMR following, appreciated  - Dispo pending to acute rehab- d/w social work/case management, pending bed availability  - Son updated via telephone

## 2024-03-21 NOTE — PHYSICAL THERAPY INITIAL EVALUATION ADULT - SENSORY TESTS
Pt reports no vision changes, no numbness/tingling, pt able to manipulate utensils/packages however, (+) right hand tremor noted making this difficult
(+) eye tracking bilaterally to right and inferior fields, pt having difficulty tracking to the left; (+) acuity in all fields; (+) finger to thumb coordination slow in RUE but intact in BUEs
Pt reports no vision changes, (+) right hand tremor

## 2024-03-21 NOTE — PHYSICAL THERAPY INITIAL EVALUATION ADULT - BALANCE DISTURBANCE, IDENTIFIED IMPAIRMENT CONTRIBUTE, REHAB EVAL
impaired coordination/impaired motor control
impaired coordination/decreased strength
decreased strength

## 2024-03-21 NOTE — PROGRESS NOTE ADULT - NS ATTEND OPT1A GEN_ALL_CORE
Medical decision making

## 2024-03-21 NOTE — PHYSICAL THERAPY INITIAL EVALUATION ADULT - GENERAL OBSERVATIONS, REHAB EVAL
Pt received in the semi-Jin position, NAD
Pt received supine in bed + telemetry//BP + Venous Compression Boots. C/o 0/10 pain, pt agreeable to PT
Pt received supine in bed + telemetry//BP + Venous Compression Boots. Pt c/o 0/10 pain, pt agreeable to PT

## 2024-03-21 NOTE — PHYSICAL THERAPY INITIAL EVALUATION ADULT - BED MOBILITY LIMITATIONS, REHAB EVAL
decreased ability to use arms for pushing/pulling/decreased ability to use legs for bridging/pushing
decreased ability to use arms for pushing/pulling/decreased ability to use legs for bridging/pushing
decreased ability to use legs for bridging/pushing/impaired ability to control trunk for mobility

## 2024-03-21 NOTE — OCCUPATIONAL THERAPY INITIAL EVALUATION ADULT - LEVEL OF INDEPENDENCE: DRESS LOWER BODY, OT EVAL
to don socks/minimum assist (75% patients effort)
maximum assist (25% patients effort)
moderate assist (50% patients effort)

## 2024-03-21 NOTE — H&P ADULT - ASSESSMENT
Assessment/Plan:  CHAU AMES is a 64y with no PMH other than frequent falls recently, worked up at [place] without significant findings, presented to [facility] 3/8 s/p unwitnessed fall with headstrike/loss of consciousness, found with bilateral mixed density subdural hematomas, transferred to [facility] for tertiary care, s/p b/l craniotomies for subdural evacuation 3/12/24, b/l MMA embolization 3/19/24. Now admitted to [facility] after for initiation of a multidisciplinary rehab program consisting focused on functional mobility, transfers and ADLs (activities of daily living).    #Traumatic SDH  - s/p bilateral craniotomy of SDH evacuation (3/12)  - S/p bilateral MMAE 3/19  - Completed seizure ppx (dc 3/21)  Deficits: Dysarthria, expressive deficits, perseveration, subtle right sided weakness/fatigued   Comprehensive Multidisciplinary Rehab Program:  - Start comprehensive rehab program, 3 hours a day, 5 days a week.  - PT 1hr/day: Focused on improving strength, endurance, coordination, balance, functional mobility, and transfers  - OT 1hr/day: Focused on improving strength, fine motor skills, coordination, posture and ADLs.    - Speech Therapy 1hr/day: to diagnose and treat deficits in swallowing, cognition and communication.   - Activity Limitations: Decreased social, vocational and leisure activities, decreased self care and ADLs, decreased mobility, decreased ability to manage household and finances.     #Mood disorder:  Neuropsychology consult   Recreation Therapy    #Sleep:   Maintain quiet hours and low stim environment.  Melatonin PRN to maximize participation in therapy during the day.     #Pain Management:  Analgesic: Tylenol PRN    #GI/Bowel:  Miralax PRN Daily  GI ppx: Protonix    #/Bladder:   - PVR q 8 hours (SC if > 400)    #Skin/Pressure Injury:   - At risk for pressure injury due to neurologic diagnosis and relative immobility.  - Skin assessment on admission: ***  - Monitor Incisions: Bilateral crani incision  - Nursing to monitor skin Qshift    #Diet/Dysphagia:  Dysphagia: SLP consult for swallow function evaluation and treatment  Current Diet: Regular   [X] Aspiration Precautions  Nutrition consult     #Precautions / PROPHYLAXIS:   - Falls, Seizure   - ortho: Weight bearing status: WBAT   - Lungs: Aspiration, Incentive Spirometer   - DVT ppx: Lovenox 40   - Pressure injury/Skin: Turn Q2hrs while in bed, OOB to Chair, PT/OT      ---------------  Code Status/Emergency Contact: Son: Yen Ames #[phone]    Outpatient Follow-up (Specialty/Name of physician):  [facility] - Dr. Patel Polanco  --------------  Goals: Safe discharge to Home*****  Estimated Length of Stay: 10-14 days  Rehab Potential: Good  Medical Prognosis: Good  Estimated Disposition: Home with Home Care  ---------------    PRESCREEN COMPARISON:  I have reviewed the prescreen information and I have found no relevant changes between the preadmission screening and my post admission evaluation.    RATIONALE FOR INPATIENT ADMISSION: Patient demonstrates the following:  [X] Medically appropriate for rehabilitation admission  [X] Has attainable rehab goals with an appropriate initial discharge plan  [X]Has rehabilitation potential (expected to make a significant improvement within a reasonable period of time)  [X] Requires close medical management by a rehab physician, rehab nursing care, Hospitalist and comprehensive interdisciplinary team (including PT, OT and/or SLP, Prosthetics and Orthotics)       Assessment/Plan:  CHAU AMES is a 64y with no PMH other than frequent falls recently, worked up at [facility] without significant findings, presented to [facility] 3/8 s/p unwitnessed fall with headstrike/loss of consciousness, found with bilateral mixed density subdural hematomas, transferred to [facility] for tertiary care, s/p b/l craniotomies for subdural evacuation 3/12/24, b/l MMA embolization 3/19/24. Now admitted to [facility] after for initiation of a multidisciplinary rehab program consisting focused on functional mobility, transfers and ADLs (activities of daily living).    #Traumatic SDH  - s/p bilateral craniotomy of SDH evacuation (3/12)  - S/p bilateral MMAE 3/19  - Completed seizure ppx (dc 3/21)  Deficits: Dysarthria, expressive deficits, perseveration, subtle right sided weakness/fatigued   Comprehensive Multidisciplinary Rehab Program:  - Start comprehensive rehab program, 3 hours a day, 5 days a week.  - PT 1hr/day: Focused on improving strength, endurance, coordination, balance, functional mobility, and transfers  - OT 1hr/day: Focused on improving strength, fine motor skills, coordination, posture and ADLs.    - Speech Therapy 1hr/day: to diagnose and treat deficits in swallowing, cognition and communication.   - Activity Limitations: Decreased social, vocational and leisure activities, decreased self care and ADLs, decreased mobility, decreased ability to manage household and finances.     #Mood disorder:  Neuropsychology consult   Recreation Therapy    #Sleep:   Maintain quiet hours and low stim environment.  Melatonin PRN to maximize participation in therapy during the day.     #Pain Management:  Analgesic: Tylenol PRN    #GI/Bowel:  Miralax Daily, Dulcolax PRN  GI ppx: Protonix    #/Bladder:   - PVR q 8 hours (SC if > 400)    #Skin/Pressure Injury:   - At risk for pressure injury due to neurologic diagnosis and relative immobility.  - Skin assessment on admission: ***  - Monitor Incisions: Bilateral crani incision  - Nursing to monitor skin Qshift    #Diet/Dysphagia:  Dysphagia: SLP consult for swallow function evaluation and treatment  Current Diet: Regular   [X] Aspiration Precautions  Nutrition consult     #Precautions / PROPHYLAXIS:   - Falls, Seizure   - ortho: Weight bearing status: WBAT   - Lungs: Aspiration, Incentive Spirometer   - DVT ppx: Lovenox 40   - Pressure injury/Skin: Turn Q2hrs while in bed, OOB to Chair, PT/OT      ---------------  Code Status/Emergency Contact: Son: Yen Ames #[phone]    Outpatient Follow-up (Specialty/Name of physician):  [facility] - Dr. Patel Polanco  --------------  Goals: Safe discharge to Home*****  Estimated Length of Stay: 10-14 days  Rehab Potential: Good  Medical Prognosis: Good  Estimated Disposition: Home with Home Care  ---------------    PRESCREEN COMPARISON:  I have reviewed the prescreen information and I have found no relevant changes between the preadmission screening and my post admission evaluation.    RATIONALE FOR INPATIENT ADMISSION: Patient demonstrates the following:  [X] Medically appropriate for rehabilitation admission  [X] Has attainable rehab goals with an appropriate initial discharge plan  [X]Has rehabilitation potential (expected to make a significant improvement within a reasonable period of time)  [X] Requires close medical management by a rehab physician, rehab nursing care, Hospitalist and comprehensive interdisciplinary team (including PT, OT and/or SLP, Prosthetics and Orthotics)       Assessment/Plan:  CHAU AMES is a 64y with no PMH other than frequent falls recently, worked up at [place] without significant findings, presented to [facility] 3/8 s/p unwitnessed fall with headstrike/loss of consciousness, found with bilateral mixed density subdural hematomas, transferred to [facility] for tertiary care, s/p b/l craniotomies for subdural evacuation 3/12/24, b/l MMA embolization 3/19/24. Now admitted to [facility] after for initiation of a multidisciplinary rehab program consisting focused on functional mobility, transfers and ADLs (activities of daily living).    #Traumatic SDH  - s/p bilateral craniotomy of SDH evacuation (3/12)  - S/p bilateral MMAE 3/19  - Completed seizure ppx (dc 3/21)  Deficits: Dysarthria, expressive deficits, perseveration, subtle right sided weakness/fatigued   Comprehensive Multidisciplinary Rehab Program:  - Start comprehensive rehab program, 3 hours a day, 5 days a week.  - PT 1hr/day: Focused on improving strength, endurance, coordination, balance, functional mobility, and transfers  - OT 1hr/day: Focused on improving strength, fine motor skills, coordination, posture and ADLs.    - Speech Therapy 1hr/day: to diagnose and treat deficits in swallowing, cognition and communication.   - Activity Limitations: Decreased social, vocational and leisure activities, decreased self care and ADLs, decreased mobility, decreased ability to manage household and finances.     #Mood disorder:  Neuropsychology consult   Recreation Therapy    #Sleep:   Maintain quiet hours and low stim environment.  Melatonin PRN to maximize participation in therapy during the day.     #Pain Management:  Analgesic: Tylenol PRN    #GI/Bowel:  Miralax Daily, Dulcolax PRN  GI ppx: Protonix    #/Bladder:   - PVR q 8 hours (SC if > 400)    #Skin/Pressure Injury:   - At risk for pressure injury due to neurologic diagnosis and relative immobility.  - Skin assessment on admission: intact  - Monitor Incisions: Bilateral crani incision staples c/d/i DORA; bilateral crani drain sites with sutures c/d/i DORA  - Nursing to monitor skin Qshift    #Diet/Dysphagia:  Dysphagia: SLP consult for swallow function evaluation and treatment  Current Diet: Regular   [X] Aspiration Precautions  Nutrition consult     #Precautions / PROPHYLAXIS:   - Falls, Seizure   - ortho: Weight bearing status: WBAT   - Lungs: Aspiration, Incentive Spirometer   - DVT ppx: Lovenox 40   - Pressure injury/Skin: Turn Q2hrs while in bed, OOB to Chair, PT/OT      ---------------  Code Status/Emergency Contact: Son: Yen Ames #[phone]    Outpatient Follow-up (Specialty/Name of physician):  [facility] - Dr. Patel Polanco  --------------  Goals: Safe discharge to Home*****  Estimated Length of Stay: 10-14 days  Rehab Potential: Good  Medical Prognosis: Good  Estimated Disposition: Home with Home Care  ---------------    PRESCREEN COMPARISON:  I have reviewed the prescreen information and I have found no relevant changes between the preadmission screening and my post admission evaluation.    RATIONALE FOR INPATIENT ADMISSION: Patient demonstrates the following:  [X] Medically appropriate for rehabilitation admission  [X] Has attainable rehab goals with an appropriate initial discharge plan  [X]Has rehabilitation potential (expected to make a significant improvement within a reasonable period of time)  [X] Requires close medical management by a rehab physician, rehab nursing care, Hospitalist and comprehensive interdisciplinary team (including PT, OT and/or SLP, Prosthetics and Orthotics)

## 2024-03-21 NOTE — H&P ADULT - NSHPPHYSICALEXAM_GEN_ALL_CORE
Constitutional - NAD, Comfortable  HEENT - NCAT, EOMI; frontal craniotomy staples and bilateral drainsites with sutures c/d/i DORA  Neck - Supple, No limited ROM  Chest - good chest expansion, good respiratory effort, CTAB   Cardio - warm and well perfused, RRR, no murmur  Abdomen -  Soft, NTND  Extremities - No peripheral edema, No calf tenderness   Neurologic Exam:                    Cognitive -             Orientation: Awake, Alert, AAO to self, place, date, year, situation            Attention:  Days of week, recall 2 objects without cuing and remaining object with categorical cueing; Difficulty with serial 7s     Speech - Fluent, Comprehensible, mild dysarthria, No aphasia; Some word finding difficulties     Cranial Nerves - No facial asymmetry, Tongue midline, EOMI, Shoulder shrug intact     Motor -                      LEFT    UE - ShAB 5/5, EF 5/5, EE 5/5, WE 5/5,  WNL                    RIGHT UE - ShAB 5/5, EF 5/5, EE 5/5, WE 5/5,  WNL                    LEFT    LE - HF 5/5, KE 5/5, DF 5/5, PF 5/5                    RIGHT LE - HF 5/5, KE 5/5, DF 5/5, PF 5/5        Sensory - Intact to LT bilateral     Reflexes - DTR 2/ 4 , neg Charlton's b/l     Coordination - FTN / HTS intact     OculoVestibular -  No nystagmus  Psychiatric - Mood stable, Affect WNL  Skin on admission: limited due to patient preference Constitutional - NAD, Comfortable  HEENT - NCAT, EOMI; frontal craniotomy staples and bilateral drain sites with sutures c/d/i   Neck - Supple, No limited ROM  Chest - good chest expansion, good respiratory effort, CTAB   Cardio - warm and well perfused, RRR, no murmur  Abdomen -  Soft, NTND  Extremities - No peripheral edema, No calf tenderness   Neurologic Exam:                    Cognitive -             Orientation: Awake, Alert, AAO to self, place, date, year, situation            Attention:  Days of week, recall 2 objects without cuing and remaining object with categorical cueing; Difficulty with serial 7s     Speech - Fluent, Comprehensible, mild dysarthria, No aphasia; Some word finding difficulties     Cranial Nerves - No facial asymmetry, Tongue midline, EOMI, Shoulder shrug intact     Motor -                      LEFT    UE - ShAB 5/5, EF 5/5, EE 5/5, WE 5/5,  WNL                    RIGHT UE - ShAB 5/5, EF 5/5, EE 5/5, WE 5/5,  WNL                    LEFT    LE - HF 5/5, KE 5/5, DF 5/5, PF 5/5                    RIGHT LE - HF 5/5, KE 5/5, DF 5/5, PF 5/5        Sensory - Intact to LT bilateral     Coordination - FTN impaired     OculoVestibular -  No nystagmus  Psychiatric - Mood stable, Affect WNL  Skin on admission: limited due to patient preference

## 2024-03-21 NOTE — OCCUPATIONAL THERAPY INITIAL EVALUATION ADULT - PLANNED THERAPY INTERVENTIONS, OT EVAL
ADL retraining/balance training/bed mobility training/transfer training
ADL retraining/balance training/bed mobility training/transfer training
ADL retraining/balance training/bed mobility training/cognitive, visual perceptual/motor coordination training/neuromuscular re-education/strengthening/transfer training

## 2024-03-21 NOTE — PHYSICAL THERAPY INITIAL EVALUATION ADULT - IMPAIRMENTS FOUND, PT EVAL
fine motor/gait, locomotion, and balance/muscle strength/neuromotor development and sensory integration
arousal, attention, and cognition/gait, locomotion, and balance/muscle strength/poor safety awareness
arousal, attention, and cognition/gait, locomotion, and balance/muscle strength

## 2024-03-21 NOTE — PATIENT PROFILE ADULT - FALL HARM RISK - HARM RISK INTERVENTIONS

## 2024-03-21 NOTE — H&P ADULT - NSHPSOCIALHISTORY_GEN_ALL_CORE
SOCIAL HISTORY  Smoking - Denied  EtOH - Denied   Drugs - Denied    FUNCTIONAL HISTORY  Lives with ex-wife, brother in law, and son. PH, 5 SHELBIE w/o HR. No steps inside. Tub with doors  Prior Level of Function: Independent in ADLs and ambulation. Owns rollator.    CURRENT FUNCTIONAL STATUS  - Bed Mobility: min A  - Transfers: min A  - Gait: 25' RW min A  - ADLs: min A bathing, UBD, LBD, grooming.  SV eating/toileting

## 2024-03-21 NOTE — H&P ADULT - NSHPREVIEWOFSYSTEMS_GEN_ALL_CORE
REVIEW OF SYSTEMS  Constitutional: No fever, No Chills, No fatigue  HEENT: No eye pain, No visual disturbances, No difficulty hearing  Pulm: No cough,  No shortness of breath  Cardio: No chest pain, No palpitations  GI:  No abdominal pain, No nausea, No vomiting, No diarrhea, No constipation  : No dysuria, No frequency, No hematuria  Neuro: No headaches, No memory loss, No loss of strength, No numbness, + tremors  Skin: No itching, No rashes, No lesions   MSK: No joint pain, No joint swelling, No muscle pain, No Neck or back pain  Psych:  No depression, No anxiety

## 2024-03-21 NOTE — PROGRESS NOTE ADULT - PROVIDER SPECIALTY LIST ADULT
NSICU
Neurosurgery
Neurosurgery
Brain Injury Medicine
Hospitalist
Neurosurgery
NSICU
Neurosurgery
SICU
SICU
NSICU
Neurosurgery
Neurosurgery
NSICU
NSICU
Neurosurgery
ASU/Home

## 2024-03-21 NOTE — OCCUPATIONAL THERAPY INITIAL EVALUATION ADULT - REHAB POTENTIAL, OT EVAL
fair, will monitor progress closely
good, to achieve stated therapy goals
fair, will monitor progress closely

## 2024-03-21 NOTE — PHYSICAL THERAPY INITIAL EVALUATION ADULT - CRITERIA FOR SKILLED THERAPEUTIC INTERVENTIONS
impairments found/functional limitations in following categories/risk reduction/prevention/rehab potential/therapy frequency/predicted duration of therapy intervention/anticipated equipment needs at discharge/anticipated discharge recommendation
Acute rehab/impairments found/anticipated discharge recommendation
Acute rehab/impairments found/anticipated equipment needs at discharge/anticipated discharge recommendation

## 2024-03-21 NOTE — H&P ADULT - HISTORY OF PRESENT ILLNESS
65 yo M with no known PMH other than frequent falls from syncope - recent admission 1/21/2024 @ [facility]: workup done without significant findings. Present to to [facility] again on 3/8 after unwitnessed fall with headstrike/LOC, found to have bilateral mixed density SDH and was transferred to [facility] for tertiary care.      [facility]: CT angio negative. CT cervical spine without fracture. Had sacral tendernes: spine images with: lumbar disc bulges, coccygeal edema. No evidence of CSF leak, mild narrowing of right C5-6 neural foramen d/t uncovertebral spurring. Underwent bilateral craniotomies for subdural evacuation.  Post op CTH with pneumocephalus. Subdural drains removed 3/15.  CTH stable.  Had cerebral angio for bilateral MMAE with coils and particles, incidental L ICA aneurysm.     Patient was evaluated by PM&R and therapy for functional deficits and gait/ADL impairments and recommend acute rehabilitation.  Patient was medically optimized for discharge to [place] on 3/21/2024

## 2024-03-21 NOTE — PHYSICAL THERAPY INITIAL EVALUATION ADULT - PERTINENT HX OF CURRENT PROBLEM, REHAB EVAL
as per medical chart: presented to [facility] 3/8 s/p unwitnessed fall with headstrike/loss of consciousness, found with bilateral mixed density subdural hematomas, transferred to [facility] for tertiary care, s/p b/l craniotomies for subdural evacuation 3/12/24, b/l MMA embolization 3/19/24.
65 yo male with no PMHx transferred from [place] s/p fall on 3/8, +HS, +LOC. . Patient has been having multiple falls recently, and had a negative cardio and neuro w/u at [place] 1 month ago. On CTH in ED at [place], found to have bilateral SDH in the frontal-parietal area. Repeat CTH in ED at [facility] re-demonstrates the bleed. Neurosurgery consulted for SDHs. MR brain shows unchanged bilateral acute subdural hematomas. Pt is now s/p OR for bilateral craniotomies with subdural evacuation. and bilateral frontal angelina holes. Postoperative CTH with pneumocephalus. Pt positioned flat for 24 hours, now eligible for mobility.
65 yo male with no PMHx transferred from [place] s/p fall on 3/8, +HS, +LOC. . Patient has been having multiple falls recently, and had a negative cardio and neuro w/u at [place] 1 month ago. On CTH in ED at [place], found to have bilateral SDH in the frontal-parietal area. Repeat C[facility] in ED [facility] re-demonstrates the bleed. Neurosurgery consulted for SDHs. MR brain shows unchanged BILATERAL acute subdural hematomas. Pt is pending OR for angelina holes. Evaluated in NSICU.

## 2024-03-21 NOTE — OCCUPATIONAL THERAPY INITIAL EVALUATION ADULT - LEVEL OF INDEPENDENCE: DRESS UPPER BODY, OT EVAL
to don gown/minimum assist (75% patients effort)
minimum assist (75% patients effort)
minimum assist (75% patients effort)

## 2024-03-21 NOTE — OCCUPATIONAL THERAPY INITIAL EVALUATION ADULT - DIAGNOSIS, OT EVAL
SDH
SDH
bilat SDH; Evacuation of bilateral subdural hematomas by craniotomy on 3/12/24; s/p MMA embolization

## 2024-03-21 NOTE — PROGRESS NOTE ADULT - THIS PATIENT HAS THE FOLLOWING CONDITION(S)/DIAGNOSES ON THIS ADMISSION:
Brain Compression / Herniation
Brain Compression / Herniation
Encephalopathy
None
Subdural hematoma/Brain Compression / Herniation
Subdural hematoma/Brain Compression / Herniation
Brain Compression / Herniation
None
b/l subdural collection
Brain Compression / Herniation
Brain Compression / Herniation
Cerebral Edema
Encephalopathy
None
None
acute on chronic b/lSDH
Brain Compression / Herniation
Brain Compression / Herniation

## 2024-03-21 NOTE — PHYSICAL THERAPY INITIAL EVALUATION ADULT - NSACTIVITYREC_GEN_A_PT
OOB to chair daily with nursing staff

## 2024-03-21 NOTE — PROGRESS NOTE ADULT - NS ATTEND BILL GEN_ALL_CORE
PA/NP to bill
Attending to bill

## 2024-03-21 NOTE — PROGRESS NOTE ADULT - NS ATTEND AMEND GEN_ALL_CORE FT
NSGY Attg:    see above    patient seen and examined    agree with above    plan of care determined for bilateral SDH  MRIs reviewed along with official report  EEG/med optimization pending  anticipate bilateral crani for drainage this week
NSGY Attg:    see above    patient seen and examined    agree with exam and plan as above
NSGY Attg:    see above    patient seen and examined    agree with exam and plan as above
NSGY Attg:    see above    patient seen and examined    imaging reviewed    agree with above  continue bilateral SD drains  repeat CT tomorrow 3/14 am
NSGY Attg:    see above    patient seen and examined     CT reviewed    agree with plan as above  d/c bilateral SD drains  repeat CT post drain pull  anticipate downgrade from ICU if post pull scan stable  possible MMA embo left versus bilateral next week pending clinical course and f/u CT head
NSGY Attg:    see above    patient seen and examined    agree with exam as above    plan of care determined for bilateral subacute SDH  pre-op for OR tomorrow, 3/12
NSGY Attg:    see above    patient seen and examined     agree with exam and plan as above  continue subdural drains
NSGY Attg:    see above    patient seen and examined    agree with exam as above    plan of care determined for bilateral subacute SDH  pre-op for OR today  plan of care reviewed with patient's son, all additional questions answered  this is a high risk case; imaging reviewed by multiple qualified providers who are in agreement with the plan
RITESH Attg:    see above    patient seen and examined    agree with above
RITESH Attg:    see above    patient seen and examined    agree with above

## 2024-03-21 NOTE — OCCUPATIONAL THERAPY INITIAL EVALUATION ADULT - LEVEL OF INDEPENDENCE: SIT/SUPINE, REHAB EVAL
minimum assist (75% patients effort)
minimum assist (75% patients effort)
pt received resting in bed

## 2024-03-21 NOTE — PHYSICAL THERAPY INITIAL EVALUATION ADULT - ADDITIONAL COMMENTS
Pt lives in a private home with his son, daughter and exwife. 5 steps to enter without handrails, no steps inside. Pt was independent PTA without an assist device. Pt owns a rollator only.
as per medical chart: Pt lives in a private home with his son, daughter and ex-wife (+) 5 steps to enter without handrails, no steps inside. Pt owns a rollator only.
Pt lives in a private home with his son, daughter and exwife. 5 steps to enter without handrails, no steps inside. Pt was independent PTA without an assist device. Pt owns a rollator only.

## 2024-03-21 NOTE — H&P ADULT - NSHPLABSRESULTS_GEN_ALL_CORE
CT head No Cont (03.08.2024)  IMPRESSION:  Bilateral mixed attenuation subdural collections, measuring up to 1.5 cm   in thickness on the right and up to 1.7 cm on the left. No   intraventricular extension of hemorrhage or midline shift. Findings are   consistent with acute on chronic subdural hematoma.    MRI of brain (03.09.2024)  IMPRESSION: Unchanged BILATERAL acute subdural hematomas, measuring 1.6 on   the RIGHT and 1.7 on the LEFT with mass effect on the brain but no   midline shift.    CT Head No Cont: 03/14/2024    IMPRESSION:   Slight decrease in the size of the BILATERAL subdural   collections now measuring 1.2 cm on the LEFT and 1.7 mm on the RIGHT.   Pneumocephalus is noted with subdural drains. Mild mass effect on the   hemispheres but no midline shift.    CT Head No Cont: 03/15/2024    IMPRESSION: Is slightly decreased postoperative air after drainage of   bilateral frontal subdural hematomas. Bilateral subdural catheters   extending into low density extra-axial collections left greater than   right with mild midline shift to the right. Small acute parafalcine   subdural hematoma unchanged..    CT Head No Cont: 03/15/2024    IMPRESSION:   Persistent unchanged BILATERAL subdural collections   following drain removal, LEFT measuring 1.5 cm and RIGHT measuring 6 mm.   Persistent pneumocephalus.   There is mild mass effect on the LEFT   hemisphere with subfalcine herniation to the LEFTunchanged. Mild   effacement of the posterior LEFT lateral ventricle.    CT Head No Cont: 03/18/2024    IMPRESSION:  1.  Improving subdural fluid collections, without interval rebleeding.    CT Head No Cont: 03/20/2024    IMPRESSION:  1.  Status post middle meningeal artery embolization and anterior   parietal craniotomies bilaterally.  A small amount of postoperative   pneumocephalus is decreased from 03/18/2024.  2.  Residual subdural collections overlying  both frontoparietal   convexities, trace subdural hemorrhage along the cerebral falx and a   low-attenuation subdural collection overlying the right cerebellar   hemisphere are stable in size from 03/18/2024.  3.  Regional mass effect in both cerebral convexities with 3 mm of   midline shift to the right is stable.   4.  No new intracranial findings. CT head No Cont (03.08.2024)  Bilateral mixed attenuation subdural collections, measuring up to 1.5 cm in thickness on the right and up to 1.7 cm on the left. No intraventricular extension of hemorrhage or midline shift. Findings are   consistent with acute on chronic subdural hematoma.    MRI of brain (03.09.2024)  Unchanged BILATERAL acute subdural hematomas, measuring 1.6 on the RIGHT and 1.7 on the LEFT with mass effect on the brain but no midline shift.    CT Head No Cont: 03/14/2024    Slight decrease in the size of the BILATERAL subdural collections now measuring 1.2 cm on the LEFT and 1.7 mm on the RIGHT. Pneumocephalus is noted with subdural drains. Mild mass effect on the hemispheres but no midline shift.    CT Head No Cont: 03/15/2024    Is slightly decreased postoperative air after drainage of bilateral frontal subdural hematomas. Bilateral subdural catheters   extending into low density extra-axial collections left greater than   right with mild midline shift to the right. Small acute parafalcine   subdural hematoma unchanged..    CT Head No Cont: 03/15/2024    Persistent unchanged BILATERAL subdural collections   following drain removal, LEFT measuring 1.5 cm and RIGHT measuring 6 mm.   Persistent pneumocephalus.   There is mild mass effect on the LEFT   hemisphere with subfalcine herniation to the LEFTunchanged. Mild   effacement of the posterior LEFT lateral ventricle.    CT Head No Cont: 03/18/2024    1.  Improving subdural fluid collections, without interval rebleeding.    CT Head No Cont: 03/20/2024    1.  Status post middle meningeal artery embolization and anterior   parietal craniotomies bilaterally.  A small amount of postoperative   pneumocephalus is decreased from 03/18/2024.  2.  Residual subdural collections overlying  both frontoparietal   convexities, trace subdural hemorrhage along the cerebral falx and a   low-attenuation subdural collection overlying the right cerebellar   hemisphere are stable in size from 03/18/2024.  3.  Regional mass effect in both cerebral convexities with 3 mm of   midline shift to the right is stable.   4.  No new intracranial findings.

## 2024-03-21 NOTE — PHYSICAL THERAPY INITIAL EVALUATION ADULT - IMPAIRMENTS CONTRIBUTING IMPAIRED BED MOBILITY, REHAB EVAL
impaired balance/impaired postural control/decreased strength
impaired balance/cognition/impaired postural control/decreased strength
impaired balance/impaired coordination/decreased flexibility/decreased strength

## 2024-03-21 NOTE — PROGRESS NOTE ADULT - SUBJECTIVE AND OBJECTIVE BOX
INTERVAL HPI/OVERNIGHT EVENTS:  64y Male no PMH other than frequent falls recently, worked up at [employer] without significant findings, presented to [facility] 3/8 s/p unwitnessed fall with headstrike/loss of consciousness, found with bilateral mixed density subdural hematomas, transferred to [facility] for tertiary care, s/p b/l craniotomies for subdural evacuation 3/12/24, b/l MMA embolization 3/19/24. patient seen this AM sitting in chair eating breakfast, doing well, no complaints    Vital Signs Last 24 Hrs  T(C): 36.7 (21 Mar 2024 07:42), Max: 37.3 (20 Mar 2024 11:42)  T(F): 98.1 (21 Mar 2024 07:42), Max: 99.2 (20 Mar 2024 11:42)  HR: 57 (21 Mar 2024 07:42) (57 - 85)  BP: 122/68 (21 Mar 2024 07:42) (109/71 - 132/76)  BP(mean): --  RR: 20 (21 Mar 2024 07:42) (18 - 20)  SpO2: 100% (21 Mar 2024 07:42) (95% - 100%)    Parameters below as of 21 Mar 2024 07:42  Patient On (Oxygen Delivery Method): room air    PHYSICAL EXAM:  GENERAL: NAD, well-groomed  HEAD: b/l craniotomy incisions and drain exit sites c/d/i  VIOLETA COMA SCORE: E- 4 V- 5 M- 6=15  MENTAL STATUS: AAO x3; Appropriately conversant without aphasia; following commands  CRANIAL NERVES: PERRL. EOMI without nystagmus. Facial sensation intact V1-3 distribution b/l. Face symmetric w/ normal eye closure and smile, tongue midline. Hearing grossly intact. Speech clear  MOTOR: strength 5/5 b/l upper and lower extremities  SENSATION: grossly intact to light touch all extremities  PULM: Nonlabored breathing  ABDOMEN: Soft, nontender, nondistended  EXTREMITIES: nonedematous    LABS:    03-20 @ 07:01  -  03-21 @ 07:00  --------------------------------------------------------  IN: 900 mL / OUT: 750 mL / NET: 150 mL    RADIOLOGY & ADDITIONAL TESTS:  CT Head No Cont (03.20.24 @ 06:03)  IMPRESSION:  1.  Status post middle meningeal artery embolization and anterior   parietal craniotomies bilaterally.  A small amount of postoperative   pneumocephalus is decreased from 03/18/2024.  2.  Residual subdural collections overlying  both frontoparietal   convexities, trace subdural hemorrhage along the cerebral falx and a   low-attenuation subdural collection overlying the right cerebellar   hemisphere are stable in size from 03/18/2024.  3.  Regional mass effect in both cerebral convexities with 3 mm of   midline shift to the right is stable.   4.  No new intracranial findings.

## 2024-03-21 NOTE — PHYSICAL THERAPY INITIAL EVALUATION ADULT - GAIT DEVIATIONS NOTED, PT EVAL
excessive ER right LE at the stand phase/decreased marlon/decreased velocity of limb motion/decreased step length/decreased weight-shifting ability

## 2024-03-21 NOTE — OCCUPATIONAL THERAPY INITIAL EVALUATION ADULT - ADDITIONAL COMMENTS
right handed  no DME
right handed  Pt has a rollator  Pt has a bathtub with doors
Pt has tub with doors  Pt owns a rollator  Pt is right handed

## 2024-03-21 NOTE — PHYSICAL THERAPY INITIAL EVALUATION ADULT - SITTING BALANCE: STATIC
good balance
Colchicine Counseling:  Patient counseled regarding adverse effects including but not limited to stomach upset (nausea, vomiting, stomach pain, or diarrhea).  Patient instructed to limit alcohol consumption while taking this medication.  Colchicine may reduce blood counts especially with prolonged use.  The patient understands that monitoring of kidney function and blood counts may be required, especially at baseline. The patient verbalized understanding of the proper use and possible adverse effects of colchicine.  All of the patient's questions and concerns were addressed.

## 2024-03-21 NOTE — OCCUPATIONAL THERAPY INITIAL EVALUATION ADULT - PERTINENT HX OF CURRENT PROBLEM, REHAB EVAL
As per MD note: 63 yo M with mixed BL SDH with mass effect. S/p BL angelina hole for SDH evac 3/12, SD drains in place; pneumacephalus.  H/o recent falls (?syncopal events), unclear etiology, w/up negative so  far.  1st degree AV block.
63 yo male with no significant PMHx transferred from [facility] s/p fall, found to have B/L SDH in the frontalpariteal region. Patient is hemodynamically stable, GCS remains 15. No other deficits or obvious injuries noted.  Awaiting neuro surgery recommendations. Keppra given in the trauma room.
As per MD note: 65 yo male with no PMHx transferred from [place] s/p fall in the afternoon. Patient has been having multiple falls recently, and had a negative cardio and neuro w/u at [facility] 1 month ago. Fall today was unwitnessed, +HS, +LOC. On CTH in ED at [place], found to have B/L SDH in the frontal-parietal area. Repeat CTH in ED here re-demonstrates the bleed. Neurosurgery consulted for SDHs.

## 2024-03-21 NOTE — PHYSICAL THERAPY INITIAL EVALUATION ADULT - PLANNED THERAPY INTERVENTIONS, PT EVAL
bed mobility training/gait training/transfer training
balance training/bed mobility training/gait training/neuromuscular re-education/strengthening/transfer training
balance training/bed mobility training/gait training/neuromuscular re-education/strengthening/transfer training

## 2024-03-22 LAB
ALBUMIN SERPL ELPH-MCNC: 3 G/DL — LOW (ref 3.3–5)
ALP SERPL-CCNC: 106 U/L — SIGNIFICANT CHANGE UP (ref 40–120)
ALT FLD-CCNC: 38 U/L — SIGNIFICANT CHANGE UP (ref 10–45)
ANION GAP SERPL CALC-SCNC: 9 MMOL/L — SIGNIFICANT CHANGE UP (ref 5–17)
AST SERPL-CCNC: 21 U/L — SIGNIFICANT CHANGE UP (ref 10–40)
BASOPHILS # BLD AUTO: 0.03 K/UL — SIGNIFICANT CHANGE UP (ref 0–0.2)
BASOPHILS NFR BLD AUTO: 0.6 % — SIGNIFICANT CHANGE UP (ref 0–2)
BILIRUB SERPL-MCNC: 0.4 MG/DL — SIGNIFICANT CHANGE UP (ref 0.2–1.2)
BUN SERPL-MCNC: 8 MG/DL — SIGNIFICANT CHANGE UP (ref 7–23)
CALCIUM SERPL-MCNC: 9.2 MG/DL — SIGNIFICANT CHANGE UP (ref 8.4–10.5)
CHLORIDE SERPL-SCNC: 103 MMOL/L — SIGNIFICANT CHANGE UP (ref 96–108)
CO2 SERPL-SCNC: 26 MMOL/L — SIGNIFICANT CHANGE UP (ref 22–31)
CREAT SERPL-MCNC: 0.81 MG/DL — SIGNIFICANT CHANGE UP (ref 0.5–1.3)
EGFR: 97 ML/MIN/1.73M2 — SIGNIFICANT CHANGE UP
EOSINOPHIL # BLD AUTO: 0.13 K/UL — SIGNIFICANT CHANGE UP (ref 0–0.5)
EOSINOPHIL NFR BLD AUTO: 2.5 % — SIGNIFICANT CHANGE UP (ref 0–6)
GLUCOSE SERPL-MCNC: 94 MG/DL — SIGNIFICANT CHANGE UP (ref 70–99)
HCT VFR BLD CALC: 40.6 % — SIGNIFICANT CHANGE UP (ref 39–50)
HGB BLD-MCNC: 13.4 G/DL — SIGNIFICANT CHANGE UP (ref 13–17)
IMM GRANULOCYTES NFR BLD AUTO: 0.6 % — SIGNIFICANT CHANGE UP (ref 0–0.9)
LYMPHOCYTES # BLD AUTO: 1.89 K/UL — SIGNIFICANT CHANGE UP (ref 1–3.3)
LYMPHOCYTES # BLD AUTO: 36.1 % — SIGNIFICANT CHANGE UP (ref 13–44)
MCHC RBC-ENTMCNC: 26.9 PG — LOW (ref 27–34)
MCHC RBC-ENTMCNC: 33 GM/DL — SIGNIFICANT CHANGE UP (ref 32–36)
MCV RBC AUTO: 81.5 FL — SIGNIFICANT CHANGE UP (ref 80–100)
MONOCYTES # BLD AUTO: 0.55 K/UL — SIGNIFICANT CHANGE UP (ref 0–0.9)
MONOCYTES NFR BLD AUTO: 10.5 % — SIGNIFICANT CHANGE UP (ref 2–14)
NEUTROPHILS # BLD AUTO: 2.61 K/UL — SIGNIFICANT CHANGE UP (ref 1.8–7.4)
NEUTROPHILS NFR BLD AUTO: 49.7 % — SIGNIFICANT CHANGE UP (ref 43–77)
NRBC # BLD: 0 /100 WBCS — SIGNIFICANT CHANGE UP (ref 0–0)
PLATELET # BLD AUTO: 294 K/UL — SIGNIFICANT CHANGE UP (ref 150–400)
POTASSIUM SERPL-MCNC: 3.9 MMOL/L — SIGNIFICANT CHANGE UP (ref 3.5–5.3)
POTASSIUM SERPL-SCNC: 3.9 MMOL/L — SIGNIFICANT CHANGE UP (ref 3.5–5.3)
PROT SERPL-MCNC: 6.9 G/DL — SIGNIFICANT CHANGE UP (ref 6–8.3)
RBC # BLD: 4.98 M/UL — SIGNIFICANT CHANGE UP (ref 4.2–5.8)
RBC # FLD: 13.6 % — SIGNIFICANT CHANGE UP (ref 10.3–14.5)
SODIUM SERPL-SCNC: 138 MMOL/L — SIGNIFICANT CHANGE UP (ref 135–145)
WBC # BLD: 5.24 K/UL — SIGNIFICANT CHANGE UP (ref 3.8–10.5)
WBC # FLD AUTO: 5.24 K/UL — SIGNIFICANT CHANGE UP (ref 3.8–10.5)

## 2024-03-22 PROCEDURE — 99223 1ST HOSP IP/OBS HIGH 75: CPT | Mod: 25

## 2024-03-22 PROCEDURE — 90832 PSYTX W PT 30 MINUTES: CPT

## 2024-03-22 PROCEDURE — 93010 ELECTROCARDIOGRAM REPORT: CPT

## 2024-03-22 RX ADMIN — Medication 500 MILLIGRAM(S): at 11:16

## 2024-03-22 RX ADMIN — Medication 1 TABLET(S): at 11:16

## 2024-03-22 RX ADMIN — POLYETHYLENE GLYCOL 3350 17 GRAM(S): 17 POWDER, FOR SOLUTION ORAL at 11:16

## 2024-03-22 RX ADMIN — PANTOPRAZOLE SODIUM 40 MILLIGRAM(S): 20 TABLET, DELAYED RELEASE ORAL at 06:14

## 2024-03-22 RX ADMIN — ENOXAPARIN SODIUM 40 MILLIGRAM(S): 100 INJECTION SUBCUTANEOUS at 21:32

## 2024-03-22 NOTE — CONSULT NOTE ADULT - SUBJECTIVE AND OBJECTIVE BOX
63 yo M with no known PMH other than frequent falls from syncope - recent admission 1/21/2024 @ [facility]: workup done without significant findings. Present to to [place] again on 3/8 after unwitnessed fall with headstrike/LOC, found to have bilateral mixed density SDH and was transferred to [facility] for tertiary care.      [facility]: CT angio negative. CT cervical spine without fracture. Had sacral tendernes: spine images with: lumbar disc bulges, coccygeal edema. No evidence of CSF leak, mild narrowing of right C5-6 neural foramen d/t uncovertebral spurring. Underwent bilateral craniotomies for subdural evacuation.  Post op CTH with pneumocephalus. Subdural drains removed 3/15.  CTH stable.  Had cerebral angio for bilateral MMAE with coils and particles, incidental L ICA aneurysm.     Patient was evaluated by PM&R and therapy for functional deficits and gait/ADL impairments and recommend acute rehabilitation.  Patient was medically optimized for discharge to [place] on 3/21/2024      seen at the bedside, no new complaints, no n/v, no sob, sitting up in chair, alert, pleasant , co operative  no overnight issues      Review of Systems: per hpi    Allergies:  No Known Allergies:     Home Medications:   * No Current Medications as of 21-Feb-2024 11:59 documented in Structured Notes    Patient History:    Past Medical, Past Surgical, and Family History:  PAST MEDICAL HISTORY:  Syncope     Traumatic subdural hematoma (SDH).     Social History:  Smoking - Denied  EtOH - Denied   Drugs - Denied      Physical Exam:   Vital Signs Last 24 Hrs  T(C): 36.6 (22 Mar 2024 08:05), Max: 36.9 (21 Mar 2024 22:02)  T(F): 97.8 (22 Mar 2024 08:05), Max: 98.4 (21 Mar 2024 22:02)  HR: 79 (22 Mar 2024 08:05) (60 - 82)  BP: 126/78 (22 Mar 2024 08:05) (105/73 - 136/77)  BP(mean): --  RR: 15 (22 Mar 2024 08:05) (15 - 18)  SpO2: 95% (22 Mar 2024 08:05) (95% - 100%)    Parameters below as of 22 Mar 2024 08:05  Patient On (Oxygen Delivery Method): room air    GENERAL- NAD  EAR/NOSE/MOUTH/THROAT -  MMM  EYES- SAMANTA, conjunctiva and Sclera clear  NECK- supple  RESPIRATORY-  clear to auscultation bilaterally, non laboured breathing  CARDIOVASCULAR - SIS2, RRR  GI - soft NT BS present  EXTREMITIES- no pedal edema  NEUROLOGY- no gross focal deficits  SKIN- scalp incision clean   PSYCHIATRY- AAO X 3                  13.4                 138  | 26   | 8            5.24  >-----------< 294     ------------------------< 94                    40.6                 3.9  | 103  | 0.81                                         Ca 9.2   Mg x     Ph x         Labs reviewed:     CXR personally reviewed:   < from: Xray Chest 1 View- PORTABLE-Urgent (Xray Chest 1 View- PORTABLE-Urgent .) (03.08.24 @ 22:42) >  HEART:difficult to access in this projection.  LUNGS: free of consolidation,effusion, or pneumothorax..  BONES: within normal limits    < end of copied text >    ECG reviewed and interpreted: < from: 12 Lead ECG (03.09.24 @ 15:38) >    Ventricular Rate 59 BPM    Atrial Rate 59 BPM    P-R Interval 218 ms    QRS Duration 90 ms    Q-T Interval 410 ms    QTC Calculation(Bazett) 405 ms    P Axis 62 degrees    R Axis 2 degrees    T Axis 31 degrees    Diagnosis Line Sinus bradycardia with 1st degree A-V block  Otherwise normal ECG    < end of copied text >    < from: TTE W or WO Ultrasound Enhancing Agent (03.09.24 @ 14:26) >     Left Ventricle:  The left ventricular cavity is normal in size. Left ventricular systolic function is normal with an ejection fraction visually estimated at 65 to 70%. There are no regional wall motion abnormalities seen. There is normal left ventricular diastolic function. There is normal LV mass and concentric remodeling.    < end of copied text >    MRI of brain (03.09.2024)  Unchanged BILATERAL acute subdural hematomas, measuring 1.6 on the RIGHT and 1.7 on the LEFT with mass effect on the brain but no midline shift.    CT Head No Cont: 03/14/2024    Slight decrease in the size of the BILATERAL subdural collections now measuring 1.2 cm on the LEFT and 1.7 mm on the RIGHT. Pneumocephalus is noted with subdural drains. Mild mass effect on the hemispheres but no midline shift.    CT Head No Cont: 03/15/2024    Is slightly decreased postoperative air after drainage of bilateral frontal subdural hematomas. Bilateral subdural catheters   extending into low density extra-axial collections left greater than   right with mild midline shift to the right. Small acute parafalcine   subdural hematoma unchanged..    CT Head No Cont: 03/15/2024    Persistent unchanged BILATERAL subdural collections   following drain removal, LEFT measuring 1.5 cm and RIGHT measuring 6 mm.   Persistent pneumocephalus.   There is mild mass effect on the LEFT   hemisphere with subfalcine herniation to the LEFTunchanged. Mild   effacement of the posterior LEFT lateral ventricle.    CT Head No Cont: 03/18/2024    1.  Improving subdural fluid collections, without interval rebleeding.    CT Head No Cont: 03/20/2024    1.  Status post middle meningeal artery embolization and anterior   parietal craniotomies bilaterally.  A small amount of postoperative   pneumocephalus is decreased from 03/18/2024.  2.  Residual subdural collections overlying  both frontoparietal   convexities, trace subdural hemorrhage along the cerebral falx and a   low-attenuation subdural collection overlying the right cerebellar   hemisphere are stable in size from 03/18/2024.  3.  Regional mass effect in both cerebral convexities with 3 mm of   midline shift to the right is stable.   4.  No new intracranial findings.      MEDICATIONS  (STANDING):  ascorbic acid 500 milliGRAM(s) Oral daily  enoxaparin Injectable 40 milliGRAM(s) SubCutaneous <User Schedule>  multivitamin 1 Tablet(s) Oral daily  pantoprazole    Tablet 40 milliGRAM(s) Oral before breakfast  polyethylene glycol 3350 17 Gram(s) Oral daily    MEDICATIONS  (PRN):  acetaminophen     Tablet .. 650 milliGRAM(s) Oral every 6 hours PRN Mild Pain (1 - 3)  bisacodyl 5 milliGRAM(s) Oral every 12 hours PRN Constipation  melatonin 3 milliGRAM(s) Oral at bedtime PRN Insomnia

## 2024-03-22 NOTE — PATIENT PROFILE ADULT - NS PRO AD NO ADVANCE DIRECTIVE
0 Pt would like Son only to be contact and decision maker of any health care decisions if patient is incapacitated./Yes

## 2024-03-22 NOTE — DIETITIAN INITIAL EVALUATION ADULT - OTHER INFO
Pt is a 64y with no PMH other than frequent falls recently, worked up at [employer] without significant findings, presented to [facility] 3/8 s/p unwitnessed fall with headstrike/loss of consciousness, found with bilateral mixed density subdural hematomas, transferred to [facility] for tertiary care, s/p b/l craniotomies for subdural evacuation 3/12/24, b/l MMA embolization 3/19/24. Now admitted to [facility] after for initiation of a multidisciplinary rehab program consisting focused on functional mobility, transfers and ADLs (activities of daily living).  Pt tolerating diet with report of good appetite. Denies recent weight change, UBW 168lbs, .2lbs. No skin breakdown or edema. Denies N/V/D, constipation. Pt receptive to trial of [employer] (vegan) oral nutrition supplement for added protein in setting of increased needs s/p crani. Diet corrected to lactovegetarian- pt receptive to dairy products with meals.

## 2024-03-22 NOTE — DIETITIAN INITIAL EVALUATION ADULT - ORAL INTAKE PTA/DIET HISTORY
Pt endorses good appetite PTA. Follows lactovegetarian diet. Reports typically eating 2 meals/day +snacks prepared at home by his ex-wife. NKFA. No chewing/swallowing issues.

## 2024-03-22 NOTE — CONSULT NOTE ADULT - ASSESSMENT
64y with no PMH other than frequent falls recently, worked up at [facility] without significant findings, presented to [facility] 3/8 s/p unwitnessed fall with headstrike/loss of consciousness, found with bilateral mixed density subdural hematomas, transferred to [facility] for tertiary care, s/p b/l craniotomies for subdural evacuation 3/12/24, b/l MMA embolization 3/19/24. Now admitted to [facility] after for initiation of a multidisciplinary rehab program consisting focused on functional mobility, transfers and ADLs - pt/ot/dvt ppx    #Traumatic SDH  - s/p bilateral craniotomy of SDH evacuation (3/12)  - S/p bilateral MMAE 3/19  - Completed seizure ppx (dc 3/21)  - Dysarthria, expressive deficits, perseveration, subtle right sided weakness/fatigued     #Sleep  Melatonin PRN to maximize participation in therapy during the day.     #Pain Management:  Analgesic: Tylenol PRN    #GI/Bowel:  Miralax Daily, Dulcolax PRN  GI ppx: Protonix    #DVT ppx: Lovenox 40   - Pressure injury/Skin: Turn Q2hrs while in bed, OOB to Chair, PT/OT      will follow  d/w rehab team   time spent in e/m visit 80 min

## 2024-03-22 NOTE — DIETITIAN INITIAL EVALUATION ADULT - PERTINENT MEDS FT
MEDICATIONS  (STANDING):  ascorbic acid 500 milliGRAM(s) Oral daily  enoxaparin Injectable 40 milliGRAM(s) SubCutaneous <User Schedule>  multivitamin 1 Tablet(s) Oral daily  pantoprazole    Tablet 40 milliGRAM(s) Oral before breakfast  polyethylene glycol 3350 17 Gram(s) Oral daily    MEDICATIONS  (PRN):  acetaminophen     Tablet .. 650 milliGRAM(s) Oral every 6 hours PRN Mild Pain (1 - 3)  bisacodyl 5 milliGRAM(s) Oral every 12 hours PRN Constipation  melatonin 3 milliGRAM(s) Oral at bedtime PRN Insomnia

## 2024-03-22 NOTE — PROGRESS NOTE ADULT - SUBJECTIVE AND OBJECTIVE BOX
Pt was seen for  minutes for supportive tx. Pt c/o . Reviewed chart, approached Pt for an initial consult, introduced the role of neuropsychology in the rehab team, and explained the nature and purpose of the consult. Pt is a 63 y/o male with no known PMH other than frequent falls from syncope - recent admission 1/21/2024 to [facility]: workup done without significant findings. Present to to [facility] again on 3/8 after unwitnessed fall with headstrike/LOC, found to have bilateral mixed density SDH and was transferred to [facility] for tertiary care.  CT angio negative. CT cervical spine without fracture. Had sacral tendernes: spine images with: lumbar disc bulges, coccygeal edema. No evidence of CSF leak, mild narrowing of right C5-6 neural foramen d/t uncovertebral spurring. Underwent bilateral craniotomies for subdural evacuation.  Post op CTH with pneumocephalus. Subdural drains removed 3/15.  CTH stable.  Had cerebral angio for bilateral MMAE with coils and particles, incidental L ICA aneurysm. Pt agreed to participate, he was well related and open in disussing his current difficulties. Session focused on establishing rapport with Pt, getting familiarized with Pt’s personal hx, and assessing his current emotional functioning.  Pt provided relevant significant information about his medical hx and social hx. He answered all questions during the clinical interview in order to elicit emotional symptoms, and denied experiencing any emotional symptoms. However, he appeared occasionally tearful during the session, suggesting some emotional lability. Support and encouragement were provided.

## 2024-03-22 NOTE — DIETITIAN INITIAL EVALUATION ADULT - PERTINENT LABORATORY DATA
03-22    138  |  103  |  8   ----------------------------<  94  3.9   |  26  |  0.81    Ca    9.2      22 Mar 2024 06:20    TPro  6.9  /  Alb  3.0<L>  /  TBili  0.4  /  DBili  x   /  AST  21  /  ALT  38  /  AlkPhos  106  03-22  A1C with Estimated Average Glucose Result: 5.6 % (03-11-24 @ 05:00)  A1C with Estimated Average Glucose Result: 5.5 % (01-22-24 @ 14:28)

## 2024-03-23 DIAGNOSIS — S06.5XAA TRAUMATIC SUBDURAL HEMORRHAGE WITH LOSS OF CONSCIOUSNESS STATUS UNKNOWN, INITIAL ENCOUNTER: ICD-10-CM

## 2024-03-23 PROCEDURE — 99232 SBSQ HOSP IP/OBS MODERATE 35: CPT

## 2024-03-23 RX ADMIN — ENOXAPARIN SODIUM 40 MILLIGRAM(S): 100 INJECTION SUBCUTANEOUS at 21:23

## 2024-03-23 RX ADMIN — Medication 1 TABLET(S): at 11:29

## 2024-03-23 RX ADMIN — Medication 500 MILLIGRAM(S): at 11:29

## 2024-03-23 RX ADMIN — POLYETHYLENE GLYCOL 3350 17 GRAM(S): 17 POWDER, FOR SOLUTION ORAL at 11:29

## 2024-03-23 RX ADMIN — PANTOPRAZOLE SODIUM 40 MILLIGRAM(S): 20 TABLET, DELAYED RELEASE ORAL at 06:12

## 2024-03-23 NOTE — PROGRESS NOTE ADULT - SUBJECTIVE AND OBJECTIVE BOX
Patient is a 64y old  Male who presents with a chief complaint of SDH (22 Mar 2024 11:46)      History, interim events and clinically pertinent issues reviewed; patient interviewed and examined.   He has no complaints seen in his wheelchair - denies HA's, CP, palpitations, shortness of breath or upper respiratory symptoms, nausea, vomiting, diarrhea, constipation, dysuria, bruising/bleeding and all other systems were reviewed as negative      ALLERGIES:  Allergy Status Unknown  No Known Allergies    MEDICATIONS  (STANDING):  ascorbic acid 500 milliGRAM(s) Oral daily  enoxaparin Injectable 40 milliGRAM(s) SubCutaneous <User Schedule>  multivitamin 1 Tablet(s) Oral daily  pantoprazole    Tablet 40 milliGRAM(s) Oral before breakfast  polyethylene glycol 3350 17 Gram(s) Oral daily    MEDICATIONS  (PRN):  acetaminophen     Tablet .. 650 milliGRAM(s) Oral every 6 hours PRN Mild Pain (1 - 3)  bisacodyl 5 milliGRAM(s) Oral every 12 hours PRN Constipation  melatonin 3 milliGRAM(s) Oral at bedtime PRN Insomnia    Vital Signs Last 24 Hrs  T(F): 97.9 (23 Mar 2024 08:20), Max: 97.9 (23 Mar 2024 08:20)  HR: 75 (23 Mar 2024 08:20) (73 - 75)  BP: 111/77 (23 Mar 2024 08:20) (109/66 - 111/77)  RR: 16 (23 Mar 2024 08:20) (15 - 16)  SpO2: 98% (23 Mar 2024 08:20) (98% - 100%)  I&O's Summary    BMI (kg/m2): 26 (03-21-24 @ 22:02)          PHYSICAL EXAM:  General: NAD, A/O x 3  ENT: MMM rt crani inc staples c/d/i  Neck: Supple, No JVD  Lungs: Clear to auscultation bilaterally  Cardio: RRR, S1/S2, No murmurs  Abdomen: Soft, Nontender, Nondistended; Bowel sounds present  Extremities: No calf tenderness, No pitting edema  Neuro speech fluent no new deficits      LABS:                        13.4   5.24  )-----------( 294      ( 22 Mar 2024 06:20 )             40.6       03-22    138  |  103  |  8   ----------------------------<  94  3.9   |  26  |  0.81    Ca    9.2      22 Mar 2024 06:20    TPro  6.9  /  Alb  3.0  /  TBili  0.4  /  DBili  x   /  AST  21  /  ALT  38  /  AlkPhos  106  03-22                03-11 Chol 142 mg/dL LDL -- HDL 38 mg/dL Trig 101 mg/dL            Urinalysis Basic - ( 22 Mar 2024 06:20 )    Color: x / Appearance: x / SG: x / pH: x  Gluc: 94 mg/dL / Ketone: x  / Bili: x / Urobili: x   Blood: x / Protein: x / Nitrite: x   Leuk Esterase: x / RBC: x / WBC x   Sq Epi: x / Non Sq Epi: x / Bacteria: x

## 2024-03-23 NOTE — PROGRESS NOTE ADULT - SUBJECTIVE AND OBJECTIVE BOX
Cc: Gait dysfunction    HPI: Patient seen and examined. No acute overnight events. States that he slept well. Denies pain or discomfort this morning.   Pain controlled, no chest pain, no N/V, no Fevers/Chills. No other new ROS  Has been tolerating rehabilitation program.    acetaminophen     Tablet .. 650 milliGRAM(s) Oral every 6 hours PRN  ascorbic acid 500 milliGRAM(s) Oral daily  bisacodyl 5 milliGRAM(s) Oral every 12 hours PRN  enoxaparin Injectable 40 milliGRAM(s) SubCutaneous <User Schedule>  melatonin 3 milliGRAM(s) Oral at bedtime PRN  multivitamin 1 Tablet(s) Oral daily  pantoprazole    Tablet 40 milliGRAM(s) Oral before breakfast  polyethylene glycol 3350 17 Gram(s) Oral daily      T(C): 36.6 (03-23-24 @ 08:20), Max: 36.6 (03-23-24 @ 08:20)  HR: 75 (03-23-24 @ 08:20) (73 - 75)  BP: 111/77 (03-23-24 @ 08:20) (109/66 - 111/77)  RR: 16 (03-23-24 @ 08:20) (15 - 16)  SpO2: 98% (03-23-24 @ 08:20) (98% - 100%)    In NAD  HEENT- +crani staples intact, skin approximated, no erythema or swelling.   Heart- RRR, S1S2  Lungs- CTA bl.  Abd- + BS, NT  Ext- No calf pain  Neuro- Exam unchanged          Imp: Patient with diagnosis of    SDH s/p craniotomy      admitted for comprehensive acute rehabilitation.    Plan:  - SDH s/p craniotomy - completed seizure ppx, Continue PT/OT/SLP  - DVT prophylaxis - lovenox 40mg qd   - Skin- Turn q2h, check skin daily, monitor BL crani sites, keep C/D/I.   - Continue current medications; patient medically stable.   -  - low PVRs, will monitor today and dc tomorrow if indicated.   - Patient is stable to continue current rehabilitation program.

## 2024-03-24 PROCEDURE — 99232 SBSQ HOSP IP/OBS MODERATE 35: CPT

## 2024-03-24 RX ADMIN — Medication 1 TABLET(S): at 11:16

## 2024-03-24 RX ADMIN — PANTOPRAZOLE SODIUM 40 MILLIGRAM(S): 20 TABLET, DELAYED RELEASE ORAL at 05:31

## 2024-03-24 RX ADMIN — ENOXAPARIN SODIUM 40 MILLIGRAM(S): 100 INJECTION SUBCUTANEOUS at 21:28

## 2024-03-24 RX ADMIN — Medication 500 MILLIGRAM(S): at 11:16

## 2024-03-24 NOTE — PROGRESS NOTE ADULT - SUBJECTIVE AND OBJECTIVE BOX
Cc: Gait dysfunction    HPI: Patient seen and examined. No acute overnight events. States that he slept well. Ate entire breakfast today. Last BM 3/23. Denies pain or discomfort this morning.   Pain controlled, no chest pain, no N/V, no Fevers/Chills. No other new ROS  Has been tolerating rehabilitation program.      acetaminophen     Tablet .. 650 milliGRAM(s) Oral every 6 hours PRN  ascorbic acid 500 milliGRAM(s) Oral daily  bisacodyl 5 milliGRAM(s) Oral every 12 hours PRN  enoxaparin Injectable 40 milliGRAM(s) SubCutaneous <User Schedule>  melatonin 3 milliGRAM(s) Oral at bedtime PRN  multivitamin 1 Tablet(s) Oral daily  pantoprazole    Tablet 40 milliGRAM(s) Oral before breakfast  polyethylene glycol 3350 17 Gram(s) Oral daily      T(C): 36.6 (03-24-24 @ 08:31), Max: 36.8 (03-23-24 @ 19:47)  HR: 61 (03-24-24 @ 08:31) (61 - 66)  BP: 121/81 (03-24-24 @ 08:31) (121/81 - 125/73)  RR: 16 (03-24-24 @ 08:31) (16 - 16)  SpO2: 98% (03-24-24 @ 08:31) (97% - 98%)        In NAD  HEENT- +crani staples intact, skin approximated, no erythema or swelling.   Heart- RRR, S1S2  Lungs- CTA bl.  Abd- + BS, NT  Ext- No calf pain  Neuro- Exam unchanged          Imp: Patient with diagnosis of    SDH s/p craniotomy      admitted for comprehensive acute rehabilitation.    Plan:  - SDH s/p craniotomy - completed seizure ppx, Continue PT/OT/SLP  - DVT prophylaxis - lovenox 40mg qd   - Skin- Turn q2h, check skin daily, monitor BL crani sites, keep C/D/I.   - Continue current medications; patient medically stable.   -  - low PVRs, will monitor today and dc tomorrow if indicated.   - Patient is stable to continue current rehabilitation program.

## 2024-03-24 NOTE — PROGRESS NOTE ADULT - SUBJECTIVE AND OBJECTIVE BOX
Patient is a 64y old  Male who presents with a chief complaint of SDH (23 Mar 2024 13:22)      Patient examined and interviewed. There were no acute medical events yesterday or overnight and patient is without complaints this morning.    REVIEW OF SYMPTOMS: patient denies HA's, CP, palpitations, shortness of breath or upper respiratory symptoms, nausea, vomiting, diarrhea, constipation, dysuria, bruising/bleeding and all other systems were reviewed as negative      ALLERGIES:  Allergy Status Unknown  No Known Allergies    MEDICATIONS  (STANDING):  ascorbic acid 500 milliGRAM(s) Oral daily  enoxaparin Injectable 40 milliGRAM(s) SubCutaneous <User Schedule>  multivitamin 1 Tablet(s) Oral daily  pantoprazole    Tablet 40 milliGRAM(s) Oral before breakfast  polyethylene glycol 3350 17 Gram(s) Oral daily    MEDICATIONS  (PRN):  acetaminophen     Tablet .. 650 milliGRAM(s) Oral every 6 hours PRN Mild Pain (1 - 3)  bisacodyl 5 milliGRAM(s) Oral every 12 hours PRN Constipation  melatonin 3 milliGRAM(s) Oral at bedtime PRN Insomnia    Vital Signs Last 24 Hrs  T(F): 97.5 (24 Mar 2024 06:17), Max: 98.2 (23 Mar 2024 19:47)  HR: 65 (24 Mar 2024 06:17) (65 - 75)  BP: 122/72 (24 Mar 2024 06:17) (111/77 - 125/73)  RR: 16 (24 Mar 2024 06:17) (16 - 16)  SpO2: 97% (24 Mar 2024 06:17) (97% - 98%)  I&O's Summary    23 Mar 2024 07:01  -  24 Mar 2024 07:00  --------------------------------------------------------  IN: 480 mL / OUT: 0 mL / NET: 480 mL      BMI (kg/m2): 26 (03-21-24 @ 22:02)          PHYSICAL EXAM:  General: NAD, A/O x 3  ENT: MMM cranio inc staples c/d/i  Neck: Supple, No JVD  Lungs: Clear to auscultation bilaterally  Cardio: RRR, S1/S2, No murmurs  Abdomen: Soft, Nontender, Nondistended; Bowel sounds present  Extremities: No calf tenderness, No pitting edema  Neuro speech fluent no new deficits    LABS:                        13.4   5.24  )-----------( 294      ( 22 Mar 2024 06:20 )             40.6       03-22    138  |  103  |  8   ----------------------------<  94  3.9   |  26  |  0.81    Ca    9.2      22 Mar 2024 06:20    TPro  6.9  /  Alb  3.0  /  TBili  0.4  /  DBili  x   /  AST  21  /  ALT  38  /  AlkPhos  106  03-22                03-11 Chol 142 mg/dL LDL -- HDL 38 mg/dL Trig 101 mg/dL            Urinalysis Basic - ( 22 Mar 2024 06:20 )    Color: x / Appearance: x / SG: x / pH: x  Gluc: 94 mg/dL / Ketone: x  / Bili: x / Urobili: x   Blood: x / Protein: x / Nitrite: x   Leuk Esterase: x / RBC: x / WBC x   Sq Epi: x / Non Sq Epi: x / Bacteria: x

## 2024-03-25 ENCOUNTER — TRANSCRIPTION ENCOUNTER (OUTPATIENT)
Age: 64
End: 2024-03-25

## 2024-03-25 ENCOUNTER — INPATIENT (INPATIENT)
Facility: HOSPITAL | Age: 64
LOS: 6 days | Discharge: HOME | DRG: 25 | End: 2024-04-01
Attending: NEUROLOGICAL SURGERY | Admitting: NEUROLOGICAL SURGERY
Payer: COMMERCIAL

## 2024-03-25 VITALS
SYSTOLIC BLOOD PRESSURE: 138 MMHG | TEMPERATURE: 98 F | WEIGHT: 166.01 LBS | OXYGEN SATURATION: 91 % | RESPIRATION RATE: 17 BRPM | HEART RATE: 77 BPM | DIASTOLIC BLOOD PRESSURE: 91 MMHG | HEIGHT: 68 IN

## 2024-03-25 VITALS
HEART RATE: 75 BPM | SYSTOLIC BLOOD PRESSURE: 121 MMHG | DIASTOLIC BLOOD PRESSURE: 71 MMHG | RESPIRATION RATE: 16 BRPM | TEMPERATURE: 98 F | OXYGEN SATURATION: 100 %

## 2024-03-25 DIAGNOSIS — Z86.79 PERSONAL HISTORY OF OTHER DISEASES OF THE CIRCULATORY SYSTEM: Chronic | ICD-10-CM

## 2024-03-25 DIAGNOSIS — Z98.890 OTHER SPECIFIED POSTPROCEDURAL STATES: Chronic | ICD-10-CM

## 2024-03-25 DIAGNOSIS — I62.9 NONTRAUMATIC INTRACRANIAL HEMORRHAGE, UNSPECIFIED: ICD-10-CM

## 2024-03-25 LAB
ALBUMIN SERPL ELPH-MCNC: 3.1 G/DL — LOW (ref 3.3–5)
ALP SERPL-CCNC: 99 U/L — SIGNIFICANT CHANGE UP (ref 40–120)
ALT FLD-CCNC: 38 U/L — SIGNIFICANT CHANGE UP (ref 10–45)
ANION GAP SERPL CALC-SCNC: 6 MMOL/L — SIGNIFICANT CHANGE UP (ref 5–17)
APTT BLD: 23.1 SEC — LOW (ref 24.5–35.6)
AST SERPL-CCNC: 17 U/L — SIGNIFICANT CHANGE UP (ref 10–40)
BILIRUB SERPL-MCNC: 0.4 MG/DL — SIGNIFICANT CHANGE UP (ref 0.2–1.2)
BLD GP AB SCN SERPL QL: SIGNIFICANT CHANGE UP
BUN SERPL-MCNC: 10 MG/DL — SIGNIFICANT CHANGE UP (ref 7–23)
CALCIUM SERPL-MCNC: 9.4 MG/DL — SIGNIFICANT CHANGE UP (ref 8.4–10.5)
CHLORIDE SERPL-SCNC: 104 MMOL/L — SIGNIFICANT CHANGE UP (ref 96–108)
CO2 SERPL-SCNC: 30 MMOL/L — SIGNIFICANT CHANGE UP (ref 22–31)
CREAT SERPL-MCNC: 0.91 MG/DL — SIGNIFICANT CHANGE UP (ref 0.5–1.3)
EGFR: 93 ML/MIN/1.73M2 — SIGNIFICANT CHANGE UP
GLUCOSE SERPL-MCNC: 105 MG/DL — HIGH (ref 70–99)
HCT VFR BLD CALC: 42.4 % — SIGNIFICANT CHANGE UP (ref 39–50)
HGB BLD-MCNC: 13.7 G/DL — SIGNIFICANT CHANGE UP (ref 13–17)
INR BLD: 1.05 RATIO — SIGNIFICANT CHANGE UP (ref 0.85–1.18)
MCHC RBC-ENTMCNC: 26.4 PG — LOW (ref 27–34)
MCHC RBC-ENTMCNC: 32.3 GM/DL — SIGNIFICANT CHANGE UP (ref 32–36)
MCV RBC AUTO: 81.9 FL — SIGNIFICANT CHANGE UP (ref 80–100)
NRBC # BLD: 0 /100 WBCS — SIGNIFICANT CHANGE UP (ref 0–0)
PLATELET # BLD AUTO: 313 K/UL — SIGNIFICANT CHANGE UP (ref 150–400)
POTASSIUM SERPL-MCNC: 4.7 MMOL/L — SIGNIFICANT CHANGE UP (ref 3.5–5.3)
POTASSIUM SERPL-SCNC: 4.7 MMOL/L — SIGNIFICANT CHANGE UP (ref 3.5–5.3)
PROT SERPL-MCNC: 7.2 G/DL — SIGNIFICANT CHANGE UP (ref 6–8.3)
PROTHROM AB SERPL-ACNC: 11.6 SEC — SIGNIFICANT CHANGE UP (ref 9.5–13)
RBC # BLD: 5.18 M/UL — SIGNIFICANT CHANGE UP (ref 4.2–5.8)
RBC # FLD: 13.3 % — SIGNIFICANT CHANGE UP (ref 10.3–14.5)
SODIUM SERPL-SCNC: 140 MMOL/L — SIGNIFICANT CHANGE UP (ref 135–145)
WBC # BLD: 5.85 K/UL — SIGNIFICANT CHANGE UP (ref 3.8–10.5)
WBC # FLD AUTO: 5.85 K/UL — SIGNIFICANT CHANGE UP (ref 3.8–10.5)

## 2024-03-25 PROCEDURE — 70450 CT HEAD/BRAIN W/O DYE: CPT | Mod: 26,77

## 2024-03-25 PROCEDURE — 97161 PT EVAL LOW COMPLEX 20 MIN: CPT | Mod: GP

## 2024-03-25 PROCEDURE — 92610 EVALUATE SWALLOWING FUNCTION: CPT | Mod: GN

## 2024-03-25 PROCEDURE — 99232 SBSQ HOSP IP/OBS MODERATE 35: CPT

## 2024-03-25 PROCEDURE — 97542 WHEELCHAIR MNGMENT TRAINING: CPT | Mod: GP

## 2024-03-25 PROCEDURE — 92507 TX SP LANG VOICE COMM INDIV: CPT | Mod: GN

## 2024-03-25 PROCEDURE — 36415 COLL VENOUS BLD VENIPUNCTURE: CPT

## 2024-03-25 PROCEDURE — 97110 THERAPEUTIC EXERCISES: CPT | Mod: GO

## 2024-03-25 PROCEDURE — 85027 COMPLETE CBC AUTOMATED: CPT

## 2024-03-25 PROCEDURE — 87637 SARSCOV2&INF A&B&RSV AMP PRB: CPT

## 2024-03-25 PROCEDURE — 97535 SELF CARE MNGMENT TRAINING: CPT | Mod: GO

## 2024-03-25 PROCEDURE — 85025 COMPLETE CBC W/AUTO DIFF WBC: CPT

## 2024-03-25 PROCEDURE — 80053 COMPREHEN METABOLIC PANEL: CPT

## 2024-03-25 PROCEDURE — 97165 OT EVAL LOW COMPLEX 30 MIN: CPT | Mod: GO

## 2024-03-25 PROCEDURE — 99291 CRITICAL CARE FIRST HOUR: CPT

## 2024-03-25 PROCEDURE — 97530 THERAPEUTIC ACTIVITIES: CPT | Mod: GO

## 2024-03-25 PROCEDURE — 70450 CT HEAD/BRAIN W/O DYE: CPT | Mod: 26

## 2024-03-25 PROCEDURE — 70450 CT HEAD/BRAIN W/O DYE: CPT | Mod: MC

## 2024-03-25 PROCEDURE — 95718 EEG PHYS/QHP 2-12 HR W/VEEG: CPT

## 2024-03-25 PROCEDURE — 99233 SBSQ HOSP IP/OBS HIGH 50: CPT | Mod: GC

## 2024-03-25 PROCEDURE — 92523 SPEECH SOUND LANG COMPREHEN: CPT | Mod: GN

## 2024-03-25 PROCEDURE — 93005 ELECTROCARDIOGRAM TRACING: CPT

## 2024-03-25 PROCEDURE — 97116 GAIT TRAINING THERAPY: CPT | Mod: GP

## 2024-03-25 RX ORDER — POLYETHYLENE GLYCOL 3350 17 G/17G
17 POWDER, FOR SOLUTION ORAL DAILY
Refills: 0 | Status: DISCONTINUED | OUTPATIENT
Start: 2024-03-25 | End: 2024-04-01

## 2024-03-25 RX ORDER — ESCITALOPRAM OXALATE 10 MG/1
5 TABLET, FILM COATED ORAL DAILY
Refills: 0 | Status: DISCONTINUED | OUTPATIENT
Start: 2024-03-25 | End: 2024-03-25

## 2024-03-25 RX ORDER — LEVETIRACETAM 250 MG/1
500 TABLET, FILM COATED ORAL EVERY 12 HOURS
Refills: 0 | Status: DISCONTINUED | OUTPATIENT
Start: 2024-03-25 | End: 2024-04-01

## 2024-03-25 RX ORDER — PANTOPRAZOLE SODIUM 20 MG/1
1 TABLET, DELAYED RELEASE ORAL
Qty: 0 | Refills: 0 | DISCHARGE
Start: 2024-03-25

## 2024-03-25 RX ORDER — ACETAMINOPHEN 500 MG
2 TABLET ORAL
Qty: 0 | Refills: 0 | DISCHARGE
Start: 2024-03-25

## 2024-03-25 RX ORDER — POLYETHYLENE GLYCOL 3350 17 G/17G
17 POWDER, FOR SOLUTION ORAL
Qty: 0 | Refills: 0 | DISCHARGE
Start: 2024-03-25

## 2024-03-25 RX ORDER — CHLORHEXIDINE GLUCONATE 213 G/1000ML
1 SOLUTION TOPICAL ONCE
Refills: 0 | Status: COMPLETED | OUTPATIENT
Start: 2024-03-26 | End: 2024-03-26

## 2024-03-25 RX ORDER — SENNA PLUS 8.6 MG/1
2 TABLET ORAL AT BEDTIME
Refills: 0 | Status: DISCONTINUED | OUTPATIENT
Start: 2024-03-25 | End: 2024-04-01

## 2024-03-25 RX ORDER — HYDRALAZINE HCL 50 MG
10 TABLET ORAL
Refills: 0 | Status: DISCONTINUED | OUTPATIENT
Start: 2024-03-25 | End: 2024-04-01

## 2024-03-25 RX ORDER — ASCORBIC ACID 60 MG
1 TABLET,CHEWABLE ORAL
Qty: 0 | Refills: 0 | DISCHARGE
Start: 2024-03-25

## 2024-03-25 RX ORDER — SODIUM CHLORIDE 9 MG/ML
1000 INJECTION INTRAMUSCULAR; INTRAVENOUS; SUBCUTANEOUS
Refills: 0 | Status: DISCONTINUED | OUTPATIENT
Start: 2024-03-25 | End: 2024-03-27

## 2024-03-25 RX ORDER — SODIUM CHLORIDE 9 MG/ML
3 INJECTION INTRAMUSCULAR; INTRAVENOUS; SUBCUTANEOUS EVERY 8 HOURS
Refills: 0 | Status: DISCONTINUED | OUTPATIENT
Start: 2024-03-25 | End: 2024-03-26

## 2024-03-25 RX ORDER — CHLORHEXIDINE GLUCONATE 213 G/1000ML
1 SOLUTION TOPICAL DAILY
Refills: 0 | Status: DISCONTINUED | OUTPATIENT
Start: 2024-03-25 | End: 2024-04-01

## 2024-03-25 RX ORDER — LABETALOL HCL 100 MG
10 TABLET ORAL
Refills: 0 | Status: DISCONTINUED | OUTPATIENT
Start: 2024-03-25 | End: 2024-04-01

## 2024-03-25 RX ORDER — LANOLIN ALCOHOL/MO/W.PET/CERES
1 CREAM (GRAM) TOPICAL
Qty: 0 | Refills: 0 | DISCHARGE
Start: 2024-03-25

## 2024-03-25 RX ORDER — ESCITALOPRAM OXALATE 10 MG/1
1 TABLET, FILM COATED ORAL
Qty: 0 | Refills: 0 | DISCHARGE
Start: 2024-03-25

## 2024-03-25 RX ADMIN — Medication 500 MILLIGRAM(S): at 12:09

## 2024-03-25 RX ADMIN — PANTOPRAZOLE SODIUM 40 MILLIGRAM(S): 20 TABLET, DELAYED RELEASE ORAL at 06:04

## 2024-03-25 RX ADMIN — ESCITALOPRAM OXALATE 5 MILLIGRAM(S): 10 TABLET, FILM COATED ORAL at 12:09

## 2024-03-25 RX ADMIN — SENNA PLUS 2 TABLET(S): 8.6 TABLET ORAL at 21:18

## 2024-03-25 RX ADMIN — LEVETIRACETAM 500 MILLIGRAM(S): 250 TABLET, FILM COATED ORAL at 18:44

## 2024-03-25 RX ADMIN — SODIUM CHLORIDE 75 MILLILITER(S): 9 INJECTION INTRAMUSCULAR; INTRAVENOUS; SUBCUTANEOUS at 18:45

## 2024-03-25 RX ADMIN — SODIUM CHLORIDE 3 MILLILITER(S): 9 INJECTION INTRAMUSCULAR; INTRAVENOUS; SUBCUTANEOUS at 21:29

## 2024-03-25 RX ADMIN — POLYETHYLENE GLYCOL 3350 17 GRAM(S): 17 POWDER, FOR SOLUTION ORAL at 12:08

## 2024-03-25 RX ADMIN — Medication 1 TABLET(S): at 12:09

## 2024-03-25 NOTE — DISCHARGE NOTE NURSING/CASE MANAGEMENT/SOCIAL WORK - PATIENT PORTAL LINK FT
You can access the FollowMyHealth Patient Portal offered by [facility] by registering at the following website: [email or web address]. By joining [facility]’s FollowMyHealth portal, you will also be able to view your health information using other applications (apps) compatible with our system.

## 2024-03-25 NOTE — DISCHARGE NOTE PROVIDER - NSDCMRMEDTOKEN_GEN_ALL_CORE_FT
acetaminophen 325 mg oral tablet: 2 tab(s) orally every 6 hours As needed Mild Pain (1 - 3)  ascorbic acid 500 mg oral tablet: 1 tab(s) orally once a day  escitalopram 5 mg oral tablet: 1 tab(s) orally once a day  melatonin 3 mg oral tablet: 1 tab(s) orally once a day (at bedtime) As needed Insomnia  Multiple Vitamins oral tablet: 1 tab(s) orally once a day  pantoprazole 40 mg oral delayed release tablet: 1 tab(s) orally once a day (before a meal)  polyethylene glycol 3350 oral powder for reconstitution: 17 gram(s) orally once a day

## 2024-03-25 NOTE — PROGRESS NOTE ADULT - ASSESSMENT
64y with no PMH other than frequent falls recently, worked up at [place] without significant findings, presented to [facility] 3/8 s/p unwitnessed fall with headstrike/loss of consciousness, found with bilateral mixed density subdural hematomas, transferred to [facility] for tertiary care, s/p b/l craniotomies for subdural evacuation 3/12/24, b/l MMA embolization 3/19/24. Now admitted to [facility] after for initiation of a multidisciplinary rehab program consisting focused on functional mobility, transfers and ADLs    #Traumatic SDH  - s/p bilateral craniotomy of SDH evacuation (3/12)  - S/p bilateral MMAE 3/19  - Completed seizure ppx (dc 3/21)  - Dysarthria, expressive deficits, perseveration, subtle right sided weakness/fatigued     #Sleep  Melatonin PRN to maximize participation in therapy during the day.     #Pain Management:  Analgesic: Tylenol PRN    #GI/Bowel:  Miralax Daily, Dulcolax PRN  GI ppx: Protonix    #DVT ppx: Lovenox 40   - Pressure injury/Skin: Turn Q2hrs while in bed, OOB to Chair, PT/OT    
CHAU AMES is a 64y with no PMH other than frequent falls recently, worked up at [place] without significant findings, presented to [facility] 3/8 s/p unwitnessed fall with headstrike/loss of consciousness, found with bilateral mixed density subdural hematomas, transferred to [facility] for tertiary care, s/p b/l craniotomies for subdural evacuation 3/12/24, b/l MMA embolization 3/19/24. Now admitted to [facility] after for initiation of a multidisciplinary rehab program consisting focused on functional mobility, transfers and ADLs (activities of daily living).    #Traumatic SDH  - s/p bilateral craniotomy of SDH evacuation (3/12)  - S/p bilateral MMAE 3/19  - Completed seizure ppx (dc 3/21)  Deficits: Dysarthria, expressive deficits, perseveration, subtle right sided weakness/fatigued   HOLD Comprehensive Multidisciplinary Rehab Program given fluctuation of performance from today compared to last week (reduced orientation, command following, and right sided weakness).  CTH with increased mass effect to left lateral ventricle with increased left-to-right shift.  Spoke to NSGY, Dr. Polanco.  Will transfer back to [facility] for further workup.      #Mood disorder:  Neuropsychology consult   Recreation Therapy    #Sleep:   Maintain quiet hours and low stim environment.  Melatonin PRN to maximize participation in therapy during the day.     #Pain Management:  Analgesic: Tylenol PRN    #GI/Bowel:  Miralax Daily, Dulcolax PRN  GI ppx: Protonix    #/Bladder:   - PVR q 8 hours (SC if > 400) - PVR <300 have not needed SC    #Skin/Pressure Injury:   - At risk for pressure injury due to neurologic diagnosis and relative immobility.  - Skin assessment on admission: intact  - Monitor Incisions: Bilateral crani incision staples c/d/i DORA; bilateral crani drain sites with sutures c/d/i DORA  - Nursing to monitor skin Qshift    #Diet/Dysphagia:  Dysphagia: SLP consult for swallow function evaluation and treatment  Current Diet: Regular   [X] Aspiration Precautions  Nutrition consult     #Precautions / PROPHYLAXIS:   - Falls, Seizure   - ortho: Weight bearing status: WBAT   - Lungs: Aspiration, Incentive Spirometer   - DVT ppx: Lovenox 40 - HOLD per NSGY - to be transferred to [facility]  - Pressure injury/Skin: Turn Q2hrs while in bed, OOB to Chair, PT/OT      ---------------  Code Status/Emergency Contact: Son: Yen Ames #[phone]    Outpatient Follow-up (Specialty/Name of physician):  NSGY - Dr. Patel Polanco  
Pt alert, attentive, intact expressive language, thought processes - goal-directed, thought contents - no morbid ideation noted, labile affect, euthymic mood, denied AH/VH, denied SI/HI/I/P, calm behavior. Plan: Continue the assessment process. Provide supportive tx.       
 64y with no PMH other than frequent falls recently, worked up at [facility] without significant findings, presented to [facility] 3/8 s/p unwitnessed fall with headstrike/loss of consciousness, found with bilateral mixed density subdural hematomas, transferred to [facility] for tertiary care, s/p b/l craniotomies for subdural evacuation 3/12/24, b/l MMA embolization 3/19/24. Now admitted to [facility] after for initiation of a multidisciplinary rehab program consisting focused on functional mobility, transfers and ADLs    #Traumatic SDH  - s/p bilateral craniotomy of SDH evacuation (3/12)  - S/p bilateral MMAE 3/19  - Completed seizure ppx (dc 3/21)  - Dysarthria, expressive deficits, perseveration, subtle right sided weakness/fatigued     #Sleep  Melatonin PRN to maximize participation in therapy during the day.     #Pain Management:  Analgesic: Tylenol PRN    #GI/Bowel:  Miralax Daily, Dulcolax PRN  GI ppx: Protonix    #DVT ppx: Lovenox 40   - Pressure injury/Skin: Turn Q2hrs while in bed, OOB to Chair, PT/OT

## 2024-03-25 NOTE — H&P ADULT - CRITICAL CARE SERVICES
Problem: Chronic Conditions and Co-morbidities  Goal: Patient's chronic conditions and co-morbidity symptoms are monitored and maintained or improved  Outcome: Adequate for Discharge     Problem: Discharge Planning  Goal: Discharge to home or other facility with appropriate resources  Outcome: Completed     
63

## 2024-03-25 NOTE — PROGRESS NOTE ADULT - NSPROGADDITIONALINFOA_GEN_ALL_CORE
I have personally interviewed and examined this patient, reviewed pertinent clinical information, and performed the evaluation and management services provided at today's visit for inpatient medical follow up    I am available to discuss any issues related to the medical care of this patient on the unit this morning, through [employer] Teams Chat or by phone at [phone]    d/w team @ [facility]
I have personally interviewed and examined this patient, reviewed pertinent clinical information, and performed the evaluation and management service provided at today's visit for medical consultation.    I am available to discuss any issues regarding the medical care of this patient, and can be reached by cell phone at [phone]
I have personally interviewed and examined this patient, reviewed pertinent clinical information, and performed the evaluation and management services provided at today's visit for inpatient medical follow up    I am available to discuss any issues related to the medical care of this patient on the unit this morning, through [employer] Teams Chat or by phone at [phone]    d/w team @ [facility]

## 2024-03-25 NOTE — H&P ADULT - HISTORY OF PRESENT ILLNESS
63 yo Male with a history of falls known to neurosurgery as he is s/p b/l craniotomies for subdural evacuation 3/12/24 by Dr. Polanco and, b/l MMA embolization 3/19/24 by Dr. Weber. The patients hospital course was complicated by transient right sided weakness post operatively which improved. He had subsequent CT Scans which were stable. He was subsequently cleared and discharged to rehab. [facility] reports of confusion, right sided weakness and increased hemorrhage on the left with increased left to right shift. No new trauma reported. Patient was subsequently transferred to [facility] for higher level and continuity of care.

## 2024-03-25 NOTE — H&P ADULT - ASSESSMENT
65 yo male b/l craniotomies for subdural evacuation 3/12/24 by Dr. Polanco and, b/l MMA embolization 3/19/24 by Dr. Weber who represents from [facility] with confusion and worsening right sided weakness and was found to have increased right to leg shift and increase left hemispheric SDH    PLAN:  - Will d/w attending    Neuro:  - HOB 30 degrees  - Neuro checks q 1 hours, vital checks q 1 hours  - CTH @ 6PM for stability  - Pain control PRN  - Keppra 500 mg Q12 for seizure prophylaxis   - c/w Lexapro 5mg   - NeuroSx following    Respiratory:  - Incentive spirometry  - on RA     CV:  - Normotensive goals  - SBP goal < 160  - PRN: hydralazine, labetalol     Endocrine:  - Goal euglycemia    Heme/Onc:  - DVT ppx SCDs    Renal:  - NS@ 75 ml/hour; IV lock when tolerating diet     ID:  - Afebrile     GI:  - NPO until stability scan  - Bedside dysphagia  - bowel regimen senna/miralax  - c/w MVI/Vit C     Activity:  - Increase as tolerated  - PT pending  - OT pending    Other:  - PM&R eval

## 2024-03-25 NOTE — DISCHARGE NOTE NURSING/CASE MANAGEMENT/SOCIAL WORK - NSDCPEFALRISK_GEN_ALL_CORE
For information on Fall & Injury Prevention, visit: [email or web address] OR  [email or web address] OR  https://www.cdc.gov/steadi/patient.html

## 2024-03-25 NOTE — CONSULT NOTE ADULT - NS ATTEND AMEND GEN_ALL_CORE FT
NSGY Attg:    see above    patient seen and examined    A and O x 2 -- confused to location  [place] to command  UE 5/5 bilaterally  LLE 5/5  RLE 4-4+/5 proximally    CT reviewed    plan of care determined for SDH  agree with above  repeat CT pending  possible OR for re-evacuation of left SDH tomorrow  d/w patient's son

## 2024-03-25 NOTE — PATIENT PROFILE ADULT - NSPROMEDSADMININFO_GEN_A_NUR
no concerns Detail Level: Simple Additional Notes: Patient consent was obtained to proceed with the visit and recommended plan of care after discussion of all risks and benefits, including the risks of COVID-19 exposure.

## 2024-03-25 NOTE — CONSULT NOTE ADULT - ASSESSMENT
63 yo male b/l craniotomies for subdural evacuation 3/12/24 by Dr. Polanco and, b/l MMA embolization 3/19/24 by Dr. Weber who represents from [facility] with confusion and worsening right sided weakness and was found to have increased right to leg shift and increase left hemispheric SDH    Plan:   - Admit to NSICU with q1 hour neurochecks and vitals  - Goal SBP <160  - Rpt CTH in 6 hours from lat scan (approximately 6pm), can be done sooner if clinically indicated  - Keppra for seizure ppx  - DVT ppx: SCD's  - NPO until rpt scan  -Bowel regimen  -Case and plan discussed with Dr. Polanco

## 2024-03-25 NOTE — H&P ADULT - NSHPPOACENTRALVENOUSCATHETER_GEN_ALL_CORE
kg).    Physical Exam   Constitutional: She is oriented to person, place, and time. She appears well-developed and well-nourished. HENT:   Head: Normocephalic. Eyes: Pupils are equal, round, and reactive to light. No scleral icterus. Neck: Normal range of motion. Neck supple. Cardiovascular: Normal rate and regular rhythm. Pulmonary/Chest: Effort normal and breath sounds normal.   Abdominal: Soft. There is no tenderness. 3 scars mid-abdomen from  Lap appendectomy   Musculoskeletal: Normal range of motion. Neurological: She is alert and oriented to person, place, and time. Psychiatric: She has a normal mood and affect. Nursing note and vitals reviewed. ASSESSMENT/PLAN:  1. Reactive depression    - sertraline (ZOLOFT) 50 MG tablet; Take 1 tablet by mouth daily  Dispense: 90 tablet; Refill: 1    2. Anxiety    - busPIRone (BUSPAR) 15 MG tablet; Take 15 mg by mouth daily  Dispense: 90 tablet; Refill: 1    3. Screening for STDs (sexually transmitted diseases)    - HSV 1, 2 Glyco G-Specific IgG; Future            An electronic signature was used to authenticate this note.     --Yodit Lobo MD on 10/17/2018 at 9:07 AM
no

## 2024-03-25 NOTE — PATIENT PROFILE ADULT - PRO INTERPRETER NEED 2
DX/W Dr. Turner after the review of ECHO. Patient last OV very weak, tired , no energy. Concern for low cardiac output syndrome and recommendations for considering home inotopic support. D/W patient needing heart cath, PICC insertion, 24/7 infusion and home RN site care. Wishes to continue current treatment and to d/w family further. Advised to call with any questions.  
English

## 2024-03-25 NOTE — H&P ADULT - NSHPLABSRESULTS_GEN_ALL_CORE
Comprehensive Metabolic Panel in AM (03.25.24 @ 05:59)    Sodium: 140 mmol/L    Potassium: 4.7 mmol/L    Chloride: 104 mmol/L    Carbon Dioxide: 30 mmol/L    Anion Gap: 6 mmol/L    Blood Urea Nitrogen: 10 mg/dL    Creatinine: 0.91 mg/dL    Glucose: 105 mg/dL    Calcium: 9.4 mg/dL    Protein Total: 7.2 g/dL    Albumin: 3.1 g/dL    Bilirubin Total: 0.4 mg/dL    Alkaline Phosphatase: 99 U/L    Aspartate Aminotransferase (AST/SGOT): 17 U/L    Alanine Aminotransferase (ALT/SGPT): 38 U/L    eGFR: 93: The estimated glomerular filtration rate (eGFR) is calculated using the  2021 CKD-EPI creatinine equation, which does not have a coefficient for  race and is validated in individuals 18 years of age and older (N Engl J  Med 2021; 385:5617-3148). Creatinine-based eGFR may be inaccurate in  various situations including but not limited to extremes of muscle mass,  altered dietary protein intake, or medications that affect renal tubular  creatinine secretion. mL/min/1.73m2    Complete Blood Count in AM (03.25.24 @ 05:59)    Nucleated RBC: 0 /100 WBCs    WBC Count: 5.85 K/uL    RBC Count: 5.18 M/uL    Hemoglobin: 13.7 g/dL    Hematocrit: 42.4 %    Mean Cell Volume: 81.9 fl    Mean Cell Hemoglobin: 26.4 pg    Mean Cell Hemoglobin Conc: 32.3 gm/dL    Red Cell Distrib Width: 13.3 %    Platelet Count - Automated: 313 K/uL    CT 3/25/24:   Acute on chronic subdural hematoma is are identified bilaterally. There   appears to be an increased acute component seen associated with the   subdural hematomas when compared with the prior head CT performed on   March 20, 2024    The subdural hematoma on the left side involves the left frontal parietal   and occipital region. This finding measures approximately 2.0 cm widest   diameter and previously measured approximately 1.7 cm was found.   Previously noted subdural air in this region has nearly resolved.    Subdural hematoma on the right side is seen involving the right frontal   parietal region. This finding measures approximately 1.4 cm widest   diameter and previously measured approximately 1.5 cm widest diameter.   Mass effect on the left lateral ventricle is now seen. Increased   left-to-right shift is seen when compared with the prior exam (currently   7.5 mm).    Postoperative changes compatible with bifrontal craniotomies and angelina   holes are identified    Extracalvarial soft tissue swelling and staples are seen involving the   high bifrontal region.    The visualized paranasal sinuses mastoid and middle ear regions appear   clear.    Impression: Mixed attenuated subdural hematoma is are again seen   bilaterally with increase acute component when compared with the prior   exam. Increased mass effect seen on the left lateral ventricle with   increased left-to-right shift.

## 2024-03-25 NOTE — DISCHARGE NOTE PROVIDER - HOSPITAL COURSE
65 yo M with no known PMH other than frequent falls from syncope - recent admission 1/21/2024 @ [facility]: workup done without significant findings. Present to to [facility] again on 3/8 after unwitnessed fall with headstrike/LOC, found to have bilateral mixed density SDH and was transferred to [facility] for tertiary care.      [facility]: CT angio negative. CT cervical spine without fracture. Had sacral tendernes: spine images with: lumbar disc bulges, coccygeal edema. No evidence of CSF leak, mild narrowing of right C5-6 neural foramen d/t uncovertebral spurring. Underwent bilateral craniotomies for subdural evacuation.  Post op CTH with pneumocephalus. Subdural drains removed 3/15.  CTH stable.  Had cerebral angio for bilateral MMAE with coils and particles, incidental L ICA aneurysm.     Patient was evaluated by PM&R and therapy for functional deficits and gait/ADL impairments and recommend acute rehabilitation.  Patient was medically optimized for discharge to [facility] on 3/21/2024     Pt was stable upon rehab admission to  Inpatient Rehabilitation Facility. Admitted with gait instabilty, ADL, and functional impairments.     Rehab Course significant:  - Emotionally labile  ******Fluctuating performance: admitted with MMT 5/5 throughout and was AAO x 4.  Therapy evaluation with min A.  However, noted with worsening cog: only oriented to self. Currently at mod A + RLE proximal weakness. Poor command following despite max cue.    Repeat CTH (3/25): Mixed attenuated subdural hematoma is are again seen bilaterally with increase acute component when compared with the prior exam. Increased mass effect seen on the left lateral ventricle with increased left-to-right shift. **** SPoke with RITESH, Dr. Patel Polanco who is made aware of findings > recommend: hold lovenox, tier 1 transfer to [facility] for further evaluation.

## 2024-03-25 NOTE — PATIENT PROFILE ADULT - NS PRO AD NO ADVANCE DIRECTIVE
Pt would like Son only to be contact and decision maker of any health care decisions if patient is incapacitated./Yes Pt would like Son only to be contact and decision maker of any health care decisions if patient is incapacitated./No

## 2024-03-25 NOTE — H&P ADULT - NSICDXPASTMEDICALHX_GEN_ALL_CORE_FT
PAST MEDICAL HISTORY:  No pertinent past medical history     Syncope     Traumatic subdural hematoma (SDH)

## 2024-03-25 NOTE — PROGRESS NOTE ADULT - SUBJECTIVE AND OBJECTIVE BOX
CC: Patient is a 64y old  Male who presents with a chief complaint of SDH (25 Mar 2024 12:41)    Patient noted to have change in mental status, appeared more confused, repeat imaging showed worsening mass effect with shift.   ALLERGIES:  Allergy Status Unknown  No Known Allergies    MEDICATIONS  (STANDING):  ascorbic acid 500 milliGRAM(s) Oral daily  escitalopram 5 milliGRAM(s) Oral daily  multivitamin 1 Tablet(s) Oral daily  pantoprazole    Tablet 40 milliGRAM(s) Oral before breakfast  polyethylene glycol 3350 17 Gram(s) Oral daily    MEDICATIONS  (PRN):  acetaminophen     Tablet .. 650 milliGRAM(s) Oral every 6 hours PRN Mild Pain (1 - 3)  bisacodyl 5 milliGRAM(s) Oral every 12 hours PRN Constipation  melatonin 3 milliGRAM(s) Oral at bedtime PRN Insomnia    Vital Signs Last 24 Hrs  T(F): 98.1 (25 Mar 2024 07:22), Max: 98.1 (24 Mar 2024 19:58)  HR: 75 (25 Mar 2024 07:22) (70 - 75)  BP: 121/71 (25 Mar 2024 07:22) (121/71 - 127/78)  RR: 16 (25 Mar 2024 07:22) (16 - 16)  SpO2: 100% (25 Mar 2024 07:22) (98% - 100%)  I&O's Summary    24 Mar 2024 07:01  -  25 Mar 2024 07:00  --------------------------------------------------------  IN: 480 mL / OUT: 1000 mL / NET: -520 mL      BMI (kg/m2): 26 (03-21-24 @ 22:02)    PHYSICAL EXAM:  lert, awake, confused  both lungs clear, s1, s2, regualr  abdomen- soft  no pedal edema    LABS:                        13.7   5.85  )-----------( 313      ( 25 Mar 2024 05:59 )             42.4       03-25    140  |  104  |  10  ----------------------------<  105  4.7   |  30  |  0.91    Ca    9.4      25 Mar 2024 05:59    TPro  7.2  /  Alb  3.1  /  TBili  0.4  /  DBili  x   /  AST  17  /  ALT  38  /  AlkPhos  99  03-25 03-11 Chol 142 mg/dL LDL -- HDL 38 mg/dL Trig 101 mg/dL    Urinalysis Basic - ( 25 Mar 2024 05:59 )    Color: x / Appearance: x / SG: x / pH: x  Gluc: 105 mg/dL / Ketone: x  / Bili: x / Urobili: x   Blood: x / Protein: x / Nitrite: x   Leuk Esterase: x / RBC: x / WBC x   Sq Epi: x / Non Sq Epi: x / Bacteria: x    a/p:    #Traumatic SDH, now with worsening status on repeat imaging  - patient to be transferred back to Neuro surgery a [facility]    - discussed with Dr. Yang    Care Discussed with Consultants/Other Providers: Yes

## 2024-03-25 NOTE — DISCHARGE NOTE PROVIDER - NSDCCPCAREPLAN_GEN_ALL_CORE_FT
PRINCIPAL DISCHARGE DIAGNOSIS  Diagnosis: SDH (subdural hematoma)  Assessment and Plan of Treatment: s/p bilateral craniotomies for subdural evacuation (3/12).  Post op CTH with pneumocephalus. Subdural drains removed 3/15.  CTH stable.  Had cerebral angio for bilateral MMAE with coils and particles (3/19)

## 2024-03-25 NOTE — PROGRESS NOTE ADULT - SUBJECTIVE AND OBJECTIVE BOX
Patient is a 64y old  Male who presents with a chief complaint of SDH     HPI:  63 yo M with no known PMH other than frequent falls from syncope - recent admission 1/21/2024 @ [place] [facility]: workup done without significant findings. Present to to [place] again on 3/8 after unwitnessed fall with headstrike/LOC, found to have bilateral mixed density SDH and was transferred to [facility] for tertiary care.      [facility]: CT angio negative. CT cervical spine without fracture. Had sacral tendernes: spine images with: lumbar disc bulges, coccygeal edema. No evidence of CSF leak, mild narrowing of right C5-6 neural foramen d/t uncovertebral spurring. Underwent bilateral craniotomies for subdural evacuation.  Post op CTH with pneumocephalus. Subdural drains removed 3/15.  CTH stable.  Had cerebral angio for bilateral MMAE with coils and particles, incidental L ICA aneurysm.     Patient was evaluated by PM&R and therapy for functional deficits and gait/ADL impairments and recommend acute rehabilitation.  Patient was medically optimized for discharge to [place] on 3/21/2024    SUBJECTIVE/OBJECTIVE: Patient was seen and evaluated while sitting up in WC having breakfast.  Therapists and nursing staff notes increased assistance with functional tasks (went from min A to mod A despite max cues.  He was emotionally labile and tearful, having difficulty conveying what he's feeling    REVIEW OF SYSTEMS:  +cog impairment (delay processing/response, reduced attention; not fully oriented)  + weakness with motor apraxia  +  emotional labile    PHYSICAL EXAM  64y  Vital Signs Last 24 Hrs  T(C): 36.7 (25 Mar 2024 07:22), Max: 36.7 (24 Mar 2024 19:58)  T(F): 98.1 (25 Mar 2024 07:22), Max: 98.1 (24 Mar 2024 19:58)  HR: 75 (25 Mar 2024 07:22) (70 - 75)  BP: 121/71 (25 Mar 2024 07:22) (121/71 - 127/78)  RR: 16 (25 Mar 2024 07:22) (16 - 16)  SpO2: 100% (25 Mar 2024 07:22) (98% - 100%)    Constitutional - NAD, Comfortable  HEENT - NCAT, EOMI; frontal craniotomy staples and bilateral drain sites with sutures c/d/i   Neck - Supple, No limited ROM  Pulm - resp nonlabored  Cardio - warm and well perfused, no cyanosis  Abdomen -  Soft, NTND  Extremities - No peripheral edema, No calf tenderness   Neurologic Exam:                    Cognitive -             Orientation: Awake, Alert and oriented to self, difficulty with place, m/yr despite cues. Attempts to follow commands ->maybe  impaired d/t motor apraxia? vs comprehension             Attention:  impaired     Speech - +dysarthria, +aphasia     Cranial Nerves - No facial asymmetry, Tongue midline, EOMI, Shoulder shrug intact     Motor -                      LEFT    UE - ShAB 5/5, EF 5/5, EE 5/5, WE 5/5,  WNL                    RIGHT UE - ShAB 4/5, EF 5/5, EE 5/5, WE 5/5,  WNL                    LEFT    LE - HF 5/5, KE 5/5, DF 5/5, PF 5/5                    RIGHT LE - HF 4/5, KE 4/5, DF 5/5, PF 5/5        Sensory - Intact to LT bilateral     Coordination - FTN impaired     OculoVestibular -  No nystagmus  Psychiatric -emotionally labile, tearful      RECENT LABS:                        13.7   5.85  )-----------( 313      ( 25 Mar 2024 05:59 )             42.4     03-25    140  |  104  |  10  ----------------------------<  105<H>  4.7   |  30  |  0.91    Ca    9.4      25 Mar 2024 05:59    TPro  7.2  /  Alb  3.1<L>  /  TBili  0.4  /  DBili  x   /  AST  17  /  ALT  38  /  AlkPhos  99  03-25    LIVER FUNCTIONS - ( 25 Mar 2024 05:59 )  Alb: 3.1 g/dL / Pro: 7.2 g/dL / ALK PHOS: 99 U/L / ALT: 38 U/L / AST: 17 U/L / GGT: x           CT Head No Cont (03.25.24 @ 11:55) >  Impression: Mixed attenuated subdural hematoma is are again seen   bilaterally with increase acute component when compared with the prior exam. Increased mass effect seen on the left lateral ventricle with increased left-to-right shift.    Allergies  Allergy Status Unknown  No Known Allergies    MEDICATIONS  (STANDING):  ascorbic acid 500 milliGRAM(s) Oral daily  enoxaparin Injectable 40 milliGRAM(s) SubCutaneous <User Schedule>  escitalopram 5 milliGRAM(s) Oral daily  multivitamin 1 Tablet(s) Oral daily  pantoprazole    Tablet 40 milliGRAM(s) Oral before breakfast  polyethylene glycol 3350 17 Gram(s) Oral daily    MEDICATIONS  (PRN):  acetaminophen     Tablet .. 650 milliGRAM(s) Oral every 6 hours PRN Mild Pain (1 - 3)  bisacodyl 5 milliGRAM(s) Oral every 12 hours PRN Constipation  melatonin 3 milliGRAM(s) Oral at bedtime PRN Insomnia   Patient is a 64y old  Male who presents with a chief complaint of SDH     HPI:  63 yo M with no known PMH other than frequent falls from syncope - recent admission 1/21/2024 @ [place] [facility]: workup done without significant findings. Present to to [place] again on 3/8 after unwitnessed fall with headstrike/LOC, found to have bilateral mixed density SDH and was transferred to [facility] for tertiary care.      [facility]: CT angio negative. CT cervical spine without fracture. Had sacral tendernes: spine images with: lumbar disc bulges, coccygeal edema. No evidence of CSF leak, mild narrowing of right C5-6 neural foramen d/t uncovertebral spurring. Underwent bilateral craniotomies for subdural evacuation.  Post op CTH with pneumocephalus. Subdural drains removed 3/15.  CTH stable.  Had cerebral angio for bilateral MMAE with coils and particles, incidental L ICA aneurysm.     Patient was evaluated by PM&R and therapy for functional deficits and gait/ADL impairments and recommend acute rehabilitation.  Patient was medically optimized for discharge to [place] on 3/21/2024    SUBJECTIVE/OBJECTIVE: Patient was seen and evaluated while sitting up in WC having breakfast.  Therapists and nursing staff notes increased assistance with functional tasks (went from min A to mod A despite max cues.  He was emotionally labile and tearful, having difficulty conveying what he's feeling, seems more confused today.    REVIEW OF SYSTEMS:  +cog impairment (delay processing/response, reduced attention; not fully oriented)  + weakness with motor apraxia  +  emotional labile    PHYSICAL EXAM  64y  Vital Signs Last 24 Hrs  T(C): 36.7 (25 Mar 2024 07:22), Max: 36.7 (24 Mar 2024 19:58)  T(F): 98.1 (25 Mar 2024 07:22), Max: 98.1 (24 Mar 2024 19:58)  HR: 75 (25 Mar 2024 07:22) (70 - 75)  BP: 121/71 (25 Mar 2024 07:22) (121/71 - 127/78)  RR: 16 (25 Mar 2024 07:22) (16 - 16)  SpO2: 100% (25 Mar 2024 07:22) (98% - 100%)    Constitutional - NAD but becomes tearful during eval  HEENT - NCAT, EOMI; frontal craniotomy staples and bilateral drain sites with sutures c/d/i   Neck - Supple, No limited ROM  Pulm - resp nonlabored  Cardio - warm and well perfused, no cyanosis  Abdomen -  Soft, NTND  Extremities - No peripheral edema, No calf tenderness   Neurologic Exam:                    Cognitive -             Orientation: Awake, Alert and oriented to self, difficulty with place, m/yr despite cues. Attempts to follow commands ->maybe  impaired d/t motor apraxia? vs comprehension             Attention:  impaired     Speech - +dysarthria, +aphasia     Cranial Nerves - No facial asymmetry, Tongue midline, EOMI, Shoulder shrug intact     Motor -                      LEFT    UE - ShAB 5/5, EF 5/5, EE 5/5, WE 5/5,  WNL                    RIGHT UE - ShAB 4/5, EF 5/5, EE 5/5, WE 5/5,  WNL                    LEFT    LE - HF 5/5, KE 5/5, DF 5/5, PF 5/5                    RIGHT LE - HF 4/5, KE 4/5, DF 5/5, PF 5/5        Coordination - FTN impaired  Psychiatric -emotionally labile      RECENT LABS:                        13.7   5.85  )-----------( 313      ( 25 Mar 2024 05:59 )             42.4     03-25    140  |  104  |  10  ----------------------------<  105<H>  4.7   |  30  |  0.91    Ca    9.4      25 Mar 2024 05:59    TPro  7.2  /  Alb  3.1<L>  /  TBili  0.4  /  DBili  x   /  AST  17  /  ALT  38  /  AlkPhos  99  03-25    LIVER FUNCTIONS - ( 25 Mar 2024 05:59 )  Alb: 3.1 g/dL / Pro: 7.2 g/dL / ALK PHOS: 99 U/L / ALT: 38 U/L / AST: 17 U/L / GGT: x           CT Head No Cont (03.25.24 @ 11:55) >  Impression: Mixed attenuated subdural hematoma is are again seen   bilaterally with increase acute component when compared with the prior exam. Increased mass effect seen on the left lateral ventricle with increased left-to-right shift.    Allergies  Allergy Status Unknown  No Known Allergies    MEDICATIONS  (STANDING):  ascorbic acid 500 milliGRAM(s) Oral daily  enoxaparin Injectable 40 milliGRAM(s) SubCutaneous <User Schedule>  escitalopram 5 milliGRAM(s) Oral daily  multivitamin 1 Tablet(s) Oral daily  pantoprazole    Tablet 40 milliGRAM(s) Oral before breakfast  polyethylene glycol 3350 17 Gram(s) Oral daily    MEDICATIONS  (PRN):  acetaminophen     Tablet .. 650 milliGRAM(s) Oral every 6 hours PRN Mild Pain (1 - 3)  bisacodyl 5 milliGRAM(s) Oral every 12 hours PRN Constipation  melatonin 3 milliGRAM(s) Oral at bedtime PRN Insomnia

## 2024-03-25 NOTE — PROGRESS NOTE ADULT - NS ATTEND AMEND GEN_ALL_CORE FT
I have personally seen and examined the patient.  I fully participated in the care of this patient.  I have made amendments to the documentation where necessary, and agree with the history, physical exam, and plan as documented above  Patient seen this AM, more confused than usual-- knew hospital but not name, did not know year or month. Emotional during evaluation but unable to say why. Denies headaches or pain.  Motor exam with RLE weakness. Repeat CT head performed-Mixed attenuated subdural hematoma is are again seen bilaterally with increase acute component when compared with the prior exam. Increased mass effect seen on the left lateral ventricle with increased left-to-right shift.  Spoke with neurosurgery at [facility], patient to be transferred back for further eval and intervention  Spoke to patient son Audi and Ex wife regarding transfer. All questions answered  Interdisciplinary team meeting today with speech therapist, occupational therapist, physical therapist, social work and nursing where medical updates provided, progress with therapies and goals discussed. Plan of care reviewed. Team conference note documented on gailky.  Total time 80 mins-face to face time encounter and counseling the patient, meeting with hospitalist, nursing staff, therapists and social work to discuss medical updates and management, care coordination, reviewing chart and data

## 2024-03-25 NOTE — H&P ADULT - NSHPPHYSICALEXAM_GEN_ALL_CORE
PHYSICAL EXAM:  General: No Acute Distress, tearful at times.     HEENT: pupils equal and reactive. b/l craniotomy site c/d/i staples in place.     Neurological: Awake, alert & oriented to person and place, Following Commands, PERRL, EOMI,  Face Symmetric, Speech Fluent, Moving all extremities     Muscle Strength: RUE: 5/5 LUE: 5/5 RLE: 4/5 HF,  5/5 remainder LLE: 5/5. RLE drift     Sensation Intact to Light Touch     Cerebellar: There is no dysmetria on finger to nose testing.    Gait : deferred    Pulmonary: non labored breathing, no use of accessory muscles    Cardiovascular:  Regular Rate and Rhythm     Gastrointestinal: Soft, Nontender, Nondistended     Incision: c/d/i. staples in place

## 2024-03-25 NOTE — DISCHARGE NOTE PROVIDER - CARE PROVIDER_API CALL
Left message for patient to call the office. Patel Polanco  Neurosurgery  [address] [place] [id number]  Phone: [phone]  Fax: [phone]  Follow Up Time:

## 2024-03-26 ENCOUNTER — APPOINTMENT (OUTPATIENT)
Dept: NEUROSURGERY | Facility: HOSPITAL | Age: 64
End: 2024-03-26

## 2024-03-26 PROBLEM — S06.5XAA TRAUMATIC SUBDURAL HEMORRHAGE WITH LOSS OF CONSCIOUSNESS STATUS UNKNOWN, INITIAL ENCOUNTER: Chronic | Status: ACTIVE | Noted: 2024-03-21

## 2024-03-26 PROBLEM — Z78.9 OTHER SPECIFIED HEALTH STATUS: Chronic | Status: ACTIVE | Noted: 2024-03-08

## 2024-03-26 PROBLEM — R55 SYNCOPE AND COLLAPSE: Chronic | Status: ACTIVE | Noted: 2024-03-21

## 2024-03-26 LAB
ANION GAP SERPL CALC-SCNC: 11 MMOL/L — SIGNIFICANT CHANGE UP (ref 5–17)
BUN SERPL-MCNC: 10.8 MG/DL — SIGNIFICANT CHANGE UP (ref 8–20)
CALCIUM SERPL-MCNC: 9.2 MG/DL — SIGNIFICANT CHANGE UP (ref 8.4–10.5)
CHLORIDE SERPL-SCNC: 101 MMOL/L — SIGNIFICANT CHANGE UP (ref 96–108)
CO2 SERPL-SCNC: 24 MMOL/L — SIGNIFICANT CHANGE UP (ref 22–29)
CREAT SERPL-MCNC: 0.63 MG/DL — SIGNIFICANT CHANGE UP (ref 0.5–1.3)
EGFR: 105 ML/MIN/1.73M2 — SIGNIFICANT CHANGE UP
GLUCOSE BLDC GLUCOMTR-MCNC: 142 MG/DL — HIGH (ref 70–99)
GLUCOSE SERPL-MCNC: 106 MG/DL — HIGH (ref 70–99)
HCT VFR BLD CALC: 39.8 % — SIGNIFICANT CHANGE UP (ref 39–50)
HGB BLD-MCNC: 13.1 G/DL — SIGNIFICANT CHANGE UP (ref 13–17)
MAGNESIUM SERPL-MCNC: 2 MG/DL — SIGNIFICANT CHANGE UP (ref 1.6–2.6)
MCHC RBC-ENTMCNC: 27 PG — SIGNIFICANT CHANGE UP (ref 27–34)
MCHC RBC-ENTMCNC: 32.9 GM/DL — SIGNIFICANT CHANGE UP (ref 32–36)
MCV RBC AUTO: 81.9 FL — SIGNIFICANT CHANGE UP (ref 80–100)
MRSA PCR RESULT.: SIGNIFICANT CHANGE UP
PHOSPHATE SERPL-MCNC: 3.6 MG/DL — SIGNIFICANT CHANGE UP (ref 2.4–4.7)
PLATELET # BLD AUTO: 317 K/UL — SIGNIFICANT CHANGE UP (ref 150–400)
POTASSIUM SERPL-MCNC: 4.3 MMOL/L — SIGNIFICANT CHANGE UP (ref 3.5–5.3)
POTASSIUM SERPL-SCNC: 4.3 MMOL/L — SIGNIFICANT CHANGE UP (ref 3.5–5.3)
RBC # BLD: 4.86 M/UL — SIGNIFICANT CHANGE UP (ref 4.2–5.8)
RBC # FLD: 13.3 % — SIGNIFICANT CHANGE UP (ref 10.3–14.5)
S AUREUS DNA NOSE QL NAA+PROBE: SIGNIFICANT CHANGE UP
SODIUM SERPL-SCNC: 136 MMOL/L — SIGNIFICANT CHANGE UP (ref 135–145)
WBC # BLD: 6.81 K/UL — SIGNIFICANT CHANGE UP (ref 3.8–10.5)
WBC # FLD AUTO: 6.81 K/UL — SIGNIFICANT CHANGE UP (ref 3.8–10.5)

## 2024-03-26 PROCEDURE — 95813 EEG EXTND MNTR 61-119 MIN: CPT | Mod: 26

## 2024-03-26 PROCEDURE — 99291 CRITICAL CARE FIRST HOUR: CPT

## 2024-03-26 PROCEDURE — 61312 CRNEC/CRNOT STTL XDRL/SDRL: CPT | Mod: AS,58

## 2024-03-26 PROCEDURE — 70450 CT HEAD/BRAIN W/O DYE: CPT | Mod: 26

## 2024-03-26 PROCEDURE — 61312 CRNEC/CRNOT STTL XDRL/SDRL: CPT | Mod: 58

## 2024-03-26 DEVICE — SURGICEL 2 X 14": Type: IMPLANTABLE DEVICE | Status: FUNCTIONAL

## 2024-03-26 DEVICE — SCREW UN3 AXS SELF DRILL 1.5X4MM: Type: IMPLANTABLE DEVICE | Status: FUNCTIONAL

## 2024-03-26 DEVICE — SURGIFOAM PAD 8CM X 12.5CM X 10MM (100): Type: IMPLANTABLE DEVICE | Status: FUNCTIONAL

## 2024-03-26 DEVICE — GRAFT DURAGEN PLUS 3X3IN: Type: IMPLANTABLE DEVICE | Status: FUNCTIONAL

## 2024-03-26 DEVICE — SURGICEL FIBRILLAR 4 X 4": Type: IMPLANTABLE DEVICE | Status: FUNCTIONAL

## 2024-03-26 DEVICE — SURGIFLO MATRIX WITH THROMBIN KIT: Type: IMPLANTABLE DEVICE | Status: FUNCTIONAL

## 2024-03-26 RX ORDER — OXYCODONE HYDROCHLORIDE 5 MG/1
5 TABLET ORAL EVERY 4 HOURS
Refills: 0 | Status: DISCONTINUED | OUTPATIENT
Start: 2024-03-26 | End: 2024-04-01

## 2024-03-26 RX ORDER — CEFAZOLIN SODIUM 1 G
2000 VIAL (EA) INJECTION EVERY 8 HOURS
Refills: 0 | Status: DISCONTINUED | OUTPATIENT
Start: 2024-03-26 | End: 2024-03-26

## 2024-03-26 RX ORDER — ESCITALOPRAM OXALATE 10 MG/1
5 TABLET, FILM COATED ORAL DAILY
Refills: 0 | Status: DISCONTINUED | OUTPATIENT
Start: 2024-03-26 | End: 2024-04-01

## 2024-03-26 RX ORDER — ACETAMINOPHEN 500 MG
975 TABLET ORAL EVERY 6 HOURS
Refills: 0 | Status: DISCONTINUED | OUTPATIENT
Start: 2024-03-26 | End: 2024-04-01

## 2024-03-26 RX ORDER — CEFAZOLIN SODIUM 1 G
2000 VIAL (EA) INJECTION EVERY 8 HOURS
Refills: 0 | Status: DISCONTINUED | OUTPATIENT
Start: 2024-03-26 | End: 2024-03-28

## 2024-03-26 RX ORDER — OXYCODONE HYDROCHLORIDE 5 MG/1
10 TABLET ORAL EVERY 4 HOURS
Refills: 0 | Status: DISCONTINUED | OUTPATIENT
Start: 2024-03-26 | End: 2024-04-01

## 2024-03-26 RX ORDER — ACETAMINOPHEN 500 MG
1000 TABLET ORAL ONCE
Refills: 0 | Status: COMPLETED | OUTPATIENT
Start: 2024-03-26 | End: 2024-03-26

## 2024-03-26 RX ADMIN — LEVETIRACETAM 500 MILLIGRAM(S): 250 TABLET, FILM COATED ORAL at 05:09

## 2024-03-26 RX ADMIN — Medication 2000 MILLIGRAM(S): at 18:48

## 2024-03-26 RX ADMIN — LEVETIRACETAM 500 MILLIGRAM(S): 250 TABLET, FILM COATED ORAL at 18:15

## 2024-03-26 RX ADMIN — SODIUM CHLORIDE 75 MILLILITER(S): 9 INJECTION INTRAMUSCULAR; INTRAVENOUS; SUBCUTANEOUS at 07:43

## 2024-03-26 RX ADMIN — Medication 1000 MILLIGRAM(S): at 19:00

## 2024-03-26 RX ADMIN — SODIUM CHLORIDE 3 MILLILITER(S): 9 INJECTION INTRAMUSCULAR; INTRAVENOUS; SUBCUTANEOUS at 05:03

## 2024-03-26 RX ADMIN — OXYCODONE HYDROCHLORIDE 10 MILLIGRAM(S): 5 TABLET ORAL at 19:45

## 2024-03-26 RX ADMIN — OXYCODONE HYDROCHLORIDE 10 MILLIGRAM(S): 5 TABLET ORAL at 18:59

## 2024-03-26 RX ADMIN — Medication 400 MILLIGRAM(S): at 18:15

## 2024-03-26 RX ADMIN — CHLORHEXIDINE GLUCONATE 1 APPLICATION(S): 213 SOLUTION TOPICAL at 10:00

## 2024-03-26 RX ADMIN — CHLORHEXIDINE GLUCONATE 1 APPLICATION(S): 213 SOLUTION TOPICAL at 10:22

## 2024-03-26 NOTE — PROGRESS NOTE ADULT - ASSESSMENT
64 YOM PMH b/l craniotomies for subdural evacuation 3/12/24 by Dr. Polanco and, b/l MMA embolization 3/19/24 by Dr. Weber who represents from [facility] with confusion and worsening right sided weakness and was found to have increased right to leg shift and increase left hemispheric SDH    PLAN:    Neuro:  - HOB 30 degrees  - Neuro checks q 1 hours, vital checks q 1 hours  - 3/25 1800 CTH Stable bilateral subdural hemorrhages. Stable left to right   midline shift.  - Keppra 500 mg Q12 for seizure prophylaxis   - c/w Lexapro 5mg   - NeuroSx following    Respiratory:  - Incentive spirometry  - on RA     CV:  - Normotensive goals  - SBP goal < 160  - PRN: hydralazine, labetalol     Endocrine:  - Goal euglycemia    Heme/Onc:  - DVT ppx SCDs    Renal:  - NS@ 75 ml/hour; IV lock when tolerating diet     ID:  - Afebrile     GI:  - NPO pending Bedside dysphagia  - bowel regimen senna/miralax  - c/w MVI/Vit C     Activity:  - Increase as tolerated  - PT pending  - OT pending    Other:  - PM&R eval    Dipso: Pending AM rounds  64 YOM PMH b/l craniotomies for subdural evacuation 3/12/24 by Dr. Polanco and, b/l MMA embolization 3/19/24 by Dr. Weber who represents from [facility] with confusion and worsening right sided weakness and was found to have increased right to leg shift and increase left hemispheric SDH    PLAN:  - HOB 30 degrees  - Neuro checks q 2 hours  - 3/25 1800 CTH Stable bilateral subdural hemorrhages. Stable left to right   midline shift.  - Keppra 500 mg Q12 for seizure prophylaxis   - c/w Lexapro 5mg   - Normotensive goals  - DVT ppx SCDs, will discuss timing to start DVT ppx with Dr. Polanco  - bowel regimen  - global management per NSICU  - final plan pending further discussions with neurosurgeon in AM

## 2024-03-26 NOTE — PROGRESS NOTE ADULT - SUBJECTIVE AND OBJECTIVE BOX
HPI:  65 yo Male with a history of falls known to neurosurgery as he is s/p b/l craniotomies for subdural evacuation 3/12/24 by Dr. Polanco and, b/l MMA embolization 3/19/24 by Dr. Weber. The patients hospital course was complicated by transient right sided weakness post operatively which improved. He had subsequent CT Scans which were stable. He was subsequently cleared and discharged to rehab. [facility] of confusion, right sided weakness and increased hemorrhage on the left with increased left to right shift. No new trauma reported. Patient was subsequently transferred to [facility] for higher level and continuity of care.    (25 Mar 2024 15:33)    24 hr events: optimized for surgical intervention; underwent L crani fo recurrent SDH evacuation 3/26.     ICU Vital Signs Last 24 Hrs  T(C): 34.9 (26 Mar 2024 14:00), Max: 36.8 (26 Mar 2024 08:16)  T(F): 94.8 (26 Mar 2024 14:00), Max: 98.2 (26 Mar 2024 08:16)  HR: 46 (26 Mar 2024 14:00) (45 - 95)  BP: 140/70 (26 Mar 2024 14:00) (104/67 - 152/78)  BP(mean): 91 (26 Mar 2024 14:00) (79 - 106)  ABP: --  ABP(mean): --  RR: 12 (26 Mar 2024 14:00) (10 - 18)  SpO2: 99% (26 Mar 2024 14:00) (95% - 100%)    O2 Parameters below as of 26 Mar 2024 14:00  Patient On (Oxygen Delivery Method): room air      Labs, imaging reviewed.    Exam:  AAOx3, EO spont, FC, NAD, EOMI, face symmetric, PERRLA, tongue midline.  [place] strongly and spont, 5/5; minor RLE drift noted.  CTAB  S1S2 present  Abd soft, NT, ND  No peripheral swelling.

## 2024-03-26 NOTE — PROGRESS NOTE ADULT - ASSESSMENT
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  Known to our service for  BL SDH with mass effect. S/p BL angelina hole for SDH evac 3/12 followed by BL MMA embo.  Small incidental L ICA aneurysm.  H/o frequent falls (?syncopal events) prior to recent admission, unclear etiology, w/up negative so  far.  1st degree AV block.    Plan:  neurochecks; d/c cEEG - negative  stability CTh in 4 -6 hrs  Keppra for sz ppx, cont  -160; PRN meds  maintain Osats>92%, incentive spirometry  NPO for now; IVf; dysphagia screen, possible diet later if stable CTh  monitor e-lytes, UOP  SCDs for VTE ppx, chemoppx held as with worsening SDH+fresh postop

## 2024-03-26 NOTE — BRIEF OPERATIVE NOTE - NSICDXBRIEFPROCEDURE_GEN_ALL_CORE_FT
PROCEDURES:  Supratentorial craniotomy for evacuation of subdural hematoma 26-Mar-2024 13:09:36  Aleyda Squires

## 2024-03-26 NOTE — PROGRESS NOTE ADULT - ASSESSMENT
64 YOM PMH b/l craniotomies for subdural evacuation 3/12/24 by Dr. Polanco and, b/l MMA embolization 3/19/24 by Dr. Weber who represents from [facility] 3/25/24 with confusion and worsening right sided weakness and was found to have increased right to left shift and increase left hemispheric SDH which underwent evacuation by Dr. Polanco 3/26/24 (POD 0)    Plan:  -Repeat CTH 6 hours after procedure (1900), obtain CTH sooner if clinically indicated  -Continue keppra BID  -Q1 neuro checks, monitor drain output/character which is to be set to gravity at this time  -Pain control PRN  -SBP <160  -Extubated post-op without issue  -Advance diet as tolerated  -Delgadillo in place, strict I/O, monitor electrolytes, 400UO during op, 1L in during op  -CBC in am  -Ancef while drain is in place    Discussed with Dr. Polanco   64 YOM PMH b/l craniotomies for subdural evacuation 3/12/24 by Dr. Polanco and, b/l MMA embolization 3/19/24 by Dr. Weber who represents from [facility] 3/25/24 with confusion and worsening right sided weakness and was found to have increased right to left shift and increase left hemispheric SDH which underwent evacuation by Dr. Polanco 3/26/24 (POD 0)    Plan:  -Repeat CTH 6 hours after procedure (1900), obtain CTH sooner if clinically indicated  -Continue keppra BID  -Q1 neuro checks, monitor drain output/character which is to be set to gravity at this time  -Pain control PRN  -SBP <160  -Extubated post-op without issue  -Advance diet as tolerated  -Delgadillo in place, strict I/O, monitor electrolytes, 400UO during op, 1L in during op  -CBC in am  -Ancef while drain is in place    Addendum: Pt re-examined 1 hour later. Updated exam: A&Ox3, BL UE 5/5, LLE 5/5, RLE HF 5/5, RLE KE 4/5, BLLE PF/DF 5/5. NO drift    Discussed with Dr. Polanco

## 2024-03-26 NOTE — PROGRESS NOTE ADULT - SUBJECTIVE AND OBJECTIVE BOX
INTERVAL HPI/OVERNIGHT EVENTS:  - no overnight events   SUBJECTIVE: Patient seen and examined at bedside.     REVIEW OF SYSTEMS :    CONSTITUTIONAL:  No weakness, fevers or chills  NEUROLOGICAL:  No numbness or weakness  EYES/ENT:  No visual changes;  No vertigo or throat pain   NECK:  No pain or stiffness  RESPIRATORY:  No cough, wheezing, hemoptysis; No shortness of breath  CARDIOVASCULAR:  No chest pain or palpitations  GASTROINTESTINAL:  No abdominal or epigastric pain. No nausea, vomiting, or hematemesis; No diarrhea or constipation. No melena or hematochezia.  GENITOURINARY:  No dysuria, frequency or hematuria  MUSCULOSKELETAL:  FROM all extremities, normal strength, No calf tenderness  SKIN:  No itching, rashes    VITAL SIGNS:  ICU Vital Signs Last 24 Hrs  T(C): 36.6 (26 Mar 2024 00:10), Max: 36.7 (25 Mar 2024 07:22)  T(F): 97.9 (26 Mar 2024 00:10), Max: 98.1 (25 Mar 2024 07:22)  HR: 66 (26 Mar 2024 00:00) (59 - 80)  BP: 123/88 (26 Mar 2024 00:00) (116/71 - 138/91)  BP(mean): 101 (26 Mar 2024 00:00) (85 - 102)  ABP: --  ABP(mean): --  RR: 10 (26 Mar 2024 00:00) (10 - 17)  SpO2: 99% (26 Mar 2024 00:00) (91% - 100%)    O2 Parameters below as of 26 Mar 2024 00:00  Patient On (Oxygen Delivery Method): room air            Plateau pressure:   P/F ratio:     03-25 @ 07:01  -  03-26 @ 01:05  --------------------------------------------------------  IN: 525 mL / OUT: 255 mL / NET: 270 mL      CAPILLARY BLOOD GLUCOSE        ECG: Sinus Bradycardia     PHYSICAL EXAM:  GENERAL: NAD, well-groomed  HEAD:  Atraumatic, normocephalic  VIOLETA COMA SCORE: 4- 5- 6- = 15  MENTAL STATUS: AAO x3; Awake; Opens eyes spontaneously; Appropriately conversant without aphasia; following simple commands  CRANIAL NERVES: Visual acuity normal for age, visual fields full to confrontation, PERRL. EOMI without nystagmus.   REFLEXES: PERRL. Corneals intact b/l. Gag intact. Cough intact.   MOTOR: strength 5/5 b/l upper and lower extremities  Uppers     Delt (C5/6)     Bicep (C5/6)     Wrist Extend (C6)     Tricep (C7)     HG (C8/T1)  R                     5/5                 5/5                         5/5                           5/5                   5/5  L                      5/5                 5/5                         5/5                           5/5                   5/5  Lowers      HF(L1/L2)     KE (L3)     DF (L4)     EHL (L5)     PF (S1)      R                     5/5              5/5           5/5           5/5            5/5  L                     5/5               5/5          5/5            5/5            5/5  SENSATION: grossly intact to light touch all extremities  COORDINATION: Gait intact; rapid alternating movements intact; heel to shin intact; no upper extremity dysmetria  CHEST/LUNG: equal chest rise  HEART: Regular rate and bradycardia; no murmurs, rubs, or gallops  ABDOMEN: Soft, nontender, nondistended; bowel sounds present all four quadrants  EXTREMITIES:  2+ peripheral pulses, no clubbing, cyanosis, or edema  SKIN: Warm, dry; no rashes or lesions      MEDICATIONS:  MEDICATIONS  (STANDING):  chlorhexidine 2% Cloths 1 Application(s) Topical daily  chlorhexidine 4% Liquid 1 Application(s) Topical once  levETIRAcetam   Injectable 500 milliGRAM(s) IV Push every 12 hours  polyethylene glycol 3350 17 Gram(s) Oral daily  senna 2 Tablet(s) Oral at bedtime  sodium chloride 0.9% lock flush 3 milliLiter(s) IV Push every 8 hours  sodium chloride 0.9%. 1000 milliLiter(s) (75 mL/Hr) IV Continuous <Continuous>    MEDICATIONS  (PRN):  hydrALAZINE Injectable 10 milliGRAM(s) IV Push every 2 hours PRN SBP>160  labetalol Injectable 10 milliGRAM(s) IV Push every 2 hours PRN SBP>160      ALLERGIES:  Allergies    Allergy Status Unknown  No Known Allergies    Intolerances        LABS:                        13.7   5.85  )-----------( 313      ( 25 Mar 2024 05:59 )             42.4     03-25    140  |  104  |  10  ----------------------------<  105<H>  4.7   |  30  |  0.91    Ca    9.4      25 Mar 2024 05:59    TPro  7.2  /  Alb  3.1<L>  /  TBili  0.4  /  DBili  x   /  AST  17  /  ALT  38  /  AlkPhos  99  03-25    PT/INR - ( 25 Mar 2024 18:03 )   PT: 11.6 sec;   INR: 1.05 ratio         PTT - ( 25 Mar 2024 18:03 )  PTT:23.1 sec  Urinalysis Basic - ( 25 Mar 2024 05:59 )    Color: x / Appearance: x / SG: x / pH: x  Gluc: 105 mg/dL / Ketone: x  / Bili: x / Urobili: x   Blood: x / Protein: x / Nitrite: x   Leuk Esterase: x / RBC: x / WBC x   Sq Epi: x / Non Sq Epi: x / Bacteria: x        RADIOLOGY & ADDITIONAL TESTS: Reviewed. INTERVAL HPI/OVERNIGHT EVENTS:  - no overnight events   SUBJECTIVE: Patient seen and examined at bedside.     REVIEW OF SYSTEMS :  negative except as in HPI    VITAL SIGNS:  ICU Vital Signs Last 24 Hrs  T(C): 36.6 (26 Mar 2024 00:10), Max: 36.7 (25 Mar 2024 07:22)  T(F): 97.9 (26 Mar 2024 00:10), Max: 98.1 (25 Mar 2024 07:22)  HR: 66 (26 Mar 2024 00:00) (59 - 80)  BP: 123/88 (26 Mar 2024 00:00) (116/71 - 138/91)  BP(mean): 101 (26 Mar 2024 00:00) (85 - 102)  ABP: --  ABP(mean): --  RR: 10 (26 Mar 2024 00:00) (10 - 17)  SpO2: 99% (26 Mar 2024 00:00) (91% - 100%)    O2 Parameters below as of 26 Mar 2024 00:00  Patient On (Oxygen Delivery Method): room air            Plateau pressure:   P/F ratio:     03-25 @ 07:01  -  03-26 @ 01:05  --------------------------------------------------------  IN: 525 mL / OUT: 255 mL / NET: 270 mL      CAPILLARY BLOOD GLUCOSE        ECG: Sinus Bradycardia     PHYSICAL EXAM:  GENERAL: NAD  HEAD:  Atraumatic, normocephalic  VIOLETA COMA SCORE: 4- 5- 6- = 15  MENTAL STATUS: AAO x 2-3; Awake; Opens eyes spontaneously; Appropriately conversant without aphasia; following commands  CRANIAL NERVES: Visual acuity normal for age, visual fields full to confrontation, PERRL. EOMI without nystagmus.   REFLEXES: PERRL.  MOTOR: strength 5/5 b/l upper and lower extremities  Uppers     Delt (C5/6)     Bicep (C5/6)     Wrist Extend (C6)     Tricep (C7)     HG (C8/T1)  R                     5/5                 5/5                         5/5                           5/5                   5/5  L                      5/5                 5/5                         5/5                           5/5                   5/5  Lowers      HF(L1/L2)     KE (L3)     DF (L4)     EHL (L5)     PF (S1)      R                     5/5              5/5           5/5           5/5            5/5  L                     5/5               5/5          5/5            5/5            5/5  SENSATION: grossly intact to light touch all extremities  CHEST/LUNG: equal chest rise  ABDOMEN: Soft, nontender, nondistended  EXTREMITIES:  2+ peripheral pulses, no clubbing, cyanosis, or edema  SKIN: Warm, dry; no rashes or lesions      MEDICATIONS:  MEDICATIONS  (STANDING):  chlorhexidine 2% Cloths 1 Application(s) Topical daily  chlorhexidine 4% Liquid 1 Application(s) Topical once  levETIRAcetam   Injectable 500 milliGRAM(s) IV Push every 12 hours  polyethylene glycol 3350 17 Gram(s) Oral daily  senna 2 Tablet(s) Oral at bedtime  sodium chloride 0.9% lock flush 3 milliLiter(s) IV Push every 8 hours  sodium chloride 0.9%. 1000 milliLiter(s) (75 mL/Hr) IV Continuous <Continuous>    MEDICATIONS  (PRN):  hydrALAZINE Injectable 10 milliGRAM(s) IV Push every 2 hours PRN SBP>160  labetalol Injectable 10 milliGRAM(s) IV Push every 2 hours PRN SBP>160      ALLERGIES:  Allergies    Allergy Status Unknown  No Known Allergies    Intolerances        LABS:                        13.7   5.85  )-----------( 313      ( 25 Mar 2024 05:59 )             42.4     03-25    140  |  104  |  10  ----------------------------<  105<H>  4.7   |  30  |  0.91    Ca    9.4      25 Mar 2024 05:59    TPro  7.2  /  Alb  3.1<L>  /  TBili  0.4  /  DBili  x   /  AST  17  /  ALT  38  /  AlkPhos  99  03-25    PT/INR - ( 25 Mar 2024 18:03 )   PT: 11.6 sec;   INR: 1.05 ratio         PTT - ( 25 Mar 2024 18:03 )  PTT:23.1 sec  Urinalysis Basic - ( 25 Mar 2024 05:59 )    Color: x / Appearance: x / SG: x / pH: x  Gluc: 105 mg/dL / Ketone: x  / Bili: x / Urobili: x   Blood: x / Protein: x / Nitrite: x   Leuk Esterase: x / RBC: x / WBC x   Sq Epi: x / Non Sq Epi: x / Bacteria: x        RADIOLOGY & ADDITIONAL TESTS: Reviewed.

## 2024-03-26 NOTE — EEG REPORT - NS EEG TEXT BOX
[facility]AU AMES [id number]     Study Date: 03-25-24 23:44 - 08:00 03-26-24  Duration: 7.5 hours    --------------------------------------------------------------------------------------------------  History:  CC/ HPI Patient is a 64y old  Male who presents with a chief complaint of SDH (10 Mar 2024 18:28)    MEDICATIONS  (STANDING):  escitalopram 5 milliGRAM(s) Oral daily  levETIRAcetam   Injectable 500 milliGRAM(s) IV Push every 12 hours    --------------------------------------------------------------------------------------------------  Study Interpretation:    [Abbreviation Key:  PDR=alpha rhythm/posterior dominant rhythm. A-P=anterior posterior.  Amplitude: ‘very low’:<20; ‘low’:20-49; ‘medium’:; ‘high’:>150uV.  Persistence for periodic/rhythmic patterns (% of epoch) ‘rare’:<1%; ‘occasional’:1-10%; ‘frequent’:10-50%; ‘abundant’:50-90%; ‘continuous’:>90%.  Persistence for sporadic discharges: ‘rare’:<1/hr; ‘occasional’:1/min-1/hr; ‘frequent’:>1/min; ‘abundant’:>1/10 sec.  RPP=rhythmic and periodic patterns; GRDA=generalized rhythmic delta activity; FIRDA=frontal intermittent GRDA; LRDA=lateralized rhythmic delta activity; TIRDA=temporal intermittent rhythmic delta activity;  LPD=PLED=lateralized periodic discharges; GPD=generalized periodic discharges; BIPDs =bilateral independent periodic discharges; Mf=multifocal; SIRPDs=stimulus induced rhythmic, periodic, or ictal appearing discharges; BIRDs=brief potentially ictal rhythmic discharges >4 Hz, lasting .5-10s; PFA (paroxysmal bursts >13 Hz or =8 Hz <10s).  Modifiers: +F=with fast component; +S=with spike component; +R=with rhythmic component.  S-B=burst suppression pattern.  Max=maximal. N1-drowsy; N2-stage II sleep; N3-slow wave sleep. SSS/BETS=small sharp spikes/benign epileptiform transients of sleep. HV=hyperventilation; PS=photic stimulation]    FINDINGS:      Background:  Continuity: continuous  Symmetry: symmetric  PDR: 7.5 Hz activity, with amplitude to 40 uV, that attenuated to eye opening.    Reactivity: present  Voltage: normal (between 20-150uV)  Anterior Posterior Gradient: present  Other background findings: none  Breach: absent    Background Slowing:  Generalized slowing: intermittent frontally predominant irregular delta and theta activity, at times shifting predominance in the bi-frontal regions.  Focal slowing: none was present.    State Changes:   -Present with N2 sleep transients with symmetric spindles and K-complexes.    Sporadic Epileptiform Discharges:    None    Rhythmic and Periodic Patterns (RPPs):  None    Electrographic and Electroclinical seizures:  None    Other Clinical Events:  None    Activation Procedures:   -Hyperventilation was performed and did not elicit any abnormalities.    -Photic stimulation was performed and did not elicit any abnormalities.       Artifacts:  Intermittent myogenic and movement artifacts were noted.  Intermittent posterior head region (Pz, C3, P3, C4, P4, T5, O1, T6, O2) lead issues.    ECG:  The heart rate on single channel ECG was predominantly between 60-65 BPM.    EEG Classification / Summary:  Abnormal EEG study  Mild to moderate generalized background slowing.    -----------------------------------------------------------------------------------------------------    Clinical Impression: ***THIS IS A PRELIMINARY FELLOW REPORT PENDING REVIEW WITH ATTENDING EPILEPTOLOGIST***  Mild to moderate diffuse/multi-focal cerebral dysfunction, not specific as to etiology.   There were no epileptiform abnormalities or seizures recorded.    Mohinder Romero MD  PGY-6, Pediatric Epilepsy Fellow   -------------------------------------------------------------------------------------------------------  [facility] EEG Reading Room Ph#: [phone]  Epilepsy Answering Service after 5PM and before 8:30AM: Ph#: [phone]   [facility]ES [id number]     Study Date: 03-25-24 23:44 - 08:00 03-26-24  Duration: 7.5 hours    --------------------------------------------------------------------------------------------------  History:  CC/ HPI Patient is a 64y old  Male who presents with a chief complaint of SDH (10 Mar 2024 18:28)    MEDICATIONS  (STANDING):  escitalopram 5 milliGRAM(s) Oral daily  levETIRAcetam   Injectable 500 milliGRAM(s) IV Push every 12 hours    --------------------------------------------------------------------------------------------------  Study Interpretation:    [Abbreviation Key:  PDR=alpha rhythm/posterior dominant rhythm. A-P=anterior posterior.  Amplitude: ‘very low’:<20; ‘low’:20-49; ‘medium’:; ‘high’:>150uV.  Persistence for periodic/rhythmic patterns (% of epoch) ‘rare’:<1%; ‘occasional’:1-10%; ‘frequent’:10-50%; ‘abundant’:50-90%; ‘continuous’:>90%.  Persistence for sporadic discharges: ‘rare’:<1/hr; ‘occasional’:1/min-1/hr; ‘frequent’:>1/min; ‘abundant’:>1/10 sec.  RPP=rhythmic and periodic patterns; GRDA=generalized rhythmic delta activity; FIRDA=frontal intermittent GRDA; LRDA=lateralized rhythmic delta activity; TIRDA=temporal intermittent rhythmic delta activity;  LPD=PLED=lateralized periodic discharges; GPD=generalized periodic discharges; BIPDs =bilateral independent periodic discharges; Mf=multifocal; SIRPDs=stimulus induced rhythmic, periodic, or ictal appearing discharges; BIRDs=brief potentially ictal rhythmic discharges >4 Hz, lasting .5-10s; PFA (paroxysmal bursts >13 Hz or =8 Hz <10s).  Modifiers: +F=with fast component; +S=with spike component; +R=with rhythmic component.  S-B=burst suppression pattern.  Max=maximal. N1-drowsy; N2-stage II sleep; N3-slow wave sleep. SSS/BETS=small sharp spikes/benign epileptiform transients of sleep. HV=hyperventilation; PS=photic stimulation]    FINDINGS:      Background:  Continuity: continuous  Symmetry: symmetric  PDR: 7.5 Hz activity, with amplitude to 40 uV, that attenuated to eye opening.    Reactivity: present  Voltage: normal (between 20-150uV)  Anterior Posterior Gradient: present  Other background findings: none  Breach: absent    Background Slowing:  Generalized slowing: intermittent frontally predominant irregular delta and theta activity, at times shifting predominance in the bi-frontal regions.  Focal slowing: none was present.    State Changes:   -Present with N2 sleep transients with symmetric spindles and K-complexes.    Sporadic Epileptiform Discharges:    None    Rhythmic and Periodic Patterns (RPPs):  None    Electrographic and Electroclinical seizures:  None    Other Clinical Events:  None    Activation Procedures:   -Hyperventilation was performed and did not elicit any abnormalities.    -Photic stimulation was performed and did not elicit any abnormalities.       Artifacts:  Intermittent myogenic and movement artifacts were noted.  Intermittent posterior head region (Pz, C3, P3, C4, P4, T5, O1, T6, O2) lead issues.    ECG:  The heart rate on single channel ECG was predominantly between 60-65 BPM.    EEG Classification / Summary:  Abnormal EEG study  Mild to moderate generalized background slowing.    -----------------------------------------------------------------------------------------------------    Clinical Impression:  Mild to moderate diffuse/multi-focal cerebral dysfunction, not specific as to etiology.   There were no epileptiform abnormalities or seizures recorded.      Mohinder Romero MD  PGY-6, Pediatric Epilepsy Fellow     Garrison Cortez MD  EEG/Epilepsy Attending   -------------------------------------------------------------------------------------------------------  [facility] EEG Reading Room Ph#: [phone]  Epilepsy Answering Service after 5PM and before 8:30AM: Ph#: [phone]   [facility]ES [id number]     Study Date: 03-25-24 23:44 - 09:25 03-26-24  Duration: 9 hours    --------------------------------------------------------------------------------------------------  History:  CC/ HPI Patient is a 64y old  Male who presents with a chief complaint of SDH (10 Mar 2024 18:28)    MEDICATIONS  (STANDING):  escitalopram 5 milliGRAM(s) Oral daily  levETIRAcetam   Injectable 500 milliGRAM(s) IV Push every 12 hours    --------------------------------------------------------------------------------------------------  Study Interpretation:    [Abbreviation Key:  PDR=alpha rhythm/posterior dominant rhythm. A-P=anterior posterior.  Amplitude: ‘very low’:<20; ‘low’:20-49; ‘medium’:; ‘high’:>150uV.  Persistence for periodic/rhythmic patterns (% of epoch) ‘rare’:<1%; ‘occasional’:1-10%; ‘frequent’:10-50%; ‘abundant’:50-90%; ‘continuous’:>90%.  Persistence for sporadic discharges: ‘rare’:<1/hr; ‘occasional’:1/min-1/hr; ‘frequent’:>1/min; ‘abundant’:>1/10 sec.  RPP=rhythmic and periodic patterns; GRDA=generalized rhythmic delta activity; FIRDA=frontal intermittent GRDA; LRDA=lateralized rhythmic delta activity; TIRDA=temporal intermittent rhythmic delta activity;  LPD=PLED=lateralized periodic discharges; GPD=generalized periodic discharges; BIPDs =bilateral independent periodic discharges; Mf=multifocal; SIRPDs=stimulus induced rhythmic, periodic, or ictal appearing discharges; BIRDs=brief potentially ictal rhythmic discharges >4 Hz, lasting .5-10s; PFA (paroxysmal bursts >13 Hz or =8 Hz <10s).  Modifiers: +F=with fast component; +S=with spike component; +R=with rhythmic component.  S-B=burst suppression pattern.  Max=maximal. N1-drowsy; N2-stage II sleep; N3-slow wave sleep. SSS/BETS=small sharp spikes/benign epileptiform transients of sleep. HV=hyperventilation; PS=photic stimulation]    FINDINGS:      Background:  Continuity: continuous  Symmetry: symmetric  PDR: 7.5 Hz activity, with amplitude to 40 uV, that attenuated to eye opening.    Reactivity: present  Voltage: normal (between 20-150uV)  Anterior Posterior Gradient: present  Other background findings: none  Breach: absent    Background Slowing:  Generalized slowing: intermittent frontally predominant irregular delta and theta activity, at times shifting predominance in the bi-frontal regions.  Focal slowing: none was present.    State Changes:   -Present with N2 sleep transients with symmetric spindles and K-complexes.    Sporadic Epileptiform Discharges:    None    Rhythmic and Periodic Patterns (RPPs):  None    Electrographic and Electroclinical seizures:  None    Other Clinical Events:  None    Activation Procedures:   -Hyperventilation was performed and did not elicit any abnormalities.    -Photic stimulation was performed and did not elicit any abnormalities.       Artifacts:  Intermittent myogenic and movement artifacts were noted.  Intermittent posterior head region (Pz, C3, P3, C4, P4, T5, O1, T6, O2) lead issues.    ECG:  The heart rate on single channel ECG was predominantly between 60-65 BPM.    EEG Classification / Summary:  Abnormal EEG study  Mild to moderate generalized background slowing.    -----------------------------------------------------------------------------------------------------    Clinical Impression:  Mild to moderate diffuse/multi-focal cerebral dysfunction, not specific as to etiology.   There were no epileptiform abnormalities or seizures recorded.      Mohinder Romero MD  PGY-6, Pediatric Epilepsy Fellow     Garrison Cortez MD  EEG/Epilepsy Attending   -------------------------------------------------------------------------------------------------------  [facility] EEG Reading Room Ph#: [phone]  Epilepsy Answering Service after 5PM and before 8:30AM: Ph#: [phone]

## 2024-03-26 NOTE — PROGRESS NOTE ADULT - SUBJECTIVE AND OBJECTIVE BOX
POST OP PROGRESS NOTE  HPI: 65 yo Male with a history of falls known to neurosurgery as he is s/p b/l craniotomies for subdural evacuation 3/12/24 by Dr. Polanco and, b/l MMA embolization 3/19/24 by Dr. Weber. The patients hospital course was complicated by transient right sided weakness post operatively which improved. He had subsequent CT Scans which were stable. He was subsequently cleared and discharged to rehab. The represents 3/25 as a transfer from [facility] with reports of confusion, right sided weakness and increased hemorrhage on the left with increased left to right shift. No new trauma reported. Patient was subsequently transferred to [facility] for higher level and continuity of care.    (25 Mar 2024 15:33)      INTERVAL HPI  64y Male s/p L SDH evacuation, POD #0, seen lying comfortably in bed. NPO at time of post op exam. Delgadillo in with straw colored clear urine. Admits to headache. Denies nausea, chest pain, SOB.    Vital Signs Last 24 Hrs  T(C): 34.9 (26 Mar 2024 14:00), Max: 36.8 (26 Mar 2024 08:16)  T(F): 94.8 (26 Mar 2024 14:00), Max: 98.2 (26 Mar 2024 08:16)  HR: 46 (26 Mar 2024 14:00) (45 - 95)  BP: 140/70 (26 Mar 2024 14:00) (104/67 - 152/78)  BP(mean): 91 (26 Mar 2024 14:00) (79 - 106)  RR: 12 (26 Mar 2024 14:00) (10 - 18)  SpO2: 99% (26 Mar 2024 14:00) (91% - 100%)    Parameters below as of 26 Mar 2024 14:00  Patient On (Oxygen Delivery Method): room air        PHYSICAL EXAM:  GENERAL: NAD  HEAD:  R post crani with staples intact (3/12/24) site clean and dry, L side operative side with island dressing in place along with drain connected to EVD apparatus  DRAINS: as above  WOUND: Dressing clean dry intact  VIOLETA COMA SCORE: E-4 V-5 M-6 =14  MENTAL STATUS: AAO x2 unable to state place; Awake; Opens eyes spontaneously; Appropriately conversant without aphasia; following simple commands  CRANIAL NERVES: PERRL. EOMI without nystagmus. Facial sensation intact V1-3 distribution b/l. Face symmetric w/ normal eye closure and smile, tongue midline. Hearing grossly intact. Speech clear. Head turning and shoulder shrug intact.   MOTOR: Strength 3/5 in RUE and RLE, NO drift appreciated. Strength 5/5 in LUE and LLE  Uppers         Delt (C5/6)     Bicep (C5/6)     Wrist Extend (C6)     Tricep (C7)     HG (C8/T1)  R                     3/5                 3/5                                                     3/5                   5/5  L                      5/5                 5/5                                                     5/5                   5/5  Lowers      HF(L1/L2)     KE (L3)       KF    R                    3/5              3/5       3/5                          L                     5/5              5/5       5/5                           SENSATION: grossly intact to light touch all extremities  CHEST/LUNG: nonlabored breathing  ABDOMEN: Soft, nontender, nondistended  SKIN: Warm, dry; no rashes or lesions. Surgical sites as mentioned above    LABS:                        13.1   6.81  )-----------( 317      ( 26 Mar 2024 02:45 )             39.8     03-26    136  |  101  |  10.8  ----------------------------<  106<H>  4.3   |  24.0  |  0.63    Ca    9.2      26 Mar 2024 02:45  Phos  3.6     03-26  Mg     2.0     03-26    TPro  7.2  /  Alb  3.1<L>  /  TBili  0.4  /  DBili  x   /  AST  17  /  ALT  38  /  AlkPhos  99  03-25    PT/INR - ( 25 Mar 2024 18:03 )   PT: 11.6 sec;   INR: 1.05 ratio         PTT - ( 25 Mar 2024 18:03 )  PTT:23.1 sec  Urinalysis Basic - ( 26 Mar 2024 02:45 )    Color: x / Appearance: x / SG: x / pH: x  Gluc: 106 mg/dL / Ketone: x  / Bili: x / Urobili: x   Blood: x / Protein: x / Nitrite: x   Leuk Esterase: x / RBC: x / WBC x   Sq Epi: x / Non Sq Epi: x / Bacteria: x        03-25 @ 07:01  -  03-26 @ 07:00  --------------------------------------------------------  IN: 1050 mL / OUT: 255 mL / NET: 795 mL    03-26 @ 07:01  -  03-26 @ 14:17  --------------------------------------------------------  IN: 300 mL / OUT: 217 mL / NET: 83 mL         POST OP PROGRESS NOTE  HPI: 65 yo Male with a history of falls known to neurosurgery as he is s/p b/l craniotomies for subdural evacuation 3/12/24 by Dr. Polanco and, b/l MMA embolization 3/19/24 by Dr. Weber. The patients hospital course was complicated by transient right sided weakness post operatively which improved. He had subsequent CT Scans which were stable. He was subsequently cleared and discharged to rehab. The represents 3/25 as a transfer from [facility] with reports of confusion, right sided weakness and increased hemorrhage on the left with increased left to right shift. No new trauma reported. Patient was subsequently transferred to [facility] for higher level and continuity of care.    (25 Mar 2024 15:33)      INTERVAL HPI  64y Male s/p L SDH evacuation, POD #0, seen lying comfortably in bed. NPO at time of post op exam. Delgadillo in with straw colored clear urine. Admits to headache. Denies nausea, chest pain, SOB.    Vital Signs Last 24 Hrs  T(C): 34.9 (26 Mar 2024 14:00), Max: 36.8 (26 Mar 2024 08:16)  T(F): 94.8 (26 Mar 2024 14:00), Max: 98.2 (26 Mar 2024 08:16)  HR: 46 (26 Mar 2024 14:00) (45 - 95)  BP: 140/70 (26 Mar 2024 14:00) (104/67 - 152/78)  BP(mean): 91 (26 Mar 2024 14:00) (79 - 106)  RR: 12 (26 Mar 2024 14:00) (10 - 18)  SpO2: 99% (26 Mar 2024 14:00) (91% - 100%)    Parameters below as of 26 Mar 2024 14:00  Patient On (Oxygen Delivery Method): room air        PHYSICAL EXAM:  GENERAL: NAD  HEAD:  R post crani with staples intact (3/12/24) site clean and dry, L side operative side with island dressing in place along with drain connected to EVD apparatus  DRAINS: as above  WOUND: Dressing clean dry intact  VIOLETA COMA SCORE: E-4 V-5 M-6 =14  MENTAL STATUS: AAO x2 unable to state place; Awake; Opens eyes spontaneously; Appropriately conversant without aphasia; following simple commands  CRANIAL NERVES: PERRL. EOMI without nystagmus. Facial sensation intact V1-3 distribution b/l. Face symmetric w/ normal eye closure and smile, tongue midline. Hearing grossly intact. Speech clear. Head turning and shoulder shrug intact.   MOTOR: Strength 4/5 in RUE and 3/5 RLE, NO drift appreciated. Strength 5/5 in LUE and LLE                        SENSATION: grossly intact to light touch all extremities  CHEST/LUNG: nonlabored breathing  ABDOMEN: Soft, nontender, nondistended  SKIN: Warm, dry; no rashes or lesions. Surgical sites as mentioned above    LABS:                        13.1   6.81  )-----------( 317      ( 26 Mar 2024 02:45 )             39.8     03-26    136  |  101  |  10.8  ----------------------------<  106<H>  4.3   |  24.0  |  0.63    Ca    9.2      26 Mar 2024 02:45  Phos  3.6     03-26  Mg     2.0     03-26    TPro  7.2  /  Alb  3.1<L>  /  TBili  0.4  /  DBili  x   /  AST  17  /  ALT  38  /  AlkPhos  99  03-25    PT/INR - ( 25 Mar 2024 18:03 )   PT: 11.6 sec;   INR: 1.05 ratio         PTT - ( 25 Mar 2024 18:03 )  PTT:23.1 sec  Urinalysis Basic - ( 26 Mar 2024 02:45 )    Color: x / Appearance: x / SG: x / pH: x  Gluc: 106 mg/dL / Ketone: x  / Bili: x / Urobili: x   Blood: x / Protein: x / Nitrite: x   Leuk Esterase: x / RBC: x / WBC x   Sq Epi: x / Non Sq Epi: x / Bacteria: x        03-25 @ 07:01 - 03-26 @ 07:00  --------------------------------------------------------  IN: 1050 mL / OUT: 255 mL / NET: 795 mL    03-26 @ 07:01 - 03-26 @ 14:17  --------------------------------------------------------  IN: 300 mL / OUT: 217 mL / NET: 83 mL

## 2024-03-27 PROBLEM — Z00.00 ENCOUNTER FOR PREVENTIVE HEALTH EXAMINATION: Status: ACTIVE | Noted: 2024-03-27

## 2024-03-27 LAB
ANION GAP SERPL CALC-SCNC: 10 MMOL/L — SIGNIFICANT CHANGE UP (ref 5–17)
BUN SERPL-MCNC: 8.2 MG/DL — SIGNIFICANT CHANGE UP (ref 8–20)
CALCIUM SERPL-MCNC: 9.1 MG/DL — SIGNIFICANT CHANGE UP (ref 8.4–10.5)
CHLORIDE SERPL-SCNC: 104 MMOL/L — SIGNIFICANT CHANGE UP (ref 96–108)
CO2 SERPL-SCNC: 22 MMOL/L — SIGNIFICANT CHANGE UP (ref 22–29)
CREAT SERPL-MCNC: 0.6 MG/DL — SIGNIFICANT CHANGE UP (ref 0.5–1.3)
EGFR: 106 ML/MIN/1.73M2 — SIGNIFICANT CHANGE UP
GLUCOSE SERPL-MCNC: 127 MG/DL — HIGH (ref 70–99)
HCT VFR BLD CALC: 39 % — SIGNIFICANT CHANGE UP (ref 39–50)
HGB BLD-MCNC: 13 G/DL — SIGNIFICANT CHANGE UP (ref 13–17)
MAGNESIUM SERPL-MCNC: 1.9 MG/DL — SIGNIFICANT CHANGE UP (ref 1.6–2.6)
MCHC RBC-ENTMCNC: 27.1 PG — SIGNIFICANT CHANGE UP (ref 27–34)
MCHC RBC-ENTMCNC: 33.3 GM/DL — SIGNIFICANT CHANGE UP (ref 32–36)
MCV RBC AUTO: 81.4 FL — SIGNIFICANT CHANGE UP (ref 80–100)
PHOSPHATE SERPL-MCNC: 3.4 MG/DL — SIGNIFICANT CHANGE UP (ref 2.4–4.7)
PLATELET # BLD AUTO: 295 K/UL — SIGNIFICANT CHANGE UP (ref 150–400)
POTASSIUM SERPL-MCNC: 4.4 MMOL/L — SIGNIFICANT CHANGE UP (ref 3.5–5.3)
POTASSIUM SERPL-SCNC: 4.4 MMOL/L — SIGNIFICANT CHANGE UP (ref 3.5–5.3)
RBC # BLD: 4.79 M/UL — SIGNIFICANT CHANGE UP (ref 4.2–5.8)
RBC # FLD: 13.4 % — SIGNIFICANT CHANGE UP (ref 10.3–14.5)
SODIUM SERPL-SCNC: 136 MMOL/L — SIGNIFICANT CHANGE UP (ref 135–145)
WBC # BLD: 10.42 K/UL — SIGNIFICANT CHANGE UP (ref 3.8–10.5)
WBC # FLD AUTO: 10.42 K/UL — SIGNIFICANT CHANGE UP (ref 3.8–10.5)

## 2024-03-27 PROCEDURE — 99291 CRITICAL CARE FIRST HOUR: CPT

## 2024-03-27 PROCEDURE — 70450 CT HEAD/BRAIN W/O DYE: CPT | Mod: 26

## 2024-03-27 PROCEDURE — 36556 INSERT NON-TUNNEL CV CATH: CPT

## 2024-03-27 PROCEDURE — 99233 SBSQ HOSP IP/OBS HIGH 50: CPT

## 2024-03-27 RX ORDER — ACETAMINOPHEN 500 MG
1000 TABLET ORAL ONCE
Refills: 0 | Status: COMPLETED | OUTPATIENT
Start: 2024-03-27 | End: 2024-03-27

## 2024-03-27 RX ADMIN — Medication 2000 MILLIGRAM(S): at 03:53

## 2024-03-27 RX ADMIN — CHLORHEXIDINE GLUCONATE 1 APPLICATION(S): 213 SOLUTION TOPICAL at 11:22

## 2024-03-27 RX ADMIN — Medication 1000 MILLIGRAM(S): at 13:00

## 2024-03-27 RX ADMIN — Medication 2000 MILLIGRAM(S): at 17:57

## 2024-03-27 RX ADMIN — LEVETIRACETAM 500 MILLIGRAM(S): 250 TABLET, FILM COATED ORAL at 17:58

## 2024-03-27 RX ADMIN — LEVETIRACETAM 500 MILLIGRAM(S): 250 TABLET, FILM COATED ORAL at 05:24

## 2024-03-27 RX ADMIN — SENNA PLUS 2 TABLET(S): 8.6 TABLET ORAL at 21:41

## 2024-03-27 RX ADMIN — Medication 2000 MILLIGRAM(S): at 11:22

## 2024-03-27 RX ADMIN — Medication 400 MILLIGRAM(S): at 12:49

## 2024-03-27 NOTE — DIETITIAN INITIAL EVALUATION ADULT - ORAL INTAKE PTA/DIET HISTORY
Pt sleeping soundly during visit. Weight steady from previous admission noted. Pt is currently NPO while in flat position noted.

## 2024-03-27 NOTE — DIETITIAN INITIAL EVALUATION ADULT - OTHER INFO
Pt is a 64 year old Male with a history of falls known to neurosurgery as he is s/p b/l craniotomies for subdural evacuation 3/12/24 by Dr. Polanco and, b/l MMA embolization 3/19/24 by Dr. Weber. The patients hospital course was complicated by transient right sided weakness post operatively which improved. He had subsequent CT Scans which were stable. He was subsequently cleared and discharged to rehab. The [facility] with reports of confusion, right sided weakness and increased hemorrhage on the left with increased left to right shift. No new trauma reported. Patient was subsequently transferred to [facility] for higher level and continuity of care.   Pt found to have increased right to left shift and increase left hemispheric SDH. S/p L craniotomy for SDH evacuation 3/26/24.

## 2024-03-27 NOTE — PROGRESS NOTE ADULT - ASSESSMENT
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  Known to our service for  BL SDH with mass effect. S/p BL angelina hole for SDH evac 3/12 followed by BL MMA embo.  Small incidental L ICA aneurysm.  H/o frequent falls (?syncopal events) prior to recent admission, unclear etiology, w/up negative so  far.  1st degree AV block.    Plan:  neurochecks;   drain for at least 48 hrs per NSGy  pain control, avoid oversedation  Keppra for sz ppx, cont  -160; PRN meds  maintain Osats>92%, incentive spirometry  diet as tolerated; d/c IVf;   monitor e-lytes, UOP  SCDs for VTE ppx, chemoppx held as fresh postop, re-eval in am

## 2024-03-27 NOTE — PROGRESS NOTE ADULT - SUBJECTIVE AND OBJECTIVE BOX
HPI:  63 yo Male with a history of falls known to neurosurgery as he is s/p b/l craniotomies for subdural evacuation 3/12/24 by Dr. Polanco and, b/l MMA embolization 3/19/24 by Dr. Weber. The patients hospital course was complicated by transient right sided weakness post operatively which improved. He had subsequent CT Scans which were stable. He was subsequently cleared and discharged to rehab. The represents today as a transfer [facility] with reports of confusion, right sided weakness and increased hemorrhage on the left with increased left to right shift. No new trauma reported. Patient was subsequently transferred to [facility] for higher level and continuity of care.    (25 Mar 2024 15:33)    OVERNIGHT EVENTS: Patient examined at bedside, denies any acute complaints. L subdural drain remains in place, initially bloody output now blood tinged/clear CSF. Remains flat with nonrebreather in place.     Vital Signs Last 24 Hrs  T(C): 36.7 (26 Mar 2024 23:00), Max: 36.9 (26 Mar 2024 20:00)  T(F): 98.1 (26 Mar 2024 23:00), Max: 98.4 (26 Mar 2024 20:00)  HR: 70 (26 Mar 2024 23:00) (45 - 96)  BP: 114/67 (26 Mar 2024 23:00) (100/65 - 152/78)  BP(mean): 83 (26 Mar 2024 23:00) (76 - 110)  RR: 13 (26 Mar 2024 23:00) (10 - 20)  SpO2: 100% (26 Mar 2024 23:00) (95% - 100%)    Parameters below as of 26 Mar 2024 21:00  Patient On (Oxygen Delivery Method): mask, nonrebreather    I&O's Summary    25 Mar 2024 07:01  -  26 Mar 2024 07:00  --------------------------------------------------------  IN: 1050 mL / OUT: 255 mL / NET: 795 mL    26 Mar 2024 07:01  -  27 Mar 2024 00:18  --------------------------------------------------------  IN: 1075 mL / OUT: 1620 mL / NET: -545 mL    PHYSICAL EXAM:  General: NAD  HEENT: EOMI b/l, face symmetric, tongue midline, neck FROM  Cardiovascular: Regular rate and rhythm  Respiratory: nonlabored breathing, normal chest rise  GI: abdomen soft, nontender, nondistended  Neuro: PERRL 3mm briskly reactive, A&Ox2 to name and place  mild RUE/RLE drift, No aphasia, speech clear Follows commands.  [place] x4 spontaneously, RLE HF 4+/5 otherwise 5/5 strength in all extremities. sensation intact  Extremities: distal pulses 2+ x4  Wound/incision: headwrap in place  Drain: L subdural drain to EVD system       LABS:                        13.1   6.81  )-----------( 317      ( 26 Mar 2024 02:45 )             39.8     03-26    136  |  101  |  10.8  ----------------------------<  106<H>  4.3   |  24.0  |  0.63    Ca    9.2      26 Mar 2024 02:45  Phos  3.6     03-26  Mg     2.0     03-26  TPro  7.2  /  Alb  3.1<L>  /  TBili  0.4  /  DBili  x   /  AST  17  /  ALT  38  /  AlkPhos  99  03-25  PT/INR - ( 25 Mar 2024 18:03 )   PT: 11.6 sec;   INR: 1.05 ratio    PTT - ( 25 Mar 2024 18:03 )  PTT:23.1 sec  Urinalysis Basic - ( 26 Mar 2024 02:45 )  Color: x / Appearance: x / SG: x / pH: x  Gluc: 106 mg/dL / Ketone: x  / Bili: x / Urobili: x   Blood: x / Protein: x / Nitrite: x   Leuk Esterase: x / RBC: x / WBC x   Sq Epi: x / Non Sq Epi: x / Bacteria: x    CAPILLARY BLOOD GLUCOSE  POCT Blood Glucose.: 142 mg/dL (26 Mar 2024 13:54)    MEDICATIONS:  ceFAZolin  Injectable. 2000 milliGRAM(s) IV Push every 8 hours  acetaminophen     Tablet .. 975 milliGRAM(s) Oral every 6 hours PRN  escitalopram 5 milliGRAM(s) Oral daily  levETIRAcetam   Injectable 500 milliGRAM(s) IV Push every 12 hours  oxyCODONE    IR 5 milliGRAM(s) Oral every 4 hours PRN  oxyCODONE    IR 10 milliGRAM(s) Oral every 4 hours PRN  chlorhexidine 2% Cloths 1 Application(s) Topical daily  hydrALAZINE Injectable 10 milliGRAM(s) IV Push every 2 hours PRN  labetalol Injectable 10 milliGRAM(s) IV Push every 2 hours PRN  polyethylene glycol 3350 17 Gram(s) Oral daily  senna 2 Tablet(s) Oral at bedtime  sodium chloride 0.9%. 1000 milliLiter(s) IV Continuous <Continuous>    RADIOLOGY & ADDITIONAL TESTS:  < from: CT Head No Cont (03.26.24 @ 18:05) >  IMPRESSION: Status post a left-sided subdural drainage catheter. Moderate   pneumocephalus. Subtotal evacuation of a left-sided subdural hemorrhage.   Stable right-sided subdural hemorrhage. Improved left to right midline   shift..      ASSESSMENT:  64M PMHx of b/l craniotomies for SDH evacuation 3/12/24, b/l MMA embolization 3/19/24 represented from [facility] 3/25/24 with confusion and worsening right sided weakness, found to have increased right to left shift and increase left hemispheric SDH. S/p L craniotomy for SDH evacuation 3/26/24.     Plan:  - neuro checks q1hr   - postop CTH done, L pneumocephalus, HOB flat + nonrebreather   - Keppra 500mg BID   - EEG 24hrs post-op   - L subdural drain in place to EVD collection system, monitor outputs   - Pain control PRN  - NPO while flat  - KARL nowak in AM   - NS@75 while NPO   - Ancef while subdural drain is in place  - DVT ppx: SCDs   - further supportive care/medical management as per NSICU   - final plan pend AM rounds with neurosurgeon

## 2024-03-27 NOTE — DIETITIAN INITIAL EVALUATION ADULT - PERTINENT MEDS FT
MEDICATIONS  (STANDING):  ceFAZolin  Injectable. 2000 milliGRAM(s) IV Push every 8 hours  chlorhexidine 2% Cloths 1 Application(s) Topical daily  escitalopram 5 milliGRAM(s) Oral daily  levETIRAcetam   Injectable 500 milliGRAM(s) IV Push every 12 hours  polyethylene glycol 3350 17 Gram(s) Oral daily  senna 2 Tablet(s) Oral at bedtime  sodium chloride 0.9%. 1000 milliLiter(s) (75 mL/Hr) IV Continuous <Continuous>    MEDICATIONS  (PRN):  acetaminophen     Tablet .. 975 milliGRAM(s) Oral every 6 hours PRN Mild Pain (1 - 3)  hydrALAZINE Injectable 10 milliGRAM(s) IV Push every 2 hours PRN SBP>160  labetalol Injectable 10 milliGRAM(s) IV Push every 2 hours PRN SBP>160  oxyCODONE    IR 10 milliGRAM(s) Oral every 4 hours PRN Severe Pain (7 - 10)

## 2024-03-27 NOTE — H&P ADULT - NS ATTEND AMEND GEN_ALL_CORE FT
Pateint is a level B trauma activation. Patient is a transfer from [facility] for b/l SDH. GCS 15, No AC, HD stable    Repeat CT head  CTA head and neck  CT spine  Keppra for seizure ppx  SCDs for DVT ppx, will hold chemical ppx for now  F/U neurosurgery recommendations 27-Mar-2024 09:32

## 2024-03-27 NOTE — CONSULT NOTE ADULT - SUBJECTIVE AND OBJECTIVE BOX
Patient is a 64y old  Male who presents with a chief complaint of right sided weakness     HISTORY OF PRESENT ILLNESS:   65 yo Male with a history of falls known to neurosurgery as he is s/p b/l craniotomies for subdural evacuation 3/12/24 by Dr. Polanco and, b/l MMA embolization 3/19/24 by Dr. Weber. The patients hospital course was complicated by transient right sided weakness post operatively which improved. He had subsequent CT Scans which were stable. He was subsequently cleared and discharged to rehab. The represents today as a [facility] with reports of confusion, right sided weakness and increased hemorrhage on the left with increased left to right shift. No new trauma reported. Patient was subsequently transferred to [facility] for higher level and continuity of care.     PAST MEDICAL & SURGICAL HISTORY:  Syncope  Traumatic subdural hematoma (SDH)    Allergies  No Known Allergies    Intolerances    REVIEW OF SYSTEMS  Right sided weakness    HOME MEDICATIONS:  Home Medications:  acetaminophen 325 mg oral tablet: 2 tab(s) orally every 6 hours As needed Mild Pain (1 - 3) (25 Mar 2024 12:49)  ascorbic acid 500 mg oral tablet: 1 tab(s) orally once a day (25 Mar 2024 12:49)  escitalopram 5 mg oral tablet: 1 tab(s) orally once a day (25 Mar 2024 12:49)  melatonin 3 mg oral tablet: 1 tab(s) orally once a day (at bedtime) As needed Insomnia (25 Mar 2024 12:49)  Multiple Vitamins oral tablet: 1 tab(s) orally once a day (25 Mar 2024 12:49)  pantoprazole 40 mg oral delayed release tablet: 1 tab(s) orally once a day (before a meal) (25 Mar 2024 12:49)  polyethylene glycol 3350 oral powder for reconstitution: 17 gram(s) orally once a day (25 Mar 2024 12:49)      Vital Signs Last 24 Hrs  T(C): 36.7 (25 Mar 2024 07:22), Max: 36.7 (24 Mar 2024 19:58)  T(F): 98.1 (25 Mar 2024 07:22), Max: 98.1 (24 Mar 2024 19:58)  HR: 75 (25 Mar 2024 07:22) (70 - 75)  BP: 121/71 (25 Mar 2024 07:22) (121/71 - 127/78)  BP(mean): --  RR: 16 (25 Mar 2024 07:22) (16 - 16)  SpO2: 100% (25 Mar 2024 07:22) (98% - 100%)    PHYSICAL EXAM:  GENERAL: NAD  HEAD:  Bilateral craniotomies sites, clean, dry and intact with staples    WOUND: Dressing clean dry intact with staples   VIOLETA COMA SCORE: E- V- M- = 15  MENTAL STATUS: AAO x3; Awake, Opens eyes spontaneously, Appropriately conversant without aphasia, following simple commands  CRANIAL NERVES: PERRL. Face symmetric w/ normal eye closure and smile, tongue midline.   MOTOR: Bilateral upper extremities 5/5, Left leg 5/5, Right HF 4+/5, distally 5/5  SENSATION: grossly intact to light touch all extremities  SKIN: Warm, dry; no rashes or lesions    LABS:                        13.7   5.85  )-----------( 313      ( 25 Mar 2024 05:59 )             42.4     03-25    140  |  104  |  10  ----------------------------<  105<H>  4.7   |  30  |  0.91    Ca    9.4      25 Mar 2024 05:59    TPro  7.2  /  Alb  3.1<L>  /  TBili  0.4  /  DBili  x   /  AST  17  /  ALT  38  /  AlkPhos  99  03-25      Urinalysis Basic - ( 25 Mar 2024 05:59 )    Color: x / Appearance: x / SG: x / pH: x  Gluc: 105 mg/dL / Ketone: x  / Bili: x / Urobili: x   Blood: x / Protein: x / Nitrite: x   Leuk Esterase: x / RBC: x / WBC x   Sq Epi: x / Non Sq Epi: x / Bacteria: x    RADIOLOGY & ADDITIONAL STUDIES:  < from: CT Head No Cont (03.25.24 @ 11:55) >  Impression: Mixed attenuated subdural hematoma is are again seen   bilaterally with increase acute component when compared with the prior   exam. Increased mass effect seen on the left lateral ventricle with   increased left-to-right shift.    --- End of Report ---    SALEEM FAIRCHILD MD; Attending Radiologist  This document has been electronically signed. Mar 25 2024 12:02PM    < end of copied text >  < from: CT Head No Cont (03.18.24 @ 12:21) >    IMPRESSION:    1.  Improving subdural fluid collections, without interval rebleeding.    Patient Name: CHAU AMES  MRN: [id number], Accession: [id number]  DOS: 03/18/24 12:21 PM    --- End of Report ---    EVANGELISTA SMITH MD; Attending Radiologist  This document has been electronically signed. Mar 18 2024  1:59PM    < end of copied text >  < from: CT Head No Cont (03.15.24 @ 10:14) >  IMPRESSION:   Persistent unchanged BILATERAL subdural collections   following drain removal, LEFT measuring 1.5 cm and RIGHT measuring 6 mm.   Persistent pneumocephalus.   There is mild mass effect on the LEFT   hemisphere with subfalcine herniation to the LEFTunchanged. Mild   effacement of the posterior LEFT lateral ventricle.  --- End of Report ---    BASILIA PARK MD; Attending Radiologist  This document has been electronically signed. Mar 15 2024 10:50AM    < end of copied text >    CAPRINI SCORE [CLOT]:  Patient has an estimated Caprini score of greater than 5.  However, the patient's unique clinical situation will be addressed in an individual manner to determine appropriate anticoagulation treatment, if any.
64yM was admitted on 03-25 from [facility] after developed worsened right sided weakness, confusion and worsened left to right shift on head CT. Patient being monitored in ICU.      Imaging Reviewed Today:  HEAD CT 3/20 - 1.  Status post middle meningeal artery embolization and anterior parietal craniotomies bilaterally.  A small amount of postoperative pneumocephalus is decreased from 03/18/2024.2.  Residual subdural collections overlying  both frontoparietal convexities, trace subdural hemorrhage along the cerebral falx and a low-attenuation subdural collection overlying the right cerebellar hemisphere are stable in size from 03/18/2024.3.  Regional mass effect in both cerebral convexities with 3 mm of midline shift to the right is stable. 4.  No new intracranial findings.    HEAD CT 3/25 - Mixed attenuated subdural hematoma is are again seen bilaterally with increase acute component when compared with the prior exam. Increased mass effect seen on the left lateral ventricle with increased left-to-right shift.    HEAD CT 3/25 - Stable bilateral subdural hemorrhages. Stable left to right midline shift.  ----------------------------  Patient appears comfortable.  Undergoing EEG.  Reports no pain.  Noted delay in responses vs last visit.       VITALS  T(C): 36.6 (03-26-24 @ 00:10), Max: 36.7 (03-25-24 @ 14:42)  HR: 51 (03-26-24 @ 07:00) (51 - 80)  BP: 104/67 (03-26-24 @ 07:00) (104/67 - 138/91)  RR: 10 (03-26-24 @ 07:00) (10 - 17)  SpO2: 99% (03-26-24 @ 07:00) (91% - 100%)  Wt(kg): --    PAST MEDICAL & SURGICAL HISTORY  Syncope    Traumatic subdural hematoma (SDH)    No significant past surgical history    S/P craniotomy    H/O cerebral embolism         RECENT LABS REVIEWED    CBC Full  -  ( 26 Mar 2024 02:45 )  WBC Count : 6.81 K/uL  RBC Count : 4.86 M/uL  Hemoglobin : 13.1 g/dL  Hematocrit : 39.8 %  Platelet Count - Automated : 317 K/uL  Mean Cell Volume : 81.9 fl  Mean Cell Hemoglobin : 27.0 pg  Mean Cell Hemoglobin Concentration : 32.9 gm/dL  Auto Neutrophil # : x  Auto Lymphocyte # : x  Auto Monocyte # : x  Auto Eosinophil # : x  Auto Basophil # : x  Auto Neutrophil % : x  Auto Lymphocyte % : x  Auto Monocyte % : x  Auto Eosinophil % : x  Auto Basophil % : x    03-26    136  |  101  |  10.8  ----------------------------<  106<H>  4.3   |  24.0  |  0.63    Ca    9.2      26 Mar 2024 02:45  Phos  3.6     03-26  Mg     2.0     03-26    TPro  7.2  /  Alb  3.1<L>  /  TBili  0.4  /  DBili  x   /  AST  17  /  ALT  38  /  AlkPhos  99  03-25    Urinalysis Basic - ( 26 Mar 2024 02:45 )    Color: x / Appearance: x / SG: x / pH: x  Gluc: 106 mg/dL / Ketone: x  / Bili: x / Urobili: x   Blood: x / Protein: x / Nitrite: x   Leuk Esterase: x / RBC: x / WBC x   Sq Epi: x / Non Sq Epi: x / Bacteria: x        ALLERGIES  Allergy Status Unknown  No Known Allergies      MEDICATIONS   chlorhexidine 2% Cloths 1 Application(s) Topical daily  chlorhexidine 4% Liquid 1 Application(s) Topical once  escitalopram 5 milliGRAM(s) Oral daily  hydrALAZINE Injectable 10 milliGRAM(s) IV Push every 2 hours PRN  labetalol Injectable 10 milliGRAM(s) IV Push every 2 hours PRN  levETIRAcetam   Injectable 500 milliGRAM(s) IV Push every 12 hours  polyethylene glycol 3350 17 Gram(s) Oral daily  senna 2 Tablet(s) Oral at bedtime  sodium chloride 0.9%. 1000 milliLiter(s) IV Continuous <Continuous>      ----------------------------------------------------------------------------------------  FUNCTIONAL HISTORY  Lives with family, [address]  Independent    FUNCTIONAL STATUS/PROGRESS  Pending reevals    ----------------------------------------------------------------------------------------  PHYSICAL EXAM  Constitutional - NAD, Comfortable  HEENT - head wrapped/EEG, EOMI  Neck - Supple, No limited ROM  Chest - Breathing comfortably, No wheezing  Cardiovascular - S1S2   Abdomen - Soft   Extremities - No C/C/E, No calf tenderness   Neurologic Exam -                    Cognitive - AAO to self, PART situation     Communication - Delayed processing. Expressive deficits, +dysarthria     Cranial Nerves - Right lip droop      FUNCTIONAL MOTOR EXAM - Noted worsened right sided weakness                    LEFT    UE - ShAB 5/5, EF 5/5, EE 5/5,  5/5                    RIGHT UE - ShAB 4/5, EF 4/5, EE 3/5,  5/5                    LEFT    LE - HF 4/5, KE 4/5, DF 5/5, PF 5/5                    RIGHT LE - HF 5/5, KE 5/5, DF 5/5, PF 5/5        Sensory - Intact to LT  Psychiatric - Calm   ----------------------------------------------------------------------------------------  ASSESSMENT/PLAN  64yMale with functional deficits after expansion of SDH  Traumatic bilateral SDHs s/p craniotomies & B/L MMA embolization with worsened shift - Keppra  HTN - Labetalol  Mood - Lexapro  DVT PPX - SCDs  Rehab/Impaired mobility and function - Patient continues to require hospitalization for the above diagnoses and ongoing active management of comorbid complications (ICU level care, EEG) that are substantially impairing functional ability and impairing quality of life.     RECOMMEND - OOB daily      Expect patient to return to [place] once medically optimized.     Will continue to follow. Rehab recommendations are dependent on how functional progress changes as well as how patient continues to participate and tolerate therapeutic interventions, WHICH MAY CHANGE UPON FOLLOW UP EXAMINATIONS. Recommend ongoing mobilization by staff to maintain cardiopulmonary function and prevention of secondary complications related to debility. Discussed the specific management and recommendations above with rehab clinical care team/rehab liaison.      Total Time Spent on Encounter (reviewing clinical notes, labs, radiology, medications, patient history/exam, assessment and plan) - 75 minutes  
Writer briefly spoke with pt to re-introduce neuropsychology services. Pt correctly stated his name, , the month, and state. Incorrectly stated the year (), and stated that he was at [facility]. Pt was aware of his reason for hospitalization. Paraphasic errors were noted during spontaneous speech. Denied cognitive changes since his hospitalization. Denied sadness or worry.     Writer will continue to follow pending pt's discharge date and ability to participate in more comprehensive neuropsychological assessment.

## 2024-03-27 NOTE — DIETITIAN INITIAL EVALUATION ADULT - PERTINENT LABORATORY DATA
03-27    136  |  104  |  8.2  ----------------------------<  127<H>  4.4   |  22.0  |  0.60    Ca    9.1      27 Mar 2024 03:30  Phos  3.4     03-27  Mg     1.9     03-27    POCT Blood Glucose.: 142 mg/dL (03-26-24 @ 13:54)  A1C with Estimated Average Glucose Result: 5.6 % (03-11-24 @ 05:00)  A1C with Estimated Average Glucose Result: 5.5 % (01-22-24 @ 14:28)

## 2024-03-27 NOTE — PROGRESS NOTE ADULT - SUBJECTIVE AND OBJECTIVE BOX
HPI:  63 yo Male with a history of falls known to neurosurgery as he is s/p b/l craniotomies for subdural evacuation 3/12/24 by Dr. Polanco and, b/l MMA embolization 3/19/24 by Dr. Weber. The patients hospital course was complicated by transient right sided weakness post operatively which improved. He had subsequent CT Scans which were stable. He was subsequently cleared and discharged to rehab. The [facility] confusion, right sided weakness and increased hemorrhage on the left with increased left to right shift. No new trauma reported. Patient was subsequently transferred to [facility] for higher level and continuity of care.    (25 Mar 2024 15:33)  Underwent L crani fo recurrent SDH evacuation 3/26.     24 hr events: none reported.      ICU Vital Signs Last 24 Hrs  T(C): 36.9 (27 Mar 2024 11:29), Max: 36.9 (26 Mar 2024 20:00)  T(F): 98.4 (27 Mar 2024 11:29), Max: 98.4 (26 Mar 2024 20:00)  HR: 68 (27 Mar 2024 15:00) (57 - 96)  BP: 130/69 (27 Mar 2024 15:00) (100/65 - 136/69)  BP(mean): 86 (27 Mar 2024 15:00) (68 - 110)  ABP: --  ABP(mean): --  RR: 15 (27 Mar 2024 15:00) (8 - 22)  SpO2: 100% (27 Mar 2024 15:00) (95% - 100%)    O2 Parameters below as of 27 Mar 2024 12:00  Patient On (Oxygen Delivery Method): mask, nonrebreather        Labs, imaging reviewed.    Exam:  drain in place  AAOx3, EO spont, FC, NAD, EOMI, face symmetric, PERRLA, tongue midline.  [place] strongly and spont, 5/5; minor RLE drift noted.  CTAB  S1S2 present  Abd soft, NT, ND  No peripheral swelling.

## 2024-03-27 NOTE — PROGRESS NOTE ADULT - SUBJECTIVE AND OBJECTIVE BOX
Patient fatigued and at this time did not want to speak.  He reports he is sad/does not feel well.   Patient is s/p evacuation of SDH.     FUNCTIONAL PROGRESS  Pending evals    VITALS  T(C): 36.3 (03-27-24 @ 08:14), Max: 36.9 (03-26-24 @ 20:00)  HR: 62 (03-27-24 @ 08:00) (45 - 96)  BP: 113/52 (03-27-24 @ 08:00) (100/65 - 152/78)  RR: 8 (03-27-24 @ 08:00) (8 - 22)  SpO2: 100% (03-27-24 @ 08:00) (95% - 100%)  Wt(kg): --    MEDICATIONS   acetaminophen     Tablet .. 975 milliGRAM(s) every 6 hours PRN  ceFAZolin  Injectable. 2000 milliGRAM(s) every 8 hours  chlorhexidine 2% Cloths 1 Application(s) daily  escitalopram 5 milliGRAM(s) daily  hydrALAZINE Injectable 10 milliGRAM(s) every 2 hours PRN  labetalol Injectable 10 milliGRAM(s) every 2 hours PRN  levETIRAcetam   Injectable 500 milliGRAM(s) every 12 hours  oxyCODONE    IR 5 milliGRAM(s) every 4 hours PRN  oxyCODONE    IR 10 milliGRAM(s) every 4 hours PRN  polyethylene glycol 3350 17 Gram(s) daily  senna 2 Tablet(s) at bedtime  sodium chloride 0.9%. 1000 milliLiter(s) <Continuous>      RECENT LABS/IMAGING  - Reviewed Today                        13.0   10.42 )-----------( 295      ( 27 Mar 2024 03:30 )             39.0     03-27    136  |  104  |  8.2  ----------------------------<  127<H>  4.4   |  22.0  |  0.60    Ca    9.1      27 Mar 2024 03:30  Phos  3.4     03-27  Mg     1.9     03-27      PT/INR - ( 25 Mar 2024 18:03 )   PT: 11.6 sec;   INR: 1.05 ratio         PTT - ( 25 Mar 2024 18:03 )  PTT:23.1 sec  Urinalysis Basic - ( 27 Mar 2024 03:30 )    Color: x / Appearance: x / SG: x / pH: x  Gluc: 127 mg/dL / Ketone: x  / Bili: x / Urobili: x   Blood: x / Protein: x / Nitrite: x   Leuk Esterase: x / RBC: x / WBC x   Sq Epi: x / Non Sq Epi: x / Bacteria: x      HEAD CT 3/20 - 1.  Status post middle meningeal artery embolization and anterior parietal craniotomies bilaterally.  A small amount of postoperative pneumocephalus is decreased from 03/18/2024.2.  Residual subdural collections overlying  both frontoparietal convexities, trace subdural hemorrhage along the cerebral falx and a low-attenuation subdural collection overlying the right cerebellar hemisphere are stable in size from 03/18/2024.3.  Regional mass effect in both cerebral convexities with 3 mm of midline shift to the right is stable. 4.  No new intracranial findings.    HEAD CT 3/25 - Mixed attenuated subdural hematoma is are again seen bilaterally with increase acute component when compared with the prior exam. Increased mass effect seen on the left lateral ventricle with increased left-to-right shift.    HEAD CT 3/25 - Stable bilateral subdural hemorrhages. Stable left to right midline shift.    EEG 3/26 - Mild to moderate diffuse/multi-focal cerebral dysfunction, not specific as to etiology. There were no epileptiform abnormalities or seizures recorded.      HEAD CT 3/26 - Status post a left-sided subdural drainage catheter. Moderate pneumocephalus. Subtotal evacuation of a left-sided subdural hemorrhage. Stable right-sided subdural hemorrhage. Improved left to right midline shift..    ----------------------------------------------------------------------------------------  PHYSICAL EXAM  Constitutional - NAD, Comfortable  HEENT - Head bandaged   Extremities - No edema  Neurologic Exam -       As of 3/26:             Cognitive - AAO to self, PART situation     Communication - Delayed processing. Expressive deficits, +dysarthria     Cranial Nerves - Right lip droop      FUNCTIONAL MOTOR EXAM - Noted worsened right sided weakness                    LEFT    UE - ShAB 5/5, EF 5/5, EE 5/5,  5/5                    RIGHT UE - ShAB 4/5, EF 4/5, EE 3/5,  5/5                    LEFT    LE - HF 4/5, KE 4/5, DF 5/5, PF 5/5                    RIGHT LE - HF 5/5, KE 5/5, DF 5/5, PF 5/5        Sensory - Intact to LT  Psychiatric - Depressed  ----------------------------------------------------------------------------------------  ASSESSMENT/PLAN  64yMale with functional deficits after expansion of SDH  Traumatic bilateral SDHs s/p craniotomies & B/L MMA embolization with worsened shift s/p craniotomy - Keppra  HTN - Labetalol, Hydralazine  Mood - Lexapro  Pain - Oxycodone, Tylenol   DVT PPX - SCDs  Rehab/Impaired mobility and function - Patient continues to require hospitalization for the above diagnoses and ongoing active management of comorbid complications (ICU level care, pending therapy evals) that are substantially impairing functional ability and impairing quality of life.     RECOMMEND - OOB daily      Expect patient to return to [place] once medically optimized.     Will continue to follow. Rehab recommendations are dependent on how functional progress changes as well as how patient continues to participate and tolerate therapeutic interventions, WHICH MAY CHANGE UPON FOLLOW UP EXAMINATIONS. Recommend ongoing mobilization by staff to maintain cardiopulmonary function and prevention of secondary complications related to debility. Discussed the specific management and recommendations above with rehab clinical care team/rehab liaison.

## 2024-03-27 NOTE — PROGRESS NOTE ADULT - CRITICAL CARE ATTENDING COMMENT
Pt is critically ill and at high risk of rapid deterioration/death due to abovementioned conditions.   Still requires critical care interventions - ongoing frequent evaluations, interventions and management adjustment by the Attending and ICU team, - and included review of relevant history, clinical examination, review of data and images, discussion of treatment with the multidisciplinary team and any consultants involved in this patient’s care as well as family discussion.
Pt is critically ill and at high risk of rapid deterioration/death due to abovementioned conditions.   Still requires critical care interventions - ongoing frequent evaluations, interventions and management adjustment by the Attending and ICU team, - and included review of relevant history, clinical examination, review of data and images, discussion of treatment with the multidisciplinary team and any consultants involved in this patient’s care as well as family discussion.

## 2024-03-28 LAB
ANION GAP SERPL CALC-SCNC: 10 MMOL/L — SIGNIFICANT CHANGE UP (ref 5–17)
BUN SERPL-MCNC: 11.2 MG/DL — SIGNIFICANT CHANGE UP (ref 8–20)
CALCIUM SERPL-MCNC: 8.8 MG/DL — SIGNIFICANT CHANGE UP (ref 8.4–10.5)
CHLORIDE SERPL-SCNC: 107 MMOL/L — SIGNIFICANT CHANGE UP (ref 96–108)
CO2 SERPL-SCNC: 24 MMOL/L — SIGNIFICANT CHANGE UP (ref 22–29)
CREAT SERPL-MCNC: 0.69 MG/DL — SIGNIFICANT CHANGE UP (ref 0.5–1.3)
EGFR: 102 ML/MIN/1.73M2 — SIGNIFICANT CHANGE UP
GLUCOSE SERPL-MCNC: 96 MG/DL — SIGNIFICANT CHANGE UP (ref 70–99)
HCT VFR BLD CALC: 36.5 % — LOW (ref 39–50)
HGB BLD-MCNC: 12 G/DL — LOW (ref 13–17)
MAGNESIUM SERPL-MCNC: 2 MG/DL — SIGNIFICANT CHANGE UP (ref 1.6–2.6)
MCHC RBC-ENTMCNC: 27 PG — SIGNIFICANT CHANGE UP (ref 27–34)
MCHC RBC-ENTMCNC: 32.9 GM/DL — SIGNIFICANT CHANGE UP (ref 32–36)
MCV RBC AUTO: 82 FL — SIGNIFICANT CHANGE UP (ref 80–100)
PHOSPHATE SERPL-MCNC: 3.3 MG/DL — SIGNIFICANT CHANGE UP (ref 2.4–4.7)
PLATELET # BLD AUTO: 309 K/UL — SIGNIFICANT CHANGE UP (ref 150–400)
POTASSIUM SERPL-MCNC: 4 MMOL/L — SIGNIFICANT CHANGE UP (ref 3.5–5.3)
POTASSIUM SERPL-SCNC: 4 MMOL/L — SIGNIFICANT CHANGE UP (ref 3.5–5.3)
RBC # BLD: 4.45 M/UL — SIGNIFICANT CHANGE UP (ref 4.2–5.8)
RBC # FLD: 13.6 % — SIGNIFICANT CHANGE UP (ref 10.3–14.5)
SODIUM SERPL-SCNC: 141 MMOL/L — SIGNIFICANT CHANGE UP (ref 135–145)
WBC # BLD: 9.2 K/UL — SIGNIFICANT CHANGE UP (ref 3.8–10.5)
WBC # FLD AUTO: 9.2 K/UL — SIGNIFICANT CHANGE UP (ref 3.8–10.5)

## 2024-03-28 PROCEDURE — 99291 CRITICAL CARE FIRST HOUR: CPT

## 2024-03-28 PROCEDURE — 70450 CT HEAD/BRAIN W/O DYE: CPT | Mod: 26

## 2024-03-28 PROCEDURE — 70450 CT HEAD/BRAIN W/O DYE: CPT | Mod: 26,77

## 2024-03-28 RX ORDER — ENOXAPARIN SODIUM 100 MG/ML
40 INJECTION SUBCUTANEOUS
Refills: 0 | Status: DISCONTINUED | OUTPATIENT
Start: 2024-03-28 | End: 2024-04-01

## 2024-03-28 RX ADMIN — OXYCODONE HYDROCHLORIDE 10 MILLIGRAM(S): 5 TABLET ORAL at 01:00

## 2024-03-28 RX ADMIN — LEVETIRACETAM 500 MILLIGRAM(S): 250 TABLET, FILM COATED ORAL at 05:40

## 2024-03-28 RX ADMIN — Medication 2000 MILLIGRAM(S): at 10:40

## 2024-03-28 RX ADMIN — Medication 975 MILLIGRAM(S): at 11:17

## 2024-03-28 RX ADMIN — Medication 975 MILLIGRAM(S): at 10:41

## 2024-03-28 RX ADMIN — LEVETIRACETAM 500 MILLIGRAM(S): 250 TABLET, FILM COATED ORAL at 17:14

## 2024-03-28 RX ADMIN — SENNA PLUS 2 TABLET(S): 8.6 TABLET ORAL at 21:35

## 2024-03-28 RX ADMIN — Medication 2000 MILLIGRAM(S): at 03:47

## 2024-03-28 RX ADMIN — ESCITALOPRAM OXALATE 5 MILLIGRAM(S): 10 TABLET, FILM COATED ORAL at 10:42

## 2024-03-28 RX ADMIN — OXYCODONE HYDROCHLORIDE 5 MILLIGRAM(S): 5 TABLET ORAL at 06:01

## 2024-03-28 RX ADMIN — OXYCODONE HYDROCHLORIDE 5 MILLIGRAM(S): 5 TABLET ORAL at 07:00

## 2024-03-28 RX ADMIN — CHLORHEXIDINE GLUCONATE 1 APPLICATION(S): 213 SOLUTION TOPICAL at 10:42

## 2024-03-28 RX ADMIN — POLYETHYLENE GLYCOL 3350 17 GRAM(S): 17 POWDER, FOR SOLUTION ORAL at 10:42

## 2024-03-28 RX ADMIN — OXYCODONE HYDROCHLORIDE 10 MILLIGRAM(S): 5 TABLET ORAL at 00:05

## 2024-03-28 RX ADMIN — ENOXAPARIN SODIUM 40 MILLIGRAM(S): 100 INJECTION SUBCUTANEOUS at 21:37

## 2024-03-28 NOTE — CHART NOTE - NSCHARTNOTEFT_GEN_A_CORE
SD drain cleared for removal per neurosurgery. Patient laid flat, Drain off-suction and removed without complication.  [place] applied for hemostasis.  Patient tolerated procedure well.

## 2024-03-28 NOTE — PHYSICAL THERAPY INITIAL EVALUATION ADULT - ADDITIONAL COMMENTS
as per medical chart/ previous PT evaluations: Pt lives in a private home with his son, daughter and ex-wife (+) 5 steps to enter without handrails, no steps inside. Pt owns a rollator only.

## 2024-03-28 NOTE — PROGRESS NOTE ADULT - SUBJECTIVE AND OBJECTIVE BOX
HPI:  63 yo Male with a history of falls known to neurosurgery as he is s/p b/l craniotomies for subdural evacuation 3/12/24 by Dr. Polanco and, b/l MMA embolization 3/19/24 by Dr. Weber. The patients hospital course was complicated by transient right sided weakness post operatively which improved. He had subsequent CT Scans which were stable. He was subsequently cleared and discharged to rehab. The represents today as a transfer from [facility] with reports of confusion, right sided weakness and increased hemorrhage on the left with increased left to right shift. No new trauma reported. Patient was subsequently transferred to [facility] for higher level and continuity of care.    (25 Mar 2024 15:33)    OVERNIGHT EVENTS: Patient denies any acute complaints. L subdural drain remains in place. Pending CTH in AM.       Vital Signs Last 24 Hrs  T(C): 36.4 (27 Mar 2024 23:50), Max: 36.9 (27 Mar 2024 11:29)  T(F): 97.5 (27 Mar 2024 23:50), Max: 98.4 (27 Mar 2024 11:29)  HR: 69 (28 Mar 2024 01:00) (57 - 88)  BP: 97/58 (28 Mar 2024 02:00) (97/58 - 133/75)  BP(mean): 70 (28 Mar 2024 02:00) (67 - 92)  RR: 16 (28 Mar 2024 01:00) (8 - 18)  SpO2: 98% (28 Mar 2024 01:00) (95% - 100%)    Parameters below as of 28 Mar 2024 00:00  Patient On (Oxygen Delivery Method): room air    I&O's Summary    26 Mar 2024 07:01  -  27 Mar 2024 07:00  --------------------------------------------------------  IN: 1675 mL / OUT: 2695 mL / NET: -1020 mL    27 Mar 2024 07:01  -  28 Mar 2024 03:10  --------------------------------------------------------  IN: 850 mL / OUT: 1173 mL / NET: -323 mL    PHYSICAL EXAM:  General: NAD, pt is comfortably sitting up in bed, on room air  HEENT: EOMI b/l, face symmetric, tongue midline, neck FROM  Cardiovascular: Regular rate and rhythm  Respiratory: nonlabored breathing, normal chest rise  GI: abdomen soft, nontender, nondistended  Neuro: CN II-XII grossly intact, PERRL 3mm briskly reactive   A&Ox3, No aphasia, speech clear, no dysmetria, no pronator drift. Follows commands.  [place] x4 spontaneously, 5/5 strength in all extremities throughout. sensation intact  Extremities: distal pulses 2+ x4  Wound/incision: headwrap in place   Drain: L subdural drain to EVD collection system       LABS:                        13.0   10.42 )-----------( 295      ( 27 Mar 2024 03:30 )             39.0     03-27    136  |  104  |  8.2  ----------------------------<  127<H>  4.4   |  22.0  |  0.60    Ca    9.1      27 Mar 2024 03:30  Phos  3.4     03-27  Mg     1.9     03-27  Urinalysis Basic - ( 27 Mar 2024 03:30 )  Color: x / Appearance: x / SG: x / pH: x  Gluc: 127 mg/dL / Ketone: x  / Bili: x / Urobili: x   Blood: x / Protein: x / Nitrite: x   Leuk Esterase: x / RBC: x / WBC x   Sq Epi: x / Non Sq Epi: x / Bacteria: x    Allergies  Allergy Status Unknown  No Known Allergies      MEDICATIONS:  ceFAZolin  Injectable. 2000 milliGRAM(s) IV Push every 8 hours  acetaminophen     Tablet .. 975 milliGRAM(s) Oral every 6 hours PRN  escitalopram 5 milliGRAM(s) Oral daily  levETIRAcetam   Injectable 500 milliGRAM(s) IV Push every 12 hours  oxyCODONE    IR 5 milliGRAM(s) Oral every 4 hours PRN  oxyCODONE    IR 10 milliGRAM(s) Oral every 4 hours PRN  chlorhexidine 2% Cloths 1 Application(s) Topical daily  hydrALAZINE Injectable 10 milliGRAM(s) IV Push every 2 hours PRN  labetalol Injectable 10 milliGRAM(s) IV Push every 2 hours PRN  polyethylene glycol 3350 17 Gram(s) Oral daily  senna 2 Tablet(s) Oral at bedtime    RADIOLOGY & ADDITIONAL TESTS:  < from: CT Head No Cont (03.27.24 @ 09:40) >  IMPRESSION: Status post a left-sided subdural drainage catheter. Moderate   pneumocephalus, mildly decreased. Subtotal evacuation of a left-sided   holohemispheric subdural hemorrhage, unchanged. Stable right-sided   subdural hemorrhage. Improved left to right midline shift.    ASSESSMENT:  64M PMHx of b/l craniotomies for SDH evacuation 3/12/24, b/l MMA embolization 3/19/24 represented from [facility] 3/25/24 with confusion and worsening right sided weakness, found to have increased right to left shift and increase left hemispheric SDH. S/p L craniotomy for SDH evacuation 3/26/24.     Plan:  - neuro checks q2r   - pending CTH in AM   - Keppra 500mg BID   - L subdural drain in place to EVD collection system, monitor outputs   - Pain control PRN  - voiding, IVL   - Ancef while subdural drain is in place  - DVT ppx: SCDs   - further supportive care/medical management as per NSICU   - final plan pend AM rounds with neurosurgeon    HPI:  65 yo Male with a history of falls known to neurosurgery as he is s/p b/l craniotomies for subdural evacuation 3/12/24 by Dr. Polanco and, b/l MMA embolization 3/19/24 by Dr. Weber. The patients hospital course was complicated by transient right sided weakness post operatively which improved. He had subsequent CT Scans which were stable. He was subsequently cleared and discharged to rehab. The represents today as a transfer from [facility] with reports of confusion, right sided weakness and increased hemorrhage on the left with increased left to right shift. No new trauma reported. Patient was subsequently transferred to [facility] for higher level and continuity of care.    (25 Mar 2024 15:33)    OVERNIGHT EVENTS: Patient denies any acute complaints. L subdural drain remains in place. Pending CTH in AM.       Vital Signs Last 24 Hrs  T(C): 36.4 (27 Mar 2024 23:50), Max: 36.9 (27 Mar 2024 11:29)  T(F): 97.5 (27 Mar 2024 23:50), Max: 98.4 (27 Mar 2024 11:29)  HR: 69 (28 Mar 2024 01:00) (57 - 88)  BP: 97/58 (28 Mar 2024 02:00) (97/58 - 133/75)  BP(mean): 70 (28 Mar 2024 02:00) (67 - 92)  RR: 16 (28 Mar 2024 01:00) (8 - 18)  SpO2: 98% (28 Mar 2024 01:00) (95% - 100%)    Parameters below as of 28 Mar 2024 00:00  Patient On (Oxygen Delivery Method): room air    I&O's Summary    26 Mar 2024 07:01  -  27 Mar 2024 07:00  --------------------------------------------------------  IN: 1675 mL / OUT: 2695 mL / NET: -1020 mL    27 Mar 2024 07:01  -  28 Mar 2024 03:10  --------------------------------------------------------  IN: 850 mL / OUT: 1173 mL / NET: -323 mL    PHYSICAL EXAM:  General: NAD, pt is comfortably sitting up in bed, on room air  HEENT: EOMI b/l, face symmetric, tongue midline, neck FROM  Cardiovascular: Regular rate and rhythm  Respiratory: nonlabored breathing, normal chest rise  GI: abdomen soft, nontender, nondistended  Neuro: OE spontaneously, PERRL 3mm briskly reactive   A&Ox2 to name and place, No aphasia, speech clear, no dysmetria, Follows commands.  mild RUE drift, [place] x4 spontaneously, 5/5 strength in all extremities throughout. sensation intact  Extremities: distal pulses 2+ x4  Wound/incision: headwrap in place   Drain: L subdural drain to EVD collection system       LABS:                        13.0   10.42 )-----------( 295      ( 27 Mar 2024 03:30 )             39.0     03-27    136  |  104  |  8.2  ----------------------------<  127<H>  4.4   |  22.0  |  0.60    Ca    9.1      27 Mar 2024 03:30  Phos  3.4     03-27  Mg     1.9     03-27  Urinalysis Basic - ( 27 Mar 2024 03:30 )  Color: x / Appearance: x / SG: x / pH: x  Gluc: 127 mg/dL / Ketone: x  / Bili: x / Urobili: x   Blood: x / Protein: x / Nitrite: x   Leuk Esterase: x / RBC: x / WBC x   Sq Epi: x / Non Sq Epi: x / Bacteria: x    Allergies  Allergy Status Unknown  No Known Allergies      MEDICATIONS:  ceFAZolin  Injectable. 2000 milliGRAM(s) IV Push every 8 hours  acetaminophen     Tablet .. 975 milliGRAM(s) Oral every 6 hours PRN  escitalopram 5 milliGRAM(s) Oral daily  levETIRAcetam   Injectable 500 milliGRAM(s) IV Push every 12 hours  oxyCODONE    IR 5 milliGRAM(s) Oral every 4 hours PRN  oxyCODONE    IR 10 milliGRAM(s) Oral every 4 hours PRN  chlorhexidine 2% Cloths 1 Application(s) Topical daily  hydrALAZINE Injectable 10 milliGRAM(s) IV Push every 2 hours PRN  labetalol Injectable 10 milliGRAM(s) IV Push every 2 hours PRN  polyethylene glycol 3350 17 Gram(s) Oral daily  senna 2 Tablet(s) Oral at bedtime    RADIOLOGY & ADDITIONAL TESTS:  < from: CT Head No Cont (03.27.24 @ 09:40) >  IMPRESSION: Status post a left-sided subdural drainage catheter. Moderate   pneumocephalus, mildly decreased. Subtotal evacuation of a left-sided   holohemispheric subdural hemorrhage, unchanged. Stable right-sided   subdural hemorrhage. Improved left to right midline shift.    ASSESSMENT:  64M PMHx of b/l craniotomies for SDH evacuation 3/12/24, b/l MMA embolization 3/19/24 represented from [facility] 3/25/24 with confusion and worsening right sided weakness, found to have increased right to left shift and increase left hemispheric SDH. S/p L craniotomy for SDH evacuation 3/26/24.     Plan:  - neuro checks q2r   - pending CTH in AM   - Keppra 500mg BID   - L subdural drain in place to EVD collection system, monitor outputs   - Pain control PRN  - voiding, IVL   - Ancef while subdural drain is in place  - DVT ppx: SCDs   - further supportive care/medical management as per NSICU   - final plan pend AM rounds with neurosurgeon

## 2024-03-28 NOTE — PROGRESS NOTE ADULT - ASSESSMENT
ASSESSMENT/PLAN:     NEURO:   SBP<160  q2hr neurochecks  Dc drains  keppra ppx  pain mgt: tylenol and oxy 5 prn  Activity: [x] mobilize as tolerated [] Bedrest [x] PT [x] OT [] PMNR    PULM: room air   SpO2>92%  incentive spirometry   OOB    CV:  SBP goal <160  prn meds    RENAL: monitor I/O  replete lytes prn  voiding  Fluids: IVF while NPO    GI:  Diet: advance diet as tolerated  GI prophylaxis [x] not indicated   zofran prn for nausea  Bowel regimen [x] senna [] other: miralax    ENDO:   Goal euglycemia (-180)    HEME/ONC:  VTE prophylaxis: [] SCDs [x] chemoprophylaxis held 24hrs post-op     ID: afebrile   no leukocytosis    MISC:    I affirm that this patient is critically ill and at high risk for sudden, fatal deterioration due to one or more of the above stated active issues.     This time does not include bedside procedures that are documented separately.   ASSESSMENT/PLAN: 63 yo Male with a history of falls known to neurosurgery as he is s/p b/l craniotomies for subdural evacuation 3/12/24 by Dr. Polanco and, b/l MMA embolization 3/19/24 by Dr. Weber.     NEURO:   SBP<160  q2hr neurochecks  Dc drains  keppra ppx  pain mgt: tylenol and oxy 5 prn  neuropsych following  lexapro  Activity: [x] mobilize as tolerated [] Bedrest [x] PT [x] OT [] PMNR    PULM: room air   SpO2>92%  incentive spirometry   OOB    CV:  SBP goal <160  prn meds    RENAL: monitor I/O  replete lytes prn  voiding  Fluids: IVF while NPO    GI:  Diet: advance diet as tolerated  GI prophylaxis [x] not indicated   zofran prn for nausea  Bowel regimen [x] senna [] other: miralax  LBM 3/26    ENDO:   Goal euglycemia (-180)    HEME/ONC:  VTE prophylaxis: [] SCDs [x] chemoprophylaxis      ID: afebrile   no leukocytosis  stop abx    MISC:    I affirm that this patient is critically ill and at high risk for sudden, fatal deterioration due to one or more of the above stated active issues.     This time does not include bedside procedures that are documented separately.

## 2024-03-28 NOTE — PHYSICAL THERAPY INITIAL EVALUATION ADULT - PERTINENT HX OF CURRENT PROBLEM, REHAB EVAL
65 yo Male with a history of falls known to neurosurgery as he is s/p b/l [place] for subdural evacuation 3/12/24 by Dr. Polanco and, b/l MMA embolization 3/19/24 by Dr. Weber. The patients hospital course was complicated by transient right sided weakness post operatively which improved. He had subsequent CT Scans which were stable. He was subsequently cleared and discharged to rehab. The [facility] with reports of confusion, right sided weakness and increased hemorrhage on the left with increased left to right shift. No new trauma reported. CTH shows increased right to left shift with increase in left SDH. Pt is now S/p Left craniotomy for SDH evacuation 3/26/24

## 2024-03-28 NOTE — CHART NOTE - NSCHARTNOTEFT_GEN_A_CORE
NEURO ICU DOWNGRADE NOTE:   HPI:  63 yo Male with a history of falls known to neurosurgery as he is s/p b/l craniotomies for subdural evacuation 3/12/24 by Dr. Polanco and, b/l MMA embolization 3/19/24 by Dr. Weber. The patients hospital course was complicated by transient right sided weakness post operatively which improved. He had subsequent CT Scans which were stable. He was subsequently cleared and discharged to rehab. The represents today as a transfer from [facility] with reports of confusion, right sided weakness and increased hemorrhage on the left with increased left to right shift. No new trauma reported. Patient was subsequently transferred to [facility] for higher level and continuity of care.    (25 Mar 2024 15:33)      Hospital Course:   63 y/o M with PMHx of multiple falls, recently admitted to [facility] w/ bilateral acute on chronic subdural hematomas s/p b/l craniotomies for subdural evacuation on 03/12, MMA embolization on 03/19. Patient was cleared, discharged to rehab. Patient presented back to [facility] s/p unwitnessed fall at rehab, +HS, +LOC. Per rehab, patient started to experience confusion, worsening R sided weakness. Patient had repeat head CT which showed increased hemorrhage on the left with increased left to right shift, readmitted to NSICU. Patient went to the OR for L sided craniotomy for L SDH evacuation with subdural drain placement on 03/26. Patient mental status, R sided weakness improved post-op, repeat head CT showed stable SDH w/ moderate pneumocephalus. Patient was placed on NRB, lowered head of bed. Patient drain was removed on 03/28, CT post-drain pull with mild pneumocephalus. Patient stable, able to be downgraded to neurosurgery at this time.     PROCEDURE: L craniectomy for SDH evacuation   POD#3     Vital Signs Last 24 Hrs  T(C): 36.4 (03-28-24 @ 08:01), Max: 36.9 (03-28-24 @ 03:47)  T(F): 97.5 (03-28-24 @ 08:01), Max: 98.5 (03-28-24 @ 03:47)  HR: 64 (03-28-24 @ 10:00) (58 - 88)  BP: 120/73 (03-28-24 @ 10:00) (97/58 - 134/67)  BP(mean): 89 (03-28-24 @ 10:00) (67 - 92)  RR: 13 (03-28-24 @ 10:00) (9 - 17)  SpO2: 100% (03-28-24 @ 10:00) (91% - 100%)    PHYSICAL EXAM:    Constitutional: No Acute Distress     Neurological:  OE spontaneously, PERRL 3mm briskly reactive, A&Ox2 to name and place, No aphasia, speech clear, no dysmetria, Follows commands. Mild RUE drift, [place] x4 spontaneously, 5/5 strength in all extremities throughout. Sensation intact Extremities    Wound/incision: x1 staple placed from drain removal site. R frontal suture from old drain site     Pulmonary: Non labored breathing     Cardiovascular: Regular rate and rhythm     Extremities: No calf tenderness bilaterally or edema          LABS:                        12.0   9.20  )-----------( 309      ( 28 Mar 2024 03:30 )             36.5    03-28    141  |  107  |  11.2  ----------------------------<  96  4.0   |  24.0  |  0.69    Ca    8.8      28 Mar 2024 03:30  Phos  3.3     03-28  Mg     2.0     03-28        IMAGING:   CTH 03/25:   Mixed attenuated subdural hematoma is are again seen   bilaterally with increase acute component when compared with the prior   exam. Increased mass effect seen on the left lateral ventricle with   increased left-to-right shift.    CTH 03/26:    Status post a left-sided subdural drainage catheter. Moderate   pneumocephalus. Subtotal evacuation of a left-sided subdural hemorrhage.   Stable right-sided subdural hemorrhage. Improved left to right midline   shift..    CTH 03/27:   Status post a left-sided subdural drainage catheter. Moderate   pneumocephalus, mildly decreased. Subtotal evacuation of a left-sided   holohemispheric subdural hemorrhage, unchanged. Stable right-sided   subdural hemorrhage. Improved left to right midline shift.    CTH 03/28:   Previously noted left-sided subdural drain has been   removed with slight increase in size of previously noted left frontal extra-axial air.        MEDICATIONS:    Neuro:  acetaminophen     Tablet .. 975 milliGRAM(s) Oral every 6 hours PRN Mild Pain (1 - 3)  escitalopram 5 milliGRAM(s) Oral daily  levETIRAcetam   Injectable 500 milliGRAM(s) IV Push every 12 hours  oxyCODONE    IR 5 milliGRAM(s) Oral every 4 hours PRN Moderate Pain (4 - 6)  oxyCODONE    IR 10 milliGRAM(s) Oral every 4 hours PRN Severe Pain (7 - 10)    Cardiac:  hydrALAZINE Injectable 10 milliGRAM(s) IV Push every 2 hours PRN SBP>160  labetalol Injectable 10 milliGRAM(s) IV Push every 2 hours PRN SBP>160      GI/:  polyethylene glycol 3350 17 Gram(s) Oral daily  senna 2 Tablet(s) Oral at bedtime    Other:   chlorhexidine 2% Cloths 1 Application(s) Topical daily  enoxaparin Injectable 40 milliGRAM(s) SubCutaneous <User Schedule>    -------------------------------------------------------------------------------------------------------------  PLAN:  Neuro:   - Q2 Neuro checks, vitals q1    -Imaging reviewed, patient with small pneumocephalus noted on CT post drain. d/w Dr. Polanco, no further intervention indicated.          -No longer need for flat bed, non rebreather   -STAT CTH if patient has deterioration in mental status   -Dysphagia screening   -Acetaminophen PRN q6 for mild pain, oxycodone q4 for moderate to severe pain   -Keppra 500mg BID     Respiratory:   - Patient on RA   - Incentive Spirometry     CV:  - Systolic BP goals   -Hydralazine and Labetalol PRN     Renal:   - Voiding  - Replete electrolytes as needed    GI:    - Regular Diet- Vegan   - Bowel Regimen: Colace, senna    Heme/Onc:               - DVT ppx: SQL     ID:   - Afebrile     PT/OT:     Dispo: Downgraded to Neurosurgery, pt signed out to Dr. Polanco   Case discussed with Dr. Polanco, and [place] NEURO ICU DOWNGRADE NOTE:   HPI:  65 yo Male with a history of falls known to neurosurgery as he is s/p b/l craniotomies for subdural evacuation 3/12/24 by Dr. Polanco and, b/l MMA embolization 3/19/24 by Dr. Weber. The patients hospital course was complicated by transient right sided weakness post operatively which improved. He had subsequent CT Scans which were stable. He was subsequently cleared and discharged to rehab. The represents today as a transfer from [facility] with reports of confusion, right sided weakness and increased hemorrhage on the left with increased left to right shift. No new trauma reported. Patient was subsequently transferred to [facility] for higher level and continuity of care.    (25 Mar 2024 15:33)      Hospital Course:   65 y/o M with PMHx of multiple falls, recently admitted to [facility] w/ bilateral acute on chronic subdural hematomas s/p b/l craniotomies for subdural evacuation on 03/12, MMA embolization on 03/19. Patient was cleared, discharged to rehab. Patient presented back to [facility] s/p unwitnessed fall at rehab, +HS, +LOC. Per rehab, patient started to experience confusion, worsening R sided weakness. Patient had repeat head CT which showed increased hemorrhage on the left with increased left to right shift, readmitted to NSICU. Patient went to the OR for L sided craniotomy for L SDH evacuation with subdural drain placement on 03/26. Patient mental status, R sided weakness improved post-op, repeat head CT showed stable SDH w/ moderate pneumocephalus. Patient was placed on NRB, lowered head of bed. Patient drain was removed on 03/28, CT post-drain pull with mild pneumocephalus. Patient stable, able to be downgraded to neurosurgery at this time.     PROCEDURE: L craniectomy for SDH evacuation   POD#2    Vital Signs Last 24 Hrs  T(C): 36.4 (03-28-24 @ 08:01), Max: 36.9 (03-28-24 @ 03:47)  T(F): 97.5 (03-28-24 @ 08:01), Max: 98.5 (03-28-24 @ 03:47)  HR: 64 (03-28-24 @ 10:00) (58 - 88)  BP: 120/73 (03-28-24 @ 10:00) (97/58 - 134/67)  BP(mean): 89 (03-28-24 @ 10:00) (67 - 92)  RR: 13 (03-28-24 @ 10:00) (9 - 17)  SpO2: 100% (03-28-24 @ 10:00) (91% - 100%)    PHYSICAL EXAM:    Constitutional: No Acute Distress     Neurological:  OE spontaneously, PERRL 3mm briskly reactive, A&Ox2 to name and place, Mild aphasia (mild word finding difficulties), speech clear, no dysmetria, Follows commands. No RUE drift, [place] x4 spontaneously, 5/5 strength in all extremities throughout. Sensation intact Extremities    Wound/incision: x1 staples placed from new drain removal site. R frontal suture from old drain site. Incision Right surgery intact (staples removed 3/28). Left staples in place, incision intact    Pulmonary: Non labored breathing     Cardiovascular: Regular rate and rhythm     Extremities: No calf tenderness bilaterally or edema          LABS:                        12.0   9.20  )-----------( 309      ( 28 Mar 2024 03:30 )             36.5    03-28    141  |  107  |  11.2  ----------------------------<  96  4.0   |  24.0  |  0.69    Ca    8.8      28 Mar 2024 03:30  Phos  3.3     03-28  Mg     2.0     03-28        IMAGING:   CTH 03/25:   Mixed attenuated subdural hematoma is are again seen   bilaterally with increase acute component when compared with the prior   exam. Increased mass effect seen on the left lateral ventricle with   increased left-to-right shift.    CTH 03/26:    Status post a left-sided subdural drainage catheter. Moderate   pneumocephalus. Subtotal evacuation of a left-sided subdural hemorrhage.   Stable right-sided subdural hemorrhage. Improved left to right midline   shift..    CTH 03/27:   Status post a left-sided subdural drainage catheter. Moderate   pneumocephalus, mildly decreased. Subtotal evacuation of a left-sided   holohemispheric subdural hemorrhage, unchanged. Stable right-sided   subdural hemorrhage. Improved left to right midline shift.    CTH 03/28:   Previously noted left-sided subdural drain has been   removed with slight increase in size of previously noted left frontal extra-axial air.        MEDICATIONS:    Neuro:  acetaminophen     Tablet .. 975 milliGRAM(s) Oral every 6 hours PRN Mild Pain (1 - 3)  escitalopram 5 milliGRAM(s) Oral daily  levETIRAcetam   Injectable 500 milliGRAM(s) IV Push every 12 hours  oxyCODONE    IR 5 milliGRAM(s) Oral every 4 hours PRN Moderate Pain (4 - 6)  oxyCODONE    IR 10 milliGRAM(s) Oral every 4 hours PRN Severe Pain (7 - 10)    Cardiac:  hydrALAZINE Injectable 10 milliGRAM(s) IV Push every 2 hours PRN SBP>160  labetalol Injectable 10 milliGRAM(s) IV Push every 2 hours PRN SBP>160      GI/:  polyethylene glycol 3350 17 Gram(s) Oral daily  senna 2 Tablet(s) Oral at bedtime    Other:   chlorhexidine 2% Cloths 1 Application(s) Topical daily  enoxaparin Injectable 40 milliGRAM(s) SubCutaneous <User Schedule>    -------------------------------------------------------------------------------------------------------------    65 yo Male with a history of falls known to neurosurgery as he is s/p b/l craniotomies for subdural evacuation 3/12/24 by Dr. Polanco and, b/l MMA embolization 3/19/24 by Dr. Weber. Pt represents today as a transfer from [facility] with reports of confusion, right sided weakness and increased hemorrhage on the left with increased left to right shift. No new trauma reported. Patient was subsequently transferred to [facility] for higher level and continuity of care. Patient now POD#2 from [facility] for SDH evacuation.     PLAN:  Neuro:   - Q2 Neuro checks, vitals q2   -Imaging reviewed, patient with small pneumocephalus noted on CT post drain. d/w Dr. Polanco, no further intervention indicated.          -No longer need for flat bed, non rebreather   -STAT CTH if patient has deterioration in mental status   -Dysphagia screening   -Acetaminophen PRN q6 for mild pain, oxycodone q4 for moderate to severe pain   -Keppra 500mg BID     Respiratory:   - Patient on RA   - Incentive Spirometry     CV:  - Systolic BP goals   -Hydralazine and Labetalol PRN     Renal:   - Voiding  - Replete electrolytes as needed    GI:    - Regular Diet- Vegan   - Bowel Regimen: Colace, senna    Heme/Onc:               - DVT ppx: SQL     ID:   - Afebrile     PT/OT:     Dispo: Downgraded to Neurosurgery Step down, pt signed out to Dr. Polanco/NeuroSx team    Case discussed with Dr. Polanco, and [place] NEURO ICU DOWNGRADE NOTE:   HPI:  63 yo Male with a history of falls known to neurosurgery as he is s/p b/l craniotomies for subdural evacuation 3/12/24 by Dr. Polanco and, b/l MMA embolization 3/19/24 by Dr. Weber. The patients hospital course was complicated by transient right sided weakness post operatively which improved. He had subsequent CT Scans which were stable. He was subsequently cleared and discharged to rehab. The represents today as a transfer from [facility] with reports of confusion, right sided weakness and increased hemorrhage on the left with increased left to right shift. No new trauma reported. Patient was subsequently transferred to [facility] for higher level and continuity of care.    (25 Mar 2024 15:33)      Hospital Course:   65 y/o M with PMHx of multiple falls, recently admitted to [facility] w/ bilateral acute on chronic subdural hematomas s/p b/l craniotomies for subdural evacuation on 03/12, MMA embolization on 03/19. Patient was cleared, discharged to rehab. Patient presented back to [facility] s/p unwitnessed fall at rehab, +HS, +LOC. Per rehab, patient started to experience confusion, worsening R sided weakness. Patient had repeat head CT which showed increased hemorrhage on the left with increased left to right shift, readmitted to NSICU. Patient went to the OR for L sided craniotomy for L SDH evacuation with subdural drain placement on 03/26. Patient mental status, R sided weakness improved post-op, repeat head CT showed stable SDH w/ moderate pneumocephalus. Patient was placed on NRB, lowered head of bed. Patient drain was removed on 03/28, CT post-drain pull with mild pneumocephalus. Patient stable, able to be downgraded to neurosurgery at this time.     PROCEDURE: L craniectomy for SDH evacuation   POD#2    Vital Signs Last 24 Hrs  T(C): 36.4 (03-28-24 @ 08:01), Max: 36.9 (03-28-24 @ 03:47)  T(F): 97.5 (03-28-24 @ 08:01), Max: 98.5 (03-28-24 @ 03:47)  HR: 64 (03-28-24 @ 10:00) (58 - 88)  BP: 120/73 (03-28-24 @ 10:00) (97/58 - 134/67)  BP(mean): 89 (03-28-24 @ 10:00) (67 - 92)  RR: 13 (03-28-24 @ 10:00) (9 - 17)  SpO2: 100% (03-28-24 @ 10:00) (91% - 100%)    PHYSICAL EXAM:    Constitutional: No Acute Distress     Neurological:  OE spontaneously, PERRL 3mm briskly reactive, A&Ox2 to name and place, Mild aphasia (mild word finding difficulties), speech clear, no dysmetria, Follows commands. No RUE drift, [place] x4 spontaneously, 5/5 strength in all extremities throughout. Sensation intact Extremities    Wound/incision: x1 staples placed from new drain removal site. R frontal suture from old drain site. Incision Right surgery intact (staples removed 3/28). Left staples in place, incision intact    Pulmonary: Non labored breathing     Cardiovascular: Regular rate and rhythm     Extremities: No calf tenderness bilaterally or edema          LABS:                        12.0   9.20  )-----------( 309      ( 28 Mar 2024 03:30 )             36.5    03-28    141  |  107  |  11.2  ----------------------------<  96  4.0   |  24.0  |  0.69    Ca    8.8      28 Mar 2024 03:30  Phos  3.3     03-28  Mg     2.0     03-28        IMAGING:   CTH 03/25:   Mixed attenuated subdural hematoma is are again seen   bilaterally with increase acute component when compared with the prior   exam. Increased mass effect seen on the left lateral ventricle with   increased left-to-right shift.    CTH 03/26:    Status post a left-sided subdural drainage catheter. Moderate   pneumocephalus. Subtotal evacuation of a left-sided subdural hemorrhage.   Stable right-sided subdural hemorrhage. Improved left to right midline   shift..    CTH 03/27:   Status post a left-sided subdural drainage catheter. Moderate   pneumocephalus, mildly decreased. Subtotal evacuation of a left-sided   holohemispheric subdural hemorrhage, unchanged. Stable right-sided   subdural hemorrhage. Improved left to right midline shift.    CTH 03/28:   Previously noted left-sided subdural drain has been   removed with slight increase in size of previously noted left frontal extra-axial air.        MEDICATIONS:    Neuro:  acetaminophen     Tablet .. 975 milliGRAM(s) Oral every 6 hours PRN Mild Pain (1 - 3)  escitalopram 5 milliGRAM(s) Oral daily  levETIRAcetam   Injectable 500 milliGRAM(s) IV Push every 12 hours  oxyCODONE    IR 5 milliGRAM(s) Oral every 4 hours PRN Moderate Pain (4 - 6)  oxyCODONE    IR 10 milliGRAM(s) Oral every 4 hours PRN Severe Pain (7 - 10)    Cardiac:  hydrALAZINE Injectable 10 milliGRAM(s) IV Push every 2 hours PRN SBP>160  labetalol Injectable 10 milliGRAM(s) IV Push every 2 hours PRN SBP>160      GI/:  polyethylene glycol 3350 17 Gram(s) Oral daily  senna 2 Tablet(s) Oral at bedtime    Other:   chlorhexidine 2% Cloths 1 Application(s) Topical daily  enoxaparin Injectable 40 milliGRAM(s) SubCutaneous <User Schedule>    -------------------------------------------------------------------------------------------------------------    63 yo Male with a history of falls known to neurosurgery as he is s/p b/l craniotomies for subdural evacuation 3/12/24 by Dr. Polanco and, b/l MMA embolization 3/19/24 by Dr. Weber. Pt represents today as a transfer from [facility] with reports of confusion, right sided weakness and increased hemorrhage on the left with increased left to right shift.  Patient was subsequently transferred to [facility] for higher level and continuity of care. Patient now POD#2 from [facility] for SDH evacuation.     PLAN:  Neuro:   - Q2 Neuro checks, vitals q2   -Imaging reviewed, patient with small pneumocephalus noted on CT post drain. d/w Dr. Polanco, no further intervention indicated.          -No longer need for flat bed, non rebreather   -STAT CTH if patient has deterioration in mental status   -Dysphagia screening   -Acetaminophen PRN q6 for mild pain, oxycodone q4 for moderate to severe pain   -Keppra 500mg BID     Respiratory:   - Patient on RA   - Incentive Spirometry     CV:  - Systolic BP goals   -Hydralazine and Labetalol PRN     Renal:   - Voiding  - Replete electrolytes as needed    GI:    - Regular Diet- Vegan   - Bowel Regimen: Miralax, senna    Heme/Onc:               - DVT ppx: SQL     ID:   - Afebrile     PT/OT:     Dispo: Downgraded to Neurosurgery Step down, pt signed out to Dr. Polanco/NeuroSx team    Case discussed with Dr. Polanco, and [place]

## 2024-03-28 NOTE — OCCUPATIONAL THERAPY INITIAL EVALUATION ADULT - PERTINENT HX OF CURRENT PROBLEM, REHAB EVAL
Hx of falls known to neurosurgery as he is s/p b/l [place] holes for subdural evacuation 3/12/24 by Dr. Polanco and, b/l MMA embolization 3/19/24 by Dr. Weber. Hospital course was c/b transient R sided weakness post operatively which improved. He had subsequent CT Scans which were stable. He was subsequently cleared and discharged to rehab.

## 2024-03-28 NOTE — OCCUPATIONAL THERAPY INITIAL EVALUATION ADULT - GENERAL OBSERVATIONS, REHAB EVAL
Pt received supine in bed, NAD, +IV lock, +Tele/ /BP on RA. Pre/post pain 0/10. Pt agreeable to OT evaluation.

## 2024-03-28 NOTE — PHYSICAL THERAPY INITIAL EVALUATION ADULT - SENSORY TESTS
Pt does not track with either eye on command (? processing issue vs visual motor issue); (+) acuity in all fields; Finger to thumb intact BUEs with hand over hand demonstration required.

## 2024-03-28 NOTE — PHYSICAL THERAPY INITIAL EVALUATION ADULT - COORDINATION ASSESSED, REHAB EVAL
intact BUEs, no dysmetria noted however, pt having difficulty processing task, requires demonstration/finger to nose

## 2024-03-28 NOTE — OCCUPATIONAL THERAPY INITIAL EVALUATION ADULT - NS ASR FOLLOW COMMAND OT EVAL
Pt able to follow single step commands but requires additional time to process. Pt demonstrates decreased attention to task, decreased processing speed, decreased sequencing and decreased problem solving abilities. Pt required repetition and verbal cues throughout session. Pt requires hand over hand demonstration for certain task commands 2* impaired cognition/50% of the time

## 2024-03-28 NOTE — PROGRESS NOTE ADULT - SUBJECTIVE AND OBJECTIVE BOX
Chief complaint:   Patient is a 64y old  Male who presents with a chief complaint of Acute on chronic SDH (28 Mar 2024 03:09)    HPI:  65 yo Male with a history of falls known to neurosurgery as he is s/p b/l craniotomies for subdural evacuation 3/12/24 by Dr. Polanco and, b/l MMA embolization 3/19/24 by Dr. Weber. The patients hospital course was complicated by transient right sided weakness post operatively which improved. He had subsequent CT Scans which were stable. He was subsequently cleared and discharged to rehab. The represents today as a transfer from [facility] with reports of confusion, right sided weakness and increased hemorrhage on the left with increased left to right shift. No new trauma reported. Patient was subsequently transferred to [facility] for higher level and continuity of care.    (25 Mar 2024 15:33)        24hr EVENTS:      ROS: [ ]  Unable to assess due to mental status   All other systems negative    -----------------------------------------------------------------------------------------------------------------------------------------------------------------------------------  ICU Vital Signs Last 24 Hrs  T(C): 36.4 (28 Mar 2024 08:01), Max: 36.9 (27 Mar 2024 11:29)  T(F): 97.5 (28 Mar 2024 08:01), Max: 98.5 (28 Mar 2024 03:47)  HR: 59 (28 Mar 2024 08:00) (57 - 88)  BP: 116/75 (28 Mar 2024 08:00) (97/58 - 134/67)  BP(mean): 87 (28 Mar 2024 08:00) (67 - 92)  ABP: --  ABP(mean): --  RR: 14 (28 Mar 2024 08:00) (9 - 18)  SpO2: 93% (28 Mar 2024 08:00) (91% - 100%)    O2 Parameters below as of 28 Mar 2024 08:00  Patient On (Oxygen Delivery Method): room air            I&O's Summary    27 Mar 2024 07:01  -  28 Mar 2024 07:00  --------------------------------------------------------  IN: 850 mL / OUT: 1293 mL / NET: -443 mL    28 Mar 2024 07:01  -  28 Mar 2024 09:01  --------------------------------------------------------  IN: 0 mL / OUT: 28 mL / NET: -28 mL        MEDICATIONS  (STANDING):  ceFAZolin  Injectable. 2000 milliGRAM(s) IV Push every 8 hours  chlorhexidine 2% Cloths 1 Application(s) Topical daily  escitalopram 5 milliGRAM(s) Oral daily  levETIRAcetam   Injectable 500 milliGRAM(s) IV Push every 12 hours  polyethylene glycol 3350 17 Gram(s) Oral daily  senna 2 Tablet(s) Oral at bedtime      RESPIRATORY:        IMAGING:   Recent imaging studies were reviewed.    LAB RESULTS:                          12.0   9.20  )-----------( 309      ( 28 Mar 2024 03:30 )             36.5           03-28    141  |  107  |  11.2  ----------------------------<  96  4.0   |  24.0  |  0.69    Ca    8.8      28 Mar 2024 03:30  Phos  3.3     03-28  Mg     2.0     03-28                  -----------------------------------------------------------------------------------------------------------------------------------------------------------------------------------    PHYSICAL EXAM:  General: Calm, laying in bed  HEENT: MMM  Neuro:  -Mental status- No acute distress, AOx2, mildly confused, following commands  -CN- PERRL 3mm, EOMI, tongue midline, face symmetric  -Motor- full strength in all ext  -Sensation- intact to LT   -Coordination- no dysmetria noted    CV: RRR  Pulm: Clear to auscultation  Abd: Soft, nontender, nondistended  Ext: No edema  Skin: warm, dry     Chief complaint:   Patient is a 64y old  Male who presents with a chief complaint of Acute on chronic SDH (28 Mar 2024 03:09)    HPI:  63 yo Male with a history of falls known to neurosurgery as he is s/p b/l craniotomies for subdural evacuation 3/12/24 by Dr. Polanco and, b/l MMA embolization 3/19/24 by Dr. Weber. The patients hospital course was complicated by transient right sided weakness post operatively which improved. He had subsequent CT Scans which were stable. He was subsequently cleared and discharged to rehab. The represents today as a transfer from [facility] with reports of confusion, right sided weakness and increased hemorrhage on the left with increased left to right shift. No new trauma reported. Patient was subsequently transferred to [facility] for higher level and continuity of care.    (25 Mar 2024 15:33)    24hr EVENTS: no acute issues      ROS: mild headache  All other systems negative    -----------------------------------------------------------------------------------------------------------------------------------------------------------------------------------  ICU Vital Signs Last 24 Hrs  T(C): 36.4 (28 Mar 2024 08:01), Max: 36.9 (27 Mar 2024 11:29)  T(F): 97.5 (28 Mar 2024 08:01), Max: 98.5 (28 Mar 2024 03:47)  HR: 59 (28 Mar 2024 08:00) (57 - 88)  BP: 116/75 (28 Mar 2024 08:00) (97/58 - 134/67)  BP(mean): 87 (28 Mar 2024 08:00) (67 - 92)  ABP: --  ABP(mean): --  RR: 14 (28 Mar 2024 08:00) (9 - 18)  SpO2: 93% (28 Mar 2024 08:00) (91% - 100%)    O2 Parameters below as of 28 Mar 2024 08:00  Patient On (Oxygen Delivery Method): room air            I&O's Summary    27 Mar 2024 07:01  -  28 Mar 2024 07:00  --------------------------------------------------------  IN: 850 mL / OUT: 1293 mL / NET: -443 mL    28 Mar 2024 07:01  -  28 Mar 2024 09:01  --------------------------------------------------------  IN: 0 mL / OUT: 28 mL / NET: -28 mL        MEDICATIONS  (STANDING):  ceFAZolin  Injectable. 2000 milliGRAM(s) IV Push every 8 hours  chlorhexidine 2% Cloths 1 Application(s) Topical daily  escitalopram 5 milliGRAM(s) Oral daily  levETIRAcetam   Injectable 500 milliGRAM(s) IV Push every 12 hours  polyethylene glycol 3350 17 Gram(s) Oral daily  senna 2 Tablet(s) Oral at bedtime      IMAGING:   Recent imaging studies were reviewed.    LAB RESULTS:                          12.0   9.20  )-----------( 309      ( 28 Mar 2024 03:30 )             36.5     03-28    141  |  107  |  11.2  ----------------------------<  96  4.0   |  24.0  |  0.69    Ca    8.8      28 Mar 2024 03:30  Phos  3.3     03-28  Mg     2.0     03-28    -----------------------------------------------------------------------------------------------------------------------------------------------------------------------------------    PHYSICAL EXAM:  General: Calm, laying in bed  HEENT: MMM  Neuro:  -Mental status- No acute distress, AOx2, mildly confused, following commands  flat affect  -CN- PERRL 3mm, EOMI, tongue midline, face symmetric  -Motor- full strength in all ext  -Sensation- intact to LT   -Coordination- no dysmetria noted    CV: RRR  Pulm: Clear to auscultation  Abd: Soft, nontender, nondistended  Ext: No edema  Skin: warm, dry

## 2024-03-28 NOTE — OCCUPATIONAL THERAPY INITIAL EVALUATION ADULT - ADDITIONAL COMMENTS
All information taken from previous OT/PT evals. Pt lives in a private home with his son, daughter and ex-wife (+) 5 steps to enter without handrails, no steps inside. Pt owns a rollator only. Pt has a tub with doors. Pt is right handed. Pt was independent PTA.

## 2024-03-28 NOTE — PHYSICAL THERAPY INITIAL EVALUATION ADULT - IMPAIRMENTS FOUND, PT EVAL
arousal, attention, and cognition/decreased midline orientation/gait, locomotion, and balance/muscle strength/poor safety awareness/posture

## 2024-03-28 NOTE — OCCUPATIONAL THERAPY INITIAL EVALUATION ADULT - DIAGNOSIS, OT EVAL
64 year old Male re-presents as a transfer from [facility] with reports of confusion, R sided weakness and increased hemorrhage on the left with increased left to right shift. No new trauma reported. CTH shows increased R to L shift with increase in L SDH. Pt is now S/p Left craniotomy for SDH evacuation 3/26/24.

## 2024-03-28 NOTE — PHYSICAL THERAPY INITIAL EVALUATION ADULT - IMPAIRED TRANSFERS: SIT/STAND, REHAB EVAL
minimal to no clearance of BLEs, difficulty initiating movement/impaired balance/impaired coordination/narrow base of support/impaired postural control/decreased strength

## 2024-03-28 NOTE — OCCUPATIONAL THERAPY INITIAL EVALUATION ADULT - FINE MOTOR COORDINATION EXAM
Grossly WFL however pt with impaired cognition and decreased processing speed, comprehension, motor planning, requires hand over hand demonstration

## 2024-03-29 LAB
ANION GAP SERPL CALC-SCNC: 11 MMOL/L — SIGNIFICANT CHANGE UP (ref 5–17)
BUN SERPL-MCNC: 11.3 MG/DL — SIGNIFICANT CHANGE UP (ref 8–20)
CALCIUM SERPL-MCNC: 9 MG/DL — SIGNIFICANT CHANGE UP (ref 8.4–10.5)
CHLORIDE SERPL-SCNC: 100 MMOL/L — SIGNIFICANT CHANGE UP (ref 96–108)
CO2 SERPL-SCNC: 25 MMOL/L — SIGNIFICANT CHANGE UP (ref 22–29)
CREAT SERPL-MCNC: 0.61 MG/DL — SIGNIFICANT CHANGE UP (ref 0.5–1.3)
EGFR: 106 ML/MIN/1.73M2 — SIGNIFICANT CHANGE UP
GLUCOSE SERPL-MCNC: 93 MG/DL — SIGNIFICANT CHANGE UP (ref 70–99)
HCT VFR BLD CALC: 39.2 % — SIGNIFICANT CHANGE UP (ref 39–50)
HGB BLD-MCNC: 12.7 G/DL — LOW (ref 13–17)
MAGNESIUM SERPL-MCNC: 2 MG/DL — SIGNIFICANT CHANGE UP (ref 1.6–2.6)
MCHC RBC-ENTMCNC: 26.6 PG — LOW (ref 27–34)
MCHC RBC-ENTMCNC: 32.4 GM/DL — SIGNIFICANT CHANGE UP (ref 32–36)
MCV RBC AUTO: 82.2 FL — SIGNIFICANT CHANGE UP (ref 80–100)
PHOSPHATE SERPL-MCNC: 3.1 MG/DL — SIGNIFICANT CHANGE UP (ref 2.4–4.7)
PLATELET # BLD AUTO: 334 K/UL — SIGNIFICANT CHANGE UP (ref 150–400)
POTASSIUM SERPL-MCNC: 4 MMOL/L — SIGNIFICANT CHANGE UP (ref 3.5–5.3)
POTASSIUM SERPL-SCNC: 4 MMOL/L — SIGNIFICANT CHANGE UP (ref 3.5–5.3)
RBC # BLD: 4.77 M/UL — SIGNIFICANT CHANGE UP (ref 4.2–5.8)
RBC # FLD: 13.5 % — SIGNIFICANT CHANGE UP (ref 10.3–14.5)
SODIUM SERPL-SCNC: 136 MMOL/L — SIGNIFICANT CHANGE UP (ref 135–145)
WBC # BLD: 7.83 K/UL — SIGNIFICANT CHANGE UP (ref 3.8–10.5)
WBC # FLD AUTO: 7.83 K/UL — SIGNIFICANT CHANGE UP (ref 3.8–10.5)

## 2024-03-29 PROCEDURE — 96136 PSYCL/NRPSYC TST PHY/QHP 1ST: CPT

## 2024-03-29 PROCEDURE — 99233 SBSQ HOSP IP/OBS HIGH 50: CPT

## 2024-03-29 RX ADMIN — CHLORHEXIDINE GLUCONATE 1 APPLICATION(S): 213 SOLUTION TOPICAL at 12:13

## 2024-03-29 RX ADMIN — SENNA PLUS 2 TABLET(S): 8.6 TABLET ORAL at 21:22

## 2024-03-29 RX ADMIN — OXYCODONE HYDROCHLORIDE 5 MILLIGRAM(S): 5 TABLET ORAL at 18:44

## 2024-03-29 RX ADMIN — ESCITALOPRAM OXALATE 5 MILLIGRAM(S): 10 TABLET, FILM COATED ORAL at 12:13

## 2024-03-29 RX ADMIN — LEVETIRACETAM 500 MILLIGRAM(S): 250 TABLET, FILM COATED ORAL at 17:30

## 2024-03-29 RX ADMIN — ENOXAPARIN SODIUM 40 MILLIGRAM(S): 100 INJECTION SUBCUTANEOUS at 21:23

## 2024-03-29 RX ADMIN — OXYCODONE HYDROCHLORIDE 5 MILLIGRAM(S): 5 TABLET ORAL at 18:11

## 2024-03-29 RX ADMIN — POLYETHYLENE GLYCOL 3350 17 GRAM(S): 17 POWDER, FOR SOLUTION ORAL at 12:13

## 2024-03-29 RX ADMIN — LEVETIRACETAM 500 MILLIGRAM(S): 250 TABLET, FILM COATED ORAL at 06:15

## 2024-03-29 NOTE — PROGRESS NOTE ADULT - SUBJECTIVE AND OBJECTIVE BOX
Patient feels well.  He is fatigued, but reports having slept.  Reports no pain.     FUNCTIONAL PROGRESS  3/28 PT  Bed Mobility: Rolling/Turning:     · Level of [place]	minimum assist (75% patients effort)  · Physical Assist/Nonphysical Assist	1 person assist    Bed Mobility: Scooting/Bridging:     · Level of [place]	minimum assist (75% patients effort)  · Physical Assist/Nonphysical Assist	1 person assist    Bed Mobility: Supine to Sit:     · Level of [place]	minimum assist (75% patients effort)  · Physical Assist/Nonphysical Assist	1 person assist    Bed Mobility Analysis:     · Bed Mobility Limitations	decreased ability to use legs for bridging/pushing; decreased ability to use arms for pushing/pulling; impaired ability to control trunk for mobility  · Impairments Contributing to Impaired Bed Mobility	impaired balance; cognition; impaired coordination; decreased strength; impaired postural control    Transfer: Bed to Chair:     Transfer Skill: Bed to Chair   · Level of [place]	minimum assist (75% patients effort)  · Physical Assist/Nonphysical Assist	2 person assist  · Weight-Bearing Restrictions	weight-bearing as tolerated  · Assistive Device	bilateral hand held assist    Bed/Chair Transfer Safety Analysis:     · Impairments Contributing to Impaired Transfers	impaired balance; cognition; impaired coordination; impaired postural control; decreased strength  · Transfer Safety Concerns Noted	decreased weight-shifting ability; decreased safety awareness; decreased proprioception; decreased sequencing ability    Transfer: Sit to Stand:     · Level of [place]	minimum assist (75% patients effort)  · Physical Assist/Nonphysical Assist	2 person assist  · Weight-Bearing Restrictions	weight-bearing as tolerated  · Assistive Device	bilateral hand held assist    Transfer: Stand to Sit:     · Level of [place]	minimum assist (75% patients effort)  · Physical Assist/Nonphysical Assist	2 person assist  · Weight-Bearing Restrictions	weight-bearing as tolerated  · Assistive Device	bilateral hand held assist    Sit/Stand Transfer Safety Analysis:     · Transfer Safety Concerns Noted	decreased safety awareness; decreased weight-shifting ability; decreased sequencing ability  · Impairments Contributing to Impaired Transfers	impaired balance; impaired coordination; impaired postural control; narrow base of support; decreased strength; minimal to no clearance of BLEs, difficulty initiating movement    Gait Skills:     · Level of [place]	bed to chair only.    3/28 OT  Bathing Training:     · Level of [place]	moderate assist (50% patients effort)    Upper Body Dressing Training:     · Level of [place]	moderate assist (50% patients effort)    Lower Body Dressing Training:     · Level of [place]	moderate assist (50% patients effort)    Toilet Hygiene Training:     · Level of [place]	to be assessed    Grooming Training:     · Level of [place]	moderate assist (50% patients effort)    Eating/Self-Feeding Training:     · Level of [place]	Did not directly observe    VITALS  T(C): 36.6 (03-29-24 @ 08:00), Max: 36.7 (03-29-24 @ 04:45)  HR: 74 (03-29-24 @ 08:00) (60 - 84)  BP: 111/73 (03-29-24 @ 08:00) (97/80 - 135/77)  RR: 14 (03-29-24 @ 08:00) (10 - 21)  SpO2: 96% (03-29-24 @ 08:00) (92% - 100%)  Wt(kg): --    MEDICATIONS   acetaminophen     Tablet .. 975 milliGRAM(s) every 6 hours PRN  chlorhexidine 2% Cloths 1 Application(s) daily  enoxaparin Injectable 40 milliGRAM(s) <User Schedule>  escitalopram 5 milliGRAM(s) daily  hydrALAZINE Injectable 10 milliGRAM(s) every 2 hours PRN  labetalol Injectable 10 milliGRAM(s) every 2 hours PRN  levETIRAcetam   Injectable 500 milliGRAM(s) every 12 hours  oxyCODONE    IR 5 milliGRAM(s) every 4 hours PRN  oxyCODONE    IR 10 milliGRAM(s) every 4 hours PRN  polyethylene glycol 3350 17 Gram(s) daily  senna 2 Tablet(s) at bedtime      RECENT LABS/IMAGING  - Reviewed Today                        12.7   7.83  )-----------( 334      ( 29 Mar 2024 02:45 )             39.2     03-29    136  |  100  |  11.3  ----------------------------<  93  4.0   |  25.0  |  0.61    Ca    9.0      29 Mar 2024 02:45  Phos  3.1     03-29  Mg     2.0     03-29        Urinalysis Basic - ( 29 Mar 2024 02:45 )    Color: x / Appearance: x / SG: x / pH: x  Gluc: 93 mg/dL / Ketone: x  / Bili: x / Urobili: x   Blood: x / Protein: x / Nitrite: x   Leuk Esterase: x / RBC: x / WBC x   Sq Epi: x / Non Sq Epi: x / Bacteria: x            HEAD CT 3/20 - 1.  Status post middle meningeal artery embolization and anterior parietal craniotomies bilaterally.  A small amount of postoperative pneumocephalus is decreased from 03/18/2024.2.  Residual subdural collections overlying  both frontoparietal convexities, trace subdural hemorrhage along the cerebral falx and a low-attenuation subdural collection overlying the right cerebellar hemisphere are stable in size from 03/18/2024.3.  Regional mass effect in both cerebral convexities with 3 mm of midline shift to the right is stable. 4.  No new intracranial findings.    HEAD CT 3/25 - Mixed attenuated subdural hematoma is are again seen bilaterally with increase acute component when compared with the prior exam. Increased mass effect seen on the left lateral ventricle with increased left-to-right shift.    HEAD CT 3/25 - Stable bilateral subdural hemorrhages. Stable left to right midline shift.    EEG 3/26 - Mild to moderate diffuse/multi-focal cerebral dysfunction, not specific as to etiology. There were no epileptiform abnormalities or seizures recorded.      HEAD CT 3/26 - Status post a left-sided subdural drainage catheter. Moderate pneumocephalus. Subtotal evacuation of a left-sided subdural hemorrhage. Stable right-sided subdural hemorrhage. Improved left to right midline shift..    HEAD CT 3/28 - Previously noted left-sided subdural hematoma has been removed. Slight increase in size of previously noted left frontal extra-axial air.  ----------------------------------------------------------------------------------------  PHYSICAL EXAM  Constitutional - NAD, Comfortable  HEENT - Head bandaged   Extremities - No edema  Neurologic Exam -              Cognitive - AAO to self, PART situation     Communication - Delayed processing, Expressive deficits, +dysarthria     Cranial Nerves - Right lip droop      FUNCTIONAL MOTOR EXAM -                      RIGHT UE - ShAB 4/5, EF 4/5, EE 3/5,  5/5                    RIGHT LE - HF 4/5, KE 4/5, DF 5/5, PF 5/5     Sensory - Intact to LT  Psychiatric - Fatigued, Calm   ----------------------------------------------------------------------------------------  ASSESSMENT/PLAN  64yMale with functional deficits after expansion of SDH  Traumatic bilateral SDHs s/p craniotomies & B/L MMA embolization with worsened shift s/p craniotomy - Keppra  HTN - Labetalol, Hydralazine  Mood - Lexapro  Pain - Oxycodone, Tylenol   DVT PPX - SCDs, Lovenox  Rehab/Impaired mobility and function - Patient continues to require hospitalization for the above diagnoses and ongoing active management of comorbid complications (IV meds) that are substantially impairing functional ability and impairing quality of life necessitating ongoing medical management of these complications necessitating acute rehab.     When medically optimized, based on the patient's diagnosis, current functional status and potential for progress, recommend ACUTE inpatient rehabilitation for the functional deficits consisting of 3 hours of therapy/day & 24 hour RN/daily PMR physician for comorbid medical management. Patient will be able to tolerate 3 hours a day.     Will continue to follow. Rehab recommendations are dependent on how functional progress changes as well as how patient continues to participate and tolerate therapeutic interventions, WHICH MAY CHANGE UPON FOLLOW UP EVALUATIONS. Recommend ongoing mobilization by staff to maintain cardiopulmonary function and prevention of secondary complications related to debility. Discussed the specific management and recommendations above with rehab clinical care team/rehab liaison.

## 2024-03-29 NOTE — PROGRESS NOTE ADULT - SUBJECTIVE AND OBJECTIVE BOX
NSx PA Note  3/26 L [facility]     HPI  65 yo Male with a history of falls known to neurosurgery as he is s/p b/l craniotomies for subdural evacuation 3/12/24 by Dr. Polanco and, b/l MMA embolization 3/19/24 by Dr. Weber. The patients hospital course was complicated by transient right sided weakness post operatively which improved. He had subsequent CT Scans which were stable. He was subsequently cleared and discharged to rehab. The represents today as a transfer from [facility] with reports of confusion, right sided weakness and increased hemorrhage on the left with increased left to right shift. No new trauma reported. Patient was subsequently transferred to [facility] for higher level and continuity of care.    (25 Mar 2024 15:33)    Interval/Overnight Events   Patient seen bedside. No complaints. Denies headache, dizziness, change in vision, n/v, weakness, numbness, tingling. Was seen by PT/OT/rehab medicine, all recommending acute rehab. Patient was previously at [place] as per Social work who will start to work on re-placement. Otherwise, patient without concerns. [facility] 3/26.     MEDICATIONS  (STANDING):  chlorhexidine 2% Cloths 1 Application(s) Topical daily  enoxaparin Injectable 40 milliGRAM(s) SubCutaneous <User Schedule>  escitalopram 5 milliGRAM(s) Oral daily  levETIRAcetam   Injectable 500 milliGRAM(s) IV Push every 12 hours  polyethylene glycol 3350 17 Gram(s) Oral daily  senna 2 Tablet(s) Oral at bedtime    MEDICATIONS  (PRN):  acetaminophen     Tablet .. 975 milliGRAM(s) Oral every 6 hours PRN Mild Pain (1 - 3)  hydrALAZINE Injectable 10 milliGRAM(s) IV Push every 2 hours PRN SBP>160  labetalol Injectable 10 milliGRAM(s) IV Push every 2 hours PRN SBP>160  oxyCODONE    IR 5 milliGRAM(s) Oral every 4 hours PRN Moderate Pain (4 - 6)  oxyCODONE    IR 10 milliGRAM(s) Oral every 4 hours PRN Severe Pain (7 - 10)    Vital Signs Last 24 Hrs  T(C): 36.6 (29 Mar 2024 08:00), Max: 36.7 (29 Mar 2024 04:45)  T(F): 97.8 (29 Mar 2024 08:00), Max: 98 (29 Mar 2024 04:45)  HR: 74 (29 Mar 2024 08:00) (60 - 84)  BP: 111/73 (29 Mar 2024 08:00) (97/80 - 135/77)  BP(mean): 84 (29 Mar 2024 08:00) (76 - 93)  RR: 14 (29 Mar 2024 08:00) (10 - 21)  SpO2: 96% (29 Mar 2024 08:00) (92% - 99%)    Parameters below as of 29 Mar 2024 08:00  Patient On (Oxygen Delivery Method): room air    Neuro- Awake, alert, oriented to all but year (2025 and perseverated March first)  speech minimal but appropriate,   pupils equal and reactive, EOMI  follows commands but mixed up push and pull  face symmetric, tongue ML   no drift  5/5 in all extremities  sensate intact to LT  Incision- healing well, right frontal drain site suture removed                          12.7   7.83  )-----------( 334      ( 29 Mar 2024 02:45 )             39.2   03-29    136  |  100  |  11.3  ----------------------------<  93  4.0   |  25.0  |  0.61    Ca    9.0      29 Mar 2024 02:45  Phos  3.1     03-29  Mg     2.0     03-29    Plan:   NS stable, exam mostly stable with some mild confusion  CW Q2 neuro checks   CW keppra AED  keep normotensive   mobilize as tolerated, OOB to chair for meals   PT/OT/rehab recommending AR- in agreement and OK for dispo to rehab when bed available and auth'd   CW Bowel regimen   SCDs in bed, chemo ppx with lovenox   Incentive isabela   Discussed with Dr. Polanco.      NSx PA Note  3/26 L [facility]     HPI  65 yo Male with a history of falls known to neurosurgery as he is s/p b/l craniotomies for subdural evacuation 3/12/24 by Dr. Polanco and, b/l MMA embolization 3/19/24 by Dr. Weber. The patients hospital course was complicated by transient right sided weakness post operatively which improved. He had subsequent CT Scans which were stable. He was subsequently cleared and discharged to rehab. The represents today as a transfer from [facility] with reports of confusion, right sided weakness and increased hemorrhage on the left with increased left to right shift. No new trauma reported. Patient was subsequently transferred to [facility] for higher level and continuity of care.    (25 Mar 2024 15:33)    Interval/Overnight Events   Patient seen bedside. No complaints. Denies headache, dizziness, change in vision, n/v, weakness, numbness, tingling. Was seen by PT/OT/rehab medicine, all recommending acute rehab. Patient was previously at [place] as per Social work who will start to work on re-placement. Otherwise, patient without concerns. [facility] 3/26.     MEDICATIONS  (STANDING):  chlorhexidine 2% Cloths 1 Application(s) Topical daily  enoxaparin Injectable 40 milliGRAM(s) SubCutaneous <User Schedule>  escitalopram 5 milliGRAM(s) Oral daily  levETIRAcetam   Injectable 500 milliGRAM(s) IV Push every 12 hours  polyethylene glycol 3350 17 Gram(s) Oral daily  senna 2 Tablet(s) Oral at bedtime    MEDICATIONS  (PRN):  acetaminophen     Tablet .. 975 milliGRAM(s) Oral every 6 hours PRN Mild Pain (1 - 3)  hydrALAZINE Injectable 10 milliGRAM(s) IV Push every 2 hours PRN SBP>160  labetalol Injectable 10 milliGRAM(s) IV Push every 2 hours PRN SBP>160  oxyCODONE    IR 5 milliGRAM(s) Oral every 4 hours PRN Moderate Pain (4 - 6)  oxyCODONE    IR 10 milliGRAM(s) Oral every 4 hours PRN Severe Pain (7 - 10)    Vital Signs Last 24 Hrs  T(C): 36.6 (29 Mar 2024 08:00), Max: 36.7 (29 Mar 2024 04:45)  T(F): 97.8 (29 Mar 2024 08:00), Max: 98 (29 Mar 2024 04:45)  HR: 74 (29 Mar 2024 08:00) (60 - 84)  BP: 111/73 (29 Mar 2024 08:00) (97/80 - 135/77)  BP(mean): 84 (29 Mar 2024 08:00) (76 - 93)  RR: 14 (29 Mar 2024 08:00) (10 - 21)  SpO2: 96% (29 Mar 2024 08:00) (92% - 99%)    Parameters below as of 29 Mar 2024 08:00  Patient On (Oxygen Delivery Method): room air    Neuro- Awake, alert, oriented to all but year (2025 and perseverated March first)  speech minimal but appropriate,   pupils equal and reactive, EOMI  follows commands but mixed up push and pull  face symmetric, tongue ML   no drift  5/5 in all extremities  sensate intact to LT  Incision- healing well, right frontal drain site suture removed                          12.7   7.83  )-----------( 334      ( 29 Mar 2024 02:45 )             39.2   03-29    136  |  100  |  11.3  ----------------------------<  93  4.0   |  25.0  |  0.61    Ca    9.0      29 Mar 2024 02:45  Phos  3.1     03-29  Mg     2.0     03-29    Plan:   NS stable, exam mostly stable with some mild confusion  CW Q2 neuro checks   CW keppra AED  keep normotensive   mobilize as tolerated, OOB to chair for meals   PT/OT/rehab recommending AR- in agreement and OK for dispo to rehab when bed available and auth'd   CW Bowel regimen   SCDs in bed, chemo ppx with lovenox   Incentive isabela   Discussed with Dr. Polanco.     ADDN 330pm  Plan for CTH Monday morning.   If stable will plan to bless for tx back to rehab.   CW PT/OT through weekend.    NSx PA Note  3/26 L [facility]     HPI  65 yo Male with a history of falls known to neurosurgery as he is s/p b/l craniotomies for subdural evacuation 3/12/24 by Dr. Polanco and, b/l MMA embolization 3/19/24 by Dr. Weber. The patients hospital course was complicated by transient right sided weakness post operatively which improved. He had subsequent CT Scans which were stable. He was subsequently cleared and discharged to rehab. The represents today as a transfer from [facility] with reports of confusion, right sided weakness and increased hemorrhage on the left with increased left to right shift. No new trauma reported. Patient was subsequently transferred to [facility] for higher level and continuity of care.    (25 Mar 2024 15:33)    Interval/Overnight Events   Patient seen bedside. No complaints. Denies headache, dizziness, change in vision, n/v, weakness, numbness, tingling. Was seen by PT/OT/rehab medicine, all recommending acute rehab. Patient was previously at [place] as per Social work who will start to work on re-placement. Otherwise, patient without concerns. [facility] 3/26.     MEDICATIONS  (STANDING):  chlorhexidine 2% Cloths 1 Application(s) Topical daily  enoxaparin Injectable 40 milliGRAM(s) SubCutaneous <User Schedule>  escitalopram 5 milliGRAM(s) Oral daily  levETIRAcetam   Injectable 500 milliGRAM(s) IV Push every 12 hours  polyethylene glycol 3350 17 Gram(s) Oral daily  senna 2 Tablet(s) Oral at bedtime    MEDICATIONS  (PRN):  acetaminophen     Tablet .. 975 milliGRAM(s) Oral every 6 hours PRN Mild Pain (1 - 3)  hydrALAZINE Injectable 10 milliGRAM(s) IV Push every 2 hours PRN SBP>160  labetalol Injectable 10 milliGRAM(s) IV Push every 2 hours PRN SBP>160  oxyCODONE    IR 5 milliGRAM(s) Oral every 4 hours PRN Moderate Pain (4 - 6)  oxyCODONE    IR 10 milliGRAM(s) Oral every 4 hours PRN Severe Pain (7 - 10)    Vital Signs Last 24 Hrs  T(C): 36.6 (29 Mar 2024 08:00), Max: 36.7 (29 Mar 2024 04:45)  T(F): 97.8 (29 Mar 2024 08:00), Max: 98 (29 Mar 2024 04:45)  HR: 74 (29 Mar 2024 08:00) (60 - 84)  BP: 111/73 (29 Mar 2024 08:00) (97/80 - 135/77)  BP(mean): 84 (29 Mar 2024 08:00) (76 - 93)  RR: 14 (29 Mar 2024 08:00) (10 - 21)  SpO2: 96% (29 Mar 2024 08:00) (92% - 99%)    Parameters below as of 29 Mar 2024 08:00  Patient On (Oxygen Delivery Method): room air    Neuro- Awake, alert, oriented to all but year (2025 and perseverated March first)  speech minimal but appropriate,   pupils equal and reactive, EOMI  follows commands but mixed up push and pull  face symmetric, tongue ML   no drift  5/5 in all extremities  sensate intact to LT  Incision- healing well, right frontal drain site suture removed                          12.7   7.83  )-----------( 334      ( 29 Mar 2024 02:45 )             39.2   03-29    136  |  100  |  11.3  ----------------------------<  93  4.0   |  25.0  |  0.61    Ca    9.0      29 Mar 2024 02:45  Phos  3.1     03-29  Mg     2.0     03-29    Plan:   NS stable, exam mostly stable with some mild confusion  CW Q2 neuro checks   CW keppra AED  keep normotensive   mobilize as tolerated, OOB to chair for meals   PT/OT/rehab recommending AR- in agreement and OK for dispo to rehab when bed available and auth'd   CW Bowel regimen   SCDs in bed, chemo ppx with lovenox   Incentive isabela   Discussed with Dr. Polanco.     ADDN 330pm  Plan for CTH Sunday morning.   If stable will plan to bless for tx back to rehab.   CW PT/OT through weekend.

## 2024-03-30 RX ADMIN — LEVETIRACETAM 500 MILLIGRAM(S): 250 TABLET, FILM COATED ORAL at 05:51

## 2024-03-30 RX ADMIN — LEVETIRACETAM 500 MILLIGRAM(S): 250 TABLET, FILM COATED ORAL at 17:20

## 2024-03-30 RX ADMIN — ESCITALOPRAM OXALATE 5 MILLIGRAM(S): 10 TABLET, FILM COATED ORAL at 12:11

## 2024-03-30 RX ADMIN — SENNA PLUS 2 TABLET(S): 8.6 TABLET ORAL at 21:07

## 2024-03-30 RX ADMIN — OXYCODONE HYDROCHLORIDE 5 MILLIGRAM(S): 5 TABLET ORAL at 17:24

## 2024-03-30 RX ADMIN — POLYETHYLENE GLYCOL 3350 17 GRAM(S): 17 POWDER, FOR SOLUTION ORAL at 12:11

## 2024-03-30 RX ADMIN — ENOXAPARIN SODIUM 40 MILLIGRAM(S): 100 INJECTION SUBCUTANEOUS at 21:08

## 2024-03-30 RX ADMIN — CHLORHEXIDINE GLUCONATE 1 APPLICATION(S): 213 SOLUTION TOPICAL at 12:12

## 2024-03-30 NOTE — PROGRESS NOTE ADULT - SUBJECTIVE AND OBJECTIVE BOX
Neurosurgery PA-C  Daily notes       This is a 64 year male with a history of falls known to neurosurgery as he is s/p b/l craniotomies for subdural evacuation 3/12/24 by Dr. Polanco and, b/l MMA embolization 3/19/24 by Dr. Weber. The patients hospital course was complicated by transient right sided weakness post operatively which improved. He had subsequent CT Scans which were stable. He was subsequently cleared and discharged to rehab. The [facility] with reports of confusion, right sided weakness and increased hemorrhage on the left with increased left to right shift. No new trauma reported. Patient was subsequently transferred to [facility] for higher level and continuity of care.    (25 Mar 2024 15:33)      INTERVAL HPI/OVERNIGHT EVENTS:  64y Male s/p __ seen lying comfortably in bed. Tolerating diet. Passing gas/BM. Voiding. [place] in with __ clear urine. Denies headache, weakness, numbness, n/v/d, fevers, chills, chest pain, SOB.     Vital Signs Last 24 Hrs  T(C): 36.7 (30 Mar 2024 08:02), Max: 37.3 (29 Mar 2024 15:46)  T(F): 98 (30 Mar 2024 08:02), Max: 99.2 (29 Mar 2024 15:46)  HR: 66 (30 Mar 2024 08:02) (66 - 103)  BP: 114/74 (30 Mar 2024 08:02) (97/69 - 125/70)  BP(mean): 86 (30 Mar 2024 08:02) (75 - 90)  RR: 14 (30 Mar 2024 08:02) (12 - 16)  SpO2: 98% (30 Mar 2024 08:02) (95% - 99%)    Parameters below as of 30 Mar 2024 08:02  Patient On (Oxygen Delivery Method): room air        PHYSICAL EXAM:  GENERAL: NAD, well-groomed, well-developed  HEAD:  Atraumatic, normocephalic  DRAINS:   WOUND: Dressing clean dry intact  VIOLETA COMA SCORE: E- V- M- =       E: 4= opens eyes spontaneously 3= to voice 2= to noxious 1= no opening       V: 5= oriented 4= confused 3= inappropriate words 2= incomprehensible sounds 1= nonverbal 1T= intubated       M: 6= follows commands 5= localizes 4= withdraws 3= flexor posturing 2= extensor posturing 1= no movement  MENTAL STATUS: AAO x3; Awake/Comatose; Opens eyes spontaneously/to voice/to light touch/to noxious stimuli; Appropriately conversant without aphasia/Nonverbal; following simple commands/mimicking/not following commands  CRANIAL NERVES: Visual acuity normal for age, visual fields full to confrontation, PERRL. EOMI without nystagmus. Facial sensation intact V1-3 distribution b/l. Face symmetric w/ normal eye closure and smile, tongue midline. Hearing grossly intact. Speech clear. Head turning and shoulder shrug intact.   REFLEXES: Doll's eyes positive. Blinks to threat. Gag reflex intact. No pronator drift; DTRs 2+ intact and symmetric; negative Charlton's b/l; negative clonus b/l  MOTOR: strength 5/5 b/l upper and lower extremities  Uppers     Delt     Bicep     Tricep     HG  R                5/5       5/5         5/5         5/5  L                5/5       5/5         5/5         5/5  Lowers      HF     KF      KE     PF     DF     EHL  R             5/5     5/5     5/5    5/5   5/5    5/5  L              5/5    5/5      5/5    5/5   5/5    5/5  SENSATION: grossly intact to light touch all extremities  COORDINATION: Gait intact; rapid alternating movements intact; heel to shin intact; no upper extremity dysmetria  CHEST/LUNG: Clear to auscultation bilaterally; no rales, rhonchi, wheezing, or rubs  HEART: +S1/+S2; Regular rate and rhythm; no murmurs, rubs, or gallops  ABDOMEN: Soft, nontender, nondistended; bowel sounds present all four quadrants  EXTREMITIES:  2+ peripheral pulses, no clubbing, cyanosis, or edema  SKIN: Warm, dry; no rashes or lesions    LABS:                        12.7   7.83  )-----------( 334      ( 29 Mar 2024 02:45 )             39.2     03-29    136  |  100  |  11.3  ----------------------------<  93  4.0   |  25.0  |  0.61    Ca    9.0      29 Mar 2024 02:45  Phos  3.1     03-29  Mg     2.0     03-29        Urinalysis Basic - ( 29 Mar 2024 02:45 )    Color: x / Appearance: x / SG: x / pH: x  Gluc: 93 mg/dL / Ketone: x  / Bili: x / Urobili: x   Blood: x / Protein: x / Nitrite: x   Leuk Esterase: x / RBC: x / WBC x   Sq Epi: x / Non Sq Epi: x / Bacteria: x        03-29 @ 07:01  -  03-30 @ 07:00  --------------------------------------------------------  IN: 200 mL / OUT: 700 mL / NET: -500 mL        RADIOLOGY & ADDITIONAL TESTS: Neurosurgery PA-C  Daily notes       This is a 64 year old right hand dominant male with a history of falls known to neurosurgery as he is s/p bilateral craniotomies for subdural evacuation 3/12/24 by Dr. Polanco and, b/l MMA embolization 3/19/24 by Dr. Weber. The patients hospital course was complicated by transient right sided weakness post operatively which improved. He had subsequent CT Scans which were stable. He was subsequently cleared and discharged to rehab. The represents today as a transfer from [facility] with reports of confusion, right sided weakness and increased hemorrhage on the left with increased left to right shift. No new trauma reported. Patient was subsequently transferred to [facility] for higher level and continuity of care. Patient is now POD#4 status post supratentorial craniotomy for evacuation of subdural hematoma.        INTERVAL HPI OVERNIGHT EVENTS:  This is a 64 year old male seen sleeping, but easily arousable, POD# 4 status post     Vital Signs Last 24 Hrs  T(C): 36.7 (30 Mar 2024 08:02), Max: 37.3 (29 Mar 2024 15:46)  T(F): 98 (30 Mar 2024 08:02), Max: 99.2 (29 Mar 2024 15:46)  HR: 66 (30 Mar 2024 08:02) (66 - 103)  BP: 114/74 (30 Mar 2024 08:02) (97/69 - 125/70)  BP(mean): 86 (30 Mar 2024 08:02) (75 - 90)  RR: 14 (30 Mar 2024 08:02) (12 - 16)  SpO2: 98% (30 Mar 2024 08:02) (95% - 99%)    Parameters below as of 30 Mar 2024 08:02  Patient On (Oxygen Delivery Method): room air      PHYSICAL EXAM:  GENERAL: NAD  HEAD: wound healing well   WOUND: no signs of infection   MENTAL STATUS: A A O x 3, Conversant, following simple commands   CRANIAL NERVES: PERRL. EOMI without nystagmus. Tongue midline. Hearing grossly intact. Speech clear. Head turning and shoulder shrug intact.   REFLEXES: No pronator drift  MOTOR: strength 5/5 b/l upper and lower extremities  SENSATION: grossly intact to light touch all extremities  HEART: +S1/+S2  ABDOMEN: Soft, nontender, nondistended  EXTREMITIES:  2+ peripheral pulses, no calf tenderness bilateral   SKIN: Warm, dry; no rashes or lesions    LABS:                        12.7   7.83  )-----------( 334      ( 29 Mar 2024 02:45 )             39.2     03-29    136  |  100  |  11.3  ----------------------------<  93  4.0   |  25.0  |  0.61    Ca    9.0      29 Mar 2024 02:45  Phos  3.1     03-29  Mg     2.0     03-29        Urinalysis Basic - ( 29 Mar 2024 02:45 )    Color: x / Appearance: x / SG: x / pH: x  Gluc: 93 mg/dL / Ketone: x  / Bili: x / Urobili: x   Blood: x / Protein: x / Nitrite: x   Leuk Esterase: x / RBC: x / WBC x   Sq Epi: x / Non Sq Epi: x / Bacteria: x        03-29 @ 07:01  -  03-30 @ 07:00  --------------------------------------------------------  IN: 200 mL / OUT: 700 mL / NET: -500 mL        RADIOLOGY & ADDITIONAL TESTS: Neurosurgery PA-C  Daily notes     This is a 64 year old right hand dominant male with a history of falls known to neurosurgery as he is s/p bilateral craniotomies for subdural evacuation 3/12/24 by Dr. Polanco and, b/l MMA embolization 3/19/24 by Dr. Weber. The patients hospital course was complicated by transient right sided weakness post operatively which improved. He had subsequent CT Scans which were stable. He was subsequently cleared and discharged to rehab. [facility] of confusion, right sided weakness and increased hemorrhage on CT of the head. No new trauma reported. Patient was subsequently transferred to [facility] for higher level and continuity of care. Patient is now POD#4 status post supratentorial craniotomy for evacuation of subdural hematoma.      INTERVAL HPI OVERNIGHT EVENTS:  This is a 64 year old right hand dominant male seen sleeping, but easily arousable, POD# 4 status post left crani for evacuation of left SDH, seen and examined by the Neurosurgery team.      Vital Signs Last 24 Hrs  T(C): 36.7 (30 Mar 2024 08:02), Max: 37.3 (29 Mar 2024 15:46)  T(F): 98 (30 Mar 2024 08:02), Max: 99.2 (29 Mar 2024 15:46)  HR: 66 (30 Mar 2024 08:02) (66 - 103)  BP: 114/74 (30 Mar 2024 08:02) (97/69 - 125/70)  BP(mean): 86 (30 Mar 2024 08:02) (75 - 90)  RR: 14 (30 Mar 2024 08:02) (12 - 16)  SpO2: 98% (30 Mar 2024 08:02) (95% - 99%)    Parameters below as of 30 Mar 2024 08:02  Patient On (Oxygen Delivery Method): room air      PHYSICAL EXAM:  GENERAL: NAD  HEAD: wound healing well   WOUND: no signs of infection   MENTAL STATUS: A A O x 3, Conversant, following all commands   CRANIAL NERVES: PERRL, Tongue midline. Hearing grossly intact. Speech clear. Head turning and shoulder shrug intact.   REFLEXES: No pronator drift  MOTOR: strength 5/5 b/l upper and lower extremities  HEART: +S1/+S2  ABDOMEN: Soft, nontender, nondistended  EXTREMITIES:  2+ peripheral pulses, no calf tenderness bilateral   SKIN: Warm, dry; no rashes or lesions    LABS:                        12.7   7.83  )-----------( 334      ( 29 Mar 2024 02:45 )             39.2     03-29    136  |  100  |  11.3  ----------------------------<  93  4.0   |  25.0  |  0.61    Ca    9.0      29 Mar 2024 02:45  Phos  3.1     03-29  Mg     2.0     03-29      Urinalysis Basic - ( 29 Mar 2024 02:45 )    Color: x / Appearance: x / SG: x / pH: x  Gluc: 93 mg/dL / Ketone: x  / Bili: x / Urobili: x   Blood: x / Protein: x / Nitrite: x   Leuk Esterase: x / RBC: x / WBC x   Sq Epi: x / Non Sq Epi: x / Bacteria: x      03-29 @ 07:01  -  03-30 @ 07:00  --------------------------------------------------------  IN: 200 mL / OUT: 700 mL / NET: -500 mL    ACC: [id number] EXAM:  CT BRAIN    PROCEDURE DATE:  03/28/2024    INTERPRETATION:  Clinical indications: Status post subdural drain normal  This exam is compared prior head CT performed on March 20, 2024  Previously noted left subdural drain has been removed. 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 The acute on chronic subdural   hematoma on the right side measures approximately 1.4 cm (unchanged when   compared to the prior exam) in widest diameter. The acute on chronic   subdural hematoma with associated air on the left side measures   approximately 2.5 cm in widest diameter and previously measured   approximately 1.8 cm widest diameter. Localized mass effect the seen   consisting of sulcal effacement. Extra-axial embolic material involving  the bilateral temporal region is again seen.    Postoperative changes compatible with high bifrontal craniotomies are   again seen with extradural soft tissue swelling and staples seen   bilaterally. Extra calvarial soft tissue swelling and staplesseen   involving the high left frontal region towards the midline.    IMPRESSION: Previously noted left-sided subdural hematoma has been   removed.  Slight increase in size of previously noted left frontal extra-axial air.     Neurosurgery PA-C  Daily notes     This is a 64 year old right hand dominant male with a history of falls known to neurosurgery as he is s/p bilateral craniotomies for subdural evacuation 3/12/24 by Dr. Polanco and, b/l MMA embolization 3/19/24 by Dr. Weber. The patients hospital course was complicated by transient right sided weakness post operatively which improved. He had subsequent CT Scans which were stable. He was subsequently cleared and discharged to rehab. [facility], right sided weakness and increased hemorrhage on CT of the head. No new trauma reported. Patient was subsequently transferred to [facility] for higher level and continuity of care. Patient is now POD#4 status post supratentorial craniotomy for evacuation of subdural hematoma.      INTERVAL HPI OVERNIGHT EVENTS:  This is a 64 year old right hand dominant male seen sleeping, but easily arousable, POD# 4 status post left crani for evacuation of left SDH, seen and examined by the Neurosurgery team.      Vital Signs Last 24 Hrs  T(C): 36.7 (30 Mar 2024 08:02), Max: 37.3 (29 Mar 2024 15:46)  T(F): 98 (30 Mar 2024 08:02), Max: 99.2 (29 Mar 2024 15:46)  HR: 66 (30 Mar 2024 08:02) (66 - 103)  BP: 114/74 (30 Mar 2024 08:02) (97/69 - 125/70)  BP(mean): 86 (30 Mar 2024 08:02) (75 - 90)  RR: 14 (30 Mar 2024 08:02) (12 - 16)  SpO2: 98% (30 Mar 2024 08:02) (95% - 99%)    Parameters below as of 30 Mar 2024 08:02  Patient On (Oxygen Delivery Method): room air      PHYSICAL EXAM:  GENERAL: NAD  HEAD: wound healing well   WOUND: no signs of infection   MENTAL STATUS: A A O x 3, Conversant, following all commands   CRANIAL NERVES: PERRL, Tongue midline. Hearing grossly intact. Speech clear. Head turning and shoulder shrug intact.   REFLEXES: No pronator drift  MOTOR: strength 5/5 b/l upper and lower extremities  ABDOMEN: Soft, nontender, nondistended  EXTREMITIES:  2+ peripheral pulses, no calf tenderness bilateral   SKIN: Warm, dry; no rashes or lesions    LABS:                        12.7   7.83  )-----------( 334      ( 29 Mar 2024 02:45 )             39.2     03-29    136  |  100  |  11.3  ----------------------------<  93  4.0   |  25.0  |  0.61    Ca    9.0      29 Mar 2024 02:45  Phos  3.1     03-29  Mg     2.0     03-29      Urinalysis Basic - ( 29 Mar 2024 02:45 )    Color: x / Appearance: x / SG: x / pH: x  Gluc: 93 mg/dL / Ketone: x  / Bili: x / Urobili: x   Blood: x / Protein: x / Nitrite: x   Leuk Esterase: x / RBC: x / WBC x   Sq Epi: x / Non Sq Epi: x / Bacteria: x      03-29 @ 07:01  -  03-30 @ 07:00  --------------------------------------------------------  IN: 200 mL / OUT: 700 mL / NET: -500 mL    ACC: [id number] EXAM:  CT BRAIN    PROCEDURE DATE:  03/28/2024    INTERPRETATION:  Clinical indications: Status post subdural drain normal  This exam is compared prior head CT performed on March 20, 2024  Previously noted left subdural drain has been removed. 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 The acute on chronic subdural   hematoma on the right side measures approximately 1.4 cm (unchanged when   compared to the prior exam) in widest diameter. The acute on chronic   subdural hematoma with associated air on the left side measures   approximately 2.5 cm in widest diameter and previously measured   approximately 1.8 cm widest diameter. Localized mass effect the seen   consisting of sulcal effacement. Extra-axial embolic material involving  the bilateral temporal region is again seen.    Postoperative changes compatible with high bifrontal craniotomies are   again seen with extradural soft tissue swelling and staples seen   bilaterally. Extra calvarial soft tissue swelling and staplesseen   involving the high left frontal region towards the midline.    IMPRESSION: Previously noted left-sided subdural hematoma has been   removed.  Slight increase in size of previously noted left frontal extra-axial air.

## 2024-03-30 NOTE — PROGRESS NOTE ADULT - ASSESSMENT
Pt presents with reduced orientation to time and location, and weaknesses in aspects of executive functioning (clock conceptualization), language, and praxis. Weaknesses are consistent with his history of bilateral subdural hemorrhage and subsequent surgical evacuation, which appears to have impacted frontal networks. The pattern of reduced language functioning also reflects left-hemisphere insult. Pt may also be experiencing superimposed delirium based on reduced orientation.     Recommendations:   1. Pt's medical team is encouraged to maintain delirium precautions, including keeping his room quiet and calm. Pt will benefit from a consistent sleep cycle, and daytime naps should be minimized   2. Given weaknesses in comprehension of complex information, information should be presented to pt using clear and concise language.

## 2024-03-30 NOTE — PROGRESS NOTE ADULT - SUBJECTIVE AND OBJECTIVE BOX
Name: CHAU AMES  Age: 64y  Gender: Male  Admitting Diagnosis: Non-traumatic intracranial hemorrhage [I62.9]  NONTRAUMATIC INTRACRANIAL HEMORRHAGE, UNSPECIFIED    Current Diagnoses:   Syncope  Traumatic subdural hematoma (SDH)  Supratentorial craniotomy for evacuation of subdural hematoma  S/P craniotomy  H/O cerebral embolism    HPI (per record): 63 yo Male with a history of falls known to neurosurgery as he is s/p b/l craniotomies for subdural evacuation 3/12/24 by Dr. Polanco and, b/l MMA embolization 3/19/24 by Dr. Weber. The patients hospital course was complicated by transient right sided weakness post operatively which improved. He had subsequent CT Scans which were stable. He was subsequently cleared and discharged to rehab. Patient presented back to [facility] s/p unwitnessed fall at rehab, +HS, +LOC. Per rehab, patient started to experience confusion, worsening R sided weakness. Patient had repeat head CT which showed increased hemorrhage on the left with increased left to right shift, readmitted to NSICU. Patient went to the OR for L sided craniotomy for L SDH evacuation with subdural drain placement on 03/26. Patient mental status, R sided weakness improved post-op, repeat head CT showed stable SDH w/ moderate pneumocephalus. Patient was placed on NRB, lowered head of bed. Patient drain was removed on 03/28, CT post-drain pull with mild pneumocephalus.    Reason for Referral: To assess the nature and extent of any cognitive weaknesses and to inform treatment planning.    Current Medications: acetaminophen     Tablet .. 975 milliGRAM(s) Oral every 6 hours PRN Mild Pain (1 - 3)  chlorhexidine 2% Cloths 1 Application(s) Topical daily  enoxaparin Injectable 40 milliGRAM(s) SubCutaneous <User Schedule>  escitalopram 5 milliGRAM(s) Oral daily  hydrALAZINE Injectable 10 milliGRAM(s) IV Push every 2 hours PRN SBP>160  labetalol Injectable 10 milliGRAM(s) IV Push every 2 hours PRN SBP>160  levETIRAcetam   Injectable 500 milliGRAM(s) IV Push every 12 hours  oxyCODONE    IR 5 milliGRAM(s) Oral every 4 hours PRN Moderate Pain (4 - 6)  oxyCODONE    IR 10 milliGRAM(s) Oral every 4 hours PRN Severe Pain (7 - 10)  polyethylene glycol 3350 17 Gram(s) Oral daily  senna 2 Tablet(s) Oral at bedtime    Neuroimaging:   CT Head No Cont: 3/25/2024  IMPRESSION: Stable bilateral subdural hemorrhages. Stable left to right   midline shift.    CT Head No Cont: 03/26/2024    IMPRESSION: Status post a left-sided subdural drainage catheter. Moderate   pneumocephalus. Subtotal evacuation of a left-sided subdural hemorrhage.   Stable right-sided subdural hemorrhage. Improved left to right midline   shift.    CT Head No Cont: 03/27/2024    IMPRESSION: Status post a left-sided subdural drainage catheter. Moderate   pneumocephalus, mildly decreased. Subtotal evacuation of a left-sided   holohemispheric subdural hemorrhage, unchanged. Stable right-sided   subdural hemorrhage. Improved left to right midline shift.    CT Head No Cont: 03/28/2024    IMPRESSION: Stable follow-up CT study.    CT Head No Cont: 03/28/2024    IMPRESSION: Previously noted left-sided subdural hematoma has been   removed.  Slight increase in size of previously noted left frontal extra-axial air.    Clinical Interview:     Social History:    MSE:  Appearance-  Behavior-  Motor-  Affect-  Mood-  Speech -  Thought Process-  Thought Content-   Effort -     Measures Administered:    Results Summary:           Name: CHAU AMES  Age: 64y  Gender: Male  Date of service: 03/29/2024  Admitting Diagnosis: Non-traumatic intracranial hemorrhage [I62.9]  NONTRAUMATIC INTRACRANIAL HEMORRHAGE, UNSPECIFIED    Current Diagnoses:   Syncope  Traumatic subdural hematoma (SDH)  Supratentorial craniotomy for evacuation of subdural hematoma  S/P craniotomy  H/O cerebral embolism    HPI (per record): 63 yo Male with a history of falls known to neurosurgery as he is s/p b/l craniotomies for subdural evacuation 3/12/24 by Dr. Polanco and, b/l MMA embolization 3/19/24 by Dr. Weber. The patients hospital course was complicated by transient right sided weakness post operatively which improved. He had subsequent CT Scans which were stable. He was subsequently cleared and discharged to rehab. Patient presented back to [facility] s/p unwitnessed fall at rehab, +HS, +LOC. Per rehab, patient started to experience confusion, worsening R sided weakness. Patient had repeat head CT which showed increased hemorrhage on the left with increased left to right shift, readmitted to NSICU. Patient went to the OR for L sided craniotomy for L SDH evacuation with subdural drain placement on 03/26. Patient mental status, R sided weakness improved post-op, repeat head CT showed stable SDH w/ moderate pneumocephalus. Patient was placed on NRB, lowered head of bed. Patient drain was removed on 03/28, CT post-drain pull with mild pneumocephalus.    Reason for Referral: To assess the nature and extent of any cognitive weaknesses and to inform treatment planning.    Current Medications: acetaminophen     Tablet .. 975 milliGRAM(s) Oral every 6 hours PRN Mild Pain (1 - 3)  chlorhexidine 2% Cloths 1 Application(s) Topical daily  enoxaparin Injectable 40 milliGRAM(s) SubCutaneous <User Schedule>  escitalopram 5 milliGRAM(s) Oral daily  hydrALAZINE Injectable 10 milliGRAM(s) IV Push every 2 hours PRN SBP>160  labetalol Injectable 10 milliGRAM(s) IV Push every 2 hours PRN SBP>160  levETIRAcetam   Injectable 500 milliGRAM(s) IV Push every 12 hours  oxyCODONE    IR 5 milliGRAM(s) Oral every 4 hours PRN Moderate Pain (4 - 6)  oxyCODONE    IR 10 milliGRAM(s) Oral every 4 hours PRN Severe Pain (7 - 10)  polyethylene glycol 3350 17 Gram(s) Oral daily  senna 2 Tablet(s) Oral at bedtime    Neuroimaging:   CT Head No Cont: 3/25/2024  IMPRESSION: Stable bilateral subdural hemorrhages. Stable left to right   midline shift.    CT Head No Cont: 03/26/2024    IMPRESSION: Status post a left-sided subdural drainage catheter. Moderate   pneumocephalus. Subtotal evacuation of a left-sided subdural hemorrhage.   Stable right-sided subdural hemorrhage. Improved left to right midline   shift.    CT Head No Cont: 03/27/2024    IMPRESSION: Status post a left-sided subdural drainage catheter. Moderate   pneumocephalus, mildly decreased. Subtotal evacuation of a left-sided   holohemispheric subdural hemorrhage, unchanged. Stable right-sided   subdural hemorrhage. Improved left to right midline shift.    CT Head No Cont: 03/28/2024    IMPRESSION: Stable follow-up CT study.    CT Head No Cont: 03/28/2024    IMPRESSION: Previously noted left-sided subdural hematoma has been   removed.  Slight increase in size of previously noted left frontal extra-axial air.    Clinical Interview:     Social History:    MSE:  Appearance-  Behavior-  Motor-  Affect-  Mood-  Speech -  Thought Process-  Thought Content-   Effort -     Measures Administered:    Results Summary:           Name: CHAU AMES  Age: 64y  Gender: Male  Date of service: 2024  Admitting Diagnosis: Non-traumatic intracranial hemorrhage [I62.9]  NONTRAUMATIC INTRACRANIAL HEMORRHAGE, UNSPECIFIED    Current Diagnoses:   Syncope  Traumatic subdural hematoma (SDH)  Supratentorial craniotomy for evacuation of subdural hematoma  S/P craniotomy  H/O cerebral embolism    HPI (per record): 63 yo Male with a history of falls known to neurosurgery as he is s/p b/l craniotomies for subdural evacuation 3/12/24 by Dr. Polanco and, b/l MMA embolization 3/19/24 by Dr. Weber. The patients hospital course was complicated by transient right sided weakness post operatively which improved. He had subsequent CT Scans which were stable. He was subsequently cleared and discharged to rehab. Patient presented back to [facility] s/p unwitnessed fall at rehab, +HS, +LOC. Per rehab, patient started to experience confusion, worsening R sided weakness. Patient had repeat head CT which showed increased hemorrhage on the left with increased left to right shift, readmitted to NSICU. Patient went to the OR for L sided craniotomy for L SDH evacuation with subdural drain placement on . Patient mental status, R sided weakness improved post-op, repeat head CT showed stable SDH w/ moderate pneumocephalus. Patient was placed on NRB, lowered head of bed. Patient drain was removed on , CT post-drain pull with mild pneumocephalus.    Reason for Referral: To assess the nature and extent of any cognitive weaknesses and to inform treatment planning.    Current Medications: acetaminophen     Tablet .. 975 milliGRAM(s) Oral every 6 hours PRN Mild Pain (1 - 3)  chlorhexidine 2% Cloths 1 Application(s) Topical daily  enoxaparin Injectable 40 milliGRAM(s) SubCutaneous <User Schedule>  escitalopram 5 milliGRAM(s) Oral daily  hydrALAZINE Injectable 10 milliGRAM(s) IV Push every 2 hours PRN SBP>160  labetalol Injectable 10 milliGRAM(s) IV Push every 2 hours PRN SBP>160  levETIRAcetam   Injectable 500 milliGRAM(s) IV Push every 12 hours  oxyCODONE    IR 5 milliGRAM(s) Oral every 4 hours PRN Moderate Pain (4 - 6)  oxyCODONE    IR 10 milliGRAM(s) Oral every 4 hours PRN Severe Pain (7 - 10)  polyethylene glycol 3350 17 Gram(s) Oral daily  senna 2 Tablet(s) Oral at bedtime    Neuroimaging:   CT Head No Cont: 3/25/2024  IMPRESSION: Stable bilateral subdural hemorrhages. Stable left to right   midline shift.    CT Head No Cont: 2024    IMPRESSION: Status post a left-sided subdural drainage catheter. Moderate   pneumocephalus. Subtotal evacuation of a left-sided subdural hemorrhage.   Stable right-sided subdural hemorrhage. Improved left to right midline   shift.    CT Head No Cont: 2024    IMPRESSION: Status post a left-sided subdural drainage catheter. Moderate   pneumocephalus, mildly decreased. Subtotal evacuation of a left-sided   holohemispheric subdural hemorrhage, unchanged. Stable right-sided   subdural hemorrhage. Improved left to right midline shift.    CT Head No Cont: 2024    IMPRESSION: Stable follow-up CT study.    CT Head No Cont: 2024    IMPRESSION: Previously noted left-sided subdural hematoma has been   removed.  Slight increase in size of previously noted left frontal extra-axial air.    Clinical Interview:     Social History:    MSE:  Appearance- Appropriately dressed and groomed.  Behavior- Polite and cooperative.  Affect- Appropriate, in full range.  Mood- Euthymic.   Speech - Normal rate, rhythm, volume, and prosody. Mild paraphasic errors were noted in spontaneous speech.  Thought Process- Perseverative.   Thought Content- WNL.  Effort - Adequate.    Measures Administered:     Results Summary: Patient correctly stated his name, age, , and year. Correctly stated the month and hospital name with multiple choice cues. Was unable to identify the state.            Name: CHAU AMES  Age: 64y  Gender: Male  Date of service: 2024  Admitting Diagnosis: Non-traumatic intracranial hemorrhage [I62.9]  NONTRAUMATIC INTRACRANIAL HEMORRHAGE, UNSPECIFIED    Current Diagnoses:   Syncope  Traumatic subdural hematoma (SDH)  Supratentorial craniotomy for evacuation of subdural hematoma  S/P craniotomy  H/O cerebral embolism    HPI (per record): 63 yo Male with a history of falls known to neurosurgery as he is s/p b/l craniotomies for subdural evacuation 3/12/24 by Dr. Polanco and, b/l MMA embolization 3/19/24 by Dr. Weber. The patients hospital course was complicated by transient right sided weakness post operatively which improved. He had subsequent CT Scans which were stable. He was subsequently cleared and discharged to rehab. Patient presented back to [facility] s/p unwitnessed fall at rehab, +HS, +LOC. Per rehab, patient started to experience confusion, worsening R sided weakness. Patient had repeat head CT which showed increased hemorrhage on the left with increased left to right shift, readmitted to NSICU. Patient went to the OR for L sided craniotomy for L SDH evacuation with subdural drain placement on . Patient mental status, R sided weakness improved post-op, repeat head CT showed stable SDH w/ moderate pneumocephalus. Patient was placed on NRB, lowered head of bed. Patient drain was removed on , CT post-drain pull with mild pneumocephalus.    Reason for Referral: To assess the nature and extent of any cognitive weaknesses and to inform treatment and discharge planning.    Current Medications: acetaminophen     Tablet .. 975 milliGRAM(s) Oral every 6 hours PRN Mild Pain (1 - 3)  chlorhexidine 2% Cloths 1 Application(s) Topical daily  enoxaparin Injectable 40 milliGRAM(s) SubCutaneous <User Schedule>  escitalopram 5 milliGRAM(s) Oral daily  hydrALAZINE Injectable 10 milliGRAM(s) IV Push every 2 hours PRN SBP>160  labetalol Injectable 10 milliGRAM(s) IV Push every 2 hours PRN SBP>160  levETIRAcetam   Injectable 500 milliGRAM(s) IV Push every 12 hours  oxyCODONE    IR 5 milliGRAM(s) Oral every 4 hours PRN Moderate Pain (4 - 6)  oxyCODONE    IR 10 milliGRAM(s) Oral every 4 hours PRN Severe Pain (7 - 10)  polyethylene glycol 3350 17 Gram(s) Oral daily  senna 2 Tablet(s) Oral at bedtime    Neuroimaging:   CT Head No Cont: 3/25/2024  IMPRESSION: Stable bilateral subdural hemorrhages. Stable left to right   midline shift.    CT Head No Cont: 2024    IMPRESSION: Status post a left-sided subdural drainage catheter. Moderate   pneumocephalus. Subtotal evacuation of a left-sided subdural hemorrhage.   Stable right-sided subdural hemorrhage. Improved left to right midline   shift.    CT Head No Cont: 2024    IMPRESSION: Status post a left-sided subdural drainage catheter. Moderate   pneumocephalus, mildly decreased. Subtotal evacuation of a left-sided   holohemispheric subdural hemorrhage, unchanged. Stable right-sided   subdural hemorrhage. Improved left to right midline shift.    CT Head No Cont: 2024    IMPRESSION: Stable follow-up CT study.    CT Head No Cont: 2024    IMPRESSION: Previously noted left-sided subdural hematoma has been   removed.  Slight increase in size of previously noted left frontal extra-axial air.    Clinical Interview: Pt is known to Neuropsychology service from prior hospitalization. Pt stated that he was born and raised in [place], and moved to the [place] at age 26. Pt reportedly completed high school in [facility]. Pt denied sadness or worry.     MSE:  Appearance- Appropriately dressed and groomed.  Behavior- Polite and cooperative.  Affect- Appropriate, in full range.  Mood- Euthymic.   Speech - Normal rate, rhythm, volume, and prosody. Mild paraphasic errors were noted in spontaneous speech.  Thought Process- Perseverative.   Thought Content- WNL.  Effort - Adequate.    Measures Administered: Orientation screen; Clock Drawing Test; RBANS Naming Subtest; Praxis screen; Brief Language Screen.     Summary of Brief Cognitive Screen: Correctly stated his name, age, , and year. Provided perseverative response across multiple orientation items ("24"). Pt stated that he was at [facility]. Correctly identified the month, day of the week, and hospital name with multiple choice cues. Was unable to identify the state or date. Conceptualization of a clock was impaired. Single word repetition was impaired. Comprehension of one-step commands was intact; comprehension of multi-step commands was impaired. Object naming was impaired, though his performance may also reflect cultural and linguistic factors. Right hand praxis was intact; left hand praxis was weak.

## 2024-03-31 PROCEDURE — 70450 CT HEAD/BRAIN W/O DYE: CPT | Mod: 26

## 2024-03-31 RX ADMIN — CHLORHEXIDINE GLUCONATE 1 APPLICATION(S): 213 SOLUTION TOPICAL at 23:42

## 2024-03-31 RX ADMIN — LEVETIRACETAM 500 MILLIGRAM(S): 250 TABLET, FILM COATED ORAL at 18:15

## 2024-03-31 RX ADMIN — POLYETHYLENE GLYCOL 3350 17 GRAM(S): 17 POWDER, FOR SOLUTION ORAL at 18:15

## 2024-03-31 RX ADMIN — LEVETIRACETAM 500 MILLIGRAM(S): 250 TABLET, FILM COATED ORAL at 05:34

## 2024-03-31 RX ADMIN — ENOXAPARIN SODIUM 40 MILLIGRAM(S): 100 INJECTION SUBCUTANEOUS at 21:29

## 2024-03-31 RX ADMIN — ESCITALOPRAM OXALATE 5 MILLIGRAM(S): 10 TABLET, FILM COATED ORAL at 18:15

## 2024-03-31 NOTE — PROGRESS NOTE ADULT - SUBJECTIVE AND OBJECTIVE BOX
HPI:  This is a 64 year old right hand dominant male with a history of falls known to neurosurgery as he is s/p bilateral craniotomies for subdural evacuation 3/12/24 by Dr. Polanco and, b/l MMA embolization 3/19/24 by Dr. Weber. The patients hospital course was complicated by transient right sided weakness post operatively which improved. He had subsequent CT Scans which were stable. He was subsequently cleared and discharged to rehab. [facility] reports of confusion, right sided weakness and increased hemorrhage on CT of the head. No new trauma reported. Patient was subsequently transferred to [facility] for higher level and continuity of care. Patient is now POD#4 status post supratentorial craniotomy for evacuation of subdural hematoma.      INTERVAL HPI OVERNIGHT EVENTS:  64 year old right hand dominant male seen sleeping, but easily arousable, POD# 5status post left crani for evacuation of left SDH, seen and examined by the Neurosurgery team.  No overnight events.       Vital Signs Last 24 Hrs  T(C): 37.1 (31 Mar 2024 04:44), Max: 37.1 (31 Mar 2024 04:44)  T(F): 98.8 (31 Mar 2024 04:44), Max: 98.8 (31 Mar 2024 04:44)  HR: 57 (31 Mar 2024 04:00) (57 - 87)  BP: 115/78 (31 Mar 2024 04:00) (102/70 - 120/90)  BP(mean): 88 (31 Mar 2024 04:00) (80 - 88)  RR: 18 (31 Mar 2024 04:00) (12 - 18)  SpO2: 97% (31 Mar 2024 04:00) (96% - 100%)    Parameters below as of 31 Mar 2024 04:00  Patient On (Oxygen Delivery Method): room air        MEDICATIONS  (STANDING):  chlorhexidine 2% Cloths 1 Application(s) Topical daily  enoxaparin Injectable 40 milliGRAM(s) SubCutaneous <User Schedule>  escitalopram 5 milliGRAM(s) Oral daily  levETIRAcetam   Injectable 500 milliGRAM(s) IV Push every 12 hours  polyethylene glycol 3350 17 Gram(s) Oral daily  senna 2 Tablet(s) Oral at bedtime    MEDICATIONS  (PRN):  acetaminophen     Tablet .. 975 milliGRAM(s) Oral every 6 hours PRN Mild Pain (1 - 3)  hydrALAZINE Injectable 10 milliGRAM(s) IV Push every 2 hours PRN SBP>160  labetalol Injectable 10 milliGRAM(s) IV Push every 2 hours PRN SBP>160  oxyCODONE    IR 5 milliGRAM(s) Oral every 4 hours PRN Moderate Pain (4 - 6)  oxyCODONE    IR 10 milliGRAM(s) Oral every 4 hours PRN Severe Pain (7 - 10)      PHYSICAL EXAM:      Constitutional: awake and alert.  HEENT: PERRLA, EOMI,   Neck: Supple.  Respiratory: Breath sounds are clear bilaterally  Cardiovascular: S1 and S2, regular   Gastrointestinal: soft, nontender  Extremities:  no edema  Musculoskeletal: no joint swelling/tenderness, no abnormal movements  Skin: No rashes    Neurological exam:  HF: A x O x 3. Appropriately interactive, normal affect. Speech fluent, No Aphasia or paraphasic errors. Naming /repetition intact   CN: SAMANTA, EOMI, VFF, facial sensation normal, no NLFD, tongue midline, Palate moves equally, SCM equal bilaterally  Motor: No pronator drift, Strength 5/5 in all 4 ext, normal bulk and tone, no tremor, rigidity or bradykinesia.    Sens: Intact to light touch    HPI:  This is a 64 year old right hand dominant male with a history of falls known to neurosurgery as he is s/p bilateral craniotomies for subdural evacuation 3/12/24 by Dr. Polanco and, b/l MMA embolization 3/19/24 by Dr. Weber. The patients hospital course was complicated by transient right sided weakness post operatively which improved. He had subsequent CT Scans which were stable. He was subsequently cleared and discharged to rehab. [facility], right sided weakness and increased hemorrhage on CT of the head. No new trauma reported. Patient was subsequently transferred to [facility] for higher level and continuity of care. Patient is now POD#4 status post supratentorial craniotomy for evacuation of subdural hematoma.      INTERVAL HPI OVERNIGHT EVENTS:  64 year old right hand dominant male seen sleeping, but easily arousable, POD# 5status post left crani for evacuation of left SDH, seen and examined by the Neurosurgery team.  No overnight events.       Vital Signs Last 24 Hrs  T(C): 37.1 (31 Mar 2024 04:44), Max: 37.1 (31 Mar 2024 04:44)  T(F): 98.8 (31 Mar 2024 04:44), Max: 98.8 (31 Mar 2024 04:44)  HR: 57 (31 Mar 2024 04:00) (57 - 87)  BP: 115/78 (31 Mar 2024 04:00) (102/70 - 120/90)  BP(mean): 88 (31 Mar 2024 04:00) (80 - 88)  RR: 18 (31 Mar 2024 04:00) (12 - 18)  SpO2: 97% (31 Mar 2024 04:00) (96% - 100%)    Parameters below as of 31 Mar 2024 04:00  Patient On (Oxygen Delivery Method): room air        MEDICATIONS  (STANDING):  chlorhexidine 2% Cloths 1 Application(s) Topical daily  enoxaparin Injectable 40 milliGRAM(s) SubCutaneous <User Schedule>  escitalopram 5 milliGRAM(s) Oral daily  levETIRAcetam   Injectable 500 milliGRAM(s) IV Push every 12 hours  polyethylene glycol 3350 17 Gram(s) Oral daily  senna 2 Tablet(s) Oral at bedtime    MEDICATIONS  (PRN):  acetaminophen     Tablet .. 975 milliGRAM(s) Oral every 6 hours PRN Mild Pain (1 - 3)  hydrALAZINE Injectable 10 milliGRAM(s) IV Push every 2 hours PRN SBP>160  labetalol Injectable 10 milliGRAM(s) IV Push every 2 hours PRN SBP>160  oxyCODONE    IR 5 milliGRAM(s) Oral every 4 hours PRN Moderate Pain (4 - 6)  oxyCODONE    IR 10 milliGRAM(s) Oral every 4 hours PRN Severe Pain (7 - 10)      PHYSICAL EXAM:      Constitutional: awake and alert.  HEENT: PERRLA, EOMI,   Neck: Supple.  Respiratory: Breath sounds are clear bilaterally  Cardiovascular: S1 and S2, regular   Gastrointestinal: soft, nontender  Extremities:  no edema  Musculoskeletal: no joint swelling/tenderness, no abnormal movements  Skin: No rashes    Neurological exam:  HF: A x O x 3. Appropriately interactive, normal affect. Speech fluent  CN: SAMANTA, EOMI, VFF, facial sensation normal, no NLFD, tongue midline,   Motor: No pronator drift, Strength 5/5 in all 4 ext    Sens: Intact to light touch

## 2024-03-31 NOTE — PROGRESS NOTE ADULT - NS ATTEND AMEND GEN_ALL_CORE FT
NSGY Attg:    see above    patient seen and examined    repeat CT reviewed    agree with plan as above  continue subdural drain  repeat CT tomorrow am
NSGY Attg:    see above    patient seen and examined    agree with above  d/c SD drain  d/c right sutures  post-pull CT reviewed
I agree with the above. I personally examined and saw the patient. neurointact. repeat head CT today, dispo planning.
I agree with the above. I personally examined and saw the patient. Exam intact. Incision c/d/i. head CT tomorrow. dispo planning.
NSGY Attg:    see above    patient seen and examined    confused to location and year  RUE drift  RLE 5/5    repeat CT stable with left-to-right midline shift    d/w patient's son; he is declining re-explanation of the risks, benefits, and alternatives of operative intervention as previously discussed as below:    benefit: hopeful decompression, hopeful improvement in symptoms, hopeful prevention of progression of deficit   alternative: no surgical intervention; continued surveillance  risks: bleeding, infection, CSF leak, failure of procedure, need for re-operation, seizure, stroke, coma, death, DVT, PE, MI, PNA, UTI, difficulty/failure to intubate or extubate, new or worsening numbness, tingling, weakness, paralysis, sensory changes, difficulty/inability to ambulate, difficulty with expressive or receptive speech, altered personality or judgment, sexual dysfunction, incontinence    I have answered all of the son's additional questions.  He wishes to proceed with operative intervention.  This is a high-risk case and imaging has been reviewed by multiple qualified providers.    A/P:  - to OR for redo left crani for re-evacuation of left SDH today

## 2024-03-31 NOTE — PROGRESS NOTE ADULT - REASON FOR ADMISSION
Acute on chronic SDH
Acute on chronic SDH reaccumulation on L side
Acute on chronic SDH

## 2024-03-31 NOTE — PROGRESS NOTE ADULT - ASSESSMENT
64 year old male  POD# 5 status post left crani for evacuation of left SDH.     Continue q4 neuro checks  Continue wound checks  Head CT this morning non contrast   Pain control  continue keppra 500mg bid  SCD in bed, lovenox 40  Dispo plan for dc to rehab - possibly [facility] if CT head remains stable.   Plan was discussed with Dr Mary Garcia    64 year old male  POD# 5 status post left crani for evacuation of left SDH.     Continue q4 neuro checks  Continue wound checks  Head CT this morning non contrast   Pain control  continue keppra 500mg bid  SCD in bed, lovenox 40  Dispo plan for dc to rehab - possibly [facility] if CT head remains stable.   Plan was discussed with Dr Mary Garcia

## 2024-04-01 ENCOUNTER — INPATIENT (INPATIENT)
Facility: HOSPITAL | Age: 64
LOS: 12 days | Discharge: HOME HEALTH | DRG: 949 | End: 2024-04-14
Attending: STUDENT IN AN ORGANIZED HEALTH CARE EDUCATION/TRAINING PROGRAM | Admitting: STUDENT IN AN ORGANIZED HEALTH CARE EDUCATION/TRAINING PROGRAM
Payer: COMMERCIAL

## 2024-04-01 ENCOUNTER — TRANSCRIPTION ENCOUNTER (OUTPATIENT)
Age: 64
End: 2024-04-01

## 2024-04-01 VITALS
DIASTOLIC BLOOD PRESSURE: 71 MMHG | WEIGHT: 158.29 LBS | TEMPERATURE: 98 F | SYSTOLIC BLOOD PRESSURE: 122 MMHG | RESPIRATION RATE: 16 BRPM | HEIGHT: 65 IN | HEART RATE: 77 BPM | OXYGEN SATURATION: 97 %

## 2024-04-01 VITALS
DIASTOLIC BLOOD PRESSURE: 74 MMHG | RESPIRATION RATE: 16 BRPM | SYSTOLIC BLOOD PRESSURE: 121 MMHG | OXYGEN SATURATION: 97 % | HEART RATE: 79 BPM

## 2024-04-01 DIAGNOSIS — Z98.890 OTHER SPECIFIED POSTPROCEDURAL STATES: Chronic | ICD-10-CM

## 2024-04-01 DIAGNOSIS — S06.5X0A TRAUMATIC SUBDURAL HEMORRHAGE WITHOUT LOSS OF CONSCIOUSNESS, INITIAL ENCOUNTER: ICD-10-CM

## 2024-04-01 DIAGNOSIS — Z86.79 PERSONAL HISTORY OF OTHER DISEASES OF THE CIRCULATORY SYSTEM: Chronic | ICD-10-CM

## 2024-04-01 LAB
ANION GAP SERPL CALC-SCNC: 11 MMOL/L — SIGNIFICANT CHANGE UP (ref 5–17)
BUN SERPL-MCNC: 12 MG/DL — SIGNIFICANT CHANGE UP (ref 8–20)
CALCIUM SERPL-MCNC: 8.8 MG/DL — SIGNIFICANT CHANGE UP (ref 8.4–10.5)
CHLORIDE SERPL-SCNC: 104 MMOL/L — SIGNIFICANT CHANGE UP (ref 96–108)
CO2 SERPL-SCNC: 24 MMOL/L — SIGNIFICANT CHANGE UP (ref 22–29)
CREAT SERPL-MCNC: 0.7 MG/DL — SIGNIFICANT CHANGE UP (ref 0.5–1.3)
EGFR: 102 ML/MIN/1.73M2 — SIGNIFICANT CHANGE UP
FLUAV AG NPH QL: SIGNIFICANT CHANGE UP
FLUBV AG NPH QL: SIGNIFICANT CHANGE UP
GLUCOSE SERPL-MCNC: 98 MG/DL — SIGNIFICANT CHANGE UP (ref 70–99)
HCT VFR BLD CALC: 38.1 % — LOW (ref 39–50)
HGB BLD-MCNC: 12.6 G/DL — LOW (ref 13–17)
MAGNESIUM SERPL-MCNC: 2 MG/DL — SIGNIFICANT CHANGE UP (ref 1.8–2.6)
MCHC RBC-ENTMCNC: 27 PG — SIGNIFICANT CHANGE UP (ref 27–34)
MCHC RBC-ENTMCNC: 33.1 GM/DL — SIGNIFICANT CHANGE UP (ref 32–36)
MCV RBC AUTO: 81.6 FL — SIGNIFICANT CHANGE UP (ref 80–100)
PHOSPHATE SERPL-MCNC: 3.4 MG/DL — SIGNIFICANT CHANGE UP (ref 2.4–4.7)
PLATELET # BLD AUTO: 320 K/UL — SIGNIFICANT CHANGE UP (ref 150–400)
POTASSIUM SERPL-MCNC: 4.2 MMOL/L — SIGNIFICANT CHANGE UP (ref 3.5–5.3)
POTASSIUM SERPL-SCNC: 4.2 MMOL/L — SIGNIFICANT CHANGE UP (ref 3.5–5.3)
RBC # BLD: 4.67 M/UL — SIGNIFICANT CHANGE UP (ref 4.2–5.8)
RBC # FLD: 13.4 % — SIGNIFICANT CHANGE UP (ref 10.3–14.5)
RSV RNA NPH QL NAA+NON-PROBE: SIGNIFICANT CHANGE UP
SARS-COV-2 RNA SPEC QL NAA+PROBE: SIGNIFICANT CHANGE UP
SODIUM SERPL-SCNC: 139 MMOL/L — SIGNIFICANT CHANGE UP (ref 135–145)
WBC # BLD: 5.09 K/UL — SIGNIFICANT CHANGE UP (ref 3.8–10.5)
WBC # FLD AUTO: 5.09 K/UL — SIGNIFICANT CHANGE UP (ref 3.8–10.5)

## 2024-04-01 PROCEDURE — 86901 BLOOD TYPING SEROLOGIC RH(D): CPT

## 2024-04-01 PROCEDURE — 86900 BLOOD TYPING SEROLOGIC ABO: CPT

## 2024-04-01 PROCEDURE — C1713: CPT

## 2024-04-01 PROCEDURE — 86850 RBC ANTIBODY SCREEN: CPT

## 2024-04-01 PROCEDURE — 97112 NEUROMUSCULAR REEDUCATION: CPT

## 2024-04-01 PROCEDURE — 87641 MR-STAPH DNA AMP PROBE: CPT

## 2024-04-01 PROCEDURE — C1889: CPT

## 2024-04-01 PROCEDURE — 85027 COMPLETE CBC AUTOMATED: CPT

## 2024-04-01 PROCEDURE — 99223 1ST HOSP IP/OBS HIGH 75: CPT

## 2024-04-01 PROCEDURE — 36415 COLL VENOUS BLD VENIPUNCTURE: CPT

## 2024-04-01 PROCEDURE — 95711 VEEG 2-12 HR UNMONITORED: CPT

## 2024-04-01 PROCEDURE — 95813 EEG EXTND MNTR 61-119 MIN: CPT

## 2024-04-01 PROCEDURE — 95700 EEG CONT REC W/VID EEG TECH: CPT

## 2024-04-01 PROCEDURE — 83735 ASSAY OF MAGNESIUM: CPT

## 2024-04-01 PROCEDURE — 87640 STAPH A DNA AMP PROBE: CPT

## 2024-04-01 PROCEDURE — 84100 ASSAY OF PHOSPHORUS: CPT

## 2024-04-01 PROCEDURE — 82962 GLUCOSE BLOOD TEST: CPT

## 2024-04-01 PROCEDURE — 99233 SBSQ HOSP IP/OBS HIGH 50: CPT

## 2024-04-01 PROCEDURE — 85730 THROMBOPLASTIN TIME PARTIAL: CPT

## 2024-04-01 PROCEDURE — 97116 GAIT TRAINING THERAPY: CPT

## 2024-04-01 PROCEDURE — 95714 VEEG EA 12-26 HR UNMNTR: CPT

## 2024-04-01 PROCEDURE — 97163 PT EVAL HIGH COMPLEX 45 MIN: CPT

## 2024-04-01 PROCEDURE — 85610 PROTHROMBIN TIME: CPT

## 2024-04-01 PROCEDURE — 70450 CT HEAD/BRAIN W/O DYE: CPT | Mod: MC

## 2024-04-01 PROCEDURE — 80048 BASIC METABOLIC PNL TOTAL CA: CPT

## 2024-04-01 RX ORDER — ACETAMINOPHEN 500 MG
650 TABLET ORAL EVERY 6 HOURS
Refills: 0 | Status: DISCONTINUED | OUTPATIENT
Start: 2024-04-01 | End: 2024-04-14

## 2024-04-01 RX ORDER — OXYCODONE HYDROCHLORIDE 5 MG/1
1 TABLET ORAL
Qty: 0 | Refills: 0 | DISCHARGE
Start: 2024-04-01

## 2024-04-01 RX ORDER — LEVETIRACETAM 250 MG/1
5 TABLET, FILM COATED ORAL
Qty: 0 | Refills: 0 | DISCHARGE
Start: 2024-04-01

## 2024-04-01 RX ORDER — CHLORHEXIDINE GLUCONATE 213 G/1000ML
1 SOLUTION TOPICAL
Qty: 0 | Refills: 0 | DISCHARGE
Start: 2024-04-01

## 2024-04-01 RX ORDER — SENNA PLUS 8.6 MG/1
2 TABLET ORAL
Qty: 0 | Refills: 0 | DISCHARGE
Start: 2024-04-01

## 2024-04-01 RX ORDER — PANTOPRAZOLE SODIUM 20 MG/1
40 TABLET, DELAYED RELEASE ORAL
Refills: 0 | Status: DISCONTINUED | OUTPATIENT
Start: 2024-04-01 | End: 2024-04-14

## 2024-04-01 RX ORDER — OXYCODONE HYDROCHLORIDE 5 MG/1
5 TABLET ORAL EVERY 4 HOURS
Refills: 0 | Status: DISCONTINUED | OUTPATIENT
Start: 2024-04-01 | End: 2024-04-04

## 2024-04-01 RX ORDER — LEVETIRACETAM 250 MG/1
500 TABLET, FILM COATED ORAL
Refills: 0 | Status: DISCONTINUED | OUTPATIENT
Start: 2024-04-01 | End: 2024-04-09

## 2024-04-01 RX ORDER — ENOXAPARIN SODIUM 100 MG/ML
40 INJECTION SUBCUTANEOUS
Qty: 0 | Refills: 0 | DISCHARGE
Start: 2024-04-01

## 2024-04-01 RX ORDER — OXYCODONE HYDROCHLORIDE 5 MG/1
2.5 TABLET ORAL EVERY 4 HOURS
Refills: 0 | Status: DISCONTINUED | OUTPATIENT
Start: 2024-04-01 | End: 2024-04-04

## 2024-04-01 RX ORDER — SENNA PLUS 8.6 MG/1
2 TABLET ORAL AT BEDTIME
Refills: 0 | Status: DISCONTINUED | OUTPATIENT
Start: 2024-04-01 | End: 2024-04-14

## 2024-04-01 RX ORDER — LABETALOL HCL 100 MG
2 TABLET ORAL
Qty: 0 | Refills: 0 | DISCHARGE
Start: 2024-04-01

## 2024-04-01 RX ORDER — POLYETHYLENE GLYCOL 3350 17 G/17G
17 POWDER, FOR SOLUTION ORAL DAILY
Refills: 0 | Status: DISCONTINUED | OUTPATIENT
Start: 2024-04-01 | End: 2024-04-14

## 2024-04-01 RX ORDER — ENOXAPARIN SODIUM 100 MG/ML
40 INJECTION SUBCUTANEOUS
Refills: 0 | Status: DISCONTINUED | OUTPATIENT
Start: 2024-04-01 | End: 2024-04-14

## 2024-04-01 RX ORDER — ESCITALOPRAM OXALATE 10 MG/1
5 TABLET, FILM COATED ORAL DAILY
Refills: 0 | Status: DISCONTINUED | OUTPATIENT
Start: 2024-04-02 | End: 2024-04-14

## 2024-04-01 RX ADMIN — LEVETIRACETAM 500 MILLIGRAM(S): 250 TABLET, FILM COATED ORAL at 18:06

## 2024-04-01 RX ADMIN — ESCITALOPRAM OXALATE 5 MILLIGRAM(S): 10 TABLET, FILM COATED ORAL at 11:20

## 2024-04-01 RX ADMIN — Medication 650 MILLIGRAM(S): at 20:24

## 2024-04-01 RX ADMIN — SENNA PLUS 2 TABLET(S): 8.6 TABLET ORAL at 21:59

## 2024-04-01 RX ADMIN — CHLORHEXIDINE GLUCONATE 1 APPLICATION(S): 213 SOLUTION TOPICAL at 11:21

## 2024-04-01 RX ADMIN — LEVETIRACETAM 500 MILLIGRAM(S): 250 TABLET, FILM COATED ORAL at 05:22

## 2024-04-01 RX ADMIN — ENOXAPARIN SODIUM 40 MILLIGRAM(S): 100 INJECTION SUBCUTANEOUS at 21:58

## 2024-04-01 NOTE — DISCHARGE NOTE NURSING/CASE MANAGEMENT/SOCIAL WORK - PATIENT PORTAL LINK FT
You can access the FollowMyHealth Patient Portal offered by [facility] by registering at the following website: [email or web address]. By joining [facility]’s FollowMyHealth portal, you will also be able to view your health information using other applications (apps) compatible with our system.

## 2024-04-01 NOTE — PATIENT PROFILE ADULT - FALL HARM RISK - HARM RISK INTERVENTIONS
Assistance with ambulation/Assistance OOB with selected safe patient handling equipment/Communicate Risk of Fall with Harm to all staff/Monitor for mental status changes/Monitor gait and stability/Provide patient with walking aids - walker, cane, crutches/Reinforce activity limits and safety measures with patient and family/Reorient to person, place and time as needed/Review medications for side effects contributing to fall risk/Sit up slowly, dangle for a short time, stand at bedside before walking/Tailored Fall Risk Interventions/Visual Cue: Yellow wristband and red socks/Bed in lowest position, wheels locked, appropriate side rails in place/Call bell, personal items and telephone in reach/Instruct patient to call for assistance before getting out of bed or chair/Non-slip footwear when patient is out of bed/[place] to call system/Physically safe environment - no spills, clutter or unnecessary equipment/Purposeful Proactive Rounding/Room/bathroom lighting operational, light cord in reach

## 2024-04-01 NOTE — PATIENT PROFILE ADULT - SURGICAL SITE DESCRIPTION
right and left parietal areas, right pariental area is open to air; left parietal area with staples; one staple on the top of the left side of the forehead

## 2024-04-01 NOTE — DISCHARGE NOTE PROVIDER - CARE PROVIDER_API CALL
Patel Polanco  Neurosurgery  [address] [place] [id number]  Phone: [phone]  Fax: [phone]  Follow Up Time: 1 month

## 2024-04-01 NOTE — H&P ADULT - NSHPPHYSICALEXAM_GEN_ALL_CORE
Physical Exam  Constitutional - NAD, Comfortable  HEENT - NCAT, EOMI; frontal craniotomy staples in place  Neck - Supple, No limited ROM  Chest - good chest expansion, good respiratory effort, CTAB   Cardio - warm and well perfused, RRR, no murmur  Abdomen -  Soft, NTND  Extremities - No peripheral edema, No calf tenderness   Neurologic Exam:                    Cognitive -             Orientation: Awake, Alert, AAO to self, place. year says 2004, needs cues for 2024, knows month             Attention:  impaired            Memory- impaired      Speech - Fluent, Comprehensible, mild dysarthria, No aphasia; Some word finding difficulties     Cranial Nerves - No facial asymmetry, Tongue midline, EOMI, Shoulder shrug intact     Motor -  right sided weakness                    LEFT    UE - ShAB 5/5, EF 5/5, EE 5/5, WE 5/5,  WNL                    RIGHT UE - ShAB 4/5, EF 4/5, EE 4/5, WE 4/5,  4/5                    LEFT    LE - HF 5/5, KE 5/5, DF 5/5, PF 5/5                    RIGHT LE - HF 4/5, KE 4/5, DF 5/5, PF 5/5        Sensory - Intact to LT bilateral     Coordination - FTN impaired     OculoVestibular -  No nystagmus  Psychiatric - Mood stable, Affect WNL

## 2024-04-01 NOTE — H&P ADULT - ATTENDING COMMENTS
64 year old male admitted with an acute on chronic SDH s/p craniotomy/evacuation.  Patient seen and examined at bedside. Denies complaints. Has condom cath on, no urinary complaints. Denies headaches.    Physical Exam  Constitutional - NAD, Comfortable  HEENT - NCAT, EOMI; frontal craniotomy staples in place  Neck - Supple, No limited ROM  Chest - good chest expansion, good respiratory effort, CTAB   Cardio - warm and well perfused, RRR, no murmur  Abdomen -  Soft, NTND  Extremities - No peripheral edema, No calf tenderness   Neurologic Exam:                    Cognitive -             Orientation: Awake, Alert, AAO to self, place. year says 2004, needs cues for 2024, knows month             Attention:  impaired            Memory- impaired      Speech - Fluent, Comprehensible, mild dysarthria, No aphasia; Some word finding difficulties     Cranial Nerves - No facial asymmetry, Tongue midline, EOMI, Shoulder shrug intact     Motor -  right sided weakness                    LEFT    UE - ShAB 5/5, EF 5/5, EE 5/5, WE 5/5,  WNL                    RIGHT UE - ShAB 4/5, EF 4/5, EE 4/5, WE 4/5,  4/5                    LEFT    LE - HF 5/5, KE 5/5, DF 5/5, PF 5/5                    RIGHT LE - HF 4/5, KE 4/5, DF 5/5, PF 5/5        Sensory - Intact to LT bilateral     Coordination - FTN impaired     OculoVestibular -  No nystagmus  Psychiatric - Mood stable, Affect WNL    Head CT 3/31- Bilateral frontal craniotomy defects. Moderate left-sided mixed density subdural hemorrhage measuring 1.1 cm in its greatest thickness. Moderate anterior left frontal convexity pneumocephalus, decreased. Stable mass effect on the left frontal lobe. Left-to-right midline shift measures 2.7 mm. Right frontal convexity mixed density subdural hemorrhage measures 1.9 cm in its greatest thickness, unchanged. Status post bilateral middle  meningeal artery embolization. Thin subdural hemorrhage along the anterior falx, unchanged.    Admission labs ordered for tomorrow   Seizure ppx- Keppra  pain- Tylenol, oxycodone PRN  Mood- Lexapro 5 mg  bowel- Miralax, senna    Total time 80 mins-face to face time encounter and counseling the patient, reviewing chart and data-including but not limited to labs and imaging. Discussed goals of acute rehab and length of stay.

## 2024-04-01 NOTE — DISCHARGE NOTE PROVIDER - HOSPITAL COURSE
64 year old right hand dominant male with a history of falls known to neurosurgery as he is s/p bilateral craniotomies for subdural evacuation 3/12/24 by Dr. Polanco and, b/l MMA embolization 3/19/24 by Dr. Weber. The patient's hospital course was complicated by transient right sided weakness post operatively which improved. He had subsequent CT Scans which were stable. He was subsequently cleared and discharged to rehab. The patient re-presented on 3/25 as a transfer from [facility] with reports of confusion, right sided weakness and increased hemorrhage on CT of the head. No new trauma reported. Patient was subsequently transferred to [facility] for higher level and continuity of care.   Patient is now POD#6 status post supratentorial craniotomy for evacuation of subdural hematoma.    HEAD CT 3/25: Mixed attenuated subdural hematoma is are again seen bilaterally with increase acute component when compared with the prior exam. Increased mass effect seen on the left lateral ventricle with increased left-to-right shift.  3/26: Redo L SDH evacuation   3/28: R craniotomy staples d/lizz, new left subdural drain d/lizz. Downgraded to SDU.  HEAD CT 3/28: Previously noted left-sided subdural hematoma has been removed. Slight increase in size of previously noted left frontal extra-axial air.  3/29: R drain exit suture removed  3/31: Stable b/l SDH. Improved pneumocephalus.    Exam 4/1: GENERAL: NAD  WOUND: No signs of infection, healing well   MENTAL STATUS: AAO x 3, Conversant, following all commands   CRANIAL NERVES: PERRL, EOMI. Tongue midline. Hearing grossly intact. Speech clear. Head turning and shoulder shrug intact.   REFLEXES: No pronator drift  MOTOR: Strength 5/5 b/l upper and lower extremities  ABDOMEN: Soft, nontender, nondistended  SKIN: Warm, dry; no rashes or lesions    New left craniotomy staple removal on 4/9  New left drain exit suture removal on 4/11  PT/OT/rehab recommended Acute Rehab - placement at [facility] 4/1  Follow up with Dr. Polanco outpatient upon discharge from [facility] 64 year old right hand dominant male with a history of falls known to neurosurgery as he is s/p bilateral craniotomies for subdural evacuation 3/12/24 by Dr. Polanco and, b/l MMA embolization 3/19/24 by Dr. Weber. The patient's hospital course was complicated by transient right sided weakness post operatively which improved. He had subsequent CT Scans which were stable. He was subsequently cleared and discharged to rehab. The patient [facility] with reports of confusion, right sided weakness and increased hemorrhage on CT of the head. No new trauma reported. Patient was subsequently transferred to [facility] for higher level and continuity of care.   Patient is now POD#6 status post supratentorial craniotomy for evacuation of subdural hematoma.    HEAD CT 3/25: Mixed attenuated subdural hematoma is are again seen bilaterally with increase acute component when compared with the prior exam. Increased mass effect seen on the left lateral ventricle with increased left-to-right shift.  3/26: Redo L SDH evacuation   3/28: R craniotomy staples d/lizz, new left subdural drain d/lizz. Downgraded to SDU.  HEAD CT 3/28: Previously noted left-sided subdural hematoma has been removed. Slight increase in size of previously noted left frontal extra-axial air.  3/29: R drain exit suture removed  3/31: Stable b/l SDH. Improved pneumocephalus.    Exam 4/1: GENERAL: NAD  WOUND: No signs of infection, healing well   MENTAL STATUS: AAO x 3, Conversant, following all commands   CRANIAL NERVES: PERRL, EOMI. Tongue midline. Hearing grossly intact. Speech clear. Head turning and shoulder shrug intact.   REFLEXES: No pronator drift  MOTOR: Strength 5/5 b/l upper and lower extremities  ABDOMEN: Soft, nontender, nondistended  SKIN: Warm, dry; no rashes or lesions    New left craniotomy staple removal on 4/9  New left drain exit staple removal on 4/11  PT/OT/rehab recommended Acute Rehab - placement at [facility] 4/1  Follow up with Dr. Polanco outpatient upon discharge from [facility]

## 2024-04-01 NOTE — DISCHARGE NOTE PROVIDER - NSDCFUADDINST_GEN_ALL_CORE_FT
Diet: You should continue a regular vegan diet. Ensure a well balanced and proper diet to help with proper wound healing. It is imperative for adequate water intake, dietary fiber intake, and ambulation as tolerated to avoid constipation. Please hold aspirin, NSAIDs, goody's powder, anticoagulation, vitamin E, turmeric/teran lattes, cinnamon pills, fish oil, ginseng, and all other supplements until cleared by your neurosurgeon on an outpatient follow up appointment    Pain: It is common to have a headache or incisional pain after surgery. You may take the pain medication we prescribe, or over the counter Tylenol. Pain medication, especially narcotics, can cause constipation. Use stool softeners or mild laxative as needed.     Activity: Avoid straining, heavy lifting (objects greater than 10 pounds), strenuous activity, twisting, bending, and vigorous exercise. You should not drive or operate machinery until cleared by your neurosurgeon.     Wound: Monitor your incision for drainage, redness, swelling. Call our office if you have any concerns. You may shower the incision site. While washing, do not rub, scratch, or scrub your incision. You may wash your incision with mild shampoo/soap. Keep the incision open to air. However, if you want to cover the incision, you may, with a clean scarf or hat.     Appointment(s): Follow up with Dr. Polanco in our office outpatient in 1-2 weeks. Call [phone] to schedule an appointment. Sutures/staples will be removed at follow up appointment or at [facility]. It is also important to see your primary physician to keep them up to date regarding your recent admission and for a formal outpatient comprehensive medical review. Call their offices for an appointment as soon as you leave the hospital. If you do not have a primary physician, you may contact the [facility] [place] [phone] located on [address] [place] [address], [place] [address].    Should you develop any new or worsening neurologic symptoms or have concern about your incision, please call our office immediately or visit the emergency department.    Return to ER immediately for any of the following: fever, bleeding, new onset numbness/tingling/weakness, nausea and/or vomiting, chest pain, shortness of breath, confusion, seizure, altered mental status, urinary and/or fecal incontinence or retention.

## 2024-04-01 NOTE — H&P ADULT - ASSESSMENT
Assessment/Plan:  CHAU AMES is a 64y with PMH of frequent falls, SDH s/p bilateral craniotomies for subdural evacuation (3/12/24 by Dr. Polanco) and b/l MMA embolization (3/19/24 by Dr. Weber) transferred to [facility] on 3/25 from [facility] due to confusion, right sided weakness and increased hemorrhage on CTH. Patient underwent left supratentorial craniotomy for evacuation of acute on chronic subdural hematoma (3/26) Hospital Course uncomplicated. Patient now admitted for a multidisciplinary rehab program.     # Acute on Chronic SDH s/p craniotomy/evacuation  - SDH s/p bilateral craniotomies for subdural evacuation (3/12/24 by Dr. Polanco) and b/l MMA embolization (3/19/24 by Dr. Weber)  - confusion, right sided weakness and increased hemorrhage on CTH (3/25)  - left supratentorial craniotomy for evacuation of acute on chronic subdural hematoma (3/26)  - Impaired ADLs and mobility  - Need for assistance with personal care   - Start comprehensive rehab program of PT/OT/SLP - 3 hours a day, 5 days a week. P&O as needed   - Precautions: Fall, Aspiration, Seizure  - Keppra 500 mg BID    # Pain  - Tylenol PRN mild pain, Oxycodone 2.5 mg mod pain, oxycodone 5 mg severe pain  - Avoid sedating medications that may interfere with cognitive recovery    # Mood / Cognition  - Neuropsychology consult PRN  - Recreation therapy  - Lexapro 5 mg QD    # Sleep  - Maintain quiet hours and a low stim environment.   - Melatonin PRN     # GI / Bowel  - Senna qHS  - Miralax Daily  - GI ppx: protonix 40 mg QD    #  / Bladder  - Continue bladder scans Q8 hours with straight cath for >400cc.  - Toileting schedule every 4 hours    # Skin / Pressure injury  - Skin assessment on admission performed [  ] :   - Monitor Incisions:    - Pressure Injury/Skin: OOB to chair, PT/OT  - nursing to monitor skin qShift    # Diet/Dysphagia:  - Diet Consistency: regular, vegan  - Aspiration Precautions  - SLP consult for swallow function evaluation and treatment  - Nutrition consult    # DVT prophylaxis:   - Lovenox 40 mg QD      Outpatient Follow-up:  Patel Polanco  Neurosurgery  [address] [place] [address]  Phone: [phone]  Fax: [phone]  Follow Up Time: 1 month      Code Status/Emergency Contact:      ---------------    Goals: Safe discharge to home  Estimated Length of Stay: 10-14 days  Rehab Potential: Good  Medical Prognosis: Good  Estimated Disposition: Home with home care      PRESCREEN COMPARISON:  I have reviewed the prescreen information and I have found no relevant changes between the preadmission screening and my post admission evaluation.    RATIONALE FOR INPATIENT ADMISSION: Patient demonstrates the following:  [X] Medically appropriate for rehabilitation admission  [X] Has attainable rehab goals with an appropriate initial discharge plan  [X]Has rehabilitation potential (expected to make a significant improvement within a reasonable period of time)  [X] Requires close medical management by a rehab physician, rehab nursing care, Hospitalist and comprehensive interdisciplinary team (including PT, OT and/or SLP, Prosthetics and Orthotics)     Assessment/Plan:  CHAU AMES is a 64y with PMH of frequent falls, SDH s/p bilateral craniotomies for subdural evacuation (3/12/24 by Dr. Polanco) and b/l MMA embolization (3/19/24 by Dr. Weber) transferred to [facility] on 3/25 from [facility] due to confusion, right sided weakness and increased hemorrhage on CTH. Patient underwent left supratentorial craniotomy for evacuation of acute on chronic subdural hematoma (3/26) Hospital Course uncomplicated. Patient now admitted for a multidisciplinary rehab program.     # Acute on Chronic SDH s/p craniotomy/evacuation  - SDH s/p bilateral craniotomies for subdural evacuation (3/12/24 by Dr. Polanco) and b/l MMA embolization (3/19/24 by Dr. Weber)  - confusion, right sided weakness and increased hemorrhage on CTH (3/25)  - left supratentorial craniotomy for evacuation of acute on chronic subdural hematoma (3/26)  - Impaired ADLs and mobility  - Need for assistance with personal care   - Start comprehensive rehab program of PT/OT/SLP - 3 hours a day, 5 days a week. P&O as needed   - Precautions: Fall, Aspiration, Seizure  - Keppra 500 mg BID    # Pain  - Tylenol PRN mild pain, Oxycodone 2.5 mg mod pain, oxycodone 5 mg severe pain  - Avoid sedating medications that may interfere with cognitive recovery    # Mood / Cognition  - Neuropsychology consult PRN  - Recreation therapy  - Lexapro 5 mg QD    # Sleep  - Maintain quiet hours and a low stim environment.   - Melatonin PRN     # GI / Bowel  - Senna qHS  - Miralax Daily  - GI ppx: protonix 40 mg QD    #  / Bladder  - Continue bladder scans Q8 hours with straight cath for >400cc.  - Toileting schedule every 4 hours    # Skin / Pressure injury  - Skin assessment on admission performed - intact   - Monitor Incisions:    - Pressure Injury/Skin: OOB to chair, PT/OT  - nursing to monitor skin qShift    # Diet/Dysphagia:  - Diet Consistency: regular, vegan  - Aspiration Precautions  - SLP consult for swallow function evaluation and treatment  - Nutrition consult    # DVT prophylaxis:   - Lovenox 40 mg QD      Outpatient Follow-up:  Patel Polanco  Neurosurgery  [address] [place] [address]  Phone: [phone]  Fax: [phone]  Follow Up Time: 1 month      Code Status/Emergency Contact:      ---------------    Goals: Safe discharge to home  Estimated Length of Stay: 10-14 days  Rehab Potential: Good  Medical Prognosis: Good  Estimated Disposition: Home with home care      PRESCREEN COMPARISON:  I have reviewed the prescreen information and I have found no relevant changes between the preadmission screening and my post admission evaluation.    RATIONALE FOR INPATIENT ADMISSION: Patient demonstrates the following:  [X] Medically appropriate for rehabilitation admission  [X] Has attainable rehab goals with an appropriate initial discharge plan  [X]Has rehabilitation potential (expected to make a significant improvement within a reasonable period of time)  [X] Requires close medical management by a rehab physician, rehab nursing care, Hospitalist and comprehensive interdisciplinary team (including PT, OT and/or SLP, Prosthetics and Orthotics)

## 2024-04-01 NOTE — DISCHARGE NOTE NURSING/CASE MANAGEMENT/SOCIAL WORK - NSDCPEFALRISK_GEN_ALL_CORE
For information on Fall & Injury Prevention, visit: [email or web address] OR  [email or web address] OR  https://www.cdc.gov/steadi/patient.html

## 2024-04-01 NOTE — PATIENT PROFILE ADULT - FLU SEASON?
Chief Complaint: Newly diagnosed DM, arrives with DKA    History: denies n/v.  Wife at bedside, in process of learning diabetes management for discharge.  Patient Serum BG < 60, FS 79,  pt denies s/s of hypoglycemia.      MEDICATIONS  (STANDING):  dextrose 40% Gel 15 Gram(s) Oral once  dextrose 5%. 1000 milliLiter(s) (50 mL/Hr) IV Continuous <Continuous>  dextrose 5%. 1000 milliLiter(s) (100 mL/Hr) IV Continuous <Continuous>  dextrose 50% Injectable 25 Gram(s) IV Push once  dextrose 50% Injectable 12.5 Gram(s) IV Push once  dextrose 50% Injectable 25 Gram(s) IV Push once  enoxaparin Injectable 40 milliGRAM(s) SubCutaneous every 24 hours  glucagon  Injectable 1 milliGRAM(s) IntraMuscular once  influenza   Vaccine 0.5 milliLiter(s) IntraMuscular once  insulin glargine Injectable (LANTUS) 30 Unit(s) SubCutaneous at bedtime  insulin lispro (ADMELOG) corrective regimen sliding scale   SubCutaneous three times a day before meals  insulin lispro (ADMELOG) corrective regimen sliding scale   SubCutaneous at bedtime  insulin lispro Injectable (ADMELOG) 10 Unit(s) SubCutaneous three times a day before meals  pantoprazole    Tablet 40 milliGRAM(s) Oral before breakfast    MEDICATIONS  (PRN):  acetaminophen     Tablet .. 650 milliGRAM(s) Oral every 6 hours PRN Temp greater or equal to 38C (100.4F), Mild Pain (1 - 3)  aluminum hydroxide/magnesium hydroxide/simethicone Suspension 30 milliLiter(s) Oral every 4 hours PRN Dyspepsia  melatonin 3 milliGRAM(s) Oral at bedtime PRN Insomnia  ondansetron Injectable 4 milliGRAM(s) IV Push every 8 hours PRN Nausea and/or Vomiting      Allergies    No Known Allergies    Intolerances      Review of Systems:  Constitutional: No fever  ALL OTHER SYSTEMS REVIEWED AND NEGATIVE        PHYSICAL EXAM:  Vital Signs Last 24 Hrs  T(C): 36.3 (16 Dec 2021 12:54), Max: 36.6 (15 Dec 2021 21:35)  T(F): 97.3 (16 Dec 2021 12:54), Max: 97.9 (15 Dec 2021 21:35)  HR: 70 (16 Dec 2021 12:54) (68 - 74)  BP: 133/88 (16 Dec 2021 12:54) (132/89 - 135/76)  BP(mean): --  RR: 18 (16 Dec 2021 12:54) (17 - 19)  SpO2: 100% (16 Dec 2021 12:54) (99% - 100%)  GENERAL: NAD, well-developed  GI: Soft, nontender, non distended  SKIN: Dry, intact, No rashes or lesions  PSYCH: Alert and oriented x 3, normal affect, normal mood      CAPILLARY BLOOD GLUCOSE    POCT Blood Glucose.: 130 mg/dL (16 Dec 2021 12:24)  POCT Blood Glucose.: 79 mg/dL (16 Dec 2021 08:43)  POCT Blood Glucose.: 219 mg/dL (15 Dec 2021 21:48)  POCT Blood Glucose.: 91 mg/dL (15 Dec 2021 18:16)  POCT Blood Glucose.: 81 mg/dL (15 Dec 2021 12:07)  POCT Blood Glucose.: 132 mg/dL (15 Dec 2021 07:40)  POCT Blood Glucose.: 256 mg/dL (15 Dec 2021 00:46)  POCT Blood Glucose.: 240 mg/dL (14 Dec 2021 21:32)  POCT Blood Glucose.: 165 mg/dL (14 Dec 2021 17:29)      12-15    135  |  101  |  8   ----------------------------<  221<H>  3.4<L>   |  25  |  0.55    EGFR if : 137  EGFR if non : 118    Ca    9.3      12-15  Mg     1.80     12-15  Phos  2.9     12-15    TPro  7.4  /  Alb  4.4  /  TBili  0.5  /  DBili  x   /  AST  28  /  ALT  29  /  AlkPhos  99  12-13      A1C with Estimated Average Glucose (12.14.21 @ 01:53)    A1C with Estimated Average Glucose Result: 15.3 %    Estimated Average Glucose: 392         Yes...

## 2024-04-01 NOTE — H&P ADULT - NSHPLABSRESULTS_GEN_ALL_CORE
LABS:                        12.6   5.09  )-----------( 320      ( 01 Apr 2024 05:33 )             38.1     04-01    139  |  104  |  12.0  ----------------------------<  98  4.2   |  24.0  |  0.70    Ca    8.8      01 Apr 2024 05:33  Phos  3.4     04-01  Mg     2.0     04-01    CT Head No Cont (03.25.24 @ 11:55)   Mixed attenuated subdural hematoma is are again seen bilaterally with increase acute component when compared with the prior exam. Increased mass effect seen on the left lateral ventricle with increased left-to-right shift.    CT Head No Cont (03.25.24 @ 18:36)   Stable bilateral subdural hemorrhages. Stable left to right midline shift.    CT Head No Cont (03.26.24 @ 18:05)   Status post a left-sided subdural drainage catheter. Moderate pneumocephalus. Subtotal evacuation of a left-sided subdural hemorrhage. Stable right-sided subdural hemorrhage. Improved left to right midline shift..    CT Head No Cont (03.27.24 @ 09:40)   Status post a left-sided subdural drainage catheter. Moderate pneumocephalus, mildly decreased. Subtotal evacuation of a left-sided holohemispheric subdural hemorrhage, unchanged. Stable right-sided subdural hemorrhage. Improved left to right midline shift.    CT Head No Cont (03.28.24 @ 10:08)   Previously noted left-sided subdural hematoma has been removed.  Slight increase in size of previously noted left frontal extra-axial air.    CT Head No Cont (03.31.24 @ 13:05)   Stable bilateral subdural hemorrhages. Improved left-sided pneumocephalus.

## 2024-04-01 NOTE — PROGRESS NOTE ADULT - SUBJECTIVE AND OBJECTIVE BOX
Patient feels well.  No events over the weekend.  Pending AR GC.     FUNCTIONAL PROGRESS  3/28 PT  Transfer Skill: Bed to Chair   · Level of Golden	minimum assist (75% patients effort)  · Physical Assist/Nonphysical Assist	2 person assist  · Weight-Bearing Restrictions	weight-bearing as tolerated  · Assistive Device	bilateral hand held assist    Bed/Chair Transfer Safety Analysis:     · Impairments Contributing to Impaired Transfers	impaired balance; cognition; impaired coordination; impaired postural control; decreased strength  · Transfer Safety Concerns Noted	decreased weight-shifting ability; decreased safety awareness; decreased proprioception; decreased sequencing ability    Transfer: Sit to Stand:     · Level of Golden	minimum assist (75% patients effort)  · Physical Assist/Nonphysical Assist	2 person assist  · Weight-Bearing Restrictions	weight-bearing as tolerated  · Assistive Device	bilateral hand held assist    Transfer: Stand to Sit:     · Level of Golden	minimum assist (75% patients effort)  · Physical Assist/Nonphysical Assist	2 person assist  · Weight-Bearing Restrictions	weight-bearing as tolerated  · Assistive Device	bilateral hand held assist    Sit/Stand Transfer Safety Analysis:     · Transfer Safety Concerns Noted	decreased safety awareness; decreased weight-shifting ability; decreased sequencing ability  · Impairments Contributing to Impaired Transfers	impaired balance; impaired coordination; impaired postural control; narrow base of support; decreased strength; minimal to no clearance of BLEs, difficulty initiating movement    Gait Skills:     · Level of Golden	bed to chair only.    3/28 OT  Bathing Training:     · Level of Golden	moderate assist (50% patients effort)    Upper Body Dressing Training:     · Level of Golden	moderate assist (50% patients effort)    Lower Body Dressing Training:     · Level of Golden	moderate assist (50% patients effort)    Toilet Hygiene Training:     · Level of Golden	to be assessed    Grooming Training:     · Level of Golden	moderate assist (50% patients effort)    Eating/Self-Feeding Training:         VITALS  T(C): 36.8 (04-01-24 @ 07:55), Max: 37.2 (03-31-24 @ 16:11)  HR: 61 (04-01-24 @ 06:00) (59 - 87)  BP: 104/62 (04-01-24 @ 06:00) (99/77 - 129/72)  RR: 12 (04-01-24 @ 06:00) (11 - 18)  SpO2: 96% (04-01-24 @ 06:00) (94% - 100%)  Wt(kg): --    MEDICATIONS   acetaminophen     Tablet .. 975 milliGRAM(s) every 6 hours PRN  chlorhexidine 2% Cloths 1 Application(s) daily  enoxaparin Injectable 40 milliGRAM(s) <User Schedule>  escitalopram 5 milliGRAM(s) daily  hydrALAZINE Injectable 10 milliGRAM(s) every 2 hours PRN  labetalol Injectable 10 milliGRAM(s) every 2 hours PRN  levETIRAcetam   Injectable 500 milliGRAM(s) every 12 hours  oxyCODONE    IR 5 milliGRAM(s) every 4 hours PRN  oxyCODONE    IR 10 milliGRAM(s) every 4 hours PRN  polyethylene glycol 3350 17 Gram(s) daily  senna 2 Tablet(s) at bedtime      RECENT LABS/IMAGING  - Reviewed Today                        12.6   5.09  )-----------( 320      ( 01 Apr 2024 05:33 )             38.1     04-01    139  |  104  |  12.0  ----------------------------<  98  4.2   |  24.0  |  0.70    Ca    8.8      01 Apr 2024 05:33  Phos  3.4     04-01  Mg     2.0     04-01        Urinalysis Basic - ( 01 Apr 2024 05:33 )    Color: x / Appearance: x / SG: x / pH: x  Gluc: 98 mg/dL / Ketone: x  / Bili: x / Urobili: x   Blood: x / Protein: x / Nitrite: x   Leuk Esterase: x / RBC: x / WBC x   Sq Epi: x / Non Sq Epi: x / Bacteria: x              HEAD CT 3/20 - 1.  Status post middle meningeal artery embolization and anterior parietal craniotomies bilaterally.  A small amount of postoperative pneumocephalus is decreased from 03/18/2024.2.  Residual subdural collections overlying  both frontoparietal convexities, trace subdural hemorrhage along the cerebral falx and a low-attenuation subdural collection overlying the right cerebellar hemisphere are stable in size from 03/18/2024.3.  Regional mass effect in both cerebral convexities with 3 mm of midline shift to the right is stable. 4.  No new intracranial findings.    HEAD CT 3/25 - Mixed attenuated subdural hematoma is are again seen bilaterally with increase acute component when compared with the prior exam. Increased mass effect seen on the left lateral ventricle with increased left-to-right shift.    HEAD CT 3/25 - Stable bilateral subdural hemorrhages. Stable left to right midline shift.    EEG 3/26 - Mild to moderate diffuse/multi-focal cerebral dysfunction, not specific as to etiology. There were no epileptiform abnormalities or seizures recorded.      HEAD CT 3/26 - Status post a left-sided subdural drainage catheter. Moderate pneumocephalus. Subtotal evacuation of a left-sided subdural hemorrhage. Stable right-sided subdural hemorrhage. Improved left to right midline shift..    HEAD CT 3/28 - Previously noted left-sided subdural hematoma has been removed. Slight increase in size of previously   noted left frontal extra-axial air.    HEAD CT 3/31 - Stable bilateral subdural hemorrhages. Improved left-sided pneumocephalus.  ----------------------------------------------------------------------------------------  PHYSICAL EXAM  Constitutional - NAD, Comfortable  Extremities - No edema  Neurologic Exam -              Cognitive - AAO to self, PART situation     Communication - Delayed processing, Expressive deficits, +dysarthria     Cranial Nerves - Right lip droop      FUNCTIONAL MOTOR EXAM -                      RIGHT UE - ShAB 4/5, EF 4/5, EE 3/5,  5/5                    RIGHT LE - HF 4/5, KE 4/5, DF 5/5, PF 5/5     Sensory - Intact to LT  Psychiatric - Fatigued, Calm   ----------------------------------------------------------------------------------------  ASSESSMENT/PLAN  64yMale with functional deficits after expansion of SDH  Traumatic bilateral SDHs s/p craniotomies & B/L MMA embolization with worsened shift s/p craniotomy - Keppra  HTN - Labetalol, Hydralazine  Mood - Lexapro  Pain - Oxycodone, Tylenol   DVT PPX - SCDs, Lovenox  Rehab/Impaired mobility and function - Patient continues to require hospitalization for the above diagnoses and ongoing active management of comorbid complications (IV meds) that are substantially impairing functional ability and impairing quality of life necessitating ongoing medical management of these complications necessitating acute rehab.     When medically optimized, based on the patient's diagnosis, current functional status and potential for progress, recommend ACUTE inpatient rehabilitation for the functional deficits consisting of 3 hours of therapy/day & 24 hour RN/daily PMR physician for comorbid medical management. Patient will be able to tolerate 3 hours a day.     Will continue to follow. Rehab recommendations are dependent on how functional progress changes as well as how patient continues to participate and tolerate therapeutic interventions, WHICH MAY CHANGE UPON FOLLOW UP EVALUATIONS. Recommend ongoing mobilization by staff to maintain cardiopulmonary function and prevention of secondary complications related to debility. Discussed the specific management and recommendations above with rehab clinical care team/rehab liaison.

## 2024-04-01 NOTE — DISCHARGE NOTE PROVIDER - NSDCMRMEDTOKEN_GEN_ALL_CORE_FT
acetaminophen 325 mg oral tablet: 2 tab(s) orally every 6 hours As needed Mild Pain (1 - 3)  ascorbic acid 500 mg oral tablet: 1 tab(s) orally once a day  chlorhexidine 2% topical pad: 1 Apply topically to affected area once a day  escitalopram 5 mg oral tablet: 1 tab(s) orally once a day  labetalol 5 mg/mL intravenous solution: 2 milliliter(s) intravenous every 2 hours As needed SBP&gt;160  levETIRAcetam 100 mg/mL intravenous solution: 5 milliliter(s) intravenous every 12 hours  Lovenox 40 mg/0.4 mL injectable solution: 40 mL/kg injectable once a day (at bedtime) as needed for DVT prophylaxis  melatonin 3 mg oral tablet: 1 tab(s) orally once a day (at bedtime) As needed Insomnia  Multiple Vitamins oral tablet: 1 tab(s) orally once a day  oxyCODONE 10 mg oral tablet: 1 tab(s) orally every 4 hours As needed Severe Pain (7 - 10)  oxyCODONE 5 mg oral tablet: 1 tab(s) orally every 4 hours As needed Moderate Pain (4 - 6)  polyethylene glycol 3350 oral powder for reconstitution: 17 gram(s) orally once a day  senna leaf extract oral tablet: 2 tab(s) orally once a day (at bedtime)   acetaminophen 325 mg oral tablet: 2 tab(s) orally every 6 hours As needed Mild Pain (1 - 3)  ascorbic acid 500 mg oral tablet: 1 tab(s) orally once a day  chlorhexidine 2% topical pad: 1 Apply topically to affected area once a day  escitalopram 5 mg oral tablet: 1 tab(s) orally once a day  labetalol 5 mg/mL intravenous solution: 2 milliliter(s) intravenous every 2 hours As needed SBP&gt;160  levETIRAcetam 100 mg/mL intravenous solution: 5 milliliter(s) intravenous every 12 hours  Lovenox 40 mg/0.4 mL injectable solution: 40 mL/kg injectable once a day (at bedtime)  melatonin 3 mg oral tablet: 1 tab(s) orally once a day (at bedtime) As needed Insomnia  Multiple Vitamins oral tablet: 1 tab(s) orally once a day  oxyCODONE 10 mg oral tablet: 1 tab(s) orally every 4 hours As needed Severe Pain (7 - 10)  oxyCODONE 5 mg oral tablet: 1 tab(s) orally every 4 hours As needed Moderate Pain (4 - 6)  polyethylene glycol 3350 oral powder for reconstitution: 17 gram(s) orally once a day  senna leaf extract oral tablet: 2 tab(s) orally once a day (at bedtime)

## 2024-04-01 NOTE — H&P ADULT - HISTORY OF PRESENT ILLNESS
65 y/o RHD male with PMH of frequent falls, SDH s/p bilateral craniotomies for subdural evacuation (3/12/24 by Dr. Polanco) and b/l MMA embolization (3/19/24 by Dr. Weber) transferred to [facility] on 3/25 from [facility] due to confusion, right sided weakness and increased hemorrhage on CTH. Patient underwent left supratentorial craniotomy for evacuation of acute on chronic subdural hematoma (3/26). CTH 3/28 showed previously noted left-sided subdural hematoma has been removed, slight increase in size of previously noted left frontal extra-axial air. CTH 3/31 showed stable b/l SDH, improved left sided pneumocephalus. Right craniotomy staples and left subdural drain removed 3/28, right drain exit suture removed 3/29, left craniotomy staple removed 4/9, and left drain exit staple removed 4/11. Hospital course uncomplicated. Patient evaluated by PT/OT and was recommended to return to acute inpatient rehab. Patient is medically stable for discharge to [facility] on 4/1.

## 2024-04-01 NOTE — H&P ADULT - NSHPSOCIALHISTORY_GEN_ALL_CORE
SOCIAL HISTORY  Smoking - Denied  EtOH - Denied   Drugs - Denied    FUNCTIONAL HISTORY  Lives with ex-wife, brother in law, and son. PH, 5 SHELBIE w/o HR. No steps inside. Tub with doors  Prior Level of Function: Independent in ADLs and ambulation. Owns rollator.    CURRENT FUNCTIONAL STATUS  - Bed Mobility: min A  - Transfers: min A  - Gait: bed to chair only  - ADLs: modA bathing, UBD, LBD, grooming

## 2024-04-01 NOTE — DISCHARGE NOTE PROVIDER - NSDCCPCAREPLAN_GEN_ALL_CORE_FT
PRINCIPAL DISCHARGE DIAGNOSIS  Diagnosis: Acute on chronic intracranial subdural hematoma  Assessment and Plan of Treatment:

## 2024-04-01 NOTE — DISCHARGE NOTE PROVIDER - NSDCCPTREATMENT_GEN_ALL_CORE_FT
PRINCIPAL PROCEDURE  Procedure: Supratentorial craniotomy for evacuation of subdural hematoma  Findings and Treatment:

## 2024-04-02 LAB
ALBUMIN SERPL ELPH-MCNC: 2.8 G/DL — LOW (ref 3.3–5)
ALP SERPL-CCNC: 103 U/L — SIGNIFICANT CHANGE UP (ref 40–120)
ALT FLD-CCNC: 34 U/L — SIGNIFICANT CHANGE UP (ref 10–45)
ANION GAP SERPL CALC-SCNC: 7 MMOL/L — SIGNIFICANT CHANGE UP (ref 5–17)
AST SERPL-CCNC: 15 U/L — SIGNIFICANT CHANGE UP (ref 10–40)
BASOPHILS # BLD AUTO: 0.01 K/UL — SIGNIFICANT CHANGE UP (ref 0–0.2)
BASOPHILS NFR BLD AUTO: 0.2 % — SIGNIFICANT CHANGE UP (ref 0–2)
BILIRUB SERPL-MCNC: 0.4 MG/DL — SIGNIFICANT CHANGE UP (ref 0.2–1.2)
BUN SERPL-MCNC: 14 MG/DL — SIGNIFICANT CHANGE UP (ref 7–23)
CALCIUM SERPL-MCNC: 9.1 MG/DL — SIGNIFICANT CHANGE UP (ref 8.4–10.5)
CHLORIDE SERPL-SCNC: 104 MMOL/L — SIGNIFICANT CHANGE UP (ref 96–108)
CO2 SERPL-SCNC: 28 MMOL/L — SIGNIFICANT CHANGE UP (ref 22–31)
CREAT SERPL-MCNC: 0.76 MG/DL — SIGNIFICANT CHANGE UP (ref 0.5–1.3)
EGFR: 99 ML/MIN/1.73M2 — SIGNIFICANT CHANGE UP
EOSINOPHIL # BLD AUTO: 0.1 K/UL — SIGNIFICANT CHANGE UP (ref 0–0.5)
EOSINOPHIL NFR BLD AUTO: 2.1 % — SIGNIFICANT CHANGE UP (ref 0–6)
GLUCOSE SERPL-MCNC: 107 MG/DL — HIGH (ref 70–99)
HCT VFR BLD CALC: 38.9 % — LOW (ref 39–50)
HGB BLD-MCNC: 12.9 G/DL — LOW (ref 13–17)
IMM GRANULOCYTES NFR BLD AUTO: 0.4 % — SIGNIFICANT CHANGE UP (ref 0–0.9)
LYMPHOCYTES # BLD AUTO: 1.93 K/UL — SIGNIFICANT CHANGE UP (ref 1–3.3)
LYMPHOCYTES # BLD AUTO: 40.4 % — SIGNIFICANT CHANGE UP (ref 13–44)
MCHC RBC-ENTMCNC: 26.6 PG — LOW (ref 27–34)
MCHC RBC-ENTMCNC: 33.2 GM/DL — SIGNIFICANT CHANGE UP (ref 32–36)
MCV RBC AUTO: 80.2 FL — SIGNIFICANT CHANGE UP (ref 80–100)
MONOCYTES # BLD AUTO: 0.54 K/UL — SIGNIFICANT CHANGE UP (ref 0–0.9)
MONOCYTES NFR BLD AUTO: 11.3 % — SIGNIFICANT CHANGE UP (ref 2–14)
NEUTROPHILS # BLD AUTO: 2.18 K/UL — SIGNIFICANT CHANGE UP (ref 1.8–7.4)
NEUTROPHILS NFR BLD AUTO: 45.6 % — SIGNIFICANT CHANGE UP (ref 43–77)
NRBC # BLD: 0 /100 WBCS — SIGNIFICANT CHANGE UP (ref 0–0)
PLATELET # BLD AUTO: 313 K/UL — SIGNIFICANT CHANGE UP (ref 150–400)
POTASSIUM SERPL-MCNC: 4 MMOL/L — SIGNIFICANT CHANGE UP (ref 3.5–5.3)
POTASSIUM SERPL-SCNC: 4 MMOL/L — SIGNIFICANT CHANGE UP (ref 3.5–5.3)
PROT SERPL-MCNC: 6.7 G/DL — SIGNIFICANT CHANGE UP (ref 6–8.3)
RBC # BLD: 4.85 M/UL — SIGNIFICANT CHANGE UP (ref 4.2–5.8)
RBC # FLD: 13.2 % — SIGNIFICANT CHANGE UP (ref 10.3–14.5)
SODIUM SERPL-SCNC: 139 MMOL/L — SIGNIFICANT CHANGE UP (ref 135–145)
WBC # BLD: 4.78 K/UL — SIGNIFICANT CHANGE UP (ref 3.8–10.5)
WBC # FLD AUTO: 4.78 K/UL — SIGNIFICANT CHANGE UP (ref 3.8–10.5)

## 2024-04-02 PROCEDURE — 99233 SBSQ HOSP IP/OBS HIGH 50: CPT | Mod: GC

## 2024-04-02 RX ADMIN — LEVETIRACETAM 500 MILLIGRAM(S): 250 TABLET, FILM COATED ORAL at 17:26

## 2024-04-02 RX ADMIN — PANTOPRAZOLE SODIUM 40 MILLIGRAM(S): 20 TABLET, DELAYED RELEASE ORAL at 05:26

## 2024-04-02 RX ADMIN — ESCITALOPRAM OXALATE 5 MILLIGRAM(S): 10 TABLET, FILM COATED ORAL at 12:05

## 2024-04-02 RX ADMIN — LEVETIRACETAM 500 MILLIGRAM(S): 250 TABLET, FILM COATED ORAL at 05:26

## 2024-04-02 RX ADMIN — ENOXAPARIN SODIUM 40 MILLIGRAM(S): 100 INJECTION SUBCUTANEOUS at 21:15

## 2024-04-02 NOTE — DIETITIAN INITIAL EVALUATION ADULT - ADD RECOMMEND
1. Continue Lactovegetarian diet.   2. Recommend supplement [employer] 1.0 Standard 11oz PO Daily (Provides 325kcal-16grams of Protein) 1x/day to assist pt in meeting estimated protein energy needs.  3. Monitor PO intake, GI tolerance, skin integrity, labs, weight, and bowel movement regularity.   4. Honor food preferences as feasible. Assist with meals PRN and encourage PO intake.  5. Provide ongoing diet education as needed  6. RD remains available upon request and will follow-up per protocol

## 2024-04-02 NOTE — DIETITIAN INITIAL EVALUATION ADULT - PERTINENT MEDS FT
MEDICATIONS  (STANDING):  enoxaparin Injectable 40 milliGRAM(s) SubCutaneous <User Schedule>  escitalopram 5 milliGRAM(s) Oral daily  levETIRAcetam 500 milliGRAM(s) Oral two times a day  pantoprazole    Tablet 40 milliGRAM(s) Oral before breakfast  polyethylene glycol 3350 17 Gram(s) Oral daily  senna 2 Tablet(s) Oral at bedtime    MEDICATIONS  (PRN):  acetaminophen     Tablet .. 650 milliGRAM(s) Oral every 6 hours PRN Mild Pain (1 - 3)  oxyCODONE    IR 2.5 milliGRAM(s) Oral every 4 hours PRN Moderate Pain (4 - 6)  oxyCODONE    IR 5 milliGRAM(s) Oral every 4 hours PRN Severe Pain (7 - 10)

## 2024-04-02 NOTE — PROGRESS NOTE ADULT - SUBJECTIVE AND OBJECTIVE BOX
Patient is a 64y old  Male who presents with a chief complaint of Traumatic subdural hemorrhage without loss of consciousness, initial encounter  65 y/o RHD male with PMH of frequent falls, SDH s/p bilateral craniotomies for subdural evacuation (3/12/24 by Dr. Polanco) and b/l MMA embolization (3/19/24 by Dr. Weber) transferred to [facility] on 3/25 from [facility] due to confusion, right sided weakness and increased hemorrhage on CTH. Patient underwent left supratentorial craniotomy for evacuation of acute on chronic subdural hematoma (3/26). CTH 3/28 showed previously noted left-sided subdural hematoma has been removed, slight increase in size of previously noted left frontal extra-axial air. CTH 3/31 showed stable b/l SDH, improved left sided pneumocephalus. Right craniotomy staples and left subdural drain removed 3/28, right drain exit suture removed 3/29, left craniotomy staple removed 4/9, and left drain exit staple removed 4/11. Hospital course uncomplicated. Patient evaluated by PT/OT and was recommended to return to acute inpatient rehab. Patient is medically stable for discharge to [facility] on 4/1. (As per H&P note 4/01/24)       (02 Apr 2024 09:27)    HPI:  65 y/o RHD male with PMH of frequent falls, SDH s/p bilateral craniotomies for subdural evacuation (3/12/24 by Dr. Polanco) and b/l MMA embolization (3/19/24 by Dr. Weber) transferred to [facility] on 3/25 from [facility] due to confusion, right sided weakness and increased hemorrhage on CTH. Patient underwent left supratentorial craniotomy for evacuation of acute on chronic subdural hematoma (3/26). CTH 3/28 showed previously noted left-sided subdural hematoma has been removed, slight increase in size of previously noted left frontal extra-axial air. CTH 3/31 showed stable b/l SDH, improved left sided pneumocephalus. Right craniotomy staples and left subdural drain removed 3/28, right drain exit suture removed 3/29, left craniotomy staple removed 4/9, and left drain exit staple removed 4/11. Hospital course uncomplicated. Patient evaluated by PT/OT and was recommended to return to acute inpatient rehab. Patient is medically stable for discharge to [facility] on 4/1.     SUBJECTIVE/OBJECTIVE: Patient seen and evaluated while sitting in WC - had breakfast by himself.  In good spirit.  Recall being here and needed to be sent back for surgery.  Denies HA, dizziness, pain, CP, SOB, nausea, or urinary symptoms.    REVIEW OF SYSTEMS  + cog impairment (delay processing/response, reduced attention)  +motor apraxia  +mood stable    PHYSICAL EXAM  64y  Vital Signs Last 24 Hrs  T(C): 36.6 (02 Apr 2024 07:55), Max: 36.7 (01 Apr 2024 15:01)  T(F): 97.8 (02 Apr 2024 07:55), Max: 98.1 (01 Apr 2024 15:01)  HR: 67 (02 Apr 2024 07:55) (67 - 79)  BP: 110/74 (02 Apr 2024 07:55) (110/74 - 122/74)  RR: 16 (02 Apr 2024 07:55) (16 - 16)  SpO2: 98% (02 Apr 2024 07:55) (97% - 98%)    Constitutional - NAD, Comfortable  HEENT - NCAT, EOMI; frontal craniotomy +staples in place  Neck - Supple, No limited ROM  Chest - good chest expansion, good respiratory effort, CTAB   Cardio - warm and well perfused, no cyanosis   Abdomen -  Soft, NTND  Extremities - No peripheral edema, No calf tenderness   Neurologic Exam:                    Cognitive -             Orientation: Awake, Alert, AAO x4            Attention:  impaired, easily distracted      Speech - Fluent, Comprehensible, mild dysarthria, No aphasia   Cranial Nerves - No facial asymmetry, Tongue midline, EOMI, Shoulder shrug intact     Motor -                     LEFT    UE - ShAB 5/5, EF 5/5, EE 5/5, WE 5/5,  WNL                    RIGHT UE - ShAB 4/5, EF 5/5, EE 5/5, WE 5/5,  5/5                    LEFT    LE - HF 5/5, KE 5/5, DF 5/5, PF 5/5                    RIGHT LE - HF 4/5, KE 5/5, DF 5/5, PF 5/5        Coordination - FTN impaired  Psychiatric - Mood stable, Affect WNL    RECENT LABS:                        12.9   4.78  )-----------( 313      ( 02 Apr 2024 05:21 )             38.9     04-02    139  |  104  |  14  ----------------------------<  107<H>  4.0   |  28  |  0.76    Ca    9.1      02 Apr 2024 05:21  Phos  3.4     04-01  Mg     2.0     04-01    TPro  6.7  /  Alb  2.8<L>  /  TBili  0.4  /  DBili  x   /  AST  15  /  ALT  34  /  AlkPhos  103  04-02    LIVER FUNCTIONS - ( 02 Apr 2024 05:21 )  Alb: 2.8 g/dL / Pro: 6.7 g/dL / ALK PHOS: 103 U/L / ALT: 34 U/L / AST: 15 U/L / GGT: x           Allergies  Allergy Status Unknown    MEDICATIONS  (STANDING):  enoxaparin Injectable 40 milliGRAM(s) SubCutaneous <User Schedule>  escitalopram 5 milliGRAM(s) Oral daily  levETIRAcetam 500 milliGRAM(s) Oral two times a day  pantoprazole    Tablet 40 milliGRAM(s) Oral before breakfast  polyethylene glycol 3350 17 Gram(s) Oral daily  senna 2 Tablet(s) Oral at bedtime    MEDICATIONS  (PRN):  acetaminophen     Tablet .. 650 milliGRAM(s) Oral every 6 hours PRN Mild Pain (1 - 3)  oxyCODONE    IR 2.5 milliGRAM(s) Oral every 4 hours PRN Moderate Pain (4 - 6)  oxyCODONE    IR 5 milliGRAM(s) Oral every 4 hours PRN Severe Pain (7 - 10)

## 2024-04-02 NOTE — DIETITIAN INITIAL EVALUATION ADULT - REASON FOR ADMISSION
Traumatic subdural hemorrhage without loss of consciousness, initial encounter     Traumatic subdural hemorrhage without loss of consciousness, initial encounter  65 y/o RHD male with PMH of frequent falls, SDH s/p bilateral craniotomies for subdural evacuation (3/12/24 by Dr. Polanco) and b/l MMA embolization (3/19/24 by Dr. Weber) transferred to [facility] on 3/25 from [facility] due to confusion, right sided weakness and increased hemorrhage on CTH. Patient underwent left supratentorial craniotomy for evacuation of acute on chronic subdural hematoma (3/26). CTH 3/28 showed previously noted left-sided subdural hematoma has been removed, slight increase in size of previously noted left frontal extra-axial air. CTH 3/31 showed stable b/l SDH, improved left sided pneumocephalus. Right craniotomy staples and left subdural drain removed 3/28, right drain exit suture removed 3/29, left craniotomy staple removed 4/9, and left drain exit staple removed 4/11. Hospital course uncomplicated. Patient evaluated by PT/OT and was recommended to return to acute inpatient rehab. Patient is medically stable for discharge to [facility] on 4/1. (As per H&P note 4/01/24)

## 2024-04-02 NOTE — DIETITIAN INITIAL EVALUATION ADULT - OTHER INFO
Patient reported tolerating diet well with good appetite. Pt reports no recent weight changes. Patient is lactovegetarian, receptive to having dairy products. Denies any chewing/swallowing issues. Denies any N/V/D/C.  Pt noted with surgical incisions (right pariental, left parietal,  right parietal).No edema/pressure injuries noted. Patient is receptive to trying [place] 1.0 Standard 11oz PO Daily (Provides 325kcal-16grams of Protein) to encourage adequate PO intake.  Patient reported tolerating diet well with good appetite. Pt reports no recent weight changes. Patient is lactovegetarian, receptive to having dairy products. Denies any chewing/swallowing issues. Denies any N/V/D/C.  Pt noted with surgical incisions (right pariental, left parietal,  right parietal).No edema/pressure injuries noted. Patient is receptive to trying [employer] 1.0 Standard 11oz PO Daily (Provides 325kcal-16grams of Protein) to encourage adequate PO intake. Last BM noted on 4/02/24.

## 2024-04-02 NOTE — PROGRESS NOTE ADULT - ASSESSMENT
64y with PMH of frequent falls, SDH s/p bilateral craniotomies for subdural evacuation (3/12/24 by Dr. Polanco) and b/l MMA embolization (3/19/24 by Dr. Weber) transferred to [facility] on 3/25 from [facility] due to confusion, right sided weakness and increased hemorrhage on CTH. Patient underwent left supratentorial craniotomy for evacuation of acute on chronic subdural hematoma (3/26) Hospital Course uncomplicated. Patient now admitted for a multidisciplinary rehab program.     # Acute on Chronic SDH s/p craniotomy/evacuation  - SDH s/p bilateral craniotomies for subdural evacuation (3/12/24 by Dr. Polanco) and b/l MMA embolization (3/19/24 by Dr. Weber)  - confusion, right sided weakness and increased hemorrhage on CTH (3/25)  - left supratentorial craniotomy for evacuation of acute on chronic subdural hematoma (3/26)  - Impaired ADLs and mobility  - Need for assistance with personal care   - Start comprehensive rehab program of PT/OT/SLP - 3 hours a day, 5 days a week. P&O as needed   - Precautions: Fall, Aspiration, Seizure  - Keppra 500 mg BID    # Pain  - Tylenol PRN mild pain, Oxycodone 2.5 mg mod pain, oxycodone 5 mg severe pain  - Avoid sedating medications that may interfere with cognitive recovery    # Mood / Cognition  - Neuropsychology consult   - Recreation therapy  - Lexapro 5 mg QD - in good spirit.  consider incr if becomes labile    # GI / Bowel  - Senna qHS  - Miralax Daily  - GI ppx: protonix 40 mg QD    #  / Bladder  - Continue bladder scans Q8 hours with straight cath for >400cc.  - PVR 67, 270 - monitor til 2 consecutive read of <250cc  - Toileting schedule every 4 hours    # Skin / Pressure injury  - Skin assessment on admission performed - intact   - Monitor Incisions:    - Pressure Injury/Skin: OOB to chair, PT/OT  - nursing to monitor skin qShift    # Diet/Dysphagia:  - Diet Consistency: regular, vegan  - Aspiration Precautions  - SLP consult for swallow function evaluation and treatment  - Nutrition consult    # DVT prophylaxis:   - Lovenox 40 mg QD    Outpatient Follow-up:  Patel Polanco  Neurosurgery  [address] [place] [id number]  Phone: [phone]  Fax: [phone]  Follow Up Time: 1 month    Code Status/Emergency Contact:

## 2024-04-02 NOTE — DIETITIAN INITIAL EVALUATION ADULT - PERTINENT LABORATORY DATA
04-02    139  |  104  |  14  ----------------------------<  107<H>  4.0   |  28  |  0.76    Ca    9.1      02 Apr 2024 05:21  Phos  3.4     04-01  Mg     2.0     04-01    TPro  6.7  /  Alb  2.8<L>  /  TBili  0.4  /  DBili  x   /  AST  15  /  ALT  34  /  AlkPhos  103  04-02  A1C with Estimated Average Glucose Result: 5.6 % (03-11-24 @ 05:00)  A1C with Estimated Average Glucose Result: 5.5 % (01-22-24 @ 14:28)

## 2024-04-02 NOTE — DIETITIAN INITIAL EVALUATION ADULT - ORAL INTAKE PTA/DIET HISTORY
Patient reports good appetite at this time. No recent weight changes. Patient follows lactovegetarian diet. Likes yogurt but does not drink milk. No chewing/swallowing issues. NKFA

## 2024-04-03 PROCEDURE — 99223 1ST HOSP IP/OBS HIGH 75: CPT | Mod: 25

## 2024-04-03 PROCEDURE — 99233 SBSQ HOSP IP/OBS HIGH 50: CPT | Mod: GC

## 2024-04-03 RX ADMIN — PANTOPRAZOLE SODIUM 40 MILLIGRAM(S): 20 TABLET, DELAYED RELEASE ORAL at 05:12

## 2024-04-03 RX ADMIN — LEVETIRACETAM 500 MILLIGRAM(S): 250 TABLET, FILM COATED ORAL at 05:12

## 2024-04-03 RX ADMIN — ESCITALOPRAM OXALATE 5 MILLIGRAM(S): 10 TABLET, FILM COATED ORAL at 13:51

## 2024-04-03 RX ADMIN — LEVETIRACETAM 500 MILLIGRAM(S): 250 TABLET, FILM COATED ORAL at 16:45

## 2024-04-03 RX ADMIN — ENOXAPARIN SODIUM 40 MILLIGRAM(S): 100 INJECTION SUBCUTANEOUS at 21:51

## 2024-04-03 NOTE — PROGRESS NOTE ADULT - SUBJECTIVE AND OBJECTIVE BOX
Patient is a 64y old  Male who presents with a chief complaint of Traumatic subdural hemorrhage without loss of consciousness, initial encounter  63 y/o RHD male with PMH of frequent falls, SDH s/p bilateral craniotomies for subdural evacuation (3/12/24 by Dr. Polanco) and b/l MMA embolization (3/19/24 by Dr. Weber) transferred to [facility] on 3/25 from [facility] due to confusion, right sided weakness and increased hemorrhage on CTH. Patient underwent left supratentorial craniotomy for evacuation of acute on chronic subdural hematoma (3/26). CTH 3/28 showed previously noted left-sided subdural hematoma has been removed, slight increase in size of previously noted left frontal extra-axial air. CTH 3/31 showed stable b/l SDH, improved left sided pneumocephalus. Right craniotomy staples and left subdural drain removed 3/28, right drain exit suture removed 3/29, left craniotomy staple removed 4/9, and left drain exit staple removed 4/11. Hospital course uncomplicated. Patient evaluated by PT/OT and was recommended to return to acute inpatient rehab. Patient is medically stable for discharge to [facility] on 4/1. (As per H&P note 4/01/24)       (02 Apr 2024 09:27)    HPI:  63 y/o RHD male with PMH of frequent falls, SDH s/p bilateral craniotomies for subdural evacuation (3/12/24 by Dr. Polanco) and b/l MMA embolization (3/19/24 by Dr. Weber) transferred to [facility] on 3/25 from [facility] due to confusion, right sided weakness and increased hemorrhage on CTH. Patient underwent left supratentorial craniotomy for evacuation of acute on chronic subdural hematoma (3/26). CTH 3/28 showed previously noted left-sided subdural hematoma has been removed, slight increase in size of previously noted left frontal extra-axial air. CTH 3/31 showed stable b/l SDH, improved left sided pneumocephalus. Right craniotomy staples and left subdural drain removed 3/28, right drain exit suture removed 3/29, left craniotomy staple removed 4/9, and left drain exit staple removed 4/11. Hospital course uncomplicated. Patient evaluated by PT/OT and was recommended to return to acute inpatient rehab. Patient is medically stable for discharge to [facility] on 4/1.     SUBJECTIVE/OBJECTIVE: Patient seen and examined while sitting in the wheelchair. States he slept. Denies pain. Slight pain at incision site.     REVIEW OF SYSTEMS  + cog impairment (delay processing/response, reduced attention)  +motor apraxia  +mood stable    PHYSICAL EXAM  64y  Vital Signs Last 24 Hrs  T(C): 36.6 (02 Apr 2024 07:55), Max: 36.7 (01 Apr 2024 15:01)  T(F): 97.8 (02 Apr 2024 07:55), Max: 98.1 (01 Apr 2024 15:01)  HR: 67 (02 Apr 2024 07:55) (67 - 79)  BP: 110/74 (02 Apr 2024 07:55) (110/74 - 122/74)  RR: 16 (02 Apr 2024 07:55) (16 - 16)  SpO2: 98% (02 Apr 2024 07:55) (97% - 98%)    Constitutional - NAD, Comfortable  HEENT - NCAT, EOMI; frontal craniotomy +staples in place  Neck - Supple, No limited ROM  Chest - good chest expansion, good respiratory effort, CTAB   Cardio - warm and well perfused, no cyanosis   Abdomen -  Soft, NTND  Extremities - No peripheral edema, No calf tenderness   Neurologic Exam:                    Cognitive -             Orientation: Awake, Alert, AAO x4            Attention:  impaired, easily distracted      Speech - Fluent, Comprehensible, mild dysarthria, No aphasia   Cranial Nerves - No facial asymmetry, Tongue midline, EOMI, Shoulder shrug intact     Motor -                     LEFT    UE - ShAB 5/5, EF 5/5, EE 5/5, WE 5/5,  WNL                    RIGHT UE - ShAB 5/5, EF 5/5, EE 5/5, WE 5/5,  5/5                    LEFT    LE - HF 5/5, KE 5/5, DF 5/5, PF 5/5                    RIGHT LE - HF 4/5, KE 5/5, DF 5/5, PF 5/5        Coordination - FTN impaired  Psychiatric - Mood stable, Affect WNL    RECENT LABS:                        12.9   4.78  )-----------( 313      ( 02 Apr 2024 05:21 )             38.9     04-02    139  |  104  |  14  ----------------------------<  107<H>  4.0   |  28  |  0.76    Ca    9.1      02 Apr 2024 05:21  Phos  3.4     04-01  Mg     2.0     04-01    TPro  6.7  /  Alb  2.8<L>  /  TBili  0.4  /  DBili  x   /  AST  15  /  ALT  34  /  AlkPhos  103  04-02    LIVER FUNCTIONS - ( 02 Apr 2024 05:21 )  Alb: 2.8 g/dL / Pro: 6.7 g/dL / ALK PHOS: 103 U/L / ALT: 34 U/L / AST: 15 U/L / GGT: x           Allergies  Allergy Status Unknown    MEDICATIONS  (STANDING):  enoxaparin Injectable 40 milliGRAM(s) SubCutaneous <User Schedule>  escitalopram 5 milliGRAM(s) Oral daily  levETIRAcetam 500 milliGRAM(s) Oral two times a day  pantoprazole    Tablet 40 milliGRAM(s) Oral before breakfast  polyethylene glycol 3350 17 Gram(s) Oral daily  senna 2 Tablet(s) Oral at bedtime    MEDICATIONS  (PRN):  acetaminophen     Tablet .. 650 milliGRAM(s) Oral every 6 hours PRN Mild Pain (1 - 3)  oxyCODONE    IR 2.5 milliGRAM(s) Oral every 4 hours PRN Moderate Pain (4 - 6)  oxyCODONE    IR 5 milliGRAM(s) Oral every 4 hours PRN Severe Pain (7 - 10)

## 2024-04-03 NOTE — CONSULT NOTE ADULT - SUBJECTIVE AND OBJECTIVE BOX
History of Present Illness:  Reason for Admission: acute on chronic SDH s/p craniotomy/evacuation  History of Present Illness:   65 y/o RHD male with PMH of frequent falls, SDH s/p bilateral craniotomies for subdural evacuation (3/12/24 by Dr. Polanco) and b/l MMA embolization (3/19/24 by Dr. Weber) transferred to [facility] on 3/25 from [facility] due to confusion, right sided weakness and increased hemorrhage on CTH. Patient underwent left supratentorial craniotomy for evacuation of acute on chronic subdural hematoma (3/26). CTH 3/28 showed previously noted left-sided subdural hematoma has been removed, slight increase in size of previously noted left frontal extra-axial air. CTH 3/31 showed stable b/l SDH, improved left sided pneumocephalus. Right craniotomy staples and left subdural drain removed 3/28, right drain exit suture removed 3/29, left craniotomy staple removed 4/9, and left drain exit staple removed 4/11. Hospital course uncomplicated. Patient evaluated by PT/OT and was recommended to return to acute inpatient rehab. Patient is medically stable for discharge to [facility] on 4/1.       Review of Systems:  Review of Systems: REVIEW OF SYSTEMS  Constitutional: No fever, No Chills, No fatigue  HEENT: No eye pain, No visual disturbances, No difficulty hearing  Pulm: No cough,  No shortness of breath  Cardio: No chest pain, No palpitations  GI:  No abdominal pain, No nausea, No vomiting, No diarrhea, No constipation  : No dysuria, No frequency, No hematuria  Neuro: No headaches, No memory loss, No loss of strength, No numbness, No tremors  Skin: No itching, No rashes, No lesions   Endo: No temperature intolerance  MSK: No joint pain, No joint swelling, No muscle pain, No Neck or back pain  Psych:  No depression, No anxiety      Allergies and Intolerances:        Allergies:  	Allergy Status Unknown:   	No Known Allergies:     Home Medications:   * Patient Currently Takes Medications as of 01-Apr-2024 09:18 documented in Structured Notes  · 	Lovenox 40 mg/0.4 mL injectable solution: 40 mL/kg injectable once a day (at bedtime) as needed for DVT prophylaxis  · 	senna leaf extract oral tablet: 2 tab(s) orally once a day (at bedtime)  · 	labetalol 5 mg/mL intravenous solution: 2 milliliter(s) intravenous every 2 hours As needed SBP>160  · 	chlorhexidine 2% topical pad: 1 Apply topically to affected area once a day  · 	levETIRAcetam 100 mg/mL intravenous solution: 5 milliliter(s) intravenous every 12 hours  · 	oxyCODONE 10 mg oral tablet: 1 tab(s) orally every 4 hours As needed Severe Pain (7 - 10)  · 	oxyCODONE 5 mg oral tablet: 1 tab(s) orally every 4 hours As needed Moderate Pain (4 - 6)  · 	melatonin 3 mg oral tablet: 1 tab(s) orally once a day (at bedtime) As needed Insomnia  · 	escitalopram 5 mg oral tablet: 1 tab(s) orally once a day  · 	polyethylene glycol 3350 oral powder for reconstitution: 17 gram(s) orally once a day  · 	Multiple Vitamins oral tablet: 1 tab(s) orally once a day  · 	ascorbic acid 500 mg oral tablet: 1 tab(s) orally once a day  · 	acetaminophen 325 mg oral tablet: 2 tab(s) orally every 6 hours As needed Mild Pain (1 - 3)    Patient History:    Past Medical, Past Surgical, and Family History:  PAST MEDICAL HISTORY:  No pertinent past medical history     Syncope     Traumatic subdural hematoma (SDH).     PAST SURGICAL HISTORY:  H/O cerebral embolism     S/P craniotomy.     Social History:  · Substance use	No  · Social History (marital status, living situation, occupation, and sexual history)	SOCIAL HISTORY  Smoking - Denied  EtOH - Denied   Drugs - Denied    FUNCTIONAL HISTORY  Lives with ex-wife, brother in law, and son. PH, 5 SHELBIE w/o HR. No steps inside. Tub with doors  Prior Level of Function: Independent in ADLs and ambulation. Owns rollator.    CURRENT FUNCTIONAL STATUS  - Bed Mobility: min A  - Transfers: min A  - Gait: bed to chair only  - ADLs: modA bathing, UBD, LBD, grooming

## 2024-04-03 NOTE — PROGRESS NOTE ADULT - ASSESSMENT
64y with PMH of frequent falls, SDH s/p bilateral craniotomies for subdural evacuation (3/12/24 by Dr. Polanco) and b/l MMA embolization (3/19/24 by Dr. Weber) transferred to [facility] on 3/25 from [facility] due to confusion, right sided weakness and increased hemorrhage on CTH. Patient underwent left supratentorial craniotomy for evacuation of acute on chronic subdural hematoma (3/26) Hospital Course uncomplicated. Patient now admitted for a multidisciplinary rehab program.     # Acute on Chronic SDH s/p craniotomy/evacuation  - SDH s/p bilateral craniotomies for subdural evacuation (3/12/24 by Dr. Polanco) and b/l MMA embolization (3/19/24 by Dr. Weber)  - confusion, right sided weakness and increased hemorrhage on CTH (3/25)  - left supratentorial craniotomy for evacuation of acute on chronic subdural hematoma (3/26)  - Impaired ADLs and mobility  - Need for assistance with personal care   - Start comprehensive rehab program of PT/OT/SLP - 3 hours a day, 5 days a week. P&O as needed   - Precautions: Fall, Aspiration, Seizure  - Keppra 500 mg BID    # Pain  - Tylenol PRN mild pain, Oxycodone 2.5 mg mod pain, oxycodone 5 mg severe pain  - Avoid sedating medications that may interfere with cognitive recovery    # Mood / Cognition  - Neuropsychology consult   - Recreation therapy  - Lexapro 5 mg QD     # GI / Bowel  - Senna qHS  - Miralax Daily  - GI ppx: protonix 40 mg QD    #  / Bladder  - low PVR's, dc bladder scans   - Toileting schedule every 4 hours    # Skin / Pressure injury  - Skin assessment on admission performed - intact   - Monitor Incisions:    - Pressure Injury/Skin: OOB to chair, PT/OT  - nursing to monitor skin qShift    # Diet/Dysphagia:  - Diet Consistency: regular, vegan  - Aspiration Precautions  - SLP consult for swallow function evaluation and treatment  - Nutrition consult    # DVT prophylaxis:   - Lovenox 40 mg QD    Outpatient Follow-up:  Patel Polanco  Neurosurgery  [address] [place] [id number]  Phone: [phone]  Fax: [phone]  Follow Up Time: 1 month      IDT 4/3  SW: Lives with ex-wife, brother in law, and son. PH, 5 SHELBIE w/o HR. No steps inside  SLP: reg, mod-severe cog, language breakdow at higher level tasks, high level better with native tongue (jaydon), reduced fluency and naming, is a functional communicator  OT: Setup eat/groom, min A UBD/LBD/toileting/toilet transfer, mod A bathing.  Overall min A ambulation/ADL. Goal: SV ADLs and shower transfer  PT: CGA bed mob, min A transfer, amb 50' RW min-mod A with close WC follow.  4 steps and 2 HR CG-SV  Team Goals: amb to bathroom SV.  Improve standing balance for functional task  TDD: 4/13 home    Interdisciplinary team meeting today with speech therapist, occupational therapist, physical therapist, social work and nursing where medical updates provided, progress with therapies and goals discussed. Plan of care reviewed. Team conference note documented on wellsky.    Total time 50 mins-face to face time encounter and counseling the patient, meeting with hospitalist, nursing staff, therapists and social work to discuss medical updates and management, care coordination, reviewing chart and data-     64y with PMH of frequent falls, SDH s/p bilateral craniotomies for subdural evacuation (3/12/24 by Dr. Polanco) and b/l MMA embolization (3/19/24 by Dr. Weber) transferred to [facility] on 3/25 from [facility] due to confusion, right sided weakness and increased hemorrhage on CTH. Patient underwent left supratentorial craniotomy for evacuation of acute on chronic subdural hematoma (3/26) Hospital Course uncomplicated. Patient now admitted for a multidisciplinary rehab program.     # Acute on Chronic SDH s/p craniotomy/evacuation  - SDH s/p bilateral craniotomies for subdural evacuation (3/12/24 by Dr. Polanco) and b/l MMA embolization (3/19/24 by Dr. Weber)  - confusion, right sided weakness and increased hemorrhage on CTH (3/25)  - left supratentorial craniotomy for evacuation of acute on chronic subdural hematoma (3/26)  - Impaired ADLs and mobility  - Need for assistance with personal care   - Start comprehensive rehab program of PT/OT/SLP - 3 hours a day, 5 days a week. P&O as needed   - Precautions: Fall, Aspiration, Seizure  - Keppra 500 mg BID    # Pain  - Tylenol PRN mild pain, Oxycodone 2.5 mg mod pain, oxycodone 5 mg severe pain  - Avoid sedating medications that may interfere with cognitive recovery    # Mood / Cognition  - Neuropsychology consult pending  - Recreation therapy  - Lexapro 5 mg QD - mood better    # GI / Bowel  - Senna qHS  - Miralax Daily  - GI ppx: protonix 40 mg QD    #  / Bladder  - low PVR's, dc bladder scans   - Toileting schedule every 4 hours    # Skin / Pressure injury  - Skin assessment on admission performed - intact   - Monitor Incisions:    - Pressure Injury/Skin: OOB to chair, PT/OT  - nursing to monitor skin qShift    # Diet/Dysphagia:  - Diet Consistency: regular, vegan  - Aspiration Precautions  - SLP consult for swallow function evaluation and treatment  - Nutrition consult    # DVT prophylaxis:   - Lovenox 40 mg QD    Outpatient Follow-up:  Patel Polanco  Neurosurgery  [address] [place] [id number]  Phone: [phone]  Fax: [phone]  Follow Up Time: 1 month      IDT 4/3  SW: Lives with ex-wife, brother in law, and son. PH, 5 SHELBIE w/o HR. No steps inside  SLP: reg, mod-severe cog, language breakdow at higher level tasks, high level better with native tongue (jaydon), reduced fluency and naming, is a functional communicator  OT: Setup eat/groom, min A UBD/LBD/toileting/toilet transfer, mod A bathing.  Overall min A ambulation/ADL. Goal: SV ADLs and shower transfer  PT: CGA bed mob, min A transfer, amb 50' RW min-mod A with close WC follow.  4 steps and 2 HR CG-SV  Team Goals: amb to bathroom SV.  Improve standing balance for functional task  TDD: 4/13 home    Interdisciplinary team meeting today with speech therapist, occupational therapist, physical therapist, social work and nursing where medical updates provided, progress with therapies and goals discussed. Plan of care reviewed. Team conference note documented on gailky.    Total time 50 mins-face to face time encounter and counseling the patient, meeting with hospitalist, nursing staff, therapists and social work to discuss medical updates and management, care coordination, reviewing chart and data-

## 2024-04-04 PROBLEM — Z78.9 OTHER SPECIFIED HEALTH STATUS: Chronic | Status: INACTIVE | Noted: 2024-01-21 | Resolved: 2024-03-21

## 2024-04-04 LAB
ALBUMIN SERPL ELPH-MCNC: 3 G/DL — LOW (ref 3.3–5)
ALP SERPL-CCNC: 109 U/L — SIGNIFICANT CHANGE UP (ref 40–120)
ALT FLD-CCNC: 33 U/L — SIGNIFICANT CHANGE UP (ref 10–45)
ANION GAP SERPL CALC-SCNC: 6 MMOL/L — SIGNIFICANT CHANGE UP (ref 5–17)
AST SERPL-CCNC: 17 U/L — SIGNIFICANT CHANGE UP (ref 10–40)
BILIRUB SERPL-MCNC: 0.5 MG/DL — SIGNIFICANT CHANGE UP (ref 0.2–1.2)
BUN SERPL-MCNC: 8 MG/DL — SIGNIFICANT CHANGE UP (ref 7–23)
CALCIUM SERPL-MCNC: 9.4 MG/DL — SIGNIFICANT CHANGE UP (ref 8.4–10.5)
CHLORIDE SERPL-SCNC: 101 MMOL/L — SIGNIFICANT CHANGE UP (ref 96–108)
CO2 SERPL-SCNC: 30 MMOL/L — SIGNIFICANT CHANGE UP (ref 22–31)
CREAT SERPL-MCNC: 0.79 MG/DL — SIGNIFICANT CHANGE UP (ref 0.5–1.3)
EGFR: 98 ML/MIN/1.73M2 — SIGNIFICANT CHANGE UP
GLUCOSE SERPL-MCNC: 106 MG/DL — HIGH (ref 70–99)
HCT VFR BLD CALC: 40 % — SIGNIFICANT CHANGE UP (ref 39–50)
HGB BLD-MCNC: 12.7 G/DL — LOW (ref 13–17)
MCHC RBC-ENTMCNC: 26.3 PG — LOW (ref 27–34)
MCHC RBC-ENTMCNC: 31.8 GM/DL — LOW (ref 32–36)
MCV RBC AUTO: 82.8 FL — SIGNIFICANT CHANGE UP (ref 80–100)
NRBC # BLD: 0 /100 WBCS — SIGNIFICANT CHANGE UP (ref 0–0)
PLATELET # BLD AUTO: 313 K/UL — SIGNIFICANT CHANGE UP (ref 150–400)
POTASSIUM SERPL-MCNC: 4.6 MMOL/L — SIGNIFICANT CHANGE UP (ref 3.5–5.3)
POTASSIUM SERPL-SCNC: 4.6 MMOL/L — SIGNIFICANT CHANGE UP (ref 3.5–5.3)
PROT SERPL-MCNC: 6.8 G/DL — SIGNIFICANT CHANGE UP (ref 6–8.3)
RBC # BLD: 4.83 M/UL — SIGNIFICANT CHANGE UP (ref 4.2–5.8)
RBC # FLD: 13.4 % — SIGNIFICANT CHANGE UP (ref 10.3–14.5)
SODIUM SERPL-SCNC: 137 MMOL/L — SIGNIFICANT CHANGE UP (ref 135–145)
WBC # BLD: 5.12 K/UL — SIGNIFICANT CHANGE UP (ref 3.8–10.5)
WBC # FLD AUTO: 5.12 K/UL — SIGNIFICANT CHANGE UP (ref 3.8–10.5)

## 2024-04-04 PROCEDURE — 99233 SBSQ HOSP IP/OBS HIGH 50: CPT | Mod: GC

## 2024-04-04 RX ADMIN — PANTOPRAZOLE SODIUM 40 MILLIGRAM(S): 20 TABLET, DELAYED RELEASE ORAL at 05:56

## 2024-04-04 RX ADMIN — ESCITALOPRAM OXALATE 5 MILLIGRAM(S): 10 TABLET, FILM COATED ORAL at 11:32

## 2024-04-04 RX ADMIN — ENOXAPARIN SODIUM 40 MILLIGRAM(S): 100 INJECTION SUBCUTANEOUS at 21:34

## 2024-04-04 RX ADMIN — LEVETIRACETAM 500 MILLIGRAM(S): 250 TABLET, FILM COATED ORAL at 17:57

## 2024-04-04 RX ADMIN — LEVETIRACETAM 500 MILLIGRAM(S): 250 TABLET, FILM COATED ORAL at 05:56

## 2024-04-04 NOTE — PROGRESS NOTE ADULT - ASSESSMENT
64y with PMH of frequent falls, SDH s/p bilateral craniotomies for subdural evacuation (3/12/24 by Dr. Polanco) and b/l MMA embolization (3/19/24 by Dr. Weber) transferred to [facility] on 3/25 from [facility] due to confusion, right sided weakness and increased hemorrhage on CTH. Patient underwent left supratentorial craniotomy for evacuation of acute on chronic subdural hematoma (3/26) Hospital Course uncomplicated. Patient now admitted for a multidisciplinary rehab program.     # Acute on Chronic SDH s/p craniotomy/evacuation  - SDH s/p bilateral craniotomies for subdural evacuation (3/12/24 by Dr. Polanco) and b/l MMA embolization (3/19/24 by Dr. Weber)  - confusion, right sided weakness and increased hemorrhage on CTH (3/25)  - left supratentorial craniotomy for evacuation of acute on chronic subdural hematoma (3/26), staples intact, remove end of next week  - Impaired ADLs and mobility  - Need for assistance with personal care   - Start comprehensive rehab program of PT/OT/SLP - 3 hours a day, 5 days a week. P&O as needed   - Precautions: Fall, Aspiration, Seizure  - Keppra 500 mg BID    #Anemia  HgB 12.7 (4/4)    # Pain  - Tylenol PRN mild pain, Oxycodone 2.5 mg mod pain, oxycodone 5 mg severe pain--dc'd ocycodone, not taking 4/4  - Avoid sedating medications that may interfere with cognitive recovery    # Mood / Cognition  - Neuropsychology consult pending  - Recreation therapy  - Lexapro 5 mg QD - mood better    # GI / Bowel  - Senna qHS  - Miralax Daily  - GI ppx: protonix 40 mg QD    #  / Bladder  - dc PVR's  - Toileting schedule every 4 hours    # Skin / Pressure injury  - Skin assessment on admission performed - intact   - Monitor Incisions:    - Pressure Injury/Skin: OOB to chair, PT/OT  - nursing to monitor skin qShift    # Diet/Dysphagia:  - Diet Consistency: regular, vegan  - Aspiration Precautions  - SLP consult for swallow function evaluation and treatment  - Nutrition consult    # DVT prophylaxis:   - Lovenox 40 mg QD    Outpatient Follow-up:  Patel Polanco  Neurosurgery  [address] [place] [address]  Phone: [phone]  Fax: [phone]  Follow Up Time: 1 month      IDT 4/3  SW: Lives with ex-wife, brother in law, and son. PH, 5 SHELBIE w/o HR. No steps inside  SLP: reg, mod-severe cog, language breakdow at higher level tasks, high level better with native tongue (jaydon), reduced fluency and naming, is a functional communicator  OT: Setup eat/groom, min A UBD/LBD/toileting/toilet transfer, mod A bathing.  Overall min A ambulation/ADL. Goal: SV ADLs and shower transfer  PT: CGA bed mob, min A transfer, amb 50' RW min-mod A with close WC follow.  4 steps and 2 HR CG-SV  Team Goals: amb to bathroom SV.  Improve standing balance for functional task  TDD: 4/13 home    reviewed labs--CBC/CMP  Discussed IDT meeting with patient and plan for discharge next saturday  Total time 50 mins-face to face time encounter and counseling the patient, meeting with hospitalist, nursing staff, therapists and social work to discuss medical updates and management, care coordination, reviewing chart and data

## 2024-04-04 NOTE — PROGRESS NOTE ADULT - SUBJECTIVE AND OBJECTIVE BOX
Patient is a 64y old  Male who presents with a chief complaint of acute on chronic SDH s/p craniotomy/evacuation (03 Apr 2024 13:56)      HPI:  65 y/o RHD male with PMH of frequent falls, SDH s/p bilateral craniotomies for subdural evacuation (3/12/24 by Dr. Polanco) and b/l MMA embolization (3/19/24 by Dr. Weber) transferred to [facility] on 3/25 from [facility] due to confusion, right sided weakness and increased hemorrhage on CTH. Patient underwent left supratentorial craniotomy for evacuation of acute on chronic subdural hematoma (3/26). CTH 3/28 showed previously noted left-sided subdural hematoma has been removed, slight increase in size of previously noted left frontal extra-axial air. CTH 3/31 showed stable b/l SDH, improved left sided pneumocephalus. Right craniotomy staples and left subdural drain removed 3/28, right drain exit suture removed 3/29, left craniotomy staple removed 4/9, and left drain exit staple removed 4/11. Hospital course uncomplicated. Patient evaluated by PT/OT and was recommended to return to acute inpatient rehab. Patient is medically stable for discharge to [facility] on 4/1. (01 Apr 2024 12:49)        SUBJECTIVE: Patient seen and examined. No acute overnight events, slept well. Denies headaches. Having BM's      REVIEW OF SYSTEMS  Constitutional: No fever  Cardio: No chest pain, No palpitations  Resp: No SOB, no respiratory distress   Neuro: No headaches +weakness    VITALS  64y  Vital Signs Last 24 Hrs  T(C): 36.3 (03 Apr 2024 19:54), Max: 36.3 (03 Apr 2024 19:54)  T(F): 97.4 (03 Apr 2024 19:54), Max: 97.4 (03 Apr 2024 19:54)  HR: 58 (03 Apr 2024 19:54) (58 - 58)  BP: 128/77 (03 Apr 2024 19:54) (128/77 - 128/77)  BP(mean): --  RR: 15 (03 Apr 2024 19:54) (15 - 15)  SpO2: 97% (03 Apr 2024 19:54) (97% - 97%)    Parameters below as of 03 Apr 2024 19:54  Patient On (Oxygen Delivery Method): room air      Daily     Daily         PHYSICAL EXAM:       RECENT LABS:                        12.7   5.12  )-----------( 313      ( 04 Apr 2024 06:19 )             40.0     04-04    137  |  101  |  8   ----------------------------<  106<H>  4.6   |  30  |  0.79    Ca    9.4      04 Apr 2024 06:19    TPro  6.8  /  Alb  3.0<L>  /  TBili  0.5  /  DBili  x   /  AST  17  /  ALT  33  /  AlkPhos  109  04-04    LIVER FUNCTIONS - ( 04 Apr 2024 06:19 )  Alb: 3.0 g/dL / Pro: 6.8 g/dL / ALK PHOS: 109 U/L / ALT: 33 U/L / AST: 17 U/L / GGT: x             Urinalysis Basic - ( 04 Apr 2024 06:19 )    Color: x / Appearance: x / SG: x / pH: x  Gluc: 106 mg/dL / Ketone: x  / Bili: x / Urobili: x   Blood: x / Protein: x / Nitrite: x   Leuk Esterase: x / RBC: x / WBC x   Sq Epi: x / Non Sq Epi: x / Bacteria: x          CAPILLARY BLOOD GLUCOSE            MEDICATIONS:  MEDICATIONS  (STANDING):  enoxaparin Injectable 40 milliGRAM(s) SubCutaneous <User Schedule>  escitalopram 5 milliGRAM(s) Oral daily  levETIRAcetam 500 milliGRAM(s) Oral two times a day  pantoprazole    Tablet 40 milliGRAM(s) Oral before breakfast  polyethylene glycol 3350 17 Gram(s) Oral daily  senna 2 Tablet(s) Oral at bedtime    MEDICATIONS  (PRN):  acetaminophen     Tablet .. 650 milliGRAM(s) Oral every 6 hours PRN Mild Pain (1 - 3)  oxyCODONE    IR 2.5 milliGRAM(s) Oral every 4 hours PRN Moderate Pain (4 - 6)  oxyCODONE    IR 5 milliGRAM(s) Oral every 4 hours PRN Severe Pain (7 - 10)     Patient is a 64y old  Male who presents with a chief complaint of acute on chronic SDH s/p craniotomy/evacuation (03 Apr 2024 13:56)      HPI:  63 y/o RHD male with PMH of frequent falls, SDH s/p bilateral craniotomies for subdural evacuation (3/12/24 by Dr. Polanco) and b/l MMA embolization (3/19/24 by Dr. Weber) transferred to [facility] on 3/25 from [facility] due to confusion, right sided weakness and increased hemorrhage on CTH. Patient underwent left supratentorial craniotomy for evacuation of acute on chronic subdural hematoma (3/26). CTH 3/28 showed previously noted left-sided subdural hematoma has been removed, slight increase in size of previously noted left frontal extra-axial air. CTH 3/31 showed stable b/l SDH, improved left sided pneumocephalus. Right craniotomy staples and left subdural drain removed 3/28, right drain exit suture removed 3/29, left craniotomy staple removed 4/9, and left drain exit staple removed 4/11. Hospital course uncomplicated. Patient evaluated by PT/OT and was recommended to return to acute inpatient rehab. Patient is medically stable for discharge to [facility] on 4/1. (01 Apr 2024 12:49)        SUBJECTIVE: Patient seen and examined. No acute overnight events, slept well. Denies headaches. Having BM's      REVIEW OF SYSTEMS  Constitutional: No fever  Cardio: No chest pain, No palpitations  Resp: No SOB, no respiratory distress   Neuro: No headaches +weakness    VITALS  64y  Vital Signs Last 24 Hrs  T(C): 36.3 (03 Apr 2024 19:54), Max: 36.3 (03 Apr 2024 19:54)  T(F): 97.4 (03 Apr 2024 19:54), Max: 97.4 (03 Apr 2024 19:54)  HR: 58 (03 Apr 2024 19:54) (58 - 58)  BP: 128/77 (03 Apr 2024 19:54) (128/77 - 128/77)  BP(mean): --  RR: 15 (03 Apr 2024 19:54) (15 - 15)  SpO2: 97% (03 Apr 2024 19:54) (97% - 97%)    Parameters below as of 03 Apr 2024 19:54  Patient On (Oxygen Delivery Method): room air      Daily     Daily         PHYSICAL EXAM:   Constitutional - NAD, Comfortable  HEENT - NCAT, EOMI; frontal craniotomy +staples in place  Neck - Supple, No limited ROM  Chest - good chest expansion, good respiratory effort, CTAB   Cardio - warm and well perfused, no cyanosis   Abdomen -  Soft, NTND  Extremities - No peripheral edema, No calf tenderness   Neurologic Exam:                    Cognitive -             Orientation: Awake, Alert, AAO x4            Attention:  impaired, easily distracted      Speech - Fluent, Comprehensible, mild dysarthria, No aphasia   Cranial Nerves - No facial asymmetry, Tongue midline, EOMI, Shoulder shrug intact     Motor -                     LEFT    UE - ShAB 5/5, EF 5/5, EE 5/5, WE 5/5,  WNL                    RIGHT UE - ShAB 5/5, EF 5/5, EE 5/5, WE 5/5,  5/5                    LEFT    LE - HF 5/5, KE 5/5, DF 5/5, PF 5/5                    RIGHT LE - HF 4/5, KE 5/5, DF 5/5, PF 5/5        Coordination - FTN impaired  Psychiatric - Mood stable, Affect WNL      RECENT LABS:                        12.7   5.12  )-----------( 313      ( 04 Apr 2024 06:19 )             40.0     04-04    137  |  101  |  8   ----------------------------<  106<H>  4.6   |  30  |  0.79    Ca    9.4      04 Apr 2024 06:19    TPro  6.8  /  Alb  3.0<L>  /  TBili  0.5  /  DBili  x   /  AST  17  /  ALT  33  /  AlkPhos  109  04-04    LIVER FUNCTIONS - ( 04 Apr 2024 06:19 )  Alb: 3.0 g/dL / Pro: 6.8 g/dL / ALK PHOS: 109 U/L / ALT: 33 U/L / AST: 17 U/L / GGT: x             Urinalysis Basic - ( 04 Apr 2024 06:19 )    Color: x / Appearance: x / SG: x / pH: x  Gluc: 106 mg/dL / Ketone: x  / Bili: x / Urobili: x   Blood: x / Protein: x / Nitrite: x   Leuk Esterase: x / RBC: x / WBC x   Sq Epi: x / Non Sq Epi: x / Bacteria: x          CAPILLARY BLOOD GLUCOSE            MEDICATIONS:  MEDICATIONS  (STANDING):  enoxaparin Injectable 40 milliGRAM(s) SubCutaneous <User Schedule>  escitalopram 5 milliGRAM(s) Oral daily  levETIRAcetam 500 milliGRAM(s) Oral two times a day  pantoprazole    Tablet 40 milliGRAM(s) Oral before breakfast  polyethylene glycol 3350 17 Gram(s) Oral daily  senna 2 Tablet(s) Oral at bedtime    MEDICATIONS  (PRN):  acetaminophen     Tablet .. 650 milliGRAM(s) Oral every 6 hours PRN Mild Pain (1 - 3)  oxyCODONE    IR 2.5 milliGRAM(s) Oral every 4 hours PRN Moderate Pain (4 - 6)  oxyCODONE    IR 5 milliGRAM(s) Oral every 4 hours PRN Severe Pain (7 - 10)

## 2024-04-05 PROCEDURE — 99232 SBSQ HOSP IP/OBS MODERATE 35: CPT | Mod: GC

## 2024-04-05 RX ADMIN — ENOXAPARIN SODIUM 40 MILLIGRAM(S): 100 INJECTION SUBCUTANEOUS at 21:25

## 2024-04-05 RX ADMIN — PANTOPRAZOLE SODIUM 40 MILLIGRAM(S): 20 TABLET, DELAYED RELEASE ORAL at 05:44

## 2024-04-05 RX ADMIN — LEVETIRACETAM 500 MILLIGRAM(S): 250 TABLET, FILM COATED ORAL at 05:43

## 2024-04-05 RX ADMIN — SENNA PLUS 2 TABLET(S): 8.6 TABLET ORAL at 21:25

## 2024-04-05 RX ADMIN — LEVETIRACETAM 500 MILLIGRAM(S): 250 TABLET, FILM COATED ORAL at 18:13

## 2024-04-05 RX ADMIN — ESCITALOPRAM OXALATE 5 MILLIGRAM(S): 10 TABLET, FILM COATED ORAL at 11:51

## 2024-04-05 RX ADMIN — POLYETHYLENE GLYCOL 3350 17 GRAM(S): 17 POWDER, FOR SOLUTION ORAL at 11:51

## 2024-04-05 NOTE — CHART NOTE - NSCHARTNOTEFT_GEN_A_CORE
Reviewed chart, approached Pt who is familiar to this writer from his previous admission. Pt declined participating today due to fatigue. Agreed to reattempt within the next business day. Will follow up.

## 2024-04-05 NOTE — PROGRESS NOTE ADULT - SUBJECTIVE AND OBJECTIVE BOX
Patient is a 64y old  Male who presents with a chief complaint of acute on chronic SDH s/p craniotomy/evacuation     HPI:  63 y/o RHD male with PMH of frequent falls, SDH s/p bilateral craniotomies for subdural evacuation (3/12/24 by Dr. Polanco) and b/l MMA embolization (3/19/24 by Dr. Weber) transferred to [facility] on 3/25 from [facility] due to confusion, right sided weakness and increased hemorrhage on CTH. Patient underwent left supratentorial craniotomy for evacuation of acute on chronic subdural hematoma (3/26). CTH 3/28 showed previously noted left-sided subdural hematoma has been removed, slight increase in size of previously noted left frontal extra-axial air. CTH 3/31 showed stable b/l SDH, improved left sided pneumocephalus. Right craniotomy staples and left subdural drain removed 3/28, right drain exit suture removed 3/29, left craniotomy staple removed 4/9, and left drain exit staple removed 4/11. Hospital course uncomplicated. Patient evaluated by PT/OT and was recommended to return to acute inpatient rehab. Patient is medically stable for discharge to [facility] on 4/1. (01 Apr 2024 12:49)    SUBJECTIVE: Patient seen and examined during OT session - working on balance, eyes, scanning, and hand coordination.  Feeling well, inquired need for f/u CTH post surgery.  Assured and educated on any neuro changes (cog/weakness/etc)    REVIEW OF SYSTEMS  Constitutional: No fever  Cardio: No chest pain, No palpitations  Resp: No SOB, no respiratory distress   Neuro: No headaches    VITALS  64y  Vital Signs Last 24 Hrs  T(C): 36.8 (04-05-24 @ 08:19), Max: 36.8 (04-04-24 @ 12:06)  T(F): 98.2 (04-05-24 @ 08:19), Max: 98.3 (04-04-24 @ 12:06)  HR: 66 (04-05-24 @ 08:19) (62 - 73)  BP: 101/66 (04-05-24 @ 08:19) (101/66 - 124/69)  RR: 16 (04-05-24 @ 08:19) (16 - 16)  SpO2: 98% (04-05-24 @ 08:19) (97% - 99%)    PHYSICAL EXAM:   Constitutional - NAD, Comfortable  HEENT - NCAT, EOMI; frontal craniotomy +staples in place  Neck - Supple, No limited ROM  Cardio - warm and well perfused, no cyanosis   Abdomen -  Soft, NTND  Extremities - No peripheral edema, No calf tenderness   Neurologic Exam:                    Cognitive -             Orientation: Awake, Alert, AAO x4, follow commands, content appropriate            Attention:  impaired, easily distracted      Speech - Fluent, Comprehensible, mild dysarthria, No aphasia   Cranial Nerves - No facial asymmetry, Tongue midline, EOMI, Shoulder shrug intact     Motor -                     LEFT    UE - ShAB 5/5, EF 5/5, EE 5/5, WE 5/5,  WNL                    RIGHT UE - ShAB 5/5, EF 5/5, EE 5/5, WE 5/5,  5/5                    LEFT    LE - HF 5/5, KE 5/5, DF 5/5, PF 5/5                    RIGHT LE - HF 4/5, KE 5/5, DF 5/5, PF 5/5        Coordination - FTN impaired  Psychiatric - Mood stable, Affect WNL    RECENT LABS:             12.7   5.12  )-----------( 313      ( 04 Apr 2024 06:19 )             40.0     04-04    137  |  101  |  8   ----------------------------<  106<H>  4.6   |  30  |  0.79    Ca    9.4      04 Apr 2024 06:19    TPro  6.8  /  Alb  3.0<L>  /  TBili  0.5  /  DBili  x   /  AST  17  /  ALT  33  /  AlkPhos  109  04-04    LIVER FUNCTIONS - ( 04 Apr 2024 06:19 )  Alb: 3.0 g/dL / Pro: 6.8 g/dL / ALK PHOS: 109 U/L / ALT: 33 U/L / AST: 17 U/L / GGT: x           MEDICATIONS:  MEDICATIONS  (STANDING):  enoxaparin Injectable 40 milliGRAM(s) SubCutaneous <User Schedule>  escitalopram 5 milliGRAM(s) Oral daily  levETIRAcetam 500 milliGRAM(s) Oral two times a day  pantoprazole    Tablet 40 milliGRAM(s) Oral before breakfast  polyethylene glycol 3350 17 Gram(s) Oral daily  senna 2 Tablet(s) Oral at bedtime    MEDICATIONS  (PRN):  acetaminophen     Tablet .. 650 milliGRAM(s) Oral every 6 hours PRN Mild Pain (1 - 3)

## 2024-04-06 PROCEDURE — 99232 SBSQ HOSP IP/OBS MODERATE 35: CPT | Mod: 25

## 2024-04-06 PROCEDURE — 99232 SBSQ HOSP IP/OBS MODERATE 35: CPT

## 2024-04-06 RX ADMIN — PANTOPRAZOLE SODIUM 40 MILLIGRAM(S): 20 TABLET, DELAYED RELEASE ORAL at 06:05

## 2024-04-06 RX ADMIN — LEVETIRACETAM 500 MILLIGRAM(S): 250 TABLET, FILM COATED ORAL at 06:04

## 2024-04-06 RX ADMIN — POLYETHYLENE GLYCOL 3350 17 GRAM(S): 17 POWDER, FOR SOLUTION ORAL at 12:45

## 2024-04-06 RX ADMIN — ESCITALOPRAM OXALATE 5 MILLIGRAM(S): 10 TABLET, FILM COATED ORAL at 12:45

## 2024-04-06 RX ADMIN — LEVETIRACETAM 500 MILLIGRAM(S): 250 TABLET, FILM COATED ORAL at 17:03

## 2024-04-06 RX ADMIN — ENOXAPARIN SODIUM 40 MILLIGRAM(S): 100 INJECTION SUBCUTANEOUS at 22:23

## 2024-04-06 NOTE — PROGRESS NOTE ADULT - SUBJECTIVE AND OBJECTIVE BOX
Patient is a 64y old  Male who presents with a chief complaint of acute on chronic SDH s/p craniotomy/evacuation (06 Apr 2024 10:46)      HPI:  65 y/o RHD male with PMH of frequent falls, SDH s/p bilateral craniotomies for subdural evacuation (3/12/24 by Dr. Polanco) and b/l MMA embolization (3/19/24 by Dr. Weber) transferred to [facility] on 3/25 from [facility] due to confusion, right sided weakness and increased hemorrhage on CTH. Patient underwent left supratentorial craniotomy for evacuation of acute on chronic subdural hematoma (3/26). CTH 3/28 showed previously noted left-sided subdural hematoma has been removed, slight increase in size of previously noted left frontal extra-axial air. CTH 3/31 showed stable b/l SDH, improved left sided pneumocephalus. Right craniotomy staples and left subdural drain removed 3/28, right drain exit suture removed 3/29, left craniotomy staple removed 4/9, and left drain exit staple removed 4/11. Hospital course uncomplicated. Patient evaluated by PT/OT and was recommended to return to acute inpatient rehab. Patient is medically stable for discharge to [facility] on 4/1. (01 Apr 2024 12:49)      PAST MEDICAL & SURGICAL HISTORY:  No pertinent past medical history      Syncope      Traumatic subdural hematoma (SDH)      S/P craniotomy      H/O cerebral embolism          MEDICATIONS  (STANDING):  enoxaparin Injectable 40 milliGRAM(s) SubCutaneous <User Schedule>  escitalopram 5 milliGRAM(s) Oral daily  levETIRAcetam 500 milliGRAM(s) Oral two times a day  pantoprazole    Tablet 40 milliGRAM(s) Oral before breakfast  polyethylene glycol 3350 17 Gram(s) Oral daily  senna 2 Tablet(s) Oral at bedtime    MEDICATIONS  (PRN):  acetaminophen     Tablet .. 650 milliGRAM(s) Oral every 6 hours PRN Mild Pain (1 - 3)      Allergies    Allergy Status Unknown  No Known Allergies    Intolerances          VITALS  64y  Vital Signs Last 24 Hrs  T(C): 36.7 (05 Apr 2024 20:02), Max: 36.7 (05 Apr 2024 20:02)  T(F): 98.1 (05 Apr 2024 20:02), Max: 98.1 (05 Apr 2024 20:02)  HR: 74 (06 Apr 2024 09:24) (68 - 74)  BP: 126/74 (06 Apr 2024 09:24) (121/74 - 126/74)  BP(mean): --  RR: 16 (06 Apr 2024 09:24) (16 - 16)  SpO2: 98% (06 Apr 2024 09:24) (98% - 98%)    Parameters below as of 06 Apr 2024 09:24  Patient On (Oxygen Delivery Method): room air      Daily     Daily         RECENT LABS:                      CAPILLARY BLOOD GLUCOSE

## 2024-04-06 NOTE — PROGRESS NOTE ADULT - ASSESSMENT
64M frequent falls, SDH s/p Craniotomies (3/12/24 by Dr. Polanco) and b/l MMA embolization (3/19/24 by Dr. Weber)  Admit Mar25 for AMS, Increased SDH  SP Craniectomy  Stabilized transferred to Acute Rehab    SDH  SDH s/p bilateral craniotomies for subdural evacuation (3/12/24 by Dr. Polanco) and b/l MMA embolization (3/19/24 by Dr. Weber)  confusion, right sided weakness and increased hemorrhage on CTH (3/25)  left supratentorial craniotomy for evacuation of acute on chronic subdural hematoma (3/26)  Keppra 500 mg BID

## 2024-04-06 NOTE — PROGRESS NOTE ADULT - SUBJECTIVE AND OBJECTIVE BOX
Patient is a 64y old  Male who presents with a chief complaint of acute on chronic SDH s/p craniotomy/evacuation (05 Apr 2024 10:39)      SUBJECTIVE / OVERNIGHT EVENTS:  Pt seen and examined at bedside. No acute events overnight.  Pt denies cp, palpitations, sob, abd pain, N/V, fever, chills.    ROS:  All other review of systems negative    Allergies    Allergy Status Unknown  No Known Allergies    Intolerances        MEDICATIONS  (STANDING):  enoxaparin Injectable 40 milliGRAM(s) SubCutaneous <User Schedule>  escitalopram 5 milliGRAM(s) Oral daily  levETIRAcetam 500 milliGRAM(s) Oral two times a day  pantoprazole    Tablet 40 milliGRAM(s) Oral before breakfast  polyethylene glycol 3350 17 Gram(s) Oral daily  senna 2 Tablet(s) Oral at bedtime    MEDICATIONS  (PRN):  acetaminophen     Tablet .. 650 milliGRAM(s) Oral every 6 hours PRN Mild Pain (1 - 3)      Vital Signs Last 24 Hrs  T(C): 36.7 (05 Apr 2024 20:02), Max: 36.7 (05 Apr 2024 20:02)  T(F): 98.1 (05 Apr 2024 20:02), Max: 98.1 (05 Apr 2024 20:02)  HR: 74 (06 Apr 2024 09:24) (68 - 74)  BP: 126/74 (06 Apr 2024 09:24) (121/74 - 126/74)  BP(mean): --  RR: 16 (06 Apr 2024 09:24) (16 - 16)  SpO2: 98% (06 Apr 2024 09:24) (98% - 98%)    Parameters below as of 06 Apr 2024 09:24  Patient On (Oxygen Delivery Method): room air      CAPILLARY BLOOD GLUCOSE        I&O's Summary    05 Apr 2024 07:01  -  06 Apr 2024 07:00  --------------------------------------------------------  IN: 0 mL / OUT: 300 mL / NET: -300 mL        PHYSICAL EXAM:  GENERAL: NAD, well-developed male   NECK: Supple, No JVD  CHEST/LUNG: Clear to auscultation bilaterally; No wheeze, nonlabored breathing  HEART: Regular rate and rhythm; No murmurs, rubs, or gallops  ABDOMEN: Soft, Nontender, Nondistended; Bowel sounds present  EXTREMITIES: No clubbing, cyanosis, or edema  PSYCH: calm, appropriate mood    LABS:                    RADIOLOGY & ADDITIONAL TESTS:  Results Reviewed:   Imaging Personally Reviewed:  Electrocardiogram Personally Reviewed:    COORDINATION OF CARE:  Care Discussed with Consultants/Other Providers [Y/N]:  Prior or Outpatient Records Reviewed [Y/N]:

## 2024-04-06 NOTE — PROGRESS NOTE ADULT - ASSESSMENT
64y with PMH of frequent falls, SDH s/p bilateral craniotomies for subdural evacuation and b/l MMA embolization. Transferred [facility] 3/[facility] due to confusion, right sided weakness and increased hemorrhage on CTH. Patient underwent left supratentorial craniotomy for evacuation of acute on chronic subdural hematoma 3/26    # Acute on Chronic SDH s/p craniotomy/evacuation  - SDH s/p bilateral craniotomies for subdural evacuation (3/12/24 by Dr. Polanco) and b/l MMA embolization (3/19/24 by Dr. Weber)  - left supratentorial craniotomy for evacuation of acute on chronic subdural hematoma (3/26)  - keppra 500 bid SZ PPX  - staples intact, remove end of next week  -continue comprehensive rehab program of PT/OT/SLP - 3 hours a day, 5 days a week. P&O as needed     # Mood / Cognition  - Lexapro 5 mg QD     # Anemia  - HgB 12.7 (4/4)  - monitor CBC 4/8    # Pain  - Tylenol PRN     # GI / Bowel  - Senna qHS  - Miralax Daily  - protonix 40 mg QD    #  / Bladder  - Toileting schedule every 4 hours    # Diet  - regular, vegan    # DVT prophylaxis:   - Lovenox 40 mg QD    Outpatient Follow-up:  Patel Polanco  Neurosurgery  [address] [place] [id number]  Phone: [phone]  Fax: [phone]  Follow Up Time: 1 month

## 2024-04-07 PROCEDURE — 99232 SBSQ HOSP IP/OBS MODERATE 35: CPT

## 2024-04-07 PROCEDURE — 99231 SBSQ HOSP IP/OBS SF/LOW 25: CPT

## 2024-04-07 RX ADMIN — LEVETIRACETAM 500 MILLIGRAM(S): 250 TABLET, FILM COATED ORAL at 06:55

## 2024-04-07 RX ADMIN — ENOXAPARIN SODIUM 40 MILLIGRAM(S): 100 INJECTION SUBCUTANEOUS at 22:25

## 2024-04-07 RX ADMIN — PANTOPRAZOLE SODIUM 40 MILLIGRAM(S): 20 TABLET, DELAYED RELEASE ORAL at 06:55

## 2024-04-07 RX ADMIN — POLYETHYLENE GLYCOL 3350 17 GRAM(S): 17 POWDER, FOR SOLUTION ORAL at 17:00

## 2024-04-07 RX ADMIN — LEVETIRACETAM 500 MILLIGRAM(S): 250 TABLET, FILM COATED ORAL at 17:01

## 2024-04-07 RX ADMIN — ESCITALOPRAM OXALATE 5 MILLIGRAM(S): 10 TABLET, FILM COATED ORAL at 12:04

## 2024-04-07 NOTE — PROGRESS NOTE ADULT - ASSESSMENT
64y with PMH of frequent falls, SDH s/p bilateral craniotomies for subdural evacuation and b/l MMA embolization. Transferred to [facility] 3/[facility] from [facility] due to confusion, right sided weakness and increased hemorrhage on CTH. Patient underwent left supratentorial craniotomy for evacuation of acute on chronic subdural hematoma 3/26    # Acute on Chronic SDH s/p craniotomy/evacuation  - SDH s/p bilateral craniotomies for subdural evacuation (3/12/24 by Dr. Polanco) and b/l MMA embolization (3/19/24 by Dr. Weber)  - left supratentorial craniotomy for evacuation of acute on chronic subdural hematoma (3/26)  - keppra 500 bid SZ PPX  - Staples intact, remove end of next week  -continue comprehensive rehab program of PT/OT/SLP - 3 hours a day, 5 days a week. P&O as needed     # Mood / Cognition  - Lexapro 5 mg QD     # Anemia  - HgB 12.7 (4/4)  - monitor CBC 4/8    # Pain  - Tylenol PRN     # GI / Bowel  - Senna qHS  - Miralax Daily  - protonix 40 mg QD    #  / Bladder  - Toileting schedule every 4 hours    # Diet  - regular, vegan    # DVT prophylaxis:   - Lovenox 40 mg QD    Outpatient Follow-up:  Patel Polanco  Neurosurgery  [address] [place] [id number]  Phone: [phone]  Fax: [phone]  Follow Up Time: 1 month

## 2024-04-07 NOTE — PROGRESS NOTE ADULT - MOTOR
no tremors   moves BUe and LE equally 4-5/5
moves BUe and LE equally 4-5/5  bilateral calves soft no TTP

## 2024-04-07 NOTE — PROGRESS NOTE ADULT - COMMENTS
Patient wishes to try to use urinal himself this morning, declines assistance for coordination/manipulation of urinal or assist with pants. He denies pain or H/A, no acute issues overnight
Patient interactive, pleasant, no agitation or restlessness. Reduced recall and reasoning but O x 3 in general. No H/A or pain,continent

## 2024-04-07 NOTE — PROGRESS NOTE ADULT - SUBJECTIVE AND OBJECTIVE BOX
Patient is a 64y old  Male who presents with a chief complaint of acute on chronic SDH s/p craniotomy/evacuation (06 Apr 2024 12:21)      SUBJECTIVE / OVERNIGHT EVENTS:  Pt seen and examined at bedside. No acute events overnight.  Pt denies cp, palpitations, sob, abd pain, N/V, fever, chills.    ROS:  All other review of systems negative    Allergies    Allergy Status Unknown  No Known Allergies    Intolerances        MEDICATIONS  (STANDING):  enoxaparin Injectable 40 milliGRAM(s) SubCutaneous <User Schedule>  escitalopram 5 milliGRAM(s) Oral daily  levETIRAcetam 500 milliGRAM(s) Oral two times a day  pantoprazole    Tablet 40 milliGRAM(s) Oral before breakfast  polyethylene glycol 3350 17 Gram(s) Oral daily  senna 2 Tablet(s) Oral at bedtime    MEDICATIONS  (PRN):  acetaminophen     Tablet .. 650 milliGRAM(s) Oral every 6 hours PRN Mild Pain (1 - 3)      Vital Signs Last 24 Hrs  T(C): 36.4 (07 Apr 2024 08:04), Max: 36.4 (07 Apr 2024 08:04)  T(F): 97.6 (07 Apr 2024 08:04), Max: 97.6 (07 Apr 2024 08:04)  HR: 61 (07 Apr 2024 08:04) (61 - 75)  BP: 104/68 (07 Apr 2024 08:04) (100/61 - 104/68)  BP(mean): --  RR: 16 (07 Apr 2024 08:04) (16 - 16)  SpO2: 98% (07 Apr 2024 08:04) (98% - 99%)      CAPILLARY BLOOD GLUCOSE        I&O's Summary      PHYSICAL EXAM:  GENERAL: NAD, well-developed male   NECK: Supple, No JVD  CHEST/LUNG: Clear to auscultation bilaterally; No wheeze, nonlabored breathing  HEART: Regular rate and rhythm; No murmurs, rubs, or gallops  ABDOMEN: Soft, Nontender, Nondistended; Bowel sounds present  EXTREMITIES: No clubbing, cyanosis, or edema  PSYCH: calm, appropriate mood    LABS:                    RADIOLOGY & ADDITIONAL TESTS:  Results Reviewed:   Imaging Personally Reviewed:  Electrocardiogram Personally Reviewed:    COORDINATION OF CARE:  Care Discussed with Consultants/Other Providers [Y/N]:  Prior or Outpatient Records Reviewed [Y/N]:

## 2024-04-07 NOTE — PROGRESS NOTE ADULT - SUBJECTIVE AND OBJECTIVE BOX
Patient is a 64y old  Male who presents with a chief complaint of acute on chronic SDH s/p craniotomy/evacuation (07 Apr 2024 09:51)      HPI:  63 y/o RHD male with PMH of frequent falls, SDH s/p bilateral craniotomies for subdural evacuation (3/12/24 by Dr. Polanco) and b/l MMA embolization (3/19/24 by Dr. Weber) transferred to [facility] on 3/25 from [facility] due to confusion, right sided weakness and increased hemorrhage on CTH. Patient underwent left supratentorial craniotomy for evacuation of acute on chronic subdural hematoma (3/26). CTH 3/28 showed previously noted left-sided subdural hematoma has been removed, slight increase in size of previously noted left frontal extra-axial air. CTH 3/31 showed stable b/l SDH, improved left sided pneumocephalus. Right craniotomy staples and left subdural drain removed 3/28, right drain exit suture removed 3/29, left craniotomy staple removed 4/9, and left drain exit staple removed 4/11. Hospital course uncomplicated. Patient evaluated by PT/OT and was recommended to return to acute inpatient rehab. Patient is medically stable for discharge to [facility] on 4/1. (01 Apr 2024 12:49)      PAST MEDICAL & SURGICAL HISTORY:  No pertinent past medical history      Syncope      Traumatic subdural hematoma (SDH)      S/P craniotomy      H/O cerebral embolism          MEDICATIONS  (STANDING):  enoxaparin Injectable 40 milliGRAM(s) SubCutaneous <User Schedule>  escitalopram 5 milliGRAM(s) Oral daily  levETIRAcetam 500 milliGRAM(s) Oral two times a day  pantoprazole    Tablet 40 milliGRAM(s) Oral before breakfast  polyethylene glycol 3350 17 Gram(s) Oral daily  senna 2 Tablet(s) Oral at bedtime    MEDICATIONS  (PRN):  acetaminophen     Tablet .. 650 milliGRAM(s) Oral every 6 hours PRN Mild Pain (1 - 3)      Allergies    Allergy Status Unknown  No Known Allergies    Intolerances          VITALS  64y  Vital Signs Last 24 Hrs  T(C): 36.4 (07 Apr 2024 08:04), Max: 36.4 (07 Apr 2024 08:04)  T(F): 97.6 (07 Apr 2024 08:04), Max: 97.6 (07 Apr 2024 08:04)  HR: 61 (07 Apr 2024 08:04) (61 - 75)  BP: 104/68 (07 Apr 2024 08:04) (100/61 - 104/68)  BP(mean): --  RR: 16 (07 Apr 2024 08:04) (16 - 16)  SpO2: 98% (07 Apr 2024 08:04) (98% - 99%)      Daily     Daily         RECENT LABS:                      CAPILLARY BLOOD GLUCOSE

## 2024-04-08 LAB
ALBUMIN SERPL ELPH-MCNC: 2.9 G/DL — LOW (ref 3.3–5)
ALP SERPL-CCNC: 95 U/L — SIGNIFICANT CHANGE UP (ref 40–120)
ALT FLD-CCNC: 26 U/L — SIGNIFICANT CHANGE UP (ref 10–45)
ANION GAP SERPL CALC-SCNC: 7 MMOL/L — SIGNIFICANT CHANGE UP (ref 5–17)
AST SERPL-CCNC: 14 U/L — SIGNIFICANT CHANGE UP (ref 10–40)
BILIRUB SERPL-MCNC: 0.4 MG/DL — SIGNIFICANT CHANGE UP (ref 0.2–1.2)
BUN SERPL-MCNC: 7 MG/DL — SIGNIFICANT CHANGE UP (ref 7–23)
CALCIUM SERPL-MCNC: 9.1 MG/DL — SIGNIFICANT CHANGE UP (ref 8.4–10.5)
CHLORIDE SERPL-SCNC: 106 MMOL/L — SIGNIFICANT CHANGE UP (ref 96–108)
CO2 SERPL-SCNC: 28 MMOL/L — SIGNIFICANT CHANGE UP (ref 22–31)
CREAT SERPL-MCNC: 0.86 MG/DL — SIGNIFICANT CHANGE UP (ref 0.5–1.3)
EGFR: 96 ML/MIN/1.73M2 — SIGNIFICANT CHANGE UP
GLUCOSE SERPL-MCNC: 100 MG/DL — HIGH (ref 70–99)
HCT VFR BLD CALC: 38.4 % — LOW (ref 39–50)
HGB BLD-MCNC: 12.4 G/DL — LOW (ref 13–17)
MCHC RBC-ENTMCNC: 26.3 PG — LOW (ref 27–34)
MCHC RBC-ENTMCNC: 32.3 GM/DL — SIGNIFICANT CHANGE UP (ref 32–36)
MCV RBC AUTO: 81.4 FL — SIGNIFICANT CHANGE UP (ref 80–100)
NRBC # BLD: 0 /100 WBCS — SIGNIFICANT CHANGE UP (ref 0–0)
PLATELET # BLD AUTO: 282 K/UL — SIGNIFICANT CHANGE UP (ref 150–400)
POTASSIUM SERPL-MCNC: 4.5 MMOL/L — SIGNIFICANT CHANGE UP (ref 3.5–5.3)
POTASSIUM SERPL-SCNC: 4.5 MMOL/L — SIGNIFICANT CHANGE UP (ref 3.5–5.3)
PROT SERPL-MCNC: 6.4 G/DL — SIGNIFICANT CHANGE UP (ref 6–8.3)
RBC # BLD: 4.72 M/UL — SIGNIFICANT CHANGE UP (ref 4.2–5.8)
RBC # FLD: 13.5 % — SIGNIFICANT CHANGE UP (ref 10.3–14.5)
SODIUM SERPL-SCNC: 141 MMOL/L — SIGNIFICANT CHANGE UP (ref 135–145)
WBC # BLD: 5.72 K/UL — SIGNIFICANT CHANGE UP (ref 3.8–10.5)
WBC # FLD AUTO: 5.72 K/UL — SIGNIFICANT CHANGE UP (ref 3.8–10.5)

## 2024-04-08 PROCEDURE — 99232 SBSQ HOSP IP/OBS MODERATE 35: CPT

## 2024-04-08 PROCEDURE — 95816 EEG AWAKE AND DROWSY: CPT | Mod: 26

## 2024-04-08 PROCEDURE — 99233 SBSQ HOSP IP/OBS HIGH 50: CPT | Mod: GC

## 2024-04-08 RX ADMIN — ENOXAPARIN SODIUM 40 MILLIGRAM(S): 100 INJECTION SUBCUTANEOUS at 21:36

## 2024-04-08 RX ADMIN — LEVETIRACETAM 500 MILLIGRAM(S): 250 TABLET, FILM COATED ORAL at 17:23

## 2024-04-08 RX ADMIN — LEVETIRACETAM 500 MILLIGRAM(S): 250 TABLET, FILM COATED ORAL at 06:33

## 2024-04-08 RX ADMIN — ESCITALOPRAM OXALATE 5 MILLIGRAM(S): 10 TABLET, FILM COATED ORAL at 11:39

## 2024-04-08 RX ADMIN — PANTOPRAZOLE SODIUM 40 MILLIGRAM(S): 20 TABLET, DELAYED RELEASE ORAL at 06:33

## 2024-04-08 NOTE — PROGRESS NOTE ADULT - SUBJECTIVE AND OBJECTIVE BOX
Patient is a 64y old  Male who presents with a chief complaint of acute on chronic SDH s/p craniotomy/evacuation     HPI:  65 y/o RHD male with PMH of frequent falls, SDH s/p bilateral craniotomies for subdural evacuation (3/12/24 by Dr. Polanco) and b/l MMA embolization (3/19/24 by Dr. Weber) transferred to [facility] on 3/25 [facility] due to confusion, right sided weakness and increased hemorrhage on CTH. Patient underwent left supratentorial craniotomy for evacuation of acute on chronic subdural hematoma (3/26). CTH 3/28 showed previously noted left-sided subdural hematoma has been removed, slight increase in size of previously noted left frontal extra-axial air. CTH 3/31 showed stable b/l SDH, improved left sided pneumocephalus. Right craniotomy staples and left subdural drain removed 3/28, right drain exit suture removed 3/29, left craniotomy staple removed 4/9, and left drain exit staple removed 4/11. Hospital course uncomplicated. Patient evaluated by PT/OT and was recommended to return to acute inpatient rehab. Patient is medically stable for discharge to [facility] on 4/1. (01 Apr 2024 12:49)    SUBJECTIVE: Patient seen and examined while sitting up in WC.  Appetite is good - able to recall having pancake and oatmeal for breakfast.  BP was soft at 98/64 - asymptomatic (not on BP meds). Seen at a later time during PT session > ambulating CG using rollator.  Looks stead and navigating AD well.     REVIEW OF SYSTEMS  Constitutional: No fever  Cardio: No chest pain, No palpitations  Resp: No SOB, no cough, no respiratory distress   Neuro: No headaches, no dizziness  MSK: no pain    VITALS  64y  Vital Signs Last 24 Hrs  T(C): 36.5 (04-08-24 @ 08:11), Max: 36.6 (04-07-24 @ 20:27)  T(F): 97.7 (04-08-24 @ 08:11), Max: 97.8 (04-07-24 @ 20:27)  HR: 63 (04-08-24 @ 08:11) (63 - 71)  BP: 98/64 (04-08-24 @ 08:11) (98/64 - 109/64)  RR: 16 (04-08-24 @ 08:11) (16 - 16)  SpO2: 98% (04-08-24 @ 08:11) (95% - 98%)    PHYSICAL EXAM:   Constitutional - NAD, Comfortable  HEENT - NCAT, EOMI; frontal craniotomy +staples in place  Neck - Supple, No limited ROM  Cardio - warm and well perfused, no cyanosis   Abdomen -  Soft, NTND  Extremities - No peripheral edema, No calf tenderness   Neurologic Exam:                    Cognitive -             Orientation: Awake, Alert, AAO x4, follow commands, content appropriate            Attention: fair     Speech - Fluent, Comprehensible, mild dysarthria, No aphasia   Cranial Nerves - No facial asymmetry, Tongue midline, EOMI, Shoulder shrug intact     Motor -                     LEFT    UE - ShAB 5/5, EF 5/5, EE 5/5, WE 5/5,  WNL                    RIGHT UE - ShAB 5/5, EF 5/5, EE 5/5, WE 5/5,  5/5                    LEFT    LE - HF 5/5, KE 5/5, DF 5/5, PF 5/5                    RIGHT LE - HF 4/5, KE 5/5, DF 5/5, PF 5/5        Coordination - FTN impaired  Psychiatric - Mood stable, Affect WNL    RECENT LABS:                        12.4   5.72  )-----------( 282      ( 08 Apr 2024 06:19 )             38.4     04-08    141  |  106  |  7   ----------------------------<  100<H>  4.5   |  28  |  0.86    Ca    9.1      08 Apr 2024 06:19    TPro  6.4  /  Alb  2.9<L>  /  TBili  0.4  /  DBili  x   /  AST  14  /  ALT  26  /  AlkPhos  95  04-08    LIVER FUNCTIONS - ( 08 Apr 2024 06:19 )  Alb: 2.9 g/dL / Pro: 6.4 g/dL / ALK PHOS: 95 U/L / ALT: 26 U/L / AST: 14 U/L / GGT: x           MEDICATIONS:  MEDICATIONS  (STANDING):  enoxaparin Injectable 40 milliGRAM(s) SubCutaneous <User Schedule>  escitalopram 5 milliGRAM(s) Oral daily  levETIRAcetam 500 milliGRAM(s) Oral two times a day  pantoprazole    Tablet 40 milliGRAM(s) Oral before breakfast  polyethylene glycol 3350 17 Gram(s) Oral daily  senna 2 Tablet(s) Oral at bedtime    MEDICATIONS  (PRN):  acetaminophen     Tablet .. 650 milliGRAM(s) Oral every 6 hours PRN Mild Pain (1 - 3)

## 2024-04-08 NOTE — PROGRESS NOTE ADULT - SUBJECTIVE AND OBJECTIVE BOX
CC: Patient is a 64y old  Male who presents with a chief complaint of acute on chronic SDH s/p craniotomy/evacuation (08 Apr 2024 10:42)      Interval History:    Patient seen and examined at bedside.  No overnight events.  No new complaints.    ALLERGIES:  Allergy Status Unknown  No Known Allergies    MEDICATIONS  (STANDING):  enoxaparin Injectable 40 milliGRAM(s) SubCutaneous <User Schedule>  escitalopram 5 milliGRAM(s) Oral daily  levETIRAcetam 500 milliGRAM(s) Oral two times a day  pantoprazole    Tablet 40 milliGRAM(s) Oral before breakfast  polyethylene glycol 3350 17 Gram(s) Oral daily  senna 2 Tablet(s) Oral at bedtime    MEDICATIONS  (PRN):  acetaminophen     Tablet .. 650 milliGRAM(s) Oral every 6 hours PRN Mild Pain (1 - 3)    Vital Signs Last 24 Hrs  T(F): 97.7 (08 Apr 2024 08:11), Max: 97.8 (07 Apr 2024 20:27)  HR: 63 (08 Apr 2024 08:11) (63 - 71)  BP: 98/64 (08 Apr 2024 08:11) (98/64 - 109/64)  RR: 16 (08 Apr 2024 08:11) (16 - 16)  SpO2: 98% (08 Apr 2024 08:11) (95% - 98%)  I&O's Summary        PHYSICAL EXAM:  GENERAL: NAD  CHEST/LUNG: No rales, rhonchi, wheezing; normal respiratory effort  HEART: RRR; No murmurs, rubs, or gallops  ABDOMEN: Soft, Nontender, Nondistended; Bowel sounds present  MSK/EXT:  No peripheral edema, 2+ Peripheral Pulses, No clubbing or digital cyanosis  PSYCH: Appropriate affect, Alert & Oriented    LABS:                        12.4   5.72  )-----------( 282      ( 08 Apr 2024 06:19 )             38.4       04-08    141  |  106  |  7   ----------------------------<  100  4.5   |  28  |  0.86    Ca    9.1      08 Apr 2024 06:19    TPro  6.4  /  Alb  2.9  /  TBili  0.4  /  DBili  x   /  AST  14  /  ALT  26  /  AlkPhos  95  04-08                03-11 Chol 142 mg/dL LDL -- HDL 38 mg/dL Trig 101 mg/dL                  Urinalysis Basic - ( 08 Apr 2024 06:19 )    Color: x / Appearance: x / SG: x / pH: x  Gluc: 100 mg/dL / Ketone: x  / Bili: x / Urobili: x   Blood: x / Protein: x / Nitrite: x   Leuk Esterase: x / RBC: x / WBC x   Sq Epi: x / Non Sq Epi: x / Bacteria: x            Care Discussed with Consultants/Other Providers: Yes

## 2024-04-08 NOTE — EEG REPORT - NS EEG TEXT BOX
[facility]-[id number]     Study Date:  04-08-24  Duration in hours:   20 min    --------------------------------------------------------------------------------------------------  History:  CC/ HPI Patient is a 64y old  Male who presents with a chief complaint of acute on chronic SDH s/p craniotomy/evacuation (08 Apr 2024 11:24)    MEDICATIONS  (STANDING):  enoxaparin Injectable 40 milliGRAM(s) SubCutaneous <User Schedule>  escitalopram 5 milliGRAM(s) Oral daily  levETIRAcetam 500 milliGRAM(s) Oral two times a day  pantoprazole    Tablet 40 milliGRAM(s) Oral before breakfast  polyethylene glycol 3350 17 Gram(s) Oral daily  senna 2 Tablet(s) Oral at bedtime    --------------------------------------------------------------------------------------------------  Study Interpretation:    [Abbreviation Key:  PDR=alpha rhythm/posterior dominant rhythm. A-P=anterior posterior.  Amplitude: ‘very low’:<20; ‘low’:20-49; ‘medium’:; ‘high’:>150uV.  Persistence for periodic/rhythmic patterns (% of epoch) ‘rare’:<1%; ‘occasional’:1-10%; ‘frequent’:10-50%; ‘abundant’:50-90%; ‘continuous’:>90%.  Persistence for sporadic discharges: ‘rare’:<1/hr; ‘occasional’:1/min-1/hr; ‘frequent’:>1/min; ‘abundant’:>1/10 sec.  RPP=rhythmic and periodic patterns; GRDA=generalized rhythmic delta activity; FIRDA=frontal intermittent GRDA; LRDA=lateralized rhythmic delta activity; TIRDA=temporal intermittent rhythmic delta activity;  LPD=PLED=lateralized periodic discharges; GPD=generalized periodic discharges; BIPDs =bilateral independent periodic discharges; Mf=multifocal; SIRPDs=stimulus induced rhythmic, periodic, or ictal appearing discharges; BIRDs=brief potentially ictal rhythmic discharges >4 Hz, lasting .5-10s; PFA (paroxysmal bursts >13 Hz or =8 Hz <10s).  Modifiers: +F=with fast component; +S=with spike component; +R=with rhythmic component.  S-B=burst suppression pattern.  Max=maximal. N1-drowsy; N2-stage II sleep; N3-slow wave sleep. SSS/BETS=small sharp spikes/benign epileptiform transients of sleep. HV=hyperventilation; PS=photic stimulation]    FINDINGS:      Background:  Continuity: continuous  Symmetry: symmetric  PDR: 8 Hz activity, with amplitude to 40 uV, that attenuated to eye opening.    Reactivity: present  Voltage: normal (between 20-150uV)  Anterior Posterior Gradient: present  Other background findings: none  Breach: absent    Background Slowing:  Generalized slowing: diffuse intermittent polymorphic delta and theta activity.  Focal slowing: Left frontal continuous polymorphic theta > delta activity.    State Changes:   -Drowsiness was not recorded  -N2 sleep transients were not recorded.    Sporadic Epileptiform Discharges:    None    Rhythmic and Periodic Patterns (RPPs):  None     Electrographic and Electroclinical seizures:  None    Other Clinical Events:  None    Activation Procedures:   -Hyperventilation was not performed.    -Photic stimulation was performed and did not elicit any abnormalities.       Artifacts:  Intermittent myogenic and movement artifacts were noted.    ECG:  The heart rate on single channel ECG was predominantly between 60-70 BPM.    EEG Classification / Summary:  Abnormal EEG study  Continuous polymorphic slowing, focal, left frontal  Intermittent polymorphic slowing, generalized, mild  -----------------------------------------------------------------------------------------------------    Clinical Impression:  Structural cerebral dysfunction seen in the left frontal region.   Mild diffuse/multi-focal cerebral dysfunction, not specific as to etiology.  There were no epileptiform abnormalities recorded.      This is a preliminary fellow impression only pending attending review    Teddy Florez MD - Epilepsy Fellow      [facility]-[id number]     Study Date:  04-08-24  Duration in hours:   20 min    --------------------------------------------------------------------------------------------------  History:  CC/ HPI Patient is a 64y old  Male who presents with a chief complaint of acute on chronic SDH s/p craniotomy/evacuation (08 Apr 2024 11:24)    MEDICATIONS  (STANDING):  enoxaparin Injectable 40 milliGRAM(s) SubCutaneous <User Schedule>  escitalopram 5 milliGRAM(s) Oral daily  levETIRAcetam 500 milliGRAM(s) Oral two times a day  pantoprazole    Tablet 40 milliGRAM(s) Oral before breakfast  polyethylene glycol 3350 17 Gram(s) Oral daily  senna 2 Tablet(s) Oral at bedtime    --------------------------------------------------------------------------------------------------  Study Interpretation:    [Abbreviation Key:  PDR=alpha rhythm/posterior dominant rhythm. A-P=anterior posterior.  Amplitude: ‘very low’:<20; ‘low’:20-49; ‘medium’:; ‘high’:>150uV.  Persistence for periodic/rhythmic patterns (% of epoch) ‘rare’:<1%; ‘occasional’:1-10%; ‘frequent’:10-50%; ‘abundant’:50-90%; ‘continuous’:>90%.  Persistence for sporadic discharges: ‘rare’:<1/hr; ‘occasional’:1/min-1/hr; ‘frequent’:>1/min; ‘abundant’:>1/10 sec.  RPP=rhythmic and periodic patterns; GRDA=generalized rhythmic delta activity; FIRDA=frontal intermittent GRDA; LRDA=lateralized rhythmic delta activity; TIRDA=temporal intermittent rhythmic delta activity;  LPD=PLED=lateralized periodic discharges; GPD=generalized periodic discharges; BIPDs =bilateral independent periodic discharges; Mf=multifocal; SIRPDs=stimulus induced rhythmic, periodic, or ictal appearing discharges; BIRDs=brief potentially ictal rhythmic discharges >4 Hz, lasting .5-10s; PFA (paroxysmal bursts >13 Hz or =8 Hz <10s).  Modifiers: +F=with fast component; +S=with spike component; +R=with rhythmic component.  S-B=burst suppression pattern.  Max=maximal. N1-drowsy; N2-stage II sleep; N3-slow wave sleep. SSS/BETS=small sharp spikes/benign epileptiform transients of sleep. HV=hyperventilation; PS=photic stimulation]    FINDINGS:      Background:  Continuity: continuous  Symmetry: symmetric  PDR: 8 Hz activity, with amplitude to 40 uV, that attenuated to eye opening.    Reactivity: present  Voltage: normal (between 20-150uV)  Anterior Posterior Gradient: present  Other background findings: none  Breach: absent    Background Slowing:  Generalized slowing: diffuse intermittent polymorphic delta and theta activity.  Focal slowing: Left frontal continuous polymorphic theta > delta activity.    State Changes:   -Drowsiness was not recorded  -N2 sleep transients were not recorded.    Sporadic Epileptiform Discharges:    None    Rhythmic and Periodic Patterns (RPPs):  None     Electrographic and Electroclinical seizures:  None    Other Clinical Events:  None    Activation Procedures:   -Hyperventilation was not performed.    -Photic stimulation was performed and did not elicit any abnormalities.       Artifacts:  Intermittent myogenic and movement artifacts were noted.    ECG:  The heart rate on single channel ECG was predominantly between 60-70 BPM.    EEG Classification / Summary:  Abnormal EEG study  Continuous polymorphic slowing, focal, left frontal  Intermittent polymorphic slowing, generalized, mild  -----------------------------------------------------------------------------------------------------    Clinical Impression:  Structural cerebral dysfunction seen in the left frontal region.   Mild diffuse/multi-focal cerebral dysfunction, not specific as to etiology.  There were no epileptiform abnormalities recorded.      Teddy Florez MD - Epilepsy Fellow     Mich Lema MD PhD  Director, Epilepsy Division, [facility] EEG Reading Room Ph#: [phone]  Epilepsy Answering Service after 5PM and before 8:30AM: Ph#: [phone]

## 2024-04-08 NOTE — PROGRESS NOTE ADULT - ASSESSMENT
64M frequent falls, SDH s/p Craniotomies (3/12/24 by Dr. Polanco) and b/l MMA embolization (3/19/24 by Dr. Weber)  Admit Mar25 for AMS, Increased SDH  SP Craniectomy  Stabilized transferred to Acute Rehab    SDH  SDH s/p bilateral craniotomies for subdural evacuation (3/12/24 by Dr. Polanco) and b/l MMA embolization (3/19/24 by Dr. Weber)  confusion, right sided weakness and increased hemorrhage on CTH (3/25)  left supratentorial craniotomy for evacuation of acute on chronic subdural hematoma (3/26)  Keppra 500 mg BID for Seizure PPX  monitor routine labs

## 2024-04-08 NOTE — PROGRESS NOTE ADULT - ASSESSMENT
64y with PMH of frequent falls, SDH s/p bilateral craniotomies for subdural evacuation (3/12/24 by Dr. Polanoc) and b/l MMA embolization (3/19/24 by Dr. Weber) transferred to [facility] on 3/25 from [facility] due to confusion, right sided weakness and increased hemorrhage on CTH. Patient underwent left supratentorial craniotomy for evacuation of acute on chronic subdural hematoma (3/26) Hospital Course uncomplicated. Patient now admitted for a multidisciplinary rehab program.     # Acute on Chronic SDH s/p craniotomy/evacuation  - SDH s/p bilateral craniotomies for subdural evacuation (3/12/24 by Dr. Polanco) and b/l MMA embolization (3/19/24 by Dr. Weber)  - confusion, right sided weakness and increased hemorrhage on CTH (3/25)  - left supratentorial craniotomy for evacuation of acute on chronic subdural hematoma (3/26), staples intact, remove ~4/11  - Impaired ADLs and mobility  - Need for assistance with personal care   - Start comprehensive rehab program of PT/OT/SLP - 3 hours a day, 5 days a week. P&O as needed   - Precautions: Fall, Aspiration, Seizure  - Keppra 500 mg BID     #Anemia - stable  HgB 12.7 (4/4) >12.4 (4/8)    # Pain  - Tylenol PRN mild pain  - Narcotic: Oxycodone 2.5 mg mod pain, oxycodone 5 mg severe pain--dc'd ocycodone, not taking 4/4  - Avoid sedating medications that may interfere with cognitive recovery    # Mood / Cognition  - Neuropsychology consult   - Recreation therapy  - Lexapro 5 mg QD - mood better    # GI / Bowel - Last BM 4/8   - Senna qHS,  Miralax Daily  - GI ppx: protonix 40 mg QD    #  / Bladder  - dc PVR's  - Toileting schedule every 4 hours    # Skin / Pressure injury  - Skin assessment on admission performed - intact   - Monitor Incisions:    - Pressure Injury/Skin: OOB to chair, PT/OT  - nursing to monitor skin qShift    # Diet/Dysphagia:  - Diet Consistency: regular, vegan  - Aspiration Precautions  - SLP consult for swallow function evaluation and treatment  - Nutrition consult appreciated    # DVT prophylaxis:   - Lovenox 40 mg QD    Outpatient Follow-up:  Patel Polanco  Neurosurgery  [address] [place] [id number]  Phone: [phone]  Fax: [phone]  Follow Up Time: 1 month    IDT 4/3  SW: Lives with ex-wife, brother in law, and son. PH, 5 SHELBIE w/o HR. No steps inside  SLP: reg, mod-severe cog, language breakdow at higher level tasks, high level better with native tongue (jaydon), reduced fluency and naming, is a functional communicator  OT: Setup eat/groom, min A UBD/LBD/toileting/toilet transfer, mod A bathing.  Overall min A ambulation/ADL. Goal: SV ADLs and shower transfer  PT: CGA bed mob, min A transfer, amb 50' RW min-mod A with close WC follow.  4 steps and 2 HR CG-SV  Team Goals: amb to bathroom SV.  Improve standing balance for functional task  TDD: 4/13 home       64y with PMH of frequent falls, SDH s/p bilateral craniotomies for subdural evacuation (3/12/24 by Dr. Polanco) and b/l MMA embolization (3/19/24 by Dr. Weber) transferred to [facility] on 3/25 from [facility] due to confusion, right sided weakness and increased hemorrhage on CTH. Patient underwent left supratentorial craniotomy for evacuation of acute on chronic subdural hematoma (3/26) Hospital Course uncomplicated. Patient now admitted for a multidisciplinary rehab program.     # Acute on Chronic SDH s/p craniotomy/evacuation  - SDH s/p bilateral craniotomies for subdural evacuation (3/12/24 by Dr. Polanco) and b/l MMA embolization (3/19/24 by Dr. Weber)  - confusion, right sided weakness and increased hemorrhage on CTH (3/25)  - left supratentorial craniotomy for evacuation of acute on chronic subdural hematoma (3/26), staples intact, remove ~4/11  - Impaired ADLs and mobility  - Need for assistance with personal care   - c/w comprehensive rehab program of PT/OT/SLP - 3 hours a day, 5 days a week. P&O as needed   - Precautions: Fall, Aspiration, Seizure  - Keppra 500 mg BID , will dc after EEG, once no reported seizure  - EEG ordered    #Anemia - stable  HgB 12.7 (4/4) >12.4 (4/8)    # Pain  - Tylenol PRN mild pain  - Narcotic: Oxycodone 2.5 mg mod pain, oxycodone 5 mg severe pain--dc'd ocycodone, not taking 4/4  - Avoid sedating medications that may interfere with cognitive recovery    # Mood / Cognition  - Neuropsychology consult   - Recreation therapy  - Lexapro 5 mg QD - mood better    # GI / Bowel - Last BM 4/8   - Senna qHS,  Miralax Daily  - GI ppx: protonix 40 mg QD    #  / Bladder  - dc PVR's  - Toileting schedule every 4 hours    # Skin / Pressure injury  - Skin assessment on admission performed - intact   - Monitor Incisions:    - Pressure Injury/Skin: OOB to chair, PT/OT  - nursing to monitor skin qShift    # Diet/Dysphagia:  - Diet Consistency: regular, vegan  - Aspiration Precautions  - SLP consult for swallow function evaluation and treatment  - Nutrition consult appreciated    # DVT prophylaxis:   - Lovenox 40 mg QD    Outpatient Follow-up:  Patel Polanco  Neurosurgery  [address] [place] [id number]  Phone: [phone]  Fax: [phone]  Follow Up Time: 1 month    IDT 4/3  SW: Lives with ex-wife, brother in law, and son. PH, 5 SHELBIE w/o HR. No steps inside  SLP: reg, mod-severe cog, language breakdow at higher level tasks, high level better with native tongue (jaydon), reduced fluency and naming, is a functional communicator  OT: Setup eat/groom, min A UBD/LBD/toileting/toilet transfer, mod A bathing.  Overall min A ambulation/ADL. Goal: SV ADLs and shower transfer  PT: CGA bed mob, min A transfer, amb 50' RW min-mod A with close WC follow.  4 steps and 2 HR CG-SV  Team Goals: amb to bathroom SV.  Improve standing balance for functional task  TDD: 4/13 home

## 2024-04-09 PROCEDURE — 99233 SBSQ HOSP IP/OBS HIGH 50: CPT | Mod: GC

## 2024-04-09 PROCEDURE — 96116 NUBHVL XM PHYS/QHP 1ST HR: CPT

## 2024-04-09 RX ADMIN — LEVETIRACETAM 500 MILLIGRAM(S): 250 TABLET, FILM COATED ORAL at 05:18

## 2024-04-09 RX ADMIN — ESCITALOPRAM OXALATE 5 MILLIGRAM(S): 10 TABLET, FILM COATED ORAL at 12:27

## 2024-04-09 RX ADMIN — PANTOPRAZOLE SODIUM 40 MILLIGRAM(S): 20 TABLET, DELAYED RELEASE ORAL at 05:18

## 2024-04-09 RX ADMIN — ENOXAPARIN SODIUM 40 MILLIGRAM(S): 100 INJECTION SUBCUTANEOUS at 21:57

## 2024-04-09 NOTE — CONSULT NOTE ADULT - SUBJECTIVE AND OBJECTIVE BOX
Pt is a 65 y/o right-handed male with PMHx of frequent falls, SDH s/p bilateral craniotomies for subdural evacuation (3/12/24 by Dr. Polanco) and b/l MMA embolization (3/19/24 by Dr. Weber) transferred to [facility] on 3/25 from [facility] due to confusion, right sided weakness and increased hemorrhage on CTH. Pt underwent left supratentorial craniotomy for evacuation of acute on chronic subdural hematoma (3/26). CTH 3/28 showed previously noted left-sided subdural hematoma has been removed, slight increase in size of previously noted left frontal extra-axial air. CTH 3/31 showed stable b/l SDH, improved left sided pneumocephalus. Right craniotomy staples and left subdural drain removed 3/28, right drain exit suture removed 3/29, left craniotomy staple removed 4/9, and left drain exit staple removed 4/11. Hospital course uncomplicated. Pt evaluated by PT/OT and was recommended to return to acute inpatient rehab. Patient is medically stable for discharge to [facility] on 4/1.  PMHx: As reported above. Current meds: Lexapro 5 mg QD.  Please see list in Pt’s chart. Social Hx: Pt is     Findings: Pt was seen for an initial assessment of his/her cognitive and emotional functioning. The Modified MMSE (3MS) was administered; Pt’s results (/100) were in the range. His/Her scores in standardized/geriatric mood measures suggested levels of anxiety and depression (Anxiety, GAD7 = /21; Depression, PHQ9 = /27; Geriatric Anxiety Scale, GAS = /30; Geriatric Depression Scale, GDS = /15). Pt was alert,  Ox3, and his/her attitude was cooperative.  Attn/Conc: Simple auditory attention - .  Concentration/Working memory for reversed counting and spelling – (/7). Language: Pt’s speech was of  . Naming - . Sentence repetition - . Auditory Comprehension - . Reading - . Writing - . Memory: Encoding of 3 words was (/3); short-delayed verbal recall – (/3, improving to /3 with cueing); long-delayed verbal recall – (/3, improving to /3 with cueing). LTM was for US presidents (/4, improving to /4 with cueing). Visual memory –. Visuospatial: Visuomotor integration – for copy of a 2D figure, was noted. Figure-ground discrimination was (/4) for the Poppelreuter figure. Executive Functions: Motor Planning - . Organizational skills – for clock drawing on command. Abstract reasoning – for similarities. Initiation/Phonemic Fluency – (/10 four-legged animals in 30 sec). Verbal problem solving – .   Emotional functioning: Affect - 	. Mood - ; Pt reported experiencing . On mood measures s/he additionally reported .  Thought processes were.  No abnormal thought contents were observed.  Pt denied any AH/VH. Pt also denied SI/HI/I/P. Insight - WFL. Judgment - fair.    Assessment:    FIM scores: Social Interaction ; Problem Solving ; Memory .  Plan: Individual supportive psychotherapy to monitor cognition, affect/mood, and behavior. Cognitive remediation during speech therapy sessions. Participation in recreation/art therapy in order to have pleasure and mastery experiences and regain/reestablish a sense of routine.  Time spent with Pt,  minutes.   Pt is a 65 y/o right-handed male with PMHx of frequent falls, SDH s/p bilateral craniotomies for subdural evacuation (3/12/24 by Dr. Polanco) and b/l MMA embolization (3/19/24 by Dr. Weber) transferred to [facility] on 3/25 from [facility] due to confusion, right sided weakness and increased hemorrhage on CTH. Pt underwent left supratentorial craniotomy for evacuation of acute on chronic subdural hematoma (3/26). CTH 3/28 showed previously noted left-sided subdural hematoma has been removed, slight increase in size of previously noted left frontal extra-axial air. CTH 3/31 showed stable b/l SDH, improved left sided pneumocephalus. Right craniotomy staples and left subdural drain removed 3/28, right drain exit suture removed 3/29, left craniotomy staple removed 4/9, and left drain exit staple removed 4/11. Hospital course uncomplicated. Pt evaluated by PT/OT and was recommended to return to acute inpatient rehab. Patient is medically stable for discharge to [facility] on 4/1. PMHx: As reported above. Current meds: Lexapro 5 mg QD.  Please see list in Pt’s chart. Social Hx: Pt is  and has two adult children. 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 Pt lacks hx of mental illness and substance abuse. Pt professes the Restorationist dimitri. Pt enjoys praying and spending time with his family.    Findings: Pt was seen for an initial assessment of his cognitive and emotional functioning. The Modified MMSE (3MS) was administered; Pt’s results (65/100) were in the Severely Impaired range. His scores in standardized geriatric mood measures suggested minimal to mild levels of anxiety and depression (Geriatric Anxiety Scale, GAS = 0/30; Geriatric Depression Scale, GDS = 3/15). Pt was alert, closely Ox3 (Pt was disoriented to the county/city), and his attitude was cooperative. Attn/Conc: Simple auditory attention - intact.  Concentration/Working memory for reversed counting and spelling – moderately impaired (2/7,  Pt unable to perform reversed spelling). Language: Pt’s speech was of normal volume, pitch and pace. Naming - mildly impaired (4/5 body parts correctly named). Sentence repetition - mildly impaired (3/5, difficulty noted with affricate consonants noted). Auditory Comprehension - mildly impaired (2/3 steps in a 3-step command correctly executed). Reading - intact. Writing - mildly impaired (3/5 words in a sentence correctly written. Memory: Encoding of 3 words was intact (3/3); short-delayed verbal recall – mildly impaired (2/3, improving to 3/3 with saturated cueing); long-delayed verbal recall – moderately impaired (1/3, improving to 3/3 with logical-semantic and saturated cueing). LTM was intact for US presidents (4/4). Visual memory – impaired. Visuospatial: Visuomotor integration – moderately impaired for copy of a 2D figure (4/10), distortion and poor integration were noted. Figure-ground discrimination was impaired (3/4) for the Poppelreuter figure. Executive Functions: Motor Planning - mildly impaired (2/3, perseveration noted in one of a 3-step command. Organizational skills – significantly impaired for clock drawing on command. Abstract reasoning – moderately impaired for similarities (2/6). Initiation/Phonemic Fluency – moderately impaired (3/10 four-legged animals in 30 sec). Verbal problem solving – moderately impaired for 3 hypothetical scenarios.   Emotional functioning: Affect - 	. Mood - ; Pt reported experiencing . On mood measures s/he additionally reported .  Thought processes were.  No abnormal thought contents were observed.  Pt denied any AH/VH. Pt also denied SI/HI/I/P. Insight - WFL. Judgment - fair.    Assessment:    FIM scores: Social Interaction ; Problem Solving ; Memory .  Plan: Individual supportive psychotherapy to monitor cognition, affect/mood, and behavior. Cognitive remediation during speech therapy sessions. Participation in recreation/art therapy in order to have pleasure and mastery experiences and regain/reestablish a sense of routine.  Time spent with Pt,  minutes.   Pt is a 65 y/o right-handed male with PMHx of frequent falls, SDH s/p bilateral craniotomies for subdural evacuation (3/12/24 by Dr. Polanco) and b/l MMA embolization (3/19/24 by Dr. Weber) transferred to [facility] on 3/25 from [facility] due to confusion, right sided weakness and increased hemorrhage on CTH. Pt underwent left supratentorial craniotomy for evacuation of acute on chronic subdural hematoma (3/26). CTH 3/28 showed previously noted left-sided subdural hematoma has been removed, slight increase in size of previously noted left frontal extra-axial air. CTH 3/31 showed stable b/l SDH, improved left sided pneumocephalus. Right craniotomy staples and left subdural drain removed 3/28, right drain exit suture removed 3/29, left craniotomy staple removed 4/9, and left drain exit staple removed 4/11. Hospital course uncomplicated. Pt evaluated by PT/OT and was recommended to return to acute inpatient rehab. Patient is medically stable for discharge to [facility] on 4/1. PMHx: As reported above. Current meds: Lexapro 5 mg QD. Please see list in Pt’s chart. Social Hx: Pt is  and has two adult children. 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 Pt lacks hx of mental illness and substance abuse. Pt professes the Latter day dimitri. Pt enjoys praying and spending time with his family.    Findings: Pt was seen for an initial assessment of his cognitive and emotional functioning. The Modified MMSE (3MS) was administered; Pt’s results (65/100) were in the Severely Impaired range. His scores in standardized geriatric mood measures suggested minimal to mild levels of anxiety and depression (Geriatric Anxiety Scale, GAS = 0/30; Geriatric Depression Scale, GDS = 3/15). Pt was alert, closely Ox3 (Pt was disoriented to the county/city), and his attitude was cooperative. Attn/Conc: Simple auditory attention - intact.  Concentration/Working memory for reversed counting and spelling – moderately impaired (2/7,  Pt unable to perform reversed spelling). Language: Pt’s speech was of normal volume, pitch and pace. Naming - mildly impaired (4/5 body parts correctly named). Sentence repetition - mildly impaired (3/5, difficulty noted with affricate consonants noted). Auditory Comprehension - mildly impaired (2/3 steps in a 3-step command correctly executed). Reading - intact. Writing - mildly impaired (3/5 words in a sentence correctly written. Memory: Encoding of 3 words was intact (3/3); short-delayed verbal recall – mildly impaired (2/3, improving to 3/3 with saturated cueing); long-delayed verbal recall – moderately impaired (1/3, improving to 3/3 with logical-semantic and saturated cueing). LTM was intact for US presidents (4/4). Visual memory – impaired. Visuospatial: Visuomotor integration – moderately impaired for copy of a 2D figure (4/10), distortion and poor integration were noted. Figure-ground discrimination was impaired (3/4) for the Poppelreuter figure. Executive Functions: Motor Planning - mildly impaired (2/3, perseveration noted in one of a 3-step command. Organizational skills – significantly impaired for clock drawing on command. Abstract reasoning – moderately impaired for similarities (2/6). Initiation/Phonemic Fluency – moderately impaired (3/10 four-legged animals in 30 sec). Verbal problem solving – moderately impaired for 3 hypothetical scenarios. Emotional functioning: Affect - depressed, responsive to humor. Mood -euthymic ("better"); during the clinical interview Pt denied currently experiencing any emotional symptoms. On mood measures he reported experiencing feelings of emptiness and low self-esteem.  Thought processes were goal-directed.  No abnormal thought contents were observed.  Pt denied any AH/VH. Pt also denied SI/HI/I/P. Insight - WFL. Judgment - fair.

## 2024-04-09 NOTE — PROGRESS NOTE ADULT - SUBJECTIVE AND OBJECTIVE BOX
Patient is a 64y old  Male who presents with a chief complaint of acute on chronic SDH s/p craniotomy/evacuation     HPI:  63 y/o RHD male with PMH of frequent falls, SDH s/p bilateral craniotomies for subdural evacuation (3/12/24 by Dr. Polanco) and b/l MMA embolization (3/19/24 by Dr. Weber) transferred to [facility] on 3/25 from [facility] due to confusion, right sided weakness and increased hemorrhage on CTH. Patient underwent left supratentorial craniotomy for evacuation of acute on chronic subdural hematoma (3/26). CTH 3/28 showed previously noted left-sided subdural hematoma has been removed, slight increase in size of previously noted left frontal extra-axial air. CTH 3/31 showed stable b/l SDH, improved left sided pneumocephalus. Right craniotomy staples and left subdural drain removed 3/28, right drain exit suture removed 3/29, left craniotomy staple removed 4/9, and left drain exit staple removed 4/11. Hospital course uncomplicated. Patient evaluated by PT/OT and was recommended to return to acute inpatient rehab. Patient is medically stable for discharge to [facility] on 4/1. (01 Apr 2024 12:49)    SUBJECTIVE: Patient seen and examined while sitting up in WC.  Pleasant and in good spirit.  Tolerating therapy.  BP soft but asymptomatic. Recall discuss for the end of this week > he feels ready and longing to see his son.    REVIEW OF SYSTEMS  Constitutional: No fever, no chills  Cardio: No chest pain, No palpitations  Resp: No SOB, no cough, no respiratory distress   Neuro: No headaches, no dizziness, +RUE tremor  MSK: no pain    VITALS  64y  Vital Signs Last 24 Hrs  T(C): 36.8 (04-09-24 @ 08:12), Max: 36.8 (04-09-24 @ 08:12)  T(F): 98.3 (04-09-24 @ 08:12), Max: 98.3 (04-09-24 @ 08:12)  HR: 59 (04-08-24 @ 19:41) (59 - 59)  BP: 95/62 (04-09-24 @ 08:12) (95/62 - 117/64)  RR: 16 (04-09-24 @ 08:12) (15 - 16)  SpO2: 97% (04-09-24 @ 08:12) (97% - 99%)      PHYSICAL EXAM:   Constitutional - NAD, Comfortable  HEENT - NCAT, EOMI; frontal craniotomy +staples in place  Neck - Supple, No limited ROM  Cardio - warm and well perfused, no cyanosis   Abdomen -  Soft, NTND  Extremities - No peripheral edema, No calf tenderness   Neurologic Exam:                    Cognitive -             Orientation: Awake, Alert, AAO x4, follow commands, content appropriate            Attention: fair     Speech - Fluent, Comprehensible, mild dysarthria, No aphasia   Cranial Nerves - No facial asymmetry, Tongue midline, EOMI, Shoulder shrug intact     Motor -                     LEFT    UE - ShAB 5/5, EF 5/5, EE 5/5, WE 5/5,  WNL                    RIGHT UE - ShAB 5/5, EF 5/5, EE 5/5, WE 5/5,  5/5                    LEFT    LE - HF 5/5, KE 5/5, DF 5/5, PF 5/5                    RIGHT LE - HF 4/5, KE 5/5, DF 5/5, PF 5/5        Coordination - FTN impaired; +RUE tremor  Psychiatric - Mood stable, Affect WNL - smiling    RECENT LABS:                        12.4   5.72  )-----------( 282      ( 08 Apr 2024 06:19 )             38.4     04-08    141  |  106  |  7   ----------------------------<  100<H>  4.5   |  28  |  0.86    Ca    9.1      08 Apr 2024 06:19    TPro  6.4  /  Alb  2.9<L>  /  TBili  0.4  /  DBili  x   /  AST  14  /  ALT  26  /  AlkPhos  95  04-08    LIVER FUNCTIONS - ( 08 Apr 2024 06:19 )  Alb: 2.9 g/dL / Pro: 6.4 g/dL / ALK PHOS: 95 U/L / ALT: 26 U/L / AST: 14 U/L / GGT: x           MEDICATIONS:  MEDICATIONS  (STANDING):  enoxaparin Injectable 40 milliGRAM(s) SubCutaneous <User Schedule>  escitalopram 5 milliGRAM(s) Oral daily  levETIRAcetam 500 milliGRAM(s) Oral two times a day  pantoprazole    Tablet 40 milliGRAM(s) Oral before breakfast  polyethylene glycol 3350 17 Gram(s) Oral daily  senna 2 Tablet(s) Oral at bedtime    MEDICATIONS  (PRN):  acetaminophen     Tablet .. 650 milliGRAM(s) Oral every 6 hours PRN Mild Pain (1 - 3)   Patient is a 64y old  Male who presents with a chief complaint of acute on chronic SDH s/p craniotomy/evacuation     HPI:  65 y/o RHD male with PMH of frequent falls, SDH s/p bilateral craniotomies for subdural evacuation (3/12/24 by Dr. Polanco) and b/l MMA embolization (3/19/24 by Dr. Weber) transferred to [facility] on 3/25 from [facility] due to confusion, right sided weakness and increased hemorrhage on CTH. Patient underwent left supratentorial craniotomy for evacuation of acute on chronic subdural hematoma (3/26). CTH 3/28 showed previously noted left-sided subdural hematoma has been removed, slight increase in size of previously noted left frontal extra-axial air. CTH 3/31 showed stable b/l SDH, improved left sided pneumocephalus. Right craniotomy staples and left subdural drain removed 3/28, right drain exit suture removed 3/29, left craniotomy staple removed 4/9, and left drain exit staple removed 4/11. Hospital course uncomplicated. Patient evaluated by PT/OT and was recommended to return to acute inpatient rehab. Patient is medically stable for discharge to [facility] on 4/1. (01 Apr 2024 12:49)    SUBJECTIVE: Patient seen and examined while sitting up in WC.  Pleasant and in good spirit.  Tolerating therapy.  BP soft but asymptomatic. Recall discuss for the end of this week > he feels ready and longing to see his son.    REVIEW OF SYSTEMS  Constitutional: No fever, no chills  Cardio: No chest pain, No palpitations  Resp: No SOB, no cough, no respiratory distress   Neuro: No headaches, no dizziness, +RUE tremor  MSK: no pain    VITALS  64y  Vital Signs Last 24 Hrs  T(C): 36.8 (04-09-24 @ 08:12), Max: 36.8 (04-09-24 @ 08:12)  T(F): 98.3 (04-09-24 @ 08:12), Max: 98.3 (04-09-24 @ 08:12)  HR: 59 (04-08-24 @ 19:41) (59 - 59)  BP: 95/62 (04-09-24 @ 08:12) (95/62 - 117/64)  RR: 16 (04-09-24 @ 08:12) (15 - 16)  SpO2: 97% (04-09-24 @ 08:12) (97% - 99%)      PHYSICAL EXAM:   Constitutional - NAD, Comfortable  HEENT - NCAT, EOMI; frontal craniotomy +staples in place  Neck - Supple, No limited ROM  Cardio - warm and well perfused, no cyanosis   Abdomen -  Soft, NTND  Extremities - No peripheral edema, No calf tenderness   Neurologic Exam:                    Cognitive -             Orientation: Awake, Alert, AAO x4, follow commands, content appropriate            memory- impaired            Attention: fair     Speech - Fluent, Comprehensible, mild dysarthria, No aphasia   Cranial Nerves - No facial asymmetry, Tongue midline, EOMI, Shoulder shrug intact     Motor -                     LEFT    UE - ShAB 5/5, EF 5/5, EE 5/5, WE 5/5,  WNL                    RIGHT UE - ShAB 5/5, EF 5/5, EE 5/5, WE 5/5,  5/5                    LEFT    LE - HF 5/5, KE 5/5, DF 5/5, PF 5/5                    RIGHT LE - HF 4/5, KE 5/5, DF 5/5, PF 5/5        Coordination - FTN impaired; +RUE tremor  Psychiatric - Mood stable, Affect WNL - smiling    RECENT LABS:                        12.4   5.72  )-----------( 282      ( 08 Apr 2024 06:19 )             38.4     04-08    141  |  106  |  7   ----------------------------<  100<H>  4.5   |  28  |  0.86    Ca    9.1      08 Apr 2024 06:19    TPro  6.4  /  Alb  2.9<L>  /  TBili  0.4  /  DBili  x   /  AST  14  /  ALT  26  /  AlkPhos  95  04-08    LIVER FUNCTIONS - ( 08 Apr 2024 06:19 )  Alb: 2.9 g/dL / Pro: 6.4 g/dL / ALK PHOS: 95 U/L / ALT: 26 U/L / AST: 14 U/L / GGT: x           MEDICATIONS:  MEDICATIONS  (STANDING):  enoxaparin Injectable 40 milliGRAM(s) SubCutaneous <User Schedule>  escitalopram 5 milliGRAM(s) Oral daily  levETIRAcetam 500 milliGRAM(s) Oral two times a day  pantoprazole    Tablet 40 milliGRAM(s) Oral before breakfast  polyethylene glycol 3350 17 Gram(s) Oral daily  senna 2 Tablet(s) Oral at bedtime    MEDICATIONS  (PRN):  acetaminophen     Tablet .. 650 milliGRAM(s) Oral every 6 hours PRN Mild Pain (1 - 3)

## 2024-04-09 NOTE — CONSULT NOTE ADULT - ASSESSMENT
64M frequent falls, SDH s/p Craniotomies (3/12/24 by Dr. Polanco) and b/l MMA embolization (3/19/24 by Dr. Weber)  Admit Mar25 for AMS, Increased SDH  SP Craniectomy  Stabilized transferred to Acute Rehab    SDH  SDH s/p bilateral craniotomies for subdural evacuation (3/12/24 by Dr. Polanco) and b/l MMA embolization (3/19/24 by Dr. Weber)  confusion, right sided weakness and increased hemorrhage on CTH (3/25)  left supratentorial craniotomy for evacuation of acute on chronic subdural hematoma (3/26)  Keppra 500 mg BID      # Mood / Cognition  - Neuropsychology consult PRN  - Recreation therapy  - Lexapro 5 mg QD    
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 Difficulties were noticed in Pt's cognitive functioning including his concentration/working memory, short-term memory for auditory verbal material, visuospatial skills (including visuomotor integration and figure-ground perception), as well as aspects of language (including naming, fluency, repetition and writing) and executive functions (including abstract reasoning, organizational skills, problem solving and initiation). Pt's language deficits may in part be due to his bilingual status (Ashok-English). His affect is depressed, but not labile as was the case during his first admission a few weeks ago. He reports a few adjustment symptoms in response to his current medical condition. FIM scores: Social Interaction 5; Problem Solving 4; Memory 4.    Plan: Individual supportive psychotherapy to monitor cognition, affect/mood, and behavior. Continue with his current antidepressant medication. Cognitive remediation during speech therapy and occupational therapy sessions is highly recommended. Participation in recreation/art therapy in order to have pleasure and mastery experiences and regain/reestablish a sense of routine.    Time spent with Pt, 40 minutes.

## 2024-04-09 NOTE — PROGRESS NOTE ADULT - ASSESSMENT
64y with PMH of frequent falls, SDH s/p bilateral craniotomies for subdural evacuation (3/12/24 by Dr. Polanco) and b/l MMA embolization (3/19/24 by Dr. Weber) transferred to [facility] on 3/25 from [facility] due to confusion, right sided weakness and increased hemorrhage on CTH. Patient underwent left supratentorial craniotomy for evacuation of acute on chronic subdural hematoma (3/26) Hospital Course uncomplicated. Patient now admitted for a multidisciplinary rehab program.     # Acute on Chronic SDH s/p craniotomy/evacuation  - SDH s/p bilateral craniotomies for subdural evacuation (3/12/24 by Dr. Polanco) and b/l MMA embolization (3/19/24 by Dr. Weber)  - confusion, right sided weakness and increased hemorrhage on CTH (3/25)  - left supratentorial craniotomy for evacuation of acute on chronic subdural hematoma (3/26), staples intact, remove ~4/11  - Impaired ADLs and mobility  - Need for assistance with personal care   - c/w comprehensive rehab program of PT/OT/SLP - 3 hours a day, 5 days a week. P&O as needed   - Precautions: Fall, Aspiration, Seizure  - Keppra 500 mg BID; EEG (4/8) negative > dc keppra    #Anemia - stable  HgB 12.7 (4/4) >12.4 (4/8)    # Pain  - Tylenol PRN mild pain  - Narcotic: Oxycodone 2.5 mg mod pain, oxycodone 5 mg severe pain--dc'd ocycodone, not taking 4/4  - Avoid sedating medications that may interfere with cognitive recovery    # Mood / Cognition  - Neuropsychology consult   - Recreation therapy  - Lexapro 5 mg QD - mood better    # GI / Bowel  - Senna qHS,  Miralax Daily  - GI ppx: protonix 40 mg QD    #  / Bladder  - dc PVR's  - Toileting schedule every 4 hours    # Skin / Pressure injury  - Skin assessment on admission performed - intact   - Monitor Incisions:    - Pressure Injury/Skin: OOB to chair, PT/OT  - nursing to monitor skin qShift    # Diet/Dysphagia:  - Diet Consistency: regular, vegan  - Aspiration Precautions  - SLP consult for swallow function evaluation and treatment  - Nutrition consult appreciated    # DVT prophylaxis:   - Lovenox 40 mg QD    Outpatient Follow-up:  Patel Polanco  Neurosurgery  [address] [place] [id number]  Phone: [phone]  Fax: [phone]  Follow Up Time: 1 month    IDT 4/3  SW: Lives with ex-wife, brother in law, and son. PH, 5 SHELBIE w/o HR. No steps inside  SLP: reg, mod-severe cog, language breakdow at higher level tasks, high level better with native tongue (jaydon), reduced fluency and naming, is a functional communicator  OT: Setup eat/groom, min A UBD/LBD/toileting/toilet transfer, mod A bathing.  Overall min A ambulation/ADL. Goal: SV ADLs and shower transfer  PT: CGA bed mob, min A transfer, amb 50' RW min-mod A with close WC follow.  4 steps and 2 HR CG-SV  Team Goals: amb to bathroom SV.  Improve standing balance for functional task  TDD: 4/13 home

## 2024-04-09 NOTE — CHART NOTE - NSCHARTNOTEFT_GEN_A_CORE
Nutrition Follow Up Note  Hospital Course   (Per Electronic Medical Record)    Source:  Patient [X]  Nursing Staff [X]   Medical Record [X]      Diet: Diet, Regular:   Lacto Veg (Accepts Milk & Milk Products)  Free Water Flush Instructions:  NO need to send estrellita hernández's preference (04-08-24 @ 10:50) [Active]    At this time patient tolerating diet w/ varied appetite/intake consuming % of meals. Oral supplementation discontinued at this time. Reviewed preferences and menu alternatives noted in [facility].  No issues w/ dentition or chewing and swallowing current diet texture. No issues w/ dentition or chewing and swallowing current diet texture.     Current Weight: 158.2lb on 4/1      Pertinent Medications: MEDICATIONS  (STANDING):  enoxaparin Injectable 40 milliGRAM(s) SubCutaneous <User Schedule>  escitalopram 5 milliGRAM(s) Oral daily  pantoprazole    Tablet 40 milliGRAM(s) Oral before breakfast  polyethylene glycol 3350 17 Gram(s) Oral daily  senna 2 Tablet(s) Oral at bedtime    MEDICATIONS  (PRN):  acetaminophen     Tablet .. 650 milliGRAM(s) Oral every 6 hours PRN Mild Pain (1 - 3)      Pertinent Labs:  04-08 Na141 mmol/L Glu 100 mg/dL<H> K+ 4.5 mmol/L Cr  0.86 mg/dL BUN 7 mg/dL 04-08 Alb 2.9 g/dL<L> 03-11 Chol 142 mg/dL LDL --    HDL 38 mg/dL<L> Trig 101 mg/dL      Skin: No pressure injury per nursing flowsheets. Surgical Incision, Site Right parietal     Edema: No edema noted per nursing flowsheet    Last Bowel Movement: on 4/8 Per nursing flowsheets     Estimated Needs:   [X] No Change Since Previous Assessment    Previous Nutrition Diagnosis:   Increased Nutrient Needs...  Related to increased physiological demand for nutrient needs  As evidence by SDH s/p evacuation.    Nutrition Diagnosis is [X] Ongoing addressed with Lacto Veg (Accepts Milk & Milk Products), +additional portions of preferred foods    New Nutrition Diagnosis: [X] Not Applicable    Interventions:   1. Recommend continuing with current plan of care  2. Encourage PO intake  3. Obtain and honor food preferences as able  4. Ongoing diet education     Monitoring & Evaluation:   [X] Weights   [X] PO Intake   [X] Skin Integrity   [X] Follow Up (Per Protocol)  [X] Tolerance to Diet Prescription   [X] Other: Labs     Registered Dietitian/Nutritionist Remains Available.  Tamar Rivera RD    Phone# [phone]

## 2024-04-10 ENCOUNTER — TRANSCRIPTION ENCOUNTER (OUTPATIENT)
Age: 64
End: 2024-04-10

## 2024-04-10 PROCEDURE — 99233 SBSQ HOSP IP/OBS HIGH 50: CPT | Mod: GC

## 2024-04-10 PROCEDURE — 99232 SBSQ HOSP IP/OBS MODERATE 35: CPT

## 2024-04-10 RX ADMIN — PANTOPRAZOLE SODIUM 40 MILLIGRAM(S): 20 TABLET, DELAYED RELEASE ORAL at 05:42

## 2024-04-10 RX ADMIN — ESCITALOPRAM OXALATE 5 MILLIGRAM(S): 10 TABLET, FILM COATED ORAL at 12:10

## 2024-04-10 RX ADMIN — ENOXAPARIN SODIUM 40 MILLIGRAM(S): 100 INJECTION SUBCUTANEOUS at 21:42

## 2024-04-10 RX ADMIN — POLYETHYLENE GLYCOL 3350 17 GRAM(S): 17 POWDER, FOR SOLUTION ORAL at 12:10

## 2024-04-10 NOTE — DISCHARGE NOTE PROVIDER - CARE PROVIDERS DIRECT ADDRESSES
[email or web address],DirectAddress_Unknown,DirectAddress_Unknown

## 2024-04-10 NOTE — DISCHARGE NOTE PROVIDER - ATTENDING DISCHARGE PHYSICAL EXAMINATION:
PHYSICAL EXAM:   Constitutional - NAD, Comfortable  HEENT - NCAT, EOMI; s/p frontal craniotomy, c/d/i  Neck - Supple, No limited ROM  Cardio - warm and well perfused, no cyanosis   Abdomen -  Soft, NTND  Extremities - No peripheral edema, No calf tenderness   Neurologic Exam:                    Cognitive -             Orientation: Awake, Alert, AAO x4, follow commands, content appropriate            memory- impaired            Attention: fair     Speech - Fluent, Comprehensible, mild dysarthria, No aphasia   Cranial Nerves - No facial asymmetry, Tongue midline, EOMI, Shoulder shrug intact     Motor - 5/5 throughout    Coordination - FTN improved  Psychiatric - Mood stable

## 2024-04-10 NOTE — DISCHARGE NOTE PROVIDER - PROVIDER TOKENS
PROVIDER:[TOKEN:[[id number]]],PROVIDER:[TOKEN:[[id number]],FOLLOWUP:[1 month]],FREE:[LAST:[Primary Care Provider],PHONE:[(   )    -],FAX:[(   )    -],FOLLOWUP:[1 week]]

## 2024-04-10 NOTE — DISCHARGE NOTE PROVIDER - NSDCMRMEDTOKEN_GEN_ALL_CORE_FT
acetaminophen 325 mg oral tablet: 2 tab(s) orally every 6 hours As needed Mild Pain (1 - 3)  ascorbic acid 500 mg oral tablet: 1 tab(s) orally once a day  chlorhexidine 2% topical pad: 1 Apply topically to affected area once a day  escitalopram 5 mg oral tablet: 1 tab(s) orally once a day  labetalol 5 mg/mL intravenous solution: 2 milliliter(s) intravenous every 2 hours As needed SBP&gt;160  levETIRAcetam 100 mg/mL intravenous solution: 5 milliliter(s) intravenous every 12 hours  Lovenox 40 mg/0.4 mL injectable solution: 40 mL/kg injectable once a day (at bedtime)  melatonin 3 mg oral tablet: 1 tab(s) orally once a day (at bedtime) As needed Insomnia  Multiple Vitamins oral tablet: 1 tab(s) orally once a day  oxyCODONE 10 mg oral tablet: 1 tab(s) orally every 4 hours As needed Severe Pain (7 - 10)  oxyCODONE 5 mg oral tablet: 1 tab(s) orally every 4 hours As needed Moderate Pain (4 - 6)  polyethylene glycol 3350 oral powder for reconstitution: 17 gram(s) orally once a day  senna leaf extract oral tablet: 2 tab(s) orally once a day (at bedtime)   acetaminophen 325 mg oral tablet: 2 tab(s) orally every 6 hours As needed Mild Pain (1 - 3)  ascorbic acid 500 mg oral tablet: 1 tab(s) orally once a day  escitalopram 5 mg oral tablet: 1 tab(s) orally once a day  melatonin 3 mg oral tablet: 1 tab(s) orally once a day (at bedtime) As needed Insomnia  Multiple Vitamins oral tablet: 1 tab(s) orally once a day  pantoprazole 40 mg oral delayed release tablet: 1 tab(s) orally once a day (before a meal)  polyethylene glycol 3350 oral powder for reconstitution: 17 gram(s) orally once a day as needed for  constipation  senna leaf extract oral tablet: 2 tab(s) orally once a day (at bedtime)

## 2024-04-10 NOTE — DISCHARGE NOTE PROVIDER - CARE PROVIDER_API CALL
Patel Polnaco  Neurosurgery  [address] [place] [id number]  Phone: [phone]  Fax: [phone]  Follow Up Time:     Amie Yang  Physical/Rehab Medicine  [address] [place] [address]  Phone: [phone]  Fax: [phone]  Follow Up Time: 1 month    Primary Care Provider,   Phone: (   )    -  Fax: (   )    -  Follow Up Time: 1 week

## 2024-04-10 NOTE — PROGRESS NOTE ADULT - SUBJECTIVE AND OBJECTIVE BOX
Patient is a 64y old  Male who presents with a chief complaint of acute on chronic SDH s/p craniotomy/evacuation     HPI:  65 y/o RHD male with PMH of frequent falls, SDH s/p bilateral craniotomies for subdural evacuation (3/12/24 by Dr. Polanco) and b/l MMA embolization (3/19/24 by Dr. Weber) transferred to [facility] on 3/25 from [facility] due to confusion, right sided weakness and increased hemorrhage on CTH. Patient underwent left supratentorial craniotomy for evacuation of acute on chronic subdural hematoma (3/26). CTH 3/28 showed previously noted left-sided subdural hematoma has been removed, slight increase in size of previously noted left frontal extra-axial air. CTH 3/31 showed stable b/l SDH, improved left sided pneumocephalus. Right craniotomy staples and left subdural drain removed 3/28, right drain exit suture removed 3/29, left craniotomy staple removed 4/9, and left drain exit staple removed 4/11. Hospital course uncomplicated. Patient evaluated by PT/OT and was recommended to return to acute inpatient rehab. Patient is medically stable for discharge to [facility] on 4/1. (01 Apr 2024 12:49)    SUBJECTIVE: Patient seen and examined while sitting up in WC. Ate all his breakfast. Staples removed from scalp. Slept well. Reviewed EEG with patient    REVIEW OF SYSTEMS  Constitutional: No fever, no chills  Cardio: No chest pain, No palpitations  Resp: No SOB, no cough, no respiratory distress   Neuro: No headaches, no dizziness, +RUE tremor  MSK: no pain    VITALS  64y  Vital Signs Last 24 Hrs  T(C): 36.7 (10 Apr 2024 08:03), Max: 36.7 (10 Apr 2024 08:03)  T(F): 98 (10 Apr 2024 08:03), Max: 98 (10 Apr 2024 08:03)  HR: 69 (10 Apr 2024 08:03) (67 - 69)  BP: 122/77 (10 Apr 2024 08:03) (103/58 - 122/77)  RR: 16 (10 Apr 2024 08:03) (16 - 16)  SpO2: 98% (10 Apr 2024 08:03) (97% - 98%)    Parameters below as of 10 Apr 2024 08:03  Patient On (Oxygen Delivery Method): room air        PHYSICAL EXAM:   Constitutional - NAD, Comfortable  HEENT - NCAT, EOMI; frontal craniotomy, staples removed.  Neck - Supple, No limited ROM  Cardio - warm and well perfused, no cyanosis   Abdomen -  Soft, NTND  Extremities - No peripheral edema, No calf tenderness   Neurologic Exam:                    Cognitive -             Orientation: Awake, Alert, AAO x4, follow commands, content appropriate            memory- impaired            Attention: fair     Speech - Fluent, Comprehensible, mild dysarthria, No aphasia   Cranial Nerves - No facial asymmetry, Tongue midline, EOMI, Shoulder shrug intact     Motor -                     LEFT    UE - ShAB 5/5, EF 5/5, EE 5/5, WE 5/5,  WNL                    RIGHT UE - ShAB 5/5, EF 5/5, EE 5/5, WE 5/5,  5/5                    LEFT    LE - HF 5/5, KE 5/5, DF 5/5, PF 5/5                    RIGHT LE - HF 4/5, KE 5/5, DF 5/5, PF 5/5        Coordination - FTN impaired; +RUE tremor  Psychiatric - Mood stable, Affect WNL - smiling    RECENT LABS:                        12.4   5.72  )-----------( 282      ( 08 Apr 2024 06:19 )             38.4     04-08    141  |  106  |  7   ----------------------------<  100<H>  4.5   |  28  |  0.86    Ca    9.1      08 Apr 2024 06:19    TPro  6.4  /  Alb  2.9<L>  /  TBili  0.4  /  DBili  x   /  AST  14  /  ALT  26  /  AlkPhos  95  04-08    LIVER FUNCTIONS - ( 08 Apr 2024 06:19 )  Alb: 2.9 g/dL / Pro: 6.4 g/dL / ALK PHOS: 95 U/L / ALT: 26 U/L / AST: 14 U/L / GGT: x           MEDICATIONS:  MEDICATIONS  (STANDING):  enoxaparin Injectable 40 milliGRAM(s) SubCutaneous <User Schedule>  escitalopram 5 milliGRAM(s) Oral daily  levETIRAcetam 500 milliGRAM(s) Oral two times a day  pantoprazole    Tablet 40 milliGRAM(s) Oral before breakfast  polyethylene glycol 3350 17 Gram(s) Oral daily  senna 2 Tablet(s) Oral at bedtime    MEDICATIONS  (PRN):  acetaminophen     Tablet .. 650 milliGRAM(s) Oral every 6 hours PRN Mild Pain (1 - 3)

## 2024-04-10 NOTE — PROGRESS NOTE ADULT - ASSESSMENT
64y with PMH of frequent falls, SDH s/p bilateral craniotomies for subdural evacuation (3/12/24 by Dr. Polanco) and b/l MMA embolization (3/19/24 by Dr. Weber) transferred to [facility] on 3/25 from [facility] due to confusion, right sided weakness and increased hemorrhage on CTH. Patient underwent left supratentorial craniotomy for evacuation of acute on chronic subdural hematoma (3/26) Hospital Course uncomplicated. Patient now admitted for a multidisciplinary rehab program.     # Acute on Chronic SDH s/p craniotomy/evacuation  - SDH s/p bilateral craniotomies for subdural evacuation (3/12/24 by Dr. Polanco) and b/l MMA embolization (3/19/24 by Dr. Weber)  - confusion, right sided weakness and increased hemorrhage on CTH (3/25)  - left supratentorial craniotomy for evacuation of acute on chronic subdural hematoma (3/26), staples removed 4/10  - Impaired ADLs and mobility  - Need for assistance with personal care   - c/w comprehensive rehab program of PT/OT/SLP - 3 hours a day, 5 days a week. P&O as needed   - Precautions: Fall, Aspiration, Seizure  - Keppra 500 mg BID; EEG (4/8) negative > dc keppra-- discussed with patient at bedside.    #Anemia - stable  HgB 12.7 (4/4) >12.4 (4/8)- repeat tomorrow    # Pain  - Tylenol PRN mild pain  - Narcotic: Oxycodone 2.5 mg mod pain, oxycodone 5 mg severe pain--dc'd ocycodone, not taking 4/4  - Avoid sedating medications that may interfere with cognitive recovery    # Mood / Cognition  - Neuropsychology consult   - Recreation therapy  - Lexapro 5 mg QD - mood better    # GI / Bowel  - Senna qHS,  Miralax Daily  - GI ppx: protonix 40 mg QD    #  / Bladder  - dc PVR's  - Toileting schedule every 4 hours    # Skin / Pressure injury  - Skin assessment on admission performed - intact   - Monitor Incisions:    - Pressure Injury/Skin: OOB to chair, PT/OT  - nursing to monitor skin qShift    # Diet/Dysphagia:  - Diet Consistency: regular, vegan  - Aspiration Precautions  - SLP consult for swallow function evaluation and treatment  - Nutrition consult appreciated    # DVT prophylaxis:   - Lovenox 40 mg QD    Outpatient Follow-up:  Patel Polanco  Neurosurgery  [address] [place] [id number]  Phone: [phone]  Fax: [phone]  Follow Up Time: 1 month    Interdisciplinary team meeting today with speech therapist, occupational therapist, physical therapist, social work and nursing where medical updates provided, progress with therapies and goals discussed. Plan of care reviewed. Team conference note documented on wellsky.  Total time 50 mins-face to face time encounter and counseling the patient, meeting with hospitalist, nursing staff, therapists and social work to discuss medical updates and management, care coordination, reviewing chart and data, idt

## 2024-04-10 NOTE — DISCHARGE NOTE PROVIDER - NSDCCPCAREPLAN_GEN_ALL_CORE_FT
PRINCIPAL DISCHARGE DIAGNOSIS  Diagnosis: SDH (subdural hematoma)  Assessment and Plan of Treatment: You  have a history of frequent falls.  Found to have subdural hematoma (SDH).  You underwent bilateral craniotomies for subdural evacuation (3/12/24 by Dr. Polanco) and bilateral MMA embolization (3/19/24 by Dr. Weber) .  You were initially admitted to [facility] for rehab on 3/21, however 4 days later (3/25) you were noted with confusion and  right sided weakness.  CTH at the time revealed increased hemorrhage. Neurosurgeon, Dr. Polanco made aware and requested for transfer back to [facility].  At [facility], you emergently had left supratentorial craniotomy for evacuation of acute on chronic subdural hematoma (3/26).  You returned to [facility] on 4/1 to complete your rehab course.   You had an unremarkable rehab stay with good progress.    Upon discharge please follow up with Neurosurgery      SECONDARY DISCHARGE DIAGNOSES  Diagnosis: Mood disorder  Assessment and Plan of Treatment: you were emotionally labile at one point, started on Lexapro to help.  Your mood have been stable

## 2024-04-10 NOTE — PROGRESS NOTE ADULT - ASSESSMENT
64M frequent falls, SDH s/p Craniotomies (3/12/24 by Dr. Polanco) and b/l MMA embolization (3/19/24 by Dr. Weber), Admitted for AMS, Increased SDH now s/p Craniectomy. Stabilized and then transferred to Acute Rehab.    #SDH s/p bilateral craniotomies for subdural evacuation (3/12/24 by Dr. Polanco) and b/l MMA embolization (3/19/24 by Dr. Weber)  confusion, right sided weakness and increased hemorrhage on CTH (3/25)  left supratentorial craniotomy for evacuation of acute on chronic subdural hematoma (3/26)  Keppra 500 mg BID for Seizure PPX  monitor routine labs  VS stable    #DVT PPX  -Lovenox SQ QD

## 2024-04-10 NOTE — PROGRESS NOTE ADULT - SUBJECTIVE AND OBJECTIVE BOX
CC: Patient is a 64y old  Male who presents with a chief complaint of acute on chronic SDH s/p craniotomy/evacuation (10 Apr 2024 10:41)      Interval History:    No overnight events.  No new complaints.    ALLERGIES:  Allergy Status Unknown  No Known Allergies    MEDICATIONS  (STANDING):  enoxaparin Injectable 40 milliGRAM(s) SubCutaneous <User Schedule>  escitalopram 5 milliGRAM(s) Oral daily  pantoprazole    Tablet 40 milliGRAM(s) Oral before breakfast  polyethylene glycol 3350 17 Gram(s) Oral daily  senna 2 Tablet(s) Oral at bedtime    MEDICATIONS  (PRN):  acetaminophen     Tablet .. 650 milliGRAM(s) Oral every 6 hours PRN Mild Pain (1 - 3)    Vital Signs Last 24 Hrs  T(F): 98 (10 Apr 2024 08:03), Max: 98 (10 Apr 2024 08:03)  HR: 69 (10 Apr 2024 08:03) (67 - 69)  BP: 122/77 (10 Apr 2024 08:03) (103/58 - 122/77)  RR: 16 (10 Apr 2024 08:03) (16 - 16)  SpO2: 98% (10 Apr 2024 08:03) (97% - 98%)  I&O's Summary    09 Apr 2024 07:01  -  10 Apr 2024 07:00  --------------------------------------------------------  IN: 480 mL / OUT: 0 mL / NET: 480 mL          PHYSICAL EXAM:  GENERAL: NAD  CHEST/LUNG: No rales, rhonchi, wheezing; normal respiratory effort  HEART: RRR; No murmurs, rubs, or gallops  ABDOMEN: Soft, Nontender, Nondistended; Bowel sounds present  MSK/EXT:  No peripheral edema, 2+ Peripheral Pulses, No clubbing or digital cyanosis  PSYCH: Appropriate affect, Alert & Oriented    LABS:                        12.4   5.72  )-----------( 282      ( 08 Apr 2024 06:19 )             38.4       04-08    141  |  106  |  7   ----------------------------<  100  4.5   |  28  |  0.86    Ca    9.1      08 Apr 2024 06:19    TPro  6.4  /  Alb  2.9  /  TBili  0.4  /  DBili  x   /  AST  14  /  ALT  26  /  AlkPhos  95  04-08                03-11 Chol 142 mg/dL LDL -- HDL 38 mg/dL Trig 101 mg/dL                  Urinalysis Basic - ( 08 Apr 2024 06:19 )    Color: x / Appearance: x / SG: x / pH: x  Gluc: 100 mg/dL / Ketone: x  / Bili: x / Urobili: x   Blood: x / Protein: x / Nitrite: x   Leuk Esterase: x / RBC: x / WBC x   Sq Epi: x / Non Sq Epi: x / Bacteria: x            Care Discussed with Consultants/Other Providers: Yes

## 2024-04-10 NOTE — DISCHARGE NOTE PROVIDER - HOSPITAL COURSE
63 y/o RHD male with PMH of frequent falls, SDH s/p bilateral craniotomies for subdural evacuation (3/12/24 by Dr. Polanco) and b/l MMA embolization (3/19/24 by Dr. Weber) transferred to [facility] on 3/25 from [facility] due to confusion, right sided weakness and increased hemorrhage on CTH. Patient underwent left supratentorial craniotomy for evacuation of acute on chronic subdural hematoma (3/26). CTH 3/28 showed previously noted left-sided subdural hematoma has been removed, slight increase in size of previously noted left frontal extra-axial air. CTH 3/31 showed stable b/l SDH, improved left sided pneumocephalus. Right craniotomy staples and left subdural drain removed 3/28, right drain exit suture removed 3/29, left craniotomy staple removed 4/9, and left drain exit staple removed 4/11. Hospital course uncomplicated. Patient evaluated by PT/OT and was recommended to return to acute inpatient rehab. Patient is medically stable for discharge to [facility] on 4/1.     Rehab Course since the return on 4/1:  - Mood has been stable - have not needed to increase Lexapro dose  - EEG (4/8) negative > keppra stopped    Last IDT    Pt tolerated course of inpatient PT/OT/SLP rehab with significant functional improvements and met rehab goals prior to discharge.

## 2024-04-11 LAB
ALBUMIN SERPL ELPH-MCNC: 2.9 G/DL — LOW (ref 3.3–5)
ALP SERPL-CCNC: 93 U/L — SIGNIFICANT CHANGE UP (ref 40–120)
ALT FLD-CCNC: 26 U/L — SIGNIFICANT CHANGE UP (ref 10–45)
ANION GAP SERPL CALC-SCNC: 8 MMOL/L — SIGNIFICANT CHANGE UP (ref 5–17)
AST SERPL-CCNC: 16 U/L — SIGNIFICANT CHANGE UP (ref 10–40)
BILIRUB SERPL-MCNC: 0.5 MG/DL — SIGNIFICANT CHANGE UP (ref 0.2–1.2)
BUN SERPL-MCNC: 10 MG/DL — SIGNIFICANT CHANGE UP (ref 7–23)
CALCIUM SERPL-MCNC: 9.1 MG/DL — SIGNIFICANT CHANGE UP (ref 8.4–10.5)
CHLORIDE SERPL-SCNC: 106 MMOL/L — SIGNIFICANT CHANGE UP (ref 96–108)
CO2 SERPL-SCNC: 27 MMOL/L — SIGNIFICANT CHANGE UP (ref 22–31)
CREAT SERPL-MCNC: 0.81 MG/DL — SIGNIFICANT CHANGE UP (ref 0.5–1.3)
EGFR: 97 ML/MIN/1.73M2 — SIGNIFICANT CHANGE UP
GLUCOSE SERPL-MCNC: 100 MG/DL — HIGH (ref 70–99)
HCT VFR BLD CALC: 38.8 % — LOW (ref 39–50)
HGB BLD-MCNC: 12.5 G/DL — LOW (ref 13–17)
MCHC RBC-ENTMCNC: 26.5 PG — LOW (ref 27–34)
MCHC RBC-ENTMCNC: 32.2 GM/DL — SIGNIFICANT CHANGE UP (ref 32–36)
MCV RBC AUTO: 82.4 FL — SIGNIFICANT CHANGE UP (ref 80–100)
NRBC # BLD: 0 /100 WBCS — SIGNIFICANT CHANGE UP (ref 0–0)
PLATELET # BLD AUTO: 249 K/UL — SIGNIFICANT CHANGE UP (ref 150–400)
POTASSIUM SERPL-MCNC: 4 MMOL/L — SIGNIFICANT CHANGE UP (ref 3.5–5.3)
POTASSIUM SERPL-SCNC: 4 MMOL/L — SIGNIFICANT CHANGE UP (ref 3.5–5.3)
PROT SERPL-MCNC: 6.4 G/DL — SIGNIFICANT CHANGE UP (ref 6–8.3)
RBC # BLD: 4.71 M/UL — SIGNIFICANT CHANGE UP (ref 4.2–5.8)
RBC # FLD: 13.7 % — SIGNIFICANT CHANGE UP (ref 10.3–14.5)
SODIUM SERPL-SCNC: 141 MMOL/L — SIGNIFICANT CHANGE UP (ref 135–145)
WBC # BLD: 4.75 K/UL — SIGNIFICANT CHANGE UP (ref 3.8–10.5)
WBC # FLD AUTO: 4.75 K/UL — SIGNIFICANT CHANGE UP (ref 3.8–10.5)

## 2024-04-11 PROCEDURE — 99232 SBSQ HOSP IP/OBS MODERATE 35: CPT

## 2024-04-11 PROCEDURE — 99233 SBSQ HOSP IP/OBS HIGH 50: CPT | Mod: GC

## 2024-04-11 RX ORDER — PANTOPRAZOLE SODIUM 20 MG/1
1 TABLET, DELAYED RELEASE ORAL
Qty: 30 | Refills: 0
Start: 2024-04-11 | End: 2024-05-10

## 2024-04-11 RX ORDER — POLYETHYLENE GLYCOL 3350 17 G/17G
17 POWDER, FOR SOLUTION ORAL
Qty: 0 | Refills: 0 | DISCHARGE
Start: 2024-04-11

## 2024-04-11 RX ORDER — SENNA PLUS 8.6 MG/1
2 TABLET ORAL
Qty: 0 | Refills: 0 | DISCHARGE
Start: 2024-04-11

## 2024-04-11 RX ORDER — ACETAMINOPHEN 500 MG
2 TABLET ORAL
Qty: 0 | Refills: 0 | DISCHARGE
Start: 2024-04-11

## 2024-04-11 RX ORDER — ESCITALOPRAM OXALATE 10 MG/1
1 TABLET, FILM COATED ORAL
Qty: 30 | Refills: 0
Start: 2024-04-11 | End: 2024-05-10

## 2024-04-11 RX ADMIN — PANTOPRAZOLE SODIUM 40 MILLIGRAM(S): 20 TABLET, DELAYED RELEASE ORAL at 06:00

## 2024-04-11 RX ADMIN — ESCITALOPRAM OXALATE 5 MILLIGRAM(S): 10 TABLET, FILM COATED ORAL at 12:17

## 2024-04-11 RX ADMIN — SENNA PLUS 2 TABLET(S): 8.6 TABLET ORAL at 21:52

## 2024-04-11 RX ADMIN — ENOXAPARIN SODIUM 40 MILLIGRAM(S): 100 INJECTION SUBCUTANEOUS at 21:52

## 2024-04-11 NOTE — PROGRESS NOTE ADULT - NS ATTEND AMEND GEN_ALL_CORE FT
I have personally seen and examined the patient.  I fully participated in the care of this patient.  I have made amendments to the documentation where necessary, and agree with the history, physical exam, and plan as documented above  Patient seen in the afternoon while sitting in the wheelchair. States he slept. Denies headaches or pain.   Happy to be back at [place].  Reviewed admission labs. HgB 12.9; Na 139, Bun/Cr: 14/0.76  sars/rsv/influenza panel negative  Total time 50 mins-face to face time encounter and counseling the patient, care coordination, reviewing chart and data
I have personally seen and examined the patient.  I fully participated in the care of this patient.  I have made amendments to the documentation where necessary, and agree with the history, physical exam, and plan as documented above  Vitals stable. Patient seen, without complaints.   CBC/CMP reviewed  HgB stable at 12.4  On keppra for seizure ppx (has been a week), will obtain EEG and dc if negative  discussed potential dc on saturday  Total time 50 mins-face to face time encounter and counseling the patient, meeting with hospitalist, nursing staff, therapists and social work to discuss medical updates and management, care coordination, reviewing chart and data
I have personally seen and examined the patient.  I fully participated in the care of this patient.  I have made amendments to the documentation where necessary, and agree with the history, physical exam, and plan as documented above  patient seen this AM, doing well, smiling. No complaints. Discussed removing staples end of next week.  BP on softer side, encouraging oral hydration, patient is asymptomatic  Total time 35 mins-face to face time encounter and counseling the patient, meeting with hospitalist, nursing staff, therapists and social work to discuss medical updates and management, care coordination, reviewing chart and data
I have personally seen and examined the patient.  I fully participated in the care of this patient.  I have made amendments to the documentation where necessary, and agree with the history, physical exam, and plan as documented above  patient seen while sitting in the wheelchair, no complaints. states he was able to sleep. discussed discharge this weekend. BP on lower side, encouraging oral hydration  seen again working with therapy.  EEG performed, reviewed- Structural cerebral dysfunction seen in the left frontal region. Mild diffuse/multi-focal cerebral dysfunction, not specific as to etiology. There were no epileptiform abnormalities recorded.    Keppra discontinued.  Discussed medical updates and plan with nursing and hospitalist on team rounds  Patient requires acute inpatient hospitalization for ongoing management of medical comorbidities-- s/p craniotomy, will remove staples later this week, BP monitoring, memory impairment- working on strategies with speech, which are limiting functional abilities and optimization of functional independence for discharge home.
I have personally seen and examined the patient.  I fully participated in the care of this patient.  I have made amendments to the documentation where necessary, and agree with the history, physical exam, and plan as documented above  Vitals reviewed, BP on lower side, asymptomatic.   CBC/CMP reviewed  Discussed discharge on saturday, patient mentions that he may be going to another house besides the house he was staying with his ex-wife. Relayed to social work to help clarify. Patient son supposed to come in for family training today

## 2024-04-11 NOTE — PROGRESS NOTE ADULT - SUBJECTIVE AND OBJECTIVE BOX
Patient is a 64y old  Male who presents with a chief complaint of acute on chronic SDH s/p craniotomy/evacuation     HPI:  65 y/o RHD male with PMH of frequent falls, SDH s/p bilateral craniotomies for subdural evacuation (3/12/24 by Dr. Polanco) and b/l MMA embolization (3/19/24 by Dr. Weber) transferred to [facility] on 3/25 [facility] due to confusion, right sided weakness and increased hemorrhage on CTH. Patient underwent left supratentorial craniotomy for evacuation of acute on chronic subdural hematoma (3/26). CTH 3/28 showed previously noted left-sided subdural hematoma has been removed, slight increase in size of previously noted left frontal extra-axial air. CTH 3/31 showed stable b/l SDH, improved left sided pneumocephalus. Right craniotomy staples and left subdural drain removed 3/28, right drain exit suture removed 3/29, left craniotomy staple removed 4/9, and left drain exit staple removed 4/11. Hospital course uncomplicated. Patient evaluated by PT/OT and was recommended to return to acute inpatient rehab. Patient is medically stable for discharge to [facility] on 4/1. (01 Apr 2024 12:49)    SUBJECTIVE: Patient seen and examined while sitting up in WC at bedside.  Independently ate all his breakfast. Slept well.  Inquired about his incision - there's no more staples, allowed to shower but just no scrubbing or scratching incision.   He's looking forward to discharge on Saturday.    REVIEW OF SYSTEMS  Constitutional: No fever, no chills  Cardio: No chest pain, No palpitations  Resp: No SOB, no cough, no respiratory distress   Neuro: No headaches, no dizziness, +RUE tremor  MSK: no pain    VITALS  64y  Vital Signs Last 24 Hrs  T(C): 36.6 (04-11-24 @ 08:26), Max: 36.6 (04-11-24 @ 08:26)  T(F): 97.8 (04-11-24 @ 08:26), Max: 97.8 (04-11-24 @ 08:26)  HR: 64 (04-11-24 @ 08:26) (64 - 68)  BP: 109/72 (04-11-24 @ 08:26) (108/64 - 109/72)  RR: 16 (04-11-24 @ 08:26) (16 - 16)  SpO2: 98% (04-11-24 @ 08:26) (97% - 98%)    PHYSICAL EXAM:   Constitutional - NAD, Comfortable  HEENT - NCAT, EOMI; frontal craniotomy, staples removed.  Neck - Supple, No limited ROM  Cardio - warm and well perfused, no cyanosis   Abdomen -  Soft, NTND  Extremities - No peripheral edema, No calf tenderness   Neurologic Exam:                    Cognitive -             Orientation: Awake, Alert, AAO x4, follow commands, content appropriate            memory- impaired            Attention: fair     Speech - Fluent, Comprehensible, mild dysarthria, No aphasia   Cranial Nerves - No facial asymmetry, Tongue midline, EOMI, Shoulder shrug intact     Motor -                     LEFT    UE - ShAB 5/5, EF 5/5, EE 5/5, WE 5/5,  WNL                    RIGHT UE - ShAB 5/5, EF 5/5, EE 5/5, WE 5/5,  5/5                    LEFT    LE - HF 5/5, KE 5/5, DF 5/5, PF 5/5                    RIGHT LE - HF 5/5, KE 5/5, DF 5/5, PF 5/5        Coordination - FTN impaired; +RUE tremor (better)  Psychiatric - Mood stable, Affect WNL - smiling    RECENT LABS:                           12.5   4.75  )-----------( 249      ( 11 Apr 2024 06:08 )             38.8     04-11    141  |  106  |  10  ----------------------------<  100<H>  4.0   |  27  |  0.81    Ca    9.1      11 Apr 2024 06:08    TPro  6.4  /  Alb  2.9<L>  /  TBili  0.5  /  DBili  x   /  AST  16  /  ALT  26  /  AlkPhos  93  04-11    LIVER FUNCTIONS - ( 11 Apr 2024 06:08 )  Alb: 2.9 g/dL / Pro: 6.4 g/dL / ALK PHOS: 93 U/L / ALT: 26 U/L / AST: 16 U/L / GGT: x             MEDICATIONS  (STANDING):  enoxaparin Injectable 40 milliGRAM(s) SubCutaneous <User Schedule>  escitalopram 5 milliGRAM(s) Oral daily  pantoprazole    Tablet 40 milliGRAM(s) Oral before breakfast  polyethylene glycol 3350 17 Gram(s) Oral daily  senna 2 Tablet(s) Oral at bedtime    MEDICATIONS  (PRN):  acetaminophen     Tablet .. 650 milliGRAM(s) Oral every 6 hours PRN Mild Pain (1 - 3)

## 2024-04-11 NOTE — PROGRESS NOTE ADULT - ASSESSMENT
64 year old male with PMH of frequent falls, SDH s/p bilateral craniotomies for subdural evacuation (3/12/24 by Dr. Polanco) and b/l MMA embolization (3/19/24 by Dr. Weber) transferred to [facility] on 3/25 from [facility] due to confusion, right sided weakness and increased hemorrhage on CTH. Patient underwent left 64y with PMH of frequent falls, SDH s/p bilateral craniotomies for subdural evacuation (3/12/24 by Dr. Polanco) and b/l MMA embolization (3/19/24 by Dr. Weber) transferred to [facility] on 3/25 from [facility] due to confusion, right sided weakness and increased hemorrhage on CTH. Patient underwent left supratentorial craniotomy for evacuation of acute on chronic subdural hematoma (3/26) Hospital Course uncomplicated. Patient now admitted for a multidisciplinary rehab program.     #SDH s/p bilateral craniotomies for subdural evacuation (3/12/24 by Dr. Polanco) and b/l MMA embolization (3/19/24 by Dr. Weber)  -confusion, right sided weakness and increased hemorrhage on CTH (3/25)  -s/p left supratentorial craniotomy for evacuation of acute on chronic subdural hematoma (3/26)  -EEG 4/8 negative, s/p Keppra 500 mg BID for Seizure PPX  -Fall precaution  -Pain control and bowel regimen per rehab team   -Continue comprehensive rehab program with PT/OT/SLP  -Outpatient follow up with neurosurgery    #Mood  -Continue lexapro  -Neuropsych following     #GI PPx  -On PPI     #DVT PPX  -Lovenox SQ QD

## 2024-04-11 NOTE — PROGRESS NOTE ADULT - SUBJECTIVE AND OBJECTIVE BOX
Interval History  Patient seen and examined at bedside. No acute events noted.  Patient denies any acute complaints.     ALLERGIES:  Allergy Status Unknown  No Known Allergies    MEDICATIONS  (STANDING):  enoxaparin Injectable 40 milliGRAM(s) SubCutaneous <User Schedule>  escitalopram 5 milliGRAM(s) Oral daily  pantoprazole    Tablet 40 milliGRAM(s) Oral before breakfast  polyethylene glycol 3350 17 Gram(s) Oral daily  senna 2 Tablet(s) Oral at bedtime    MEDICATIONS  (PRN):  acetaminophen     Tablet .. 650 milliGRAM(s) Oral every 6 hours PRN Mild Pain (1 - 3)    Vital Signs Last 24 Hrs  T(F): 97.8 (11 Apr 2024 08:26), Max: 97.8 (11 Apr 2024 08:26)  HR: 64 (11 Apr 2024 08:26) (64 - 68)  BP: 109/72 (11 Apr 2024 08:26) (108/64 - 109/72)  RR: 16 (11 Apr 2024 08:26) (16 - 16)  SpO2: 98% (11 Apr 2024 08:26) (97% - 98%)  I&O's Summary    10 Apr 2024 07:01  -  11 Apr 2024 07:00  --------------------------------------------------------  IN: 0 mL / OUT: 800 mL / NET: -800 mL    PHYSICAL EXAM:  GENERAL: NAD  HEENT: NCAT, crani incision healing well  CHEST/LUNG: Clear to percussion bilaterally; No rales, rhonchi, wheezing  HEART: Regular rate and rhythm; No murmurs  ABDOMEN: Soft, Nontender, Nondistended; Bowel sounds present  MUSCULOSKELETAL/EXTREMITIES:  2+ Peripheral Pulses, No LE edema  PSYCH: Appropriate affect  NEURO: Alert & Oriented x 3, 5/5 UE/LE    I personally reviewed the below data/images/labs:    LABS:                        12.5   4.75  )-----------( 249      ( 11 Apr 2024 06:08 )             38.8       04-11    141  |  106  |  10  ----------------------------<  100  4.0   |  27  |  0.81    Ca    9.1      11 Apr 2024 06:08    TPro  6.4  /  Alb  2.9  /  TBili  0.5  /  DBili  x   /  AST  16  /  ALT  26  /  AlkPhos  93  04-11    03-11 Chol 142 mg/dL LDL -- HDL 38 mg/dL Trig 101 mg/dL    Urinalysis Basic - ( 11 Apr 2024 06:08 )    Color: x / Appearance: x / SG: x / pH: x  Gluc: 100 mg/dL / Ketone: x  / Bili: x / Urobili: x   Blood: x / Protein: x / Nitrite: x   Leuk Esterase: x / RBC: x / WBC x   Sq Epi: x / Non Sq Epi: x / Bacteria: x    Consultant(s) Notes Reviewed:   Care Discused with Consultants/Other Providers:  Imaging Personally Reviewed:

## 2024-04-11 NOTE — PROGRESS NOTE ADULT - ASSESSMENT
64y with PMH of frequent falls, SDH s/p bilateral craniotomies for subdural evacuation (3/12/24 by Dr. Polanco) and b/l MMA embolization (3/19/24 by Dr. Weber) transferred to [facility] on 3/25 from [facility] due to confusion, right sided weakness and increased hemorrhage on CTH. Patient underwent left supratentorial craniotomy for evacuation of acute on chronic subdural hematoma (3/26) Hospital Course uncomplicated. Patient now admitted for a multidisciplinary rehab program.     # Acute on Chronic SDH s/p craniotomy/evacuation  - SDH s/p bilateral craniotomies for subdural evacuation (3/12/24 by Dr. Polanco) and b/l MMA embolization (3/19/24 by Dr. Weber)  - confusion, right sided weakness and increased hemorrhage on CTH (3/25)  - left supratentorial craniotomy for evacuation of acute on chronic subdural hematoma (3/26), staples removed 4/10  - Impaired ADLs and mobility  - Need for assistance with personal care   - c/w comprehensive rehab program of PT/OT/SLP - 3 hours a day, 5 days a week. P&O as needed   - Precautions: Fall, Aspiration, Seizure  - Keppra 500 mg BID; EEG (4/8) negative > dc keppra-- discussed with patient at bedside.    #Anemia - stable  HgB 12.7 (4/4) >12.4 (4/8)>12.5 (4/11)    # Pain  - Tylenol PRN mild pain  - Narcotic: Oxycodone 2.5 mg mod pain, oxycodone 5 mg severe pain--dc'd ocycodone, not taking 4/4  - Avoid sedating medications that may interfere with cognitive recovery    # Mood / Cognition  - Neuropsychology consult appreciated  - Recreation therapy  - Lexapro 5 mg QD - mood better    # GI / Bowel  - Senna qHS,  Miralax Daily  - GI ppx: protonix 40 mg QD    #  / Bladder  - dc PVR's  - Toileting schedule every 4 hours    # Skin / Pressure injury  - Skin assessment on admission performed - intact   - Monitor Incisions:    - Pressure Injury/Skin: OOB to chair, PT/OT  - nursing to monitor skin qShift    # Diet/Dysphagia:  - Diet Consistency: regular, vegan  - Aspiration Precautions  - SLP consult for swallow function evaluation and treatment  - Nutrition consult appreciated    # DVT prophylaxis:   - Lovenox 40 mg QD    Outpatient Follow-up:  Patel Polanco  Neurosurgery  [address] [place] [id number]  Phone: [phone]  Fax: [phone]

## 2024-04-11 NOTE — PROGRESS NOTE ADULT - NS ATTEND OPT1A GEN_ALL_CORE
History/Medical decision making

## 2024-04-12 ENCOUNTER — TRANSCRIPTION ENCOUNTER (OUTPATIENT)
Age: 64
End: 2024-04-12

## 2024-04-12 PROCEDURE — 99232 SBSQ HOSP IP/OBS MODERATE 35: CPT | Mod: GC

## 2024-04-12 PROCEDURE — 99232 SBSQ HOSP IP/OBS MODERATE 35: CPT

## 2024-04-12 RX ADMIN — ENOXAPARIN SODIUM 40 MILLIGRAM(S): 100 INJECTION SUBCUTANEOUS at 22:04

## 2024-04-12 RX ADMIN — PANTOPRAZOLE SODIUM 40 MILLIGRAM(S): 20 TABLET, DELAYED RELEASE ORAL at 06:30

## 2024-04-12 RX ADMIN — ESCITALOPRAM OXALATE 5 MILLIGRAM(S): 10 TABLET, FILM COATED ORAL at 11:53

## 2024-04-12 NOTE — PROGRESS NOTE ADULT - ASSESSMENT
64 year old male with PMH of frequent falls, SDH s/p bilateral craniotomies for subdural evacuation (3/12/24 by Dr. Polanco) and b/l MMA embolization (3/19/24 by Dr. Weber) transferred to [facility] on 3/25 from [facility] due to confusion, right sided weakness and increased hemorrhage on CTH. Patient underwent left 64y with PMH of frequent falls, SDH s/p bilateral craniotomies for subdural evacuation (3/12/24 by Dr. Polanco) and b/l MMA embolization (3/19/24 by Dr. Weber) transferred to [facility] on 3/25 from [facility] due to confusion, right sided weakness and increased hemorrhage on CTH. Patient underwent left supratentorial craniotomy for evacuation of acute on chronic subdural hematoma (3/26) Hospital Course uncomplicated. Patient now admitted for a multidisciplinary rehab program.     #SDH s/p bilateral craniotomies for subdural evacuation (3/12/24 by Dr. Polanco) and b/l MMA embolization (3/19/24 by Dr. Weber)  -confusion, right sided weakness and increased hemorrhage on CTH (3/25)  -s/p left supratentorial craniotomy for evacuation of acute on chronic subdural hematoma (3/26)  -EEG 4/8 negative, s/p Keppra 500 mg BID for Seizure PPX  -Fall precaution  -Pain control and bowel regimen per rehab team   -Continue comprehensive rehab program with PT/OT/SLP  -Outpatient follow up with neurosurgery    #Mood  -Continue lexapro  -Neuropsych following     #GI PPx  -On PPI     #DVT PPX  -Lovenox SQ QD

## 2024-04-12 NOTE — PROGRESS NOTE ADULT - ASSESSMENT
64y with PMH of frequent falls, SDH s/p bilateral craniotomies for subdural evacuation (3/12/24 by Dr. Polanco) and b/l MMA embolization (3/19/24 by Dr. Weber) transferred to [facility] on 3/25 from [facility] due to confusion, right sided weakness and increased hemorrhage on CTH. Patient underwent left supratentorial craniotomy for evacuation of acute on chronic subdural hematoma (3/26) Hospital Course uncomplicated. Patient now admitted for a multidisciplinary rehab program.     # Acute on Chronic SDH s/p craniotomy/evacuation  - SDH s/p bilateral craniotomies for subdural evacuation (3/12/24 by Dr. Polanco) and b/l MMA embolization (3/19/24 by Dr. Weber)  - confusion, right sided weakness and increased hemorrhage on CTH (3/25)  - left supratentorial craniotomy for evacuation of acute on chronic subdural hematoma (3/26), staples removed 4/10  - Impaired ADLs and mobility  - Need for assistance with personal care   - c/w comprehensive rehab program of PT/OT/SLP - 3 hours a day, 5 days a week. P&O as needed   - Precautions: Fall, Aspiration, Seizure  - Keppra 500 mg BID; EEG (4/8) negative > dc keppra-- discussed with patient at bedside.    #Anemia - stable  HgB 12.7 (4/4) >12.4 (4/8)>12.5 (4/11)    # Pain  - Tylenol PRN mild pain  - Narcotic: Oxycodone 2.5 mg mod pain, oxycodone 5 mg severe pain--dc'd ocycodone, not taking 4/4  - Avoid sedating medications that may interfere with cognitive recovery    # Mood / Cognition  - Neuropsychology consult appreciated  - Recreation therapy  - Lexapro 5 mg QD - mood better    # GI / Bowel  - Senna qHS,  Miralax Daily  - GI ppx: protonix 40 mg QD    #  / Bladder  - dc PVR's  - Toileting schedule every 4 hours    # Skin / Pressure injury  - Skin assessment on admission performed - intact   - Monitor Incisions:    - Pressure Injury/Skin: OOB to chair, PT/OT  - nursing to monitor skin qShift    # Diet/Dysphagia:  - Diet Consistency: regular, vegan  - Aspiration Precautions  - SLP consult for swallow function evaluation and treatment  - Nutrition consult appreciated    # DVT prophylaxis:   - Lovenox 40 mg QD    Outpatient Follow-up:  Patel Polanco  Neurosurgery  [address] [place] [id number]  Phone: [phone]  Fax: [phone]    Total time 36 mins-face to face time encounter and counseling the patient- discussed discharge, meeting with hospitalist, nursing staff, therapists and social work to discuss medical updates and management, care coordination, reviewing chart and data

## 2024-04-12 NOTE — PROGRESS NOTE ADULT - TIME BILLING
Time spent includes direct patient care (interview and examination of patient), discussion with other providers, support staff and/or patient's family members, review of medical records, ordering diagnostic tests and analyzing results, and documentation
Time spent includes direct patient care (interview and examination of patient), discussion with other providers, support staff and/or patient's family members, review of medical records, ordering diagnostic tests and analyzing results, and documentation
face to face time encounter and counseling the patient, meeting with hospitalist, nursing staff, therapists and social work to discuss medical updates and management, care coordination, reviewing chart and data

## 2024-04-12 NOTE — DISCHARGE NOTE NURSING/CASE MANAGEMENT/SOCIAL WORK - PATIENT PORTAL LINK FT
You can access the FollowMyHealth Patient Portal offered by [facility] by registering at the following website: [email or web address]. By joining [employer]’s FollowMyHealth portal, you will also be able to view your health information using other applications (apps) compatible with our system.

## 2024-04-12 NOTE — PROGRESS NOTE ADULT - SUBJECTIVE AND OBJECTIVE BOX
Interval History  Patient seen and examined at bedside. No acute events noted.  Patient denies any acute complaints, feels well.    ALLERGIES:  Allergy Status Unknown  No Known Allergies    MEDICATIONS  (STANDING):  enoxaparin Injectable 40 milliGRAM(s) SubCutaneous <User Schedule>  escitalopram 5 milliGRAM(s) Oral daily  pantoprazole    Tablet 40 milliGRAM(s) Oral before breakfast  polyethylene glycol 3350 17 Gram(s) Oral daily  senna 2 Tablet(s) Oral at bedtime    MEDICATIONS  (PRN):  acetaminophen     Tablet .. 650 milliGRAM(s) Oral every 6 hours PRN Mild Pain (1 - 3)    Vital Signs Last 24 Hrs  T(F): 97.8 (12 Apr 2024 08:42), Max: 97.9 (11 Apr 2024 19:40)  HR: 56 (12 Apr 2024 08:42) (56 - 58)  BP: 125/72 (12 Apr 2024 08:42) (120/55 - 125/72)  RR: 16 (12 Apr 2024 08:42) (16 - 16)  SpO2: 97% (12 Apr 2024 08:42) (97% - 97%)  I&O's Summary    11 Apr 2024 07:01  -  12 Apr 2024 07:00  --------------------------------------------------------  IN: 0 mL / OUT: 650 mL / NET: -650 mL    PHYSICAL EXAM:  GENERAL: NAD  HEENT: NCAT, crani incision healing well  CHEST/LUNG: Clear to percussion bilaterally; No rales, rhonchi, wheezing  HEART: Regular rate and rhythm; No murmurs  ABDOMEN: Soft, Nontender, Nondistended; Bowel sounds present  MUSCULOSKELETAL/EXTREMITIES:  2+ Peripheral Pulses, No LE edema  PSYCH: Appropriate affect  NEURO: Alert & Oriented x 3, 5/5 UE/LE    I personally reviewed the below data/images/labs:    LABS:                        12.5   4.75  )-----------( 249      ( 11 Apr 2024 06:08 )             38.8       04-11    141  |  106  |  10  ----------------------------<  100  4.0   |  27  |  0.81    Ca    9.1      11 Apr 2024 06:08    TPro  6.4  /  Alb  2.9  /  TBili  0.5  /  DBili  x   /  AST  16  /  ALT  26  /  AlkPhos  93  04-11     03-11 Chol 142 mg/dL LDL -- HDL 38 mg/dL Trig 101 mg/dL    Urinalysis Basic - ( 11 Apr 2024 06:08 )    Color: x / Appearance: x / SG: x / pH: x  Gluc: 100 mg/dL / Ketone: x  / Bili: x / Urobili: x   Blood: x / Protein: x / Nitrite: x   Leuk Esterase: x / RBC: x / WBC x   Sq Epi: x / Non Sq Epi: x / Bacteria: x    Consultant(s) Notes Reviewed:   Care Discused with Consultants/Other Providers:   Imaging Personally Reviewed:

## 2024-04-12 NOTE — PROGRESS NOTE ADULT - SUBJECTIVE AND OBJECTIVE BOX
Patient is a 64y old  Male who presents with a chief complaint of acute on chronic SDH s/p craniotomy/evacuation     HPI:  65 y/o RHD male with PMH of frequent falls, SDH s/p bilateral craniotomies for subdural evacuation (3/12/24 by Dr. Polanco) and b/l MMA embolization (3/19/24 by Dr. Weber) transferred to [facility] on 3/25 from [facility] due to confusion, right sided weakness and increased hemorrhage on CTH. Patient underwent left supratentorial craniotomy for evacuation of acute on chronic subdural hematoma (3/26). CTH 3/28 showed previously noted left-sided subdural hematoma has been removed, slight increase in size of previously noted left frontal extra-axial air. CTH 3/31 showed stable b/l SDH, improved left sided pneumocephalus. Right craniotomy staples and left subdural drain removed 3/28, right drain exit suture removed 3/29, left craniotomy staple removed 4/9, and left drain exit staple removed 4/11. Hospital course uncomplicated. Patient evaluated by PT/OT and was recommended to return to acute inpatient rehab. Patient is medically stable for discharge to [facility] on 4/1. (01 Apr 2024 12:49)    SUBJECTIVE: Patient seen and examined while laying in bed. States he slept. Denies pain. Nephew attended family training yesterday.    REVIEW OF SYSTEMS  Constitutional: No fever, no chills  Cardio: No chest pain, No palpitations  Resp: No SOB, no cough, no respiratory distress   Neuro: No headaches, no dizziness, +RUE tremor  MSK: no pain    VITALS  64y  T(C): 36.6 (04-12-24 @ 08:42)  T(F): 97.8 (04-12-24 @ 08:42), Max: 97.9 (04-11-24 @ 19:40)  HR: 56 (04-12-24 @ 08:42) (56 - 58)  BP: 125/72 (04-12-24 @ 08:42) (120/55 - 125/72)  RR:  (16 - 16)  SpO2:  (97% - 97%)    PHYSICAL EXAM:   Constitutional - NAD, Comfortable  HEENT - NCAT, EOMI; s/p frontal craniotomy, c/d/i  Neck - Supple, No limited ROM  Cardio - warm and well perfused, no cyanosis   Abdomen -  Soft, NTND  Extremities - No peripheral edema, No calf tenderness   Neurologic Exam:                    Cognitive -             Orientation: Awake, Alert, AAO x4, follow commands, content appropriate            memory- impaired            Attention: fair     Speech - Fluent, Comprehensible, mild dysarthria, No aphasia   Cranial Nerves - No facial asymmetry, Tongue midline, EOMI, Shoulder shrug intact     Motor -                     LEFT    UE - ShAB 5/5, EF 5/5, EE 5/5, WE 5/5,  WNL                    RIGHT UE - ShAB 5/5, EF 5/5, EE 5/5, WE 5/5,  5/5                    LEFT    LE - HF 5/5, KE 5/5, DF 5/5, PF 5/5                    RIGHT LE - HF 5/5, KE 5/5, DF 5/5, PF 5/5        Coordination - FTN impaired-improving  Psychiatric - Mood stable  RECENT LABS:                           12.5   4.75  )-----------( 249      ( 11 Apr 2024 06:08 )             38.8     04-11    141  |  106  |  10  ----------------------------<  100<H>  4.0   |  27  |  0.81    Ca    9.1      11 Apr 2024 06:08    TPro  6.4  /  Alb  2.9<L>  /  TBili  0.5  /  DBili  x   /  AST  16  /  ALT  26  /  AlkPhos  93  04-11    LIVER FUNCTIONS - ( 11 Apr 2024 06:08 )  Alb: 2.9 g/dL / Pro: 6.4 g/dL / ALK PHOS: 93 U/L / ALT: 26 U/L / AST: 16 U/L / GGT: x             MEDICATIONS  (STANDING):  enoxaparin Injectable 40 milliGRAM(s) SubCutaneous <User Schedule>  escitalopram 5 milliGRAM(s) Oral daily  pantoprazole    Tablet 40 milliGRAM(s) Oral before breakfast  polyethylene glycol 3350 17 Gram(s) Oral daily  senna 2 Tablet(s) Oral at bedtime    MEDICATIONS  (PRN):  acetaminophen     Tablet .. 650 milliGRAM(s) Oral every 6 hours PRN Mild Pain (1 - 3)

## 2024-04-12 NOTE — DISCHARGE NOTE NURSING/CASE MANAGEMENT/SOCIAL WORK - NSDCPEFALRISK_GEN_ALL_CORE
For information on Fall & Injury Prevention, visit: [email or web address] OR  [email or web address] OR  https://www.cdc.gov/steadi/patient.html

## 2024-04-13 PROCEDURE — 99232 SBSQ HOSP IP/OBS MODERATE 35: CPT

## 2024-04-13 PROCEDURE — 99232 SBSQ HOSP IP/OBS MODERATE 35: CPT | Mod: GC,25

## 2024-04-13 RX ADMIN — ESCITALOPRAM OXALATE 5 MILLIGRAM(S): 10 TABLET, FILM COATED ORAL at 12:02

## 2024-04-13 RX ADMIN — ENOXAPARIN SODIUM 40 MILLIGRAM(S): 100 INJECTION SUBCUTANEOUS at 22:06

## 2024-04-13 RX ADMIN — PANTOPRAZOLE SODIUM 40 MILLIGRAM(S): 20 TABLET, DELAYED RELEASE ORAL at 06:06

## 2024-04-13 NOTE — PROGRESS NOTE ADULT - SUBJECTIVE AND OBJECTIVE BOX
Patient is a 64y old  Male who presents with a chief complaint of acute on chronic SDH s/p craniotomy/evacuation (12 Apr 2024 16:04)      Subjective and overnight events:  Patient seen and examined at bedside. no complaints. reports slept well. + BM. no fever, chills, sob, cp, abd pain    ALLERGIES:  Allergy Status Unknown  No Known Allergies    MEDICATIONS  (STANDING):  enoxaparin Injectable 40 milliGRAM(s) SubCutaneous <User Schedule>  escitalopram 5 milliGRAM(s) Oral daily  pantoprazole    Tablet 40 milliGRAM(s) Oral before breakfast  polyethylene glycol 3350 17 Gram(s) Oral daily  senna 2 Tablet(s) Oral at bedtime    MEDICATIONS  (PRN):  acetaminophen     Tablet .. 650 milliGRAM(s) Oral every 6 hours PRN Mild Pain (1 - 3)    Vital Signs Last 24 Hrs  T(F): 97.8 (12 Apr 2024 22:01), Max: 97.8 (12 Apr 2024 22:01)  HR: 68 (12 Apr 2024 22:01) (68 - 68)  BP: 111/66 (12 Apr 2024 22:01) (111/66 - 111/66)  RR: 16 (12 Apr 2024 22:01) (16 - 16)  SpO2: 98% (12 Apr 2024 22:01) (98% - 98%)  I&O's Summary    PHYSICAL EXAM:  General: NAD, Awake alert. answering questions appropriately   ENT: MMM  Neck: Supple, No JVD  Lungs: Clear to auscultation bilaterally  Cardio: RRR, S1/S2, No murmurs  Abdomen: Soft, Nontender, Nondistended; Bowel sounds present  Extremities: No calf tenderness, No pitting edema    LABS:                        12.5   4.75  )-----------( 249      ( 11 Apr 2024 06:08 )             38.8     04-11    141  |  106  |  10  ----------------------------<  100  4.0   |  27  |  0.81    Ca    9.1      11 Apr 2024 06:08    TPro  6.4  /  Alb  2.9  /  TBili  0.5  /  DBili  x   /  AST  16  /  ALT  26  /  AlkPhos  93  04-11        03-11 Chol 142 mg/dL LDL -- HDL 38 mg/dL Trig 101 mg/dL        Urinalysis Basic - ( 11 Apr 2024 06:08 )    Color: x / Appearance: x / SG: x / pH: x  Gluc: 100 mg/dL / Ketone: x  / Bili: x / Urobili: x   Blood: x / Protein: x / Nitrite: x   Leuk Esterase: x / RBC: x / WBC x   Sq Epi: x / Non Sq Epi: x / Bacteria: x            RADIOLOGY & ADDITIONAL TESTS:    Care Discussed with Consultants/Other Providers:

## 2024-04-13 NOTE — PROGRESS NOTE ADULT - SUBJECTIVE AND OBJECTIVE BOX
Patient is a 64y old  Male who presents with a chief complaint of acute on chronic SDH s/p craniotomy/evacuation     HPI:  65 y/o RHD male with PMH of frequent falls, SDH s/p bilateral craniotomies for subdural evacuation (3/12/24 by Dr. Polanco) and b/l MMA embolization (3/19/24 by Dr. Weber) transferred to [facility] on 3/25 from [facility] due to confusion, right sided weakness and increased hemorrhage on CTH. Patient underwent left supratentorial craniotomy for evacuation of acute on chronic subdural hematoma (3/26). CTH 3/28 showed previously noted left-sided subdural hematoma has been removed, slight increase in size of previously noted left frontal extra-axial air. CTH 3/31 showed stable b/l SDH, improved left sided pneumocephalus. Right craniotomy staples and left subdural drain removed 3/28, right drain exit suture removed 3/29, left craniotomy staple removed 4/9, and left drain exit staple removed 4/11. Hospital course uncomplicated. Patient evaluated by PT/OT and was recommended to return to acute inpatient rehab. Patient is medically stable for discharge to [facility] on 4/1. (01 Apr 2024 12:49)    SUBJECTIVE: Patient seen and examined while laying in bed. States he slept. Denies pain or new complaints.    REVIEW OF SYSTEMS  Constitutional: No fever, no chills  Cardio: No chest pain, No palpitations  Resp: No SOB, no cough  Neuro: No headaches, no dizziness      VITALS  64y  Vital Signs Last 24 Hrs  T(C): 36.6 (12 Apr 2024 22:01), Max: 36.6 (12 Apr 2024 22:01)  T(F): 97.8 (12 Apr 2024 22:01), Max: 97.8 (12 Apr 2024 22:01)  HR: 68 (12 Apr 2024 22:01) (68 - 68)  BP: 111/66 (12 Apr 2024 22:01) (111/66 - 111/66)  RR: 16 (12 Apr 2024 22:01) (16 - 16)  SpO2: 98% (12 Apr 2024 22:01) (98% - 98%)    Parameters below as of 12 Apr 2024 22:01  Patient On (Oxygen Delivery Method): room air        PHYSICAL EXAM:   Constitutional - NAD, Comfortable  HEENT - NCAT, EOMI; s/p frontal craniotomy, c/d/i  Neck - Supple, No limited ROM  Cardio - warm and well perfused, no cyanosis   Abdomen -  Soft, NTND  Extremities - No peripheral edema, No calf tenderness   Neurologic Exam:                    Cognitive -             Orientation: Awake, Alert, AAO x4, follow commands, content appropriate            memory- impaired            Attention: fair     Speech - Fluent, Comprehensible, mild dysarthria, No aphasia   Cranial Nerves - No facial asymmetry, Tongue midline, EOMI, Shoulder shrug intact     Motor - 5/5 throughout    Coordination - FTN impaired-improving  Psychiatric - Mood stable    RECENT LABS:                           12.5   4.75  )-----------( 249      ( 11 Apr 2024 06:08 )             38.8     04-11    141  |  106  |  10  ----------------------------<  100<H>  4.0   |  27  |  0.81    Ca    9.1      11 Apr 2024 06:08    TPro  6.4  /  Alb  2.9<L>  /  TBili  0.5  /  DBili  x   /  AST  16  /  ALT  26  /  AlkPhos  93  04-11    LIVER FUNCTIONS - ( 11 Apr 2024 06:08 )  Alb: 2.9 g/dL / Pro: 6.4 g/dL / ALK PHOS: 93 U/L / ALT: 26 U/L / AST: 16 U/L / GGT: x             MEDICATIONS  (STANDING):  enoxaparin Injectable 40 milliGRAM(s) SubCutaneous <User Schedule>  escitalopram 5 milliGRAM(s) Oral daily  pantoprazole    Tablet 40 milliGRAM(s) Oral before breakfast  polyethylene glycol 3350 17 Gram(s) Oral daily  senna 2 Tablet(s) Oral at bedtime    MEDICATIONS  (PRN):  acetaminophen     Tablet .. 650 milliGRAM(s) Oral every 6 hours PRN Mild Pain (1 - 3)

## 2024-04-13 NOTE — PROGRESS NOTE ADULT - ASSESSMENT
64y with PMH of frequent falls, SDH s/p bilateral craniotomies for subdural evacuation (3/12/24 by Dr. Polanco) and b/l MMA embolization (3/19/24 by Dr. Weber) transferred to [facility] on 3/25 from [facility] due to confusion, right sided weakness and increased hemorrhage on CTH. Patient underwent left supratentorial craniotomy for evacuation of acute on chronic subdural hematoma (3/26) Hospital Course uncomplicated. Patient now admitted for a multidisciplinary rehab program.     # Acute on Chronic SDH s/p craniotomy/evacuation  - SDH s/p bilateral craniotomies for subdural evacuation (3/12/24 by Dr. Polanco) and b/l MMA embolization (3/19/24 by Dr. Weber)  - confusion, right sided weakness and increased hemorrhage on CTH (3/25)  - left supratentorial craniotomy for evacuation of acute on chronic subdural hematoma (3/26), staples removed 4/10  - Impaired ADLs and mobility  - Need for assistance with personal care   - c/w comprehensive rehab program of PT/OT/SLP - 3 hours a day, 5 days a week. P&O as needed   - Precautions: Fall, Aspiration, Seizure  - Keppra 500 mg BID; EEG (4/8) negative > dc keppra-- discussed with patient at bedside.    #Anemia - stable  HgB 12.7 (4/4) >12.4 (4/8)>12.5 (4/11)    # Pain  - Tylenol PRN mild pain  - Narcotic: Oxycodone 2.5 mg mod pain, oxycodone 5 mg severe pain--dc'd ocycodone, not taking 4/4  - Avoid sedating medications that may interfere with cognitive recovery    # Mood / Cognition  - Neuropsychology consult appreciated  - Recreation therapy  - Lexapro 5 mg QD - mood better    # GI / Bowel  - Senna qHS,  Miralax Daily  - GI ppx: protonix 40 mg QD    #  / Bladder  - dc PVR's  - Toileting schedule every 4 hours    # Skin / Pressure injury  - Skin assessment on admission performed - intact   - Monitor Incisions:    - Pressure Injury/Skin: OOB to chair, PT/OT  - nursing to monitor skin qShift    # Diet/Dysphagia:  - Diet Consistency: regular, vegan  - Aspiration Precautions  - SLP consult for swallow function evaluation and treatment  - Nutrition consult appreciated    # DVT prophylaxis:   - Lovenox 40 mg QD    Outpatient Follow-up:  Patel Polanco  Neurosurgery  [address] [place] [id number]  Phone: [phone]  Fax: [phone]    Total time 36 mins-face to face time encounter and counseling the patient, meeting with hospitalist, nursing staff to discuss medical updates and management, care coordination, reviewing chart and data-- discussed DC tomorrow home with family

## 2024-04-13 NOTE — PROGRESS NOTE ADULT - ASSESSMENT
64 year old male with PMH of frequent falls, SDH s/p bilateral craniotomies for subdural evacuation (3/12/24 by Dr. Polanco) and b/l MMA embolization (3/19/24 by Dr. Weber) transferred to [facility] on 3/25 from [facility] due to confusion, right sided weakness and increased hemorrhage on CTH. Patient underwent left 64y with PMH of frequent falls, SDH s/p bilateral craniotomies for subdural evacuation (3/12/24 by Dr. Polanco) and b/l MMA embolization (3/19/24 by Dr. Weber) transferred to [facility] on 3/25 from [facility] due to confusion, right sided weakness and increased hemorrhage on CTH. Patient underwent left supratentorial craniotomy for evacuation of acute on chronic subdural hematoma (3/26) Hospital Course uncomplicated. Patient now admitted for a multidisciplinary rehab program.     #SDH s/p bilateral craniotomies for subdural evacuation (3/12/24 by Dr. Polanco) and b/l MMA embolization (3/19/24 by Dr. Weber)  -confusion, right sided weakness and increased hemorrhage on CTH (3/25)  -s/p left supratentorial craniotomy for evacuation of acute on chronic subdural hematoma (3/26)  -EEG 4/8 negative, s/p Keppra 500 mg BID for Seizure PPX  -Fall precaution  -Pain control and bowel regimen per rehab team   -Continue comprehensive rehab program with PT/OT/SLP  -Outpatient follow up with neurosurgery    #Mood  -Continue lexapro    #GI PPx  -On PPI     #DVT PPX  -Lovenox SQ QD

## 2024-04-14 VITALS
SYSTOLIC BLOOD PRESSURE: 122 MMHG | DIASTOLIC BLOOD PRESSURE: 70 MMHG | TEMPERATURE: 98 F | OXYGEN SATURATION: 97 % | HEART RATE: 74 BPM | RESPIRATION RATE: 16 BRPM

## 2024-04-14 PROCEDURE — 97116 GAIT TRAINING THERAPY: CPT | Mod: GP

## 2024-04-14 PROCEDURE — 85025 COMPLETE CBC W/AUTO DIFF WBC: CPT

## 2024-04-14 PROCEDURE — 97165 OT EVAL LOW COMPLEX 30 MIN: CPT | Mod: GO

## 2024-04-14 PROCEDURE — 80053 COMPREHEN METABOLIC PANEL: CPT

## 2024-04-14 PROCEDURE — 92523 SPEECH SOUND LANG COMPREHEN: CPT | Mod: GN

## 2024-04-14 PROCEDURE — 92507 TX SP LANG VOICE COMM INDIV: CPT | Mod: GN

## 2024-04-14 PROCEDURE — 87637 SARSCOV2&INF A&B&RSV AMP PRB: CPT

## 2024-04-14 PROCEDURE — 36415 COLL VENOUS BLD VENIPUNCTURE: CPT

## 2024-04-14 PROCEDURE — 85027 COMPLETE CBC AUTOMATED: CPT

## 2024-04-14 PROCEDURE — 97161 PT EVAL LOW COMPLEX 20 MIN: CPT | Mod: GP

## 2024-04-14 PROCEDURE — 97112 NEUROMUSCULAR REEDUCATION: CPT | Mod: GO

## 2024-04-14 PROCEDURE — 97110 THERAPEUTIC EXERCISES: CPT | Mod: GO

## 2024-04-14 PROCEDURE — 97530 THERAPEUTIC ACTIVITIES: CPT | Mod: GO

## 2024-04-14 PROCEDURE — 95812 EEG 41-60 MINUTES: CPT

## 2024-04-14 PROCEDURE — 99239 HOSP IP/OBS DSCHRG MGMT >30: CPT | Mod: GC

## 2024-04-14 PROCEDURE — 97535 SELF CARE MNGMENT TRAINING: CPT | Mod: GO

## 2024-04-14 PROCEDURE — 92610 EVALUATE SWALLOWING FUNCTION: CPT | Mod: GN

## 2024-04-14 RX ORDER — ESCITALOPRAM OXALATE 10 MG/1
1 TABLET, FILM COATED ORAL
Qty: 30 | Refills: 0
Start: 2024-04-14 | End: 2024-05-13

## 2024-04-14 RX ORDER — PANTOPRAZOLE SODIUM 20 MG/1
1 TABLET, DELAYED RELEASE ORAL
Qty: 30 | Refills: 0
Start: 2024-04-14 | End: 2024-05-13

## 2024-04-14 RX ADMIN — POLYETHYLENE GLYCOL 3350 17 GRAM(S): 17 POWDER, FOR SOLUTION ORAL at 13:54

## 2024-04-14 RX ADMIN — PANTOPRAZOLE SODIUM 40 MILLIGRAM(S): 20 TABLET, DELAYED RELEASE ORAL at 06:04

## 2024-04-14 RX ADMIN — ESCITALOPRAM OXALATE 5 MILLIGRAM(S): 10 TABLET, FILM COATED ORAL at 13:52

## 2024-04-14 NOTE — PROGRESS NOTE ADULT - REASON FOR ADMISSION
acute on chronic SDH s/p craniotomy/evacuation

## 2024-04-14 NOTE — PROGRESS NOTE ADULT - SUBJECTIVE AND OBJECTIVE BOX
Patient is a 64y old  Male who presents with a chief complaint of acute on chronic SDH s/p craniotomy/evacuation     HPI:  65 y/o RHD male with PMH of frequent falls, SDH s/p bilateral craniotomies for subdural evacuation (3/12/24 by Dr. Polanco) and b/l MMA embolization (3/19/24 by Dr. Weber) transferred to [facility] on 3/25 from [facility] due to confusion, right sided weakness and increased hemorrhage on CTH. Patient underwent left supratentorial craniotomy for evacuation of acute on chronic subdural hematoma (3/26). CTH 3/28 showed previously noted left-sided subdural hematoma has been removed, slight increase in size of previously noted left frontal extra-axial air. CTH 3/31 showed stable b/l SDH, improved left sided pneumocephalus. Right craniotomy staples and left subdural drain removed 3/28, right drain exit suture removed 3/29, left craniotomy staple removed 4/9, and left drain exit staple removed 4/11. Hospital course uncomplicated. Patient evaluated by PT/OT and was recommended to return to acute inpatient rehab. Patient is medically stable for discharge to [facility] on 4/1. (01 Apr 2024 12:49)    SUBJECTIVE: Patient seen and examined while laying in bed. States he slept. Denies pain or new complaints. DC home today    REVIEW OF SYSTEMS  Constitutional: No fever, no chills  Cardio: No chest pain, No palpitations  Resp: No SOB, no cough  Neuro: No headaches, no dizziness      VITALS  64y  Vital Signs Last 24 Hrs  T(C): 36.9 (04-14-24 @ 09:45), Max: 36.9 (04-14-24 @ 09:45)  T(F): 98.4 (04-14-24 @ 09:45), Max: 98.4 (04-14-24 @ 09:45)  HR: 74 (04-14-24 @ 09:45) (60 - 74)  BP: 122/70 (04-14-24 @ 09:45) (117/66 - 126/75)  BP(mean): --  RR: 16 (04-14-24 @ 09:45) (16 - 16)  SpO2: 97% (04-14-24 @ 09:45) (97% - 97%)        PHYSICAL EXAM:   Constitutional - NAD, Comfortable  HEENT - NCAT, EOMI; s/p frontal craniotomy, c/d/i  Neck - Supple, No limited ROM  Cardio - warm and well perfused, no cyanosis   Abdomen -  Soft, NTND  Extremities - No peripheral edema, No calf tenderness   Neurologic Exam:                    Cognitive -             Orientation: Awake, Alert, AAO x4, follow commands, content appropriate            memory- impaired            Attention: fair     Speech - Fluent, Comprehensible, mild dysarthria, No aphasia   Cranial Nerves - No facial asymmetry, Tongue midline, EOMI, Shoulder shrug intact     Motor - 5/5 throughout    Coordination - FTN improved  Psychiatric - Mood stable    RECENT LABS:                           12.5   4.75  )-----------( 249      ( 11 Apr 2024 06:08 )             38.8     04-11    141  |  106  |  10  ----------------------------<  100<H>  4.0   |  27  |  0.81    Ca    9.1      11 Apr 2024 06:08    TPro  6.4  /  Alb  2.9<L>  /  TBili  0.5  /  DBili  x   /  AST  16  /  ALT  26  /  AlkPhos  93  04-11    LIVER FUNCTIONS - ( 11 Apr 2024 06:08 )  Alb: 2.9 g/dL / Pro: 6.4 g/dL / ALK PHOS: 93 U/L / ALT: 26 U/L / AST: 16 U/L / GGT: x             MEDICATIONS  (STANDING):  enoxaparin Injectable 40 milliGRAM(s) SubCutaneous <User Schedule>  escitalopram 5 milliGRAM(s) Oral daily  pantoprazole    Tablet 40 milliGRAM(s) Oral before breakfast  polyethylene glycol 3350 17 Gram(s) Oral daily  senna 2 Tablet(s) Oral at bedtime    MEDICATIONS  (PRN):  acetaminophen     Tablet .. 650 milliGRAM(s) Oral every 6 hours PRN Mild Pain (1 - 3)

## 2024-04-14 NOTE — PROGRESS NOTE ADULT - ASSESSMENT
64y with PMH of frequent falls, SDH s/p bilateral craniotomies for subdural evacuation (3/12/24 by Dr. Polanco) and b/l MMA embolization (3/19/24 by Dr. Weber) transferred to [facility] on 3/25 from [facility] due to confusion, right sided weakness and increased hemorrhage on CTH. Patient underwent left supratentorial craniotomy for evacuation of acute on chronic subdural hematoma (3/26) Hospital Course uncomplicated. Patient now admitted for a multidisciplinary rehab program.     # Acute on Chronic SDH s/p craniotomy/evacuation  - SDH s/p bilateral craniotomies for subdural evacuation (3/12/24 by Dr. Polanco) and b/l MMA embolization (3/19/24 by Dr. Weber)  - confusion, right sided weakness and increased hemorrhage on CTH (3/25)  - left supratentorial craniotomy for evacuation of acute on chronic subdural hematoma (3/26), staples removed 4/10  - Impaired ADLs and mobility  - Need for assistance with personal care   - DC from comprehensive rehab program of PT/OT/SLP - 3 hours a day, 5 days a week. P&O as needed --- going home today with family  - Precautions: Fall, Aspiration, Seizure  - Keppra 500 mg BID; EEG (4/8) negative > dc keppra-- discussed with patient at bedside.    #Anemia - stable  HgB 12.7 (4/4) >12.4 (4/8)>12.5 (4/11)    # Pain  - Tylenol PRN mild pain  - Narcotic: Oxycodone 2.5 mg mod pain, oxycodone 5 mg severe pain--dc'd ocycodone, not taking 4/4  - Avoid sedating medications that may interfere with cognitive recovery    # Mood / Cognition  - Neuropsychology consult appreciated  - Recreation therapy  - Lexapro 5 mg QD - mood better    # GI / Bowel  - Senna qHS,  Miralax Daily  - GI ppx: protonix 40 mg QD    #  / Bladder  - dc PVR's  - Toileting schedule every 4 hours    # Skin / Pressure injury  - Skin assessment on admission performed - intact   - Monitor Incisions:    - Pressure Injury/Skin: OOB to chair, PT/OT  - nursing to monitor skin qShift    # Diet/Dysphagia:  - Diet Consistency: regular, vegan  - Aspiration Precautions  - SLP consult for swallow function evaluation and treatment  - Nutrition consult appreciated    # DVT prophylaxis:   - Lovenox 40 mg QD    Outpatient Follow-up:  Patel Polanco  Neurosurgery  [address] [place] [id number]  Phone: [phone]  Fax: [phone]

## 2024-04-14 NOTE — PROGRESS NOTE ADULT - PROVIDER SPECIALTY LIST ADULT
Hospitalist
Hospitalist
Physiatry
Hospitalist
Hospitalist
Physiatry
Hospitalist
Hospitalist
Physiatry
Physiatry
Hospitalist

## 2024-05-21 ENCOUNTER — NON-APPOINTMENT (OUTPATIENT)
Age: 64
End: 2024-05-21

## 2024-05-21 ENCOUNTER — APPOINTMENT (OUTPATIENT)
Dept: PHYSICAL MEDICINE AND REHAB | Facility: CLINIC | Age: 64
End: 2024-05-21
Payer: MEDICARE

## 2024-05-21 VITALS
SYSTOLIC BLOOD PRESSURE: 125 MMHG | HEIGHT: 67 IN | DIASTOLIC BLOOD PRESSURE: 79 MMHG | BODY MASS INDEX: 26.37 KG/M2 | TEMPERATURE: 98.9 F | HEART RATE: 69 BPM | OXYGEN SATURATION: 97 % | WEIGHT: 168 LBS

## 2024-05-21 DIAGNOSIS — S06.5XAA TRAUMATIC SUBDURAL HEMORRHAGE WITH LOSS OF CONSCIOUSNESS STATUS UNKNOWN, INITIAL ENCOUNTER: ICD-10-CM

## 2024-05-21 DIAGNOSIS — Z98.890 OTHER SPECIFIED POSTPROCEDURAL STATES: ICD-10-CM

## 2024-05-21 DIAGNOSIS — R26.9 UNSPECIFIED ABNORMALITIES OF GAIT AND MOBILITY: ICD-10-CM

## 2024-05-21 PROCEDURE — 99215 OFFICE O/P EST HI 40 MIN: CPT

## 2024-05-21 NOTE — PHYSICAL EXAM
[FreeTextEntry1] : Physical Exam Constitutional - NAD, Comfortable HEENT - s/p craniotomy  Chest - Breathing comfortably, No wheezing Cardiovascular - S1S2  Abdomen - Soft  Extremities - No calf tenderness   Neurologic Exam -                   Cognitive - AAO x3    Communication - Fluent, No dysarthria, no aphasia- able to repeat, name objects and function, follow 2 step commands    Memory- recall 2/3 words    Motor -                   LEFT    UE - ShAB 5/5, EF 5/5, EE 5/5, WE 5/5,  5/5                   RIGHT UE - ShAB 5/5, EF 5/5, EE 5/5, WE 5/5,  5/5                   LEFT    LE - HF 5/5, KE 5/5, DF 5/5, PF 5/5                   RIGHT LE - HF 5/5, KE 5/5, DF 5/5, PF 5/5       Coordination - FTN intact bilaterally  Psychiatric - Mood stable, Affect WNL

## 2024-05-21 NOTE — HISTORY OF PRESENT ILLNESS
[FreeTextEntry1] : 64-year-old male presents for a follow up s/p acute rehab hospitalization s/p craniotomy for SDH evacuation. Patient was admitted to [facility] from 4/1-4/14/24. He is no longer on lexapro. Mood is good.  Ambulating without an assistive device but is unsteady. He has a rolling walker that he uses when outdoors. Family provides supervision for dressing and bathing. Patient is independent with oral care, grooming.  Sleep is good, no difficulties.  Denies headaches or pain.  Hospital Course  65 y/o RHD male with PMH of frequent falls, SDH s/p bilateral craniotomies for subdural evacuation (3/12/24 by Dr. Polanco) and b/l MMA embolization (3/19/24 by Dr. Weebr) transferred to [facility] on 3/25 from [facility] due to confusion, right sided weakness and increased hemorrhage on CTH. Patient underwent left supratentorial craniotomy for evacuation of acute on chronic subdural hematoma (3/26). CTH 3/28 showed previously noted left-sided subdural hematoma has been removed, slight increase in size of previously noted left frontal extra-axial air. CTH 3/31 showed stable b/l SDH, improved left sided pneumocephalus. Right craniotomy staples and left subdural drain removed 3/28, right drain exit suture removed 3/29, left craniotomy staple removed 4/9, and left drain exit staple removed 4/11. Hospital course uncomplicated. Patient evaluated by PT/OT and was recommended to return to acute inpatient rehab. Patient is medically stable for discharge to [facility] on 4/1.

## 2024-05-21 NOTE — ASSESSMENT
[FreeTextEntry1] : 64 year old male s/p craniotomy for SDH presents for follow up. Doing well overall, no issues with sleep, mood. Appetite is good. Ongoing balance impairments  - Referral to/ Cont. Physical Therapy [facility] x 8 weeks.   Eval and Treat.  Balance, ROM exercises, Strengthening, Community Ambulation, Stairs, Postural biomechanics, Endurance. HEP -Patient may travel to [place], discussed obtaining assistive device prior to travel.

## 2024-05-21 NOTE — REASON FOR VISIT
[Follow-Up] : a follow-up visit [Other: _____] : [unfilled] [FreeTextEntry1] : acute on chronic SDH s/p craniotomy

## 2024-11-18 NOTE — ED ADULT NURSE NOTE - GENITOURINARY ASSESSMENT
Pastoral Care Progress Note             11/17/24 0590   Clinical Encounter Type   Visited With Patient   Sacramental Encounters   Communion Given Indicator Yes     Eucharistic Janis provided communion.   - - -

## 2025-03-13 NOTE — BRIEF OPERATIVE NOTE - ANTIBIOTIC PROTOCOL
[facility] Locations - No appointment necessary.  ? indicates the location is open Saturdays in addition to Monday through Friday.   Call your preferred location for test preparation, business hours and other information you need.   [facility] accepts all insurances.  CENTRAL  EAST  [place]    ? [place]   [address]   [address]   [place] [address]    Ph: [phone]  [facility]   [address] [place] [address]    Ph: [phone]   ? [place]   [address]    [place] [address]    Ph: [phone]     [facility]   [address] [place] [address]    Ph: [phone] ? [place]   [address] [place] [address]   Ph: [phone]  ? [facility]   [address] [place] [address]    Ph: [phone]      [place]   [address] [place] [address]   Ph: [phone]     [facility]  [address] [place] [address]   Ph: [phone]    [place]    ? [facility]   [address] [address]    Ph: [phone]  [facility]   [address] [place] [address]   Ph: [phone]  [place]   [address] [place], [place] [address]vd.   [facility], [address]    PH: [phone]    [facility]   [address] [place] [address]    Ph: [phone]  [place]  [address] [place] [address]  Ph: [phone]  [facility]   [address] [place] [address]    Ph: [phone]        Followed protocol

## (undated) DEVICE — SUT ETHILON 3-0 18" PS-1

## (undated) DEVICE — ELCTR BOVIE TIP BLADE INSULATED 4" EDGE

## (undated) DEVICE — ELCTR GROUNDING PAD ADULT COVIDIEN

## (undated) DEVICE — DRSG XEROFORM 5 X 9"

## (undated) DEVICE — SUT ETHILON 4-0 18" PS-2

## (undated) DEVICE — DRAIN JACKSON PRATT 7MM FLAT FULL W 15 FR TROCAR

## (undated) DEVICE — DRSG 4 X 8

## (undated) DEVICE — ELCTR ROCKER SWITCH PENCIL BLUE 10FT

## (undated) DEVICE — PACK NEURO

## (undated) DEVICE — STAPLER SKIN PROXIMATE

## (undated) DEVICE — CODMAN RANEY SCALP CLIPS (BLUE) MEDIUM

## (undated) DEVICE — ADAPTER TIP SYR

## (undated) DEVICE — FRAZIER SUCTION TIP 10FR

## (undated) DEVICE — GLV 7.5 PROTEXIS (WHITE)

## (undated) DEVICE — VENODYNE/SCD SLEEVE CALF MEDIUM

## (undated) DEVICE — ACCUDRAIN WITHOUT ANTI-REFLUX VALVE IN THE PATIENT LINE

## (undated) DEVICE — TUBING FOR SMOKE EVACUATOR (PURPLE END)

## (undated) DEVICE — CODMAN PERFORATOR 14MM (BLUE)

## (undated) DEVICE — ELCTR SUBDERMAL NDL 27G X 1/2" WITH TWISTED PAIR

## (undated) DEVICE — DRSG TELFA 4 X 8

## (undated) DEVICE — DRAPE CRANIOTOMY W POUCH 100X104"

## (undated) DEVICE — DRSG XEROFORM 1 X 8"

## (undated) DEVICE — BIPOLAR FORCEP SYMMETRY BAYONET 7" X 1.5MM SMOOTH (SILVER)

## (undated) DEVICE — MIDAS REX LEGEND TAPERED SM BORE 2.3MM X 8CM

## (undated) DEVICE — Device

## (undated) DEVICE — TUBING CONNECTING 6MM 20FT

## (undated) DEVICE — ELCTR MONOPOLAR STIMULATOR PROBE FLUSH-TIP

## (undated) DEVICE — SUT NUROLON 4-0 8-18" TF (POP-OFF)

## (undated) DEVICE — GLV 7 PROTEXIS (WHITE)

## (undated) DEVICE — SUT VICRYL 2-0 18" CP-2 UNDYED (POP-OFF)

## (undated) DEVICE — WARMING BLANKET LOWER ADULT

## (undated) DEVICE — PREP DURAPREP 26CC

## (undated) DEVICE — AESCULAP SCALPFIX 10 CLIPS

## (undated) DEVICE — MIDAS REX MR8 TAPERED SM BORE 2.3MM X 7CM FOOTED

## (undated) DEVICE — ELCTR SUBDERMAL NDL CLASSIC 1.5M X 59" (6 COLOR)